# Patient Record
Sex: MALE | Race: WHITE | Employment: UNEMPLOYED | URBAN - METROPOLITAN AREA
[De-identification: names, ages, dates, MRNs, and addresses within clinical notes are randomized per-mention and may not be internally consistent; named-entity substitution may affect disease eponyms.]

---

## 2017-11-01 PROBLEM — E10.65 UNCONTROLLED TYPE 1 DIABETES MELLITUS WITH HYPERGLYCEMIA (HCC): Status: ACTIVE | Noted: 2017-11-01

## 2018-03-26 RX ORDER — FLUTICASONE PROPIONATE 220 UG/1
1 AEROSOL, METERED RESPIRATORY (INHALATION) 2 TIMES DAILY
Qty: 1 INHALER | Refills: 3 | Status: SHIPPED | OUTPATIENT
Start: 2018-03-26 | End: 2018-04-25 | Stop reason: SDUPTHER

## 2018-03-26 RX ORDER — ALBUTEROL SULFATE 90 UG/1
2 AEROSOL, METERED RESPIRATORY (INHALATION) EVERY 6 HOURS PRN
Qty: 1 INHALER | Refills: 3 | Status: SHIPPED | OUTPATIENT
Start: 2018-03-26 | End: 2018-07-30 | Stop reason: SDUPTHER

## 2018-04-25 ENCOUNTER — OFFICE VISIT (OUTPATIENT)
Dept: FAMILY MEDICINE CLINIC | Age: 28
End: 2018-04-25
Payer: COMMERCIAL

## 2018-04-25 VITALS
OXYGEN SATURATION: 98 % | SYSTOLIC BLOOD PRESSURE: 136 MMHG | HEART RATE: 120 BPM | RESPIRATION RATE: 18 BRPM | HEIGHT: 63 IN | WEIGHT: 104 LBS | DIASTOLIC BLOOD PRESSURE: 104 MMHG | BODY MASS INDEX: 18.43 KG/M2

## 2018-04-25 DIAGNOSIS — R80.9 MICROALBUMINURIA: ICD-10-CM

## 2018-04-25 DIAGNOSIS — E10.42 DIABETIC POLYNEUROPATHY ASSOCIATED WITH TYPE 1 DIABETES MELLITUS (HCC): ICD-10-CM

## 2018-04-25 DIAGNOSIS — E78.01 FAMILIAL HYPERCHOLESTEROLEMIA: ICD-10-CM

## 2018-04-25 DIAGNOSIS — Z76.0 MEDICATION REFILL: ICD-10-CM

## 2018-04-25 DIAGNOSIS — R11.2 INTRACTABLE VOMITING WITH NAUSEA, UNSPECIFIED VOMITING TYPE: ICD-10-CM

## 2018-04-25 DIAGNOSIS — Z00.00 HEALTH CARE MAINTENANCE: ICD-10-CM

## 2018-04-25 DIAGNOSIS — I10 ESSENTIAL HYPERTENSION: ICD-10-CM

## 2018-04-25 LAB — HBA1C MFR BLD: 10.5 %

## 2018-04-25 PROCEDURE — 83036 HEMOGLOBIN GLYCOSYLATED A1C: CPT | Performed by: STUDENT IN AN ORGANIZED HEALTH CARE EDUCATION/TRAINING PROGRAM

## 2018-04-25 PROCEDURE — 4004F PT TOBACCO SCREEN RCVD TLK: CPT | Performed by: STUDENT IN AN ORGANIZED HEALTH CARE EDUCATION/TRAINING PROGRAM

## 2018-04-25 PROCEDURE — G8419 CALC BMI OUT NRM PARAM NOF/U: HCPCS | Performed by: STUDENT IN AN ORGANIZED HEALTH CARE EDUCATION/TRAINING PROGRAM

## 2018-04-25 PROCEDURE — 99213 OFFICE O/P EST LOW 20 MIN: CPT | Performed by: STUDENT IN AN ORGANIZED HEALTH CARE EDUCATION/TRAINING PROGRAM

## 2018-04-25 PROCEDURE — 2022F DILAT RTA XM EVC RTNOPTHY: CPT | Performed by: STUDENT IN AN ORGANIZED HEALTH CARE EDUCATION/TRAINING PROGRAM

## 2018-04-25 PROCEDURE — 3046F HEMOGLOBIN A1C LEVEL >9.0%: CPT | Performed by: STUDENT IN AN ORGANIZED HEALTH CARE EDUCATION/TRAINING PROGRAM

## 2018-04-25 PROCEDURE — G8427 DOCREV CUR MEDS BY ELIG CLIN: HCPCS | Performed by: STUDENT IN AN ORGANIZED HEALTH CARE EDUCATION/TRAINING PROGRAM

## 2018-04-25 RX ORDER — FLUTICASONE PROPIONATE 220 UG/1
1 AEROSOL, METERED RESPIRATORY (INHALATION) 2 TIMES DAILY
Qty: 1 INHALER | Refills: 3 | Status: SHIPPED | OUTPATIENT
Start: 2018-04-25 | End: 2018-08-23 | Stop reason: SDUPTHER

## 2018-04-25 RX ORDER — LISINOPRIL 5 MG/1
5 TABLET ORAL DAILY
Qty: 30 TABLET | Refills: 1 | Status: SHIPPED | OUTPATIENT
Start: 2018-04-25 | End: 2018-06-22 | Stop reason: SDUPTHER

## 2018-04-25 RX ORDER — GABAPENTIN 100 MG/1
100 CAPSULE ORAL 3 TIMES DAILY
Qty: 90 CAPSULE | Refills: 0 | Status: SHIPPED | OUTPATIENT
Start: 2018-04-25 | End: 2018-05-25 | Stop reason: SDUPTHER

## 2018-04-26 RX ORDER — QUETIAPINE FUMARATE 25 MG/1
25 TABLET, FILM COATED ORAL EVERY EVENING
Qty: 30 TABLET | Refills: 1 | Status: SHIPPED | OUTPATIENT
Start: 2018-04-26 | End: 2018-06-22 | Stop reason: SDUPTHER

## 2018-05-01 ENCOUNTER — HOSPITAL ENCOUNTER (OUTPATIENT)
Dept: GENERAL RADIOLOGY | Age: 28
Discharge: HOME | End: 2018-05-03
Payer: COMMERCIAL

## 2018-05-01 ENCOUNTER — HOSPITAL ENCOUNTER (OUTPATIENT)
Age: 28
Discharge: HOME | End: 2018-05-03
Payer: COMMERCIAL

## 2018-05-01 ENCOUNTER — HOSPITAL ENCOUNTER (OUTPATIENT)
Age: 28
Discharge: HOME | End: 2018-05-01
Payer: COMMERCIAL

## 2018-05-01 ENCOUNTER — TELEPHONE (OUTPATIENT)
Dept: FAMILY MEDICINE CLINIC | Age: 28
End: 2018-05-01

## 2018-05-01 DIAGNOSIS — Z00.00 HEALTH CARE MAINTENANCE: ICD-10-CM

## 2018-05-01 DIAGNOSIS — R11.2 INTRACTABLE VOMITING WITH NAUSEA, UNSPECIFIED VOMITING TYPE: ICD-10-CM

## 2018-05-01 LAB
ALBUMIN SERPL-MCNC: 3.9 G/DL (ref 3.5–5.2)
ALP BLD-CCNC: 103 U/L (ref 40–129)
ALT SERPL-CCNC: 22 U/L (ref 0–40)
ANION GAP SERPL CALCULATED.3IONS-SCNC: 19 MMOL/L (ref 7–16)
AST SERPL-CCNC: 28 U/L (ref 0–39)
BASOPHILS ABSOLUTE: 0.13 E9/L (ref 0–0.2)
BASOPHILS RELATIVE PERCENT: 0.8 % (ref 0–2)
BILIRUB SERPL-MCNC: <0.2 MG/DL (ref 0–1.2)
BUN BLDV-MCNC: 19 MG/DL (ref 6–20)
CALCIUM SERPL-MCNC: 9.5 MG/DL (ref 8.6–10.2)
CHLORIDE BLD-SCNC: 96 MMOL/L (ref 98–107)
CHOLESTEROL, TOTAL: 262 MG/DL (ref 0–199)
CO2: 19 MMOL/L (ref 22–29)
CREAT SERPL-MCNC: 0.8 MG/DL (ref 0.7–1.2)
EOSINOPHILS ABSOLUTE: 0.35 E9/L (ref 0.05–0.5)
EOSINOPHILS RELATIVE PERCENT: 2.1 % (ref 0–6)
GFR AFRICAN AMERICAN: >60
GFR NON-AFRICAN AMERICAN: >60 ML/MIN/1.73
GLUCOSE BLD-MCNC: 518 MG/DL (ref 74–109)
HCT VFR BLD CALC: 39.2 % (ref 37–54)
HDLC SERPL-MCNC: 39 MG/DL
HEMOGLOBIN: 13 G/DL (ref 12.5–16.5)
IMMATURE GRANULOCYTES #: 0.08 E9/L
IMMATURE GRANULOCYTES %: 0.5 % (ref 0–5)
LDL CHOLESTEROL CALCULATED: 165 MG/DL (ref 0–99)
LYMPHOCYTES ABSOLUTE: 3.01 E9/L (ref 1.5–4)
LYMPHOCYTES RELATIVE PERCENT: 17.7 % (ref 20–42)
MCH RBC QN AUTO: 30.2 PG (ref 26–35)
MCHC RBC AUTO-ENTMCNC: 33.2 % (ref 32–34.5)
MCV RBC AUTO: 91 FL (ref 80–99.9)
MONOCYTES ABSOLUTE: 0.53 E9/L (ref 0.1–0.95)
MONOCYTES RELATIVE PERCENT: 3.1 % (ref 2–12)
NEUTROPHILS ABSOLUTE: 12.89 E9/L (ref 1.8–7.3)
NEUTROPHILS RELATIVE PERCENT: 75.8 % (ref 43–80)
PDW BLD-RTO: 13 FL (ref 11.5–15)
PLATELET # BLD: 318 E9/L (ref 130–450)
PMV BLD AUTO: 10 FL (ref 7–12)
POTASSIUM SERPL-SCNC: 4 MMOL/L (ref 3.5–5)
RBC # BLD: 4.31 E12/L (ref 3.8–5.8)
SODIUM BLD-SCNC: 134 MMOL/L (ref 132–146)
TOTAL PROTEIN: 7.1 G/DL (ref 6.4–8.3)
TRIGL SERPL-MCNC: 288 MG/DL (ref 0–149)
VLDLC SERPL CALC-MCNC: 58 MG/DL
WBC # BLD: 17 E9/L (ref 4.5–11.5)

## 2018-05-01 PROCEDURE — 74019 RADEX ABDOMEN 2 VIEWS: CPT

## 2018-05-01 PROCEDURE — 80053 COMPREHEN METABOLIC PANEL: CPT

## 2018-05-01 PROCEDURE — 36415 COLL VENOUS BLD VENIPUNCTURE: CPT

## 2018-05-01 PROCEDURE — 85025 COMPLETE CBC W/AUTO DIFF WBC: CPT

## 2018-05-01 PROCEDURE — 80061 LIPID PANEL: CPT

## 2018-05-02 ENCOUNTER — OFFICE VISIT (OUTPATIENT)
Dept: FAMILY MEDICINE CLINIC | Age: 28
End: 2018-05-02
Payer: COMMERCIAL

## 2018-05-02 VITALS
WEIGHT: 107 LBS | RESPIRATION RATE: 18 BRPM | SYSTOLIC BLOOD PRESSURE: 142 MMHG | HEART RATE: 123 BPM | OXYGEN SATURATION: 98 % | BODY MASS INDEX: 18.96 KG/M2 | DIASTOLIC BLOOD PRESSURE: 100 MMHG | HEIGHT: 63 IN

## 2018-05-02 DIAGNOSIS — R00.0 TACHYCARDIA: ICD-10-CM

## 2018-05-02 LAB — GLUCOSE BLD-MCNC: 269 MG/DL

## 2018-05-02 PROCEDURE — 4004F PT TOBACCO SCREEN RCVD TLK: CPT | Performed by: STUDENT IN AN ORGANIZED HEALTH CARE EDUCATION/TRAINING PROGRAM

## 2018-05-02 PROCEDURE — G8427 DOCREV CUR MEDS BY ELIG CLIN: HCPCS | Performed by: STUDENT IN AN ORGANIZED HEALTH CARE EDUCATION/TRAINING PROGRAM

## 2018-05-02 PROCEDURE — 82962 GLUCOSE BLOOD TEST: CPT | Performed by: STUDENT IN AN ORGANIZED HEALTH CARE EDUCATION/TRAINING PROGRAM

## 2018-05-02 PROCEDURE — 3046F HEMOGLOBIN A1C LEVEL >9.0%: CPT | Performed by: STUDENT IN AN ORGANIZED HEALTH CARE EDUCATION/TRAINING PROGRAM

## 2018-05-02 PROCEDURE — G8420 CALC BMI NORM PARAMETERS: HCPCS | Performed by: STUDENT IN AN ORGANIZED HEALTH CARE EDUCATION/TRAINING PROGRAM

## 2018-05-02 PROCEDURE — 99213 OFFICE O/P EST LOW 20 MIN: CPT | Performed by: STUDENT IN AN ORGANIZED HEALTH CARE EDUCATION/TRAINING PROGRAM

## 2018-05-02 PROCEDURE — 2022F DILAT RTA XM EVC RTNOPTHY: CPT | Performed by: STUDENT IN AN ORGANIZED HEALTH CARE EDUCATION/TRAINING PROGRAM

## 2018-05-02 RX ORDER — METOPROLOL SUCCINATE 100 MG/1
50 TABLET, EXTENDED RELEASE ORAL DAILY
Qty: 30 TABLET | Refills: 3 | Status: SHIPPED | OUTPATIENT
Start: 2018-05-02 | End: 2018-05-09 | Stop reason: SDUPTHER

## 2018-05-02 RX ORDER — INSULIN GLARGINE 100 [IU]/ML
40 INJECTION, SOLUTION SUBCUTANEOUS NIGHTLY
Qty: 1 VIAL | Refills: 3 | Status: SHIPPED | OUTPATIENT
Start: 2018-05-02 | End: 2018-05-30 | Stop reason: SDUPTHER

## 2018-05-08 ASSESSMENT — ENCOUNTER SYMPTOMS
HEARTBURN: 1
SORE THROAT: 0
WHEEZING: 0
CONSTIPATION: 0
BLURRED VISION: 0
VOMITING: 1
COUGH: 1
ABDOMINAL PAIN: 0
SHORTNESS OF BREATH: 1
DIARRHEA: 0
BACK PAIN: 0
NAUSEA: 1
ORTHOPNEA: 0

## 2018-05-09 RX ORDER — METOPROLOL SUCCINATE 100 MG/1
100 TABLET, EXTENDED RELEASE ORAL DAILY
Qty: 30 TABLET | Refills: 3
Start: 2018-05-09 | End: 2018-09-11 | Stop reason: SDUPTHER

## 2018-05-09 ASSESSMENT — ENCOUNTER SYMPTOMS
DIARRHEA: 0
NAUSEA: 0
ABDOMINAL PAIN: 0
SORE THROAT: 0
BLURRED VISION: 0
SHORTNESS OF BREATH: 1
ORTHOPNEA: 0
CONSTIPATION: 0
BACK PAIN: 0
COUGH: 1
VOMITING: 0
HEARTBURN: 0
WHEEZING: 0

## 2018-05-10 ENCOUNTER — TELEPHONE (OUTPATIENT)
Dept: FAMILY MEDICINE CLINIC | Age: 28
End: 2018-05-10

## 2018-05-25 ENCOUNTER — OFFICE VISIT (OUTPATIENT)
Dept: FAMILY MEDICINE CLINIC | Age: 28
End: 2018-05-25
Payer: COMMERCIAL

## 2018-05-25 VITALS
BODY MASS INDEX: 18.96 KG/M2 | WEIGHT: 107 LBS | HEIGHT: 63 IN | DIASTOLIC BLOOD PRESSURE: 84 MMHG | RESPIRATION RATE: 16 BRPM | SYSTOLIC BLOOD PRESSURE: 126 MMHG | HEART RATE: 116 BPM

## 2018-05-25 DIAGNOSIS — E10.42 DIABETIC POLYNEUROPATHY ASSOCIATED WITH TYPE 1 DIABETES MELLITUS (HCC): ICD-10-CM

## 2018-05-25 DIAGNOSIS — Z00.00 HEALTH CARE MAINTENANCE: ICD-10-CM

## 2018-05-25 LAB — GLUCOSE BLD-MCNC: 151 MG/DL

## 2018-05-25 PROCEDURE — 82962 GLUCOSE BLOOD TEST: CPT | Performed by: STUDENT IN AN ORGANIZED HEALTH CARE EDUCATION/TRAINING PROGRAM

## 2018-05-25 PROCEDURE — G8427 DOCREV CUR MEDS BY ELIG CLIN: HCPCS | Performed by: STUDENT IN AN ORGANIZED HEALTH CARE EDUCATION/TRAINING PROGRAM

## 2018-05-25 PROCEDURE — 3046F HEMOGLOBIN A1C LEVEL >9.0%: CPT | Performed by: STUDENT IN AN ORGANIZED HEALTH CARE EDUCATION/TRAINING PROGRAM

## 2018-05-25 PROCEDURE — 4004F PT TOBACCO SCREEN RCVD TLK: CPT | Performed by: STUDENT IN AN ORGANIZED HEALTH CARE EDUCATION/TRAINING PROGRAM

## 2018-05-25 PROCEDURE — G8420 CALC BMI NORM PARAMETERS: HCPCS | Performed by: STUDENT IN AN ORGANIZED HEALTH CARE EDUCATION/TRAINING PROGRAM

## 2018-05-25 PROCEDURE — 99213 OFFICE O/P EST LOW 20 MIN: CPT | Performed by: STUDENT IN AN ORGANIZED HEALTH CARE EDUCATION/TRAINING PROGRAM

## 2018-05-25 PROCEDURE — 2022F DILAT RTA XM EVC RTNOPTHY: CPT | Performed by: STUDENT IN AN ORGANIZED HEALTH CARE EDUCATION/TRAINING PROGRAM

## 2018-05-25 RX ORDER — ATORVASTATIN CALCIUM 40 MG/1
40 TABLET, FILM COATED ORAL DAILY
Qty: 30 TABLET | Refills: 3 | Status: SHIPPED | OUTPATIENT
Start: 2018-05-25 | End: 2018-10-11 | Stop reason: SDUPTHER

## 2018-05-25 RX ORDER — GABAPENTIN 100 MG/1
100 CAPSULE ORAL 3 TIMES DAILY
Qty: 90 CAPSULE | Refills: 0 | Status: SHIPPED | OUTPATIENT
Start: 2018-05-25 | End: 2018-06-22 | Stop reason: SDUPTHER

## 2018-05-25 RX ORDER — NICOTINE 21 MG/24HR
1 PATCH, TRANSDERMAL 24 HOURS TRANSDERMAL EVERY 24 HOURS
Qty: 30 PATCH | Refills: 3 | Status: SHIPPED | OUTPATIENT
Start: 2018-05-25 | End: 2018-06-27 | Stop reason: SDDI

## 2018-05-30 RX ORDER — INSULIN GLARGINE 100 [IU]/ML
40 INJECTION, SOLUTION SUBCUTANEOUS NIGHTLY
Qty: 1 VIAL | Refills: 3
Start: 2018-05-30 | End: 2018-07-30

## 2018-05-30 ASSESSMENT — ENCOUNTER SYMPTOMS
WHEEZING: 0
HEARTBURN: 0
VOMITING: 0
DIARRHEA: 0
CONSTIPATION: 0
COUGH: 0
BACK PAIN: 0
BLURRED VISION: 1
SORE THROAT: 0
NAUSEA: 0
ABDOMINAL PAIN: 0
SHORTNESS OF BREATH: 0
ORTHOPNEA: 0

## 2018-06-22 ENCOUNTER — OFFICE VISIT (OUTPATIENT)
Dept: FAMILY MEDICINE CLINIC | Age: 28
End: 2018-06-22
Payer: COMMERCIAL

## 2018-06-22 VITALS
SYSTOLIC BLOOD PRESSURE: 120 MMHG | HEART RATE: 107 BPM | BODY MASS INDEX: 18.25 KG/M2 | HEIGHT: 63 IN | OXYGEN SATURATION: 97 % | RESPIRATION RATE: 18 BRPM | WEIGHT: 103 LBS | DIASTOLIC BLOOD PRESSURE: 88 MMHG

## 2018-06-22 DIAGNOSIS — E10.42 DIABETIC POLYNEUROPATHY ASSOCIATED WITH TYPE 1 DIABETES MELLITUS (HCC): ICD-10-CM

## 2018-06-22 DIAGNOSIS — R80.9 MICROALBUMINURIA: ICD-10-CM

## 2018-06-22 DIAGNOSIS — E10.42 TYPE 1 DIABETES MELLITUS WITH DIABETIC POLYNEUROPATHY (HCC): Primary | ICD-10-CM

## 2018-06-22 PROCEDURE — G8427 DOCREV CUR MEDS BY ELIG CLIN: HCPCS | Performed by: STUDENT IN AN ORGANIZED HEALTH CARE EDUCATION/TRAINING PROGRAM

## 2018-06-22 PROCEDURE — 4004F PT TOBACCO SCREEN RCVD TLK: CPT | Performed by: STUDENT IN AN ORGANIZED HEALTH CARE EDUCATION/TRAINING PROGRAM

## 2018-06-22 PROCEDURE — 2022F DILAT RTA XM EVC RTNOPTHY: CPT | Performed by: STUDENT IN AN ORGANIZED HEALTH CARE EDUCATION/TRAINING PROGRAM

## 2018-06-22 PROCEDURE — 3046F HEMOGLOBIN A1C LEVEL >9.0%: CPT | Performed by: STUDENT IN AN ORGANIZED HEALTH CARE EDUCATION/TRAINING PROGRAM

## 2018-06-22 PROCEDURE — G8419 CALC BMI OUT NRM PARAM NOF/U: HCPCS | Performed by: STUDENT IN AN ORGANIZED HEALTH CARE EDUCATION/TRAINING PROGRAM

## 2018-06-22 PROCEDURE — 99213 OFFICE O/P EST LOW 20 MIN: CPT | Performed by: STUDENT IN AN ORGANIZED HEALTH CARE EDUCATION/TRAINING PROGRAM

## 2018-06-22 RX ORDER — QUETIAPINE FUMARATE 25 MG/1
25 TABLET, FILM COATED ORAL EVERY EVENING
Qty: 30 TABLET | Refills: 1 | Status: SHIPPED | OUTPATIENT
Start: 2018-06-22 | End: 2018-08-23 | Stop reason: SDUPTHER

## 2018-06-22 RX ORDER — LISINOPRIL 5 MG/1
5 TABLET ORAL DAILY
Qty: 30 TABLET | Refills: 1 | Status: SHIPPED | OUTPATIENT
Start: 2018-06-22 | End: 2018-08-23 | Stop reason: SDUPTHER

## 2018-06-22 RX ORDER — GABAPENTIN 100 MG/1
100 CAPSULE ORAL 3 TIMES DAILY
Qty: 90 CAPSULE | Refills: 0 | Status: SHIPPED | OUTPATIENT
Start: 2018-06-22 | End: 2018-07-30 | Stop reason: SDUPTHER

## 2018-06-25 ENCOUNTER — HOSPITAL ENCOUNTER (OUTPATIENT)
Age: 28
Discharge: HOME | End: 2018-06-25
Payer: COMMERCIAL

## 2018-06-25 DIAGNOSIS — Z00.00 HEALTH CARE MAINTENANCE: ICD-10-CM

## 2018-06-25 LAB
BASOPHILS ABSOLUTE: 0.09 E9/L (ref 0–0.2)
BASOPHILS RELATIVE PERCENT: 0.8 % (ref 0–2)
EOSINOPHILS ABSOLUTE: 0.38 E9/L (ref 0.05–0.5)
EOSINOPHILS RELATIVE PERCENT: 3.4 % (ref 0–6)
HCT VFR BLD CALC: 38.8 % (ref 37–54)
HEMOGLOBIN: 12.9 G/DL (ref 12.5–16.5)
IMMATURE GRANULOCYTES #: 0.04 E9/L
IMMATURE GRANULOCYTES %: 0.4 % (ref 0–5)
LYMPHOCYTES ABSOLUTE: 3.79 E9/L (ref 1.5–4)
LYMPHOCYTES RELATIVE PERCENT: 34 % (ref 20–42)
MCH RBC QN AUTO: 29.7 PG (ref 26–35)
MCHC RBC AUTO-ENTMCNC: 33.2 % (ref 32–34.5)
MCV RBC AUTO: 89.2 FL (ref 80–99.9)
MONOCYTES ABSOLUTE: 0.52 E9/L (ref 0.1–0.95)
MONOCYTES RELATIVE PERCENT: 4.7 % (ref 2–12)
NEUTROPHILS ABSOLUTE: 6.34 E9/L (ref 1.8–7.3)
NEUTROPHILS RELATIVE PERCENT: 56.7 % (ref 43–80)
PDW BLD-RTO: 12.6 FL (ref 11.5–15)
PLATELET # BLD: 291 E9/L (ref 130–450)
PMV BLD AUTO: 9.6 FL (ref 7–12)
RBC # BLD: 4.35 E12/L (ref 3.8–5.8)
WBC # BLD: 11.2 E9/L (ref 4.5–11.5)

## 2018-06-25 PROCEDURE — 36415 COLL VENOUS BLD VENIPUNCTURE: CPT

## 2018-06-25 PROCEDURE — 85025 COMPLETE CBC W/AUTO DIFF WBC: CPT

## 2018-06-25 ASSESSMENT — ENCOUNTER SYMPTOMS
WHEEZING: 0
ABDOMINAL PAIN: 0
NAUSEA: 0
HEARTBURN: 0
SHORTNESS OF BREATH: 1
SORE THROAT: 0
CONSTIPATION: 0
ORTHOPNEA: 0
VOMITING: 0
DIARRHEA: 0
BACK PAIN: 0
BLURRED VISION: 1
COUGH: 1

## 2018-07-30 ENCOUNTER — OFFICE VISIT (OUTPATIENT)
Dept: FAMILY MEDICINE CLINIC | Age: 28
End: 2018-07-30
Payer: COMMERCIAL

## 2018-07-30 VITALS
HEIGHT: 63 IN | RESPIRATION RATE: 14 BRPM | DIASTOLIC BLOOD PRESSURE: 106 MMHG | OXYGEN SATURATION: 99 % | BODY MASS INDEX: 18.43 KG/M2 | SYSTOLIC BLOOD PRESSURE: 143 MMHG | HEART RATE: 110 BPM | WEIGHT: 104 LBS

## 2018-07-30 DIAGNOSIS — E10.42 DIABETIC POLYNEUROPATHY ASSOCIATED WITH TYPE 1 DIABETES MELLITUS (HCC): ICD-10-CM

## 2018-07-30 DIAGNOSIS — I10 ESSENTIAL HYPERTENSION: ICD-10-CM

## 2018-07-30 LAB — HBA1C MFR BLD: 11.1 %

## 2018-07-30 PROCEDURE — 4004F PT TOBACCO SCREEN RCVD TLK: CPT | Performed by: STUDENT IN AN ORGANIZED HEALTH CARE EDUCATION/TRAINING PROGRAM

## 2018-07-30 PROCEDURE — G8419 CALC BMI OUT NRM PARAM NOF/U: HCPCS | Performed by: STUDENT IN AN ORGANIZED HEALTH CARE EDUCATION/TRAINING PROGRAM

## 2018-07-30 PROCEDURE — 3046F HEMOGLOBIN A1C LEVEL >9.0%: CPT | Performed by: STUDENT IN AN ORGANIZED HEALTH CARE EDUCATION/TRAINING PROGRAM

## 2018-07-30 PROCEDURE — G8427 DOCREV CUR MEDS BY ELIG CLIN: HCPCS | Performed by: STUDENT IN AN ORGANIZED HEALTH CARE EDUCATION/TRAINING PROGRAM

## 2018-07-30 PROCEDURE — 83036 HEMOGLOBIN GLYCOSYLATED A1C: CPT | Performed by: STUDENT IN AN ORGANIZED HEALTH CARE EDUCATION/TRAINING PROGRAM

## 2018-07-30 PROCEDURE — 99213 OFFICE O/P EST LOW 20 MIN: CPT | Performed by: STUDENT IN AN ORGANIZED HEALTH CARE EDUCATION/TRAINING PROGRAM

## 2018-07-30 PROCEDURE — 2022F DILAT RTA XM EVC RTNOPTHY: CPT | Performed by: STUDENT IN AN ORGANIZED HEALTH CARE EDUCATION/TRAINING PROGRAM

## 2018-07-30 RX ORDER — GABAPENTIN 100 MG/1
100 CAPSULE ORAL 3 TIMES DAILY
Qty: 90 CAPSULE | Refills: 0 | Status: SHIPPED | OUTPATIENT
Start: 2018-07-30 | End: 2018-08-23 | Stop reason: SDUPTHER

## 2018-07-30 RX ORDER — ALBUTEROL SULFATE 90 UG/1
2 AEROSOL, METERED RESPIRATORY (INHALATION) EVERY 6 HOURS PRN
Qty: 1 INHALER | Refills: 3 | Status: SHIPPED | OUTPATIENT
Start: 2018-07-30 | End: 2019-01-21 | Stop reason: SDUPTHER

## 2018-07-30 RX ORDER — CLONIDINE HYDROCHLORIDE 0.1 MG/1
0.1 TABLET, EXTENDED RELEASE ORAL 2 TIMES DAILY PRN
Qty: 60 TABLET | Refills: 0 | Status: SHIPPED | OUTPATIENT
Start: 2018-07-30 | End: 2018-09-11

## 2018-07-30 NOTE — PROGRESS NOTES
insulin aspart (NOVOLOG) 100 UNIT/ML injection vial; Inject 6-10 Units into the skin 4 times daily  -     POCT glycosylated hemoglobin (Hb A1C)  -      DIABETES FOOT EXAM    Essential hypertension:  HTN due to weaning off suboxone. Patient does have history of labile blood pressure. Will add Clonidine today and reassess. Patient asked to report any dizziness, light headedness. Also asked not to stop suddenly. -     cloNIDine (KAPVAY) 0.1 MG TB12 extended release tablet; Take 1 tablet by mouth 2 times daily as needed (htn)    Diabetic polyneuropathy associated with type 1 diabetes mellitus (HCC)  -     gabapentin (NEURONTIN) 100 MG capsule; Take 1 capsule by mouth 3 times daily for 30 days. .    Shortness of Breath:  Pt continues to smoke. Counseled on smoking cessation. Will start on Ventolin as needed. -     albuterol sulfate HFA (VENTOLIN HFA) 108 (90 Base) MCG/ACT inhaler; Inhale 2 puffs into the lungs every 6 hours as needed for Wheezing    Return in about 2 weeks (around 8/13/2018).

## 2018-08-07 ASSESSMENT — ENCOUNTER SYMPTOMS
VOMITING: 0
CONSTIPATION: 0
DIARRHEA: 0
BLURRED VISION: 1
NAUSEA: 0
SORE THROAT: 0
SHORTNESS OF BREATH: 1
COUGH: 1
HEARTBURN: 0
ABDOMINAL PAIN: 0
ORTHOPNEA: 0
WHEEZING: 0
BACK PAIN: 0

## 2018-08-23 ENCOUNTER — OFFICE VISIT (OUTPATIENT)
Dept: FAMILY MEDICINE CLINIC | Age: 28
End: 2018-08-23
Payer: COMMERCIAL

## 2018-08-23 VITALS
SYSTOLIC BLOOD PRESSURE: 150 MMHG | HEART RATE: 112 BPM | DIASTOLIC BLOOD PRESSURE: 100 MMHG | TEMPERATURE: 98.7 F | RESPIRATION RATE: 16 BRPM | WEIGHT: 107 LBS | OXYGEN SATURATION: 98 % | HEIGHT: 63 IN | BODY MASS INDEX: 18.96 KG/M2

## 2018-08-23 DIAGNOSIS — K04.7 DENTAL ABSCESS: Primary | ICD-10-CM

## 2018-08-23 DIAGNOSIS — E10.65 UNCONTROLLED TYPE 1 DIABETES MELLITUS WITH HYPERGLYCEMIA (HCC): ICD-10-CM

## 2018-08-23 DIAGNOSIS — R80.9 MICROALBUMINURIA: ICD-10-CM

## 2018-08-23 DIAGNOSIS — E10.42 DIABETIC POLYNEUROPATHY ASSOCIATED WITH TYPE 1 DIABETES MELLITUS (HCC): ICD-10-CM

## 2018-08-23 DIAGNOSIS — Z76.0 MEDICATION REFILL: ICD-10-CM

## 2018-08-23 PROCEDURE — 4004F PT TOBACCO SCREEN RCVD TLK: CPT | Performed by: STUDENT IN AN ORGANIZED HEALTH CARE EDUCATION/TRAINING PROGRAM

## 2018-08-23 PROCEDURE — 3046F HEMOGLOBIN A1C LEVEL >9.0%: CPT | Performed by: STUDENT IN AN ORGANIZED HEALTH CARE EDUCATION/TRAINING PROGRAM

## 2018-08-23 PROCEDURE — G8427 DOCREV CUR MEDS BY ELIG CLIN: HCPCS | Performed by: STUDENT IN AN ORGANIZED HEALTH CARE EDUCATION/TRAINING PROGRAM

## 2018-08-23 PROCEDURE — 2022F DILAT RTA XM EVC RTNOPTHY: CPT | Performed by: STUDENT IN AN ORGANIZED HEALTH CARE EDUCATION/TRAINING PROGRAM

## 2018-08-23 PROCEDURE — 99213 OFFICE O/P EST LOW 20 MIN: CPT | Performed by: STUDENT IN AN ORGANIZED HEALTH CARE EDUCATION/TRAINING PROGRAM

## 2018-08-23 PROCEDURE — G8420 CALC BMI NORM PARAMETERS: HCPCS | Performed by: STUDENT IN AN ORGANIZED HEALTH CARE EDUCATION/TRAINING PROGRAM

## 2018-08-23 RX ORDER — GABAPENTIN 100 MG/1
100 CAPSULE ORAL 3 TIMES DAILY
Qty: 90 CAPSULE | Refills: 0 | Status: SHIPPED | OUTPATIENT
Start: 2018-08-23 | End: 2018-09-11 | Stop reason: SDUPTHER

## 2018-08-23 RX ORDER — LISINOPRIL 10 MG/1
10 TABLET ORAL DAILY
Qty: 30 TABLET | Refills: 1 | Status: SHIPPED | OUTPATIENT
Start: 2018-08-23 | End: 2019-03-06 | Stop reason: SDUPTHER

## 2018-08-23 RX ORDER — AMOXICILLIN AND CLAVULANATE POTASSIUM 875; 125 MG/1; MG/1
1 TABLET, FILM COATED ORAL EVERY 12 HOURS
Qty: 20 TABLET | Refills: 0 | Status: SHIPPED | OUTPATIENT
Start: 2018-08-23 | End: 2018-09-02

## 2018-08-23 RX ORDER — FLUTICASONE PROPIONATE 220 UG/1
1 AEROSOL, METERED RESPIRATORY (INHALATION) 2 TIMES DAILY
Qty: 1 INHALER | Refills: 3 | Status: SHIPPED | OUTPATIENT
Start: 2018-08-23 | End: 2019-01-21 | Stop reason: SDUPTHER

## 2018-08-23 RX ORDER — QUETIAPINE FUMARATE 25 MG/1
25 TABLET, FILM COATED ORAL EVERY EVENING
Qty: 30 TABLET | Refills: 1 | Status: SHIPPED | OUTPATIENT
Start: 2018-08-23 | End: 2018-09-11

## 2018-08-23 NOTE — PROGRESS NOTES
9/24/2018    Jose Bobo is a 29 y.o. male here for   Chief Complaint   Patient presents with    Diabetes     check up     DM2:    Lantus at 10:30-11pm at night. FBS: 300s now. Post-Prandial   Left eye blurry -has not been able to see any opth. Neuropathy under check with gabapentin. Pre-meal: 150-160s. Right jaw pain and also under his ear. Night time fevers. Started a week ago, worse for the past 3 days. Dentist unwilling to extract because of Diabetes. 9-10 units 4 times a day. Eating more to gain weight. Lisinopril 20 mg    No Known Allergies    Medications  Current Outpatient Prescriptions   Medication Sig Dispense Refill    blood glucose test strips (TRUE METRIX BLOOD GLUCOSE TEST) strip 1 each by In Vitro route 5 times daily As needed. 150 each 3    lisinopril (PRINIVIL;ZESTRIL) 10 MG tablet Take 1 tablet by mouth daily (Patient taking differently: Take 7.5 mg by mouth daily ) 30 tablet 1    fluticasone (FLOVENT HFA) 220 MCG/ACT inhaler Inhale 1 puff into the lungs 2 times daily 1 Inhaler 3    insulin aspart (NOVOLOG) 100 UNIT/ML injection vial Inject 6-10 Units into the skin 4 times daily 1 vial 3    albuterol sulfate HFA (VENTOLIN HFA) 108 (90 Base) MCG/ACT inhaler Inhale 2 puffs into the lungs every 6 hours as needed for Wheezing 1 Inhaler 3    insulin glargine (BASAGLAR KWIKPEN) 100 UNIT/ML injection pen Patient to take 15 units in the am and 20 units in the pm. (Patient taking differently: Inject 40 Units into the skin nightly ) 4 pen 3    B-D INS SYRINGE 0.5CC/31GX5/16 31G X 5/16\" 0.5 ML MISC use four times a day 200 each 5    atorvastatin (LIPITOR) 40 MG tablet Take 1 tablet by mouth daily 30 tablet 3    SUBOXONE 8-2 MG FILM SL film       gabapentin (NEURONTIN) 300 MG capsule Take 1 capsule by mouth nightly for 60 days. . 30 capsule 1    melatonin 1 MG tablet Take 1 tablet by mouth nightly as needed for Sleep 30 tablet 0    metoprolol succinate (TOPROL XL) 50 MG extended
Statement    I have reviewed the chart, including any radiology or labs. I have discussed the case, including pertinent history and exam findings with the resident. I agree with the assessment, plan and orders as documented by the resident. Please refer to the resident note for additional information.       Electronically signed by Lizette Gee DO on 9/24/2018 at 8:22 AM

## 2018-08-23 NOTE — PATIENT INSTRUCTIONS
Uncontrolled Diabetes Barrier Assessment  [facility] 4908-8219 Quality Improvement  Project  [address] [facility] PRIMARY CARE Dept: [phone] 8/22/18    Hemoglobin A1C:  Hemoglobin A1C is a reflection of average blood sugar over the past 3 months. Many organizations recommend that a diabetic's Hemoglobin A1C be below 7%, however this often varies on an individual basis. It is recommended that you discuss your Hemoglobin A1C goal with your doctor and why this goal is chosen for you. My next Hemoglobin A1C goal is less than 10. My last Hemoglobin A1C was 11.1  Lab Results   Component Value Date    LABA1C 11.1 07/30/2018        Follow Up Plan: It is very important that you have appropriate follow up for your health conditions. We may call you in about 2 weeks to review your progress unless you are scheduled to meet with your doctor before that time. Please be sure your contact numbers are accurate. At this visit your doctor recommended follow up No Follow-up on file. Please check your glucose 4 times per day and record your results for review at your follow up visit. Uncontrolled Diabetes Barrier Assessment:  Based on the barrier assessment completed today (scanned into media), the highest risk area appears to be: 3 - Beliefs about Treatments. Based on the survey you completed today, we have agreed that the next best step is: 2 - to streamline to help you take your medicines as recommended. Every person's circumstances are unique, we would like to offer as much support as possible as we work together to improve your health. If there is something specific you feel would help please bring it up to your health care staff and doctor. Thank you for allowing us to take care of you.

## 2018-08-24 ENCOUNTER — TELEPHONE (OUTPATIENT)
Dept: ADMINISTRATIVE | Age: 28
End: 2018-08-24

## 2018-09-11 ENCOUNTER — OFFICE VISIT (OUTPATIENT)
Dept: FAMILY MEDICINE CLINIC | Age: 28
End: 2018-09-11
Payer: COMMERCIAL

## 2018-09-11 VITALS
WEIGHT: 107 LBS | HEIGHT: 63 IN | SYSTOLIC BLOOD PRESSURE: 114 MMHG | DIASTOLIC BLOOD PRESSURE: 74 MMHG | RESPIRATION RATE: 18 BRPM | OXYGEN SATURATION: 94 % | BODY MASS INDEX: 18.96 KG/M2 | HEART RATE: 103 BPM

## 2018-09-11 DIAGNOSIS — R00.0 TACHYCARDIA: ICD-10-CM

## 2018-09-11 DIAGNOSIS — E10.42 DIABETIC POLYNEUROPATHY ASSOCIATED WITH TYPE 1 DIABETES MELLITUS (HCC): ICD-10-CM

## 2018-09-11 PROCEDURE — 99213 OFFICE O/P EST LOW 20 MIN: CPT | Performed by: STUDENT IN AN ORGANIZED HEALTH CARE EDUCATION/TRAINING PROGRAM

## 2018-09-11 PROCEDURE — 3046F HEMOGLOBIN A1C LEVEL >9.0%: CPT | Performed by: STUDENT IN AN ORGANIZED HEALTH CARE EDUCATION/TRAINING PROGRAM

## 2018-09-11 PROCEDURE — G8420 CALC BMI NORM PARAMETERS: HCPCS | Performed by: STUDENT IN AN ORGANIZED HEALTH CARE EDUCATION/TRAINING PROGRAM

## 2018-09-11 PROCEDURE — 4004F PT TOBACCO SCREEN RCVD TLK: CPT | Performed by: STUDENT IN AN ORGANIZED HEALTH CARE EDUCATION/TRAINING PROGRAM

## 2018-09-11 PROCEDURE — 2022F DILAT RTA XM EVC RTNOPTHY: CPT | Performed by: STUDENT IN AN ORGANIZED HEALTH CARE EDUCATION/TRAINING PROGRAM

## 2018-09-11 PROCEDURE — G8427 DOCREV CUR MEDS BY ELIG CLIN: HCPCS | Performed by: STUDENT IN AN ORGANIZED HEALTH CARE EDUCATION/TRAINING PROGRAM

## 2018-09-11 RX ORDER — UREA 10 %
1 LOTION (ML) TOPICAL NIGHTLY PRN
Qty: 30 TABLET | Refills: 0 | Status: SHIPPED | OUTPATIENT
Start: 2018-09-11 | End: 2019-02-14

## 2018-09-11 RX ORDER — GABAPENTIN 300 MG/1
300 CAPSULE ORAL NIGHTLY
Qty: 30 CAPSULE | Refills: 1 | Status: SHIPPED | OUTPATIENT
Start: 2018-09-11 | End: 2018-11-13 | Stop reason: SDUPTHER

## 2018-09-11 RX ORDER — METOPROLOL SUCCINATE 50 MG/1
50 TABLET, EXTENDED RELEASE ORAL DAILY
Qty: 30 TABLET | Refills: 1 | Status: SHIPPED | OUTPATIENT
Start: 2018-09-11 | End: 2018-11-13 | Stop reason: SDUPTHER

## 2018-09-11 NOTE — PROGRESS NOTES
Uncontrolled Diabetes Barrier Assessment  [facility] 0550-0579 Quality Improvement  Project  [address] [facility] PRIMARY CARE Dept: [phone] 9/11/18    Hemoglobin A1C:  Hemoglobin A1C is a reflection of average blood sugar over the past 3 months. Many organizations recommend that a diabetic's Hemoglobin A1C be below 7%, however this often varies on an individual basis. It is recommended that you discuss your Hemoglobin A1C goal with your doctor and why this goal is chosen for you. My next Hemoglobin A1C goal is less than {Blank single:90981::\"6.5\",\"7\",\"7.5\",\"8\",\"8.5\",\"9\",\"***\"}. My last Hemoglobin A1C was   Lab Results   Component Value Date    LABA1C 11.1 07/30/2018        Follow Up Plan: It is very important that you have appropriate follow up for your health conditions. We may call you in about 2 weeks to review your progress unless you are scheduled to meet with your doctor before that time. Please be sure your contact numbers are accurate. At this visit your doctor recommended follow up No Follow-up on file. Please check your glucose {Blanksingle:32880::\"1\",\"2\",\"3\",\"4\"} times per day and record your results for review at your follow up visit. Uncontrolled Diabetes Barrier Assessment:  Based on the barrier assessment completed today (scanned into media), the highest risk area appears to be: {Blank single:92774::\"1 - Social/Economic Concerns\",\"2 - Medication Effects\",\"3 - Beliefs about Treatments\",\"4 - Mood\",\"5 - Strength of Habit (reverse coded)\",\"***\"}. Based on the survey you completed today, we have agreed that the next best step is: {Blank single:04357::\"1 - a referral to social work. Please expect a call within the next 2 weeks. \",\"2 - to *** to help you take your medicines as recommended. \",\"3 - to *** to help you understand your illness and the treatments available. \",\"4 - treat your mood by *** so that you are able to improve your diabetes illness. \",\"5 - to remind myself daily why it is important to me to control my illnesses and the benefit of making my medications part of my daily routine\",\"***\"}    Every person's circumstances are unique, we would like to offer as much support as possible as we work together to improve your health. If there is something specific you feel would help please bring it up to your health care staff and doctor. Thank you for allowing us to take care of you.

## 2018-09-11 NOTE — PROGRESS NOTES
refer to the resident note for additional information.       Electronically signed by Marni Muñiz MD on 9/11/2018 at 12:10 PM

## 2018-09-11 NOTE — PROGRESS NOTES
9/18/2018    Adam Ortega is a 29 y.o. male here for   Chief Complaint   Patient presents with    Diabetes         Other     due for eye exam, declined Tdap, pneumo, flu     Dental Pain:  augmentin 10 days. Tooth pain has resolved. DM1:  40 units at night. premeal is 160s. Post prandial are 240-260.  30 day avg has been 204. FBS: 187,usually in the 180s-200s. Blood pressure has been better because taking 7.5 mg lisinopril. In the 100s/70-80s.    5 units   No Known Allergies    Medications  Current Outpatient Prescriptions   Medication Sig Dispense Refill    gabapentin (NEURONTIN) 300 MG capsule Take 1 capsule by mouth nightly for 60 days. . 30 capsule 1    melatonin 1 MG tablet Take 1 tablet by mouth nightly as needed for Sleep 30 tablet 0    metoprolol succinate (TOPROL XL) 50 MG extended release tablet Take 1 tablet by mouth daily 30 tablet 1    blood glucose test strips (TRUE METRIX BLOOD GLUCOSE TEST) strip 1 each by In Vitro route 5 times daily As needed.  150 each 3    lisinopril (PRINIVIL;ZESTRIL) 10 MG tablet Take 1 tablet by mouth daily (Patient taking differently: Take 7.5 mg by mouth daily ) 30 tablet 1    fluticasone (FLOVENT HFA) 220 MCG/ACT inhaler Inhale 1 puff into the lungs 2 times daily 1 Inhaler 3    insulin aspart (NOVOLOG) 100 UNIT/ML injection vial Inject 6-10 Units into the skin 4 times daily 1 vial 3    albuterol sulfate HFA (VENTOLIN HFA) 108 (90 Base) MCG/ACT inhaler Inhale 2 puffs into the lungs every 6 hours as needed for Wheezing 1 Inhaler 3    insulin glargine (BASAGLAR KWIKPEN) 100 UNIT/ML injection pen Patient to take 15 units in the am and 20 units in the pm. (Patient taking differently: Inject 40 Units into the skin nightly ) 4 pen 3    B-D INS SYRINGE 0.5CC/31GX5/16 31G X 5/16\" 0.5 ML MISC use four times a day 200 each 5    atorvastatin (LIPITOR) 40 MG tablet Take 1 tablet by mouth daily 30 tablet 3    SUBOXONE 8-2 MG FILM SL film        No current

## 2018-09-18 ASSESSMENT — ENCOUNTER SYMPTOMS
DIARRHEA: 0
ORTHOPNEA: 0
VOMITING: 0
WHEEZING: 0
SHORTNESS OF BREATH: 0
NAUSEA: 0
HEARTBURN: 0
BACK PAIN: 0
ABDOMINAL PAIN: 0
BLURRED VISION: 0
SORE THROAT: 0
CONSTIPATION: 0
COUGH: 0

## 2018-09-21 ASSESSMENT — ENCOUNTER SYMPTOMS
SHORTNESS OF BREATH: 0
COUGH: 0
NAUSEA: 0
WHEEZING: 0
CONSTIPATION: 0
HEARTBURN: 0
BACK PAIN: 0
BLURRED VISION: 0
VOMITING: 0
ORTHOPNEA: 0
ABDOMINAL PAIN: 0
DIARRHEA: 0
SORE THROAT: 0

## 2018-09-27 ENCOUNTER — TELEPHONE (OUTPATIENT)
Dept: FAMILY MEDICINE CLINIC | Age: 28
End: 2018-09-27

## 2018-09-27 NOTE — TELEPHONE ENCOUNTER
----- Message from Yumiko Vann RN sent at 9/11/2018 11:18 AM EDT -----  Staff - This patient has been identified for additional help due to uncontrolled diabetes. Please use smart phrase [id number] when the follow up phone call is placed.

## 2018-10-11 ENCOUNTER — OFFICE VISIT (OUTPATIENT)
Dept: FAMILY MEDICINE CLINIC | Age: 28
End: 2018-10-11
Payer: COMMERCIAL

## 2018-10-11 VITALS
HEART RATE: 59 BPM | WEIGHT: 108 LBS | OXYGEN SATURATION: 95 % | RESPIRATION RATE: 18 BRPM | BODY MASS INDEX: 19.14 KG/M2 | HEIGHT: 63 IN | SYSTOLIC BLOOD PRESSURE: 126 MMHG | DIASTOLIC BLOOD PRESSURE: 88 MMHG

## 2018-10-11 DIAGNOSIS — F33.42 RECURRENT MAJOR DEPRESSIVE DISORDER, IN FULL REMISSION (HCC): ICD-10-CM

## 2018-10-11 DIAGNOSIS — E10.65 UNCONTROLLED TYPE 1 DIABETES MELLITUS WITH HYPERGLYCEMIA (HCC): ICD-10-CM

## 2018-10-11 DIAGNOSIS — H53.8 BLURRY VISION: Primary | ICD-10-CM

## 2018-10-11 DIAGNOSIS — R80.9 MICROALBUMINURIA: ICD-10-CM

## 2018-10-11 PROCEDURE — 4004F PT TOBACCO SCREEN RCVD TLK: CPT | Performed by: STUDENT IN AN ORGANIZED HEALTH CARE EDUCATION/TRAINING PROGRAM

## 2018-10-11 PROCEDURE — 99213 OFFICE O/P EST LOW 20 MIN: CPT | Performed by: STUDENT IN AN ORGANIZED HEALTH CARE EDUCATION/TRAINING PROGRAM

## 2018-10-11 PROCEDURE — 3046F HEMOGLOBIN A1C LEVEL >9.0%: CPT | Performed by: STUDENT IN AN ORGANIZED HEALTH CARE EDUCATION/TRAINING PROGRAM

## 2018-10-11 PROCEDURE — G8420 CALC BMI NORM PARAMETERS: HCPCS | Performed by: STUDENT IN AN ORGANIZED HEALTH CARE EDUCATION/TRAINING PROGRAM

## 2018-10-11 PROCEDURE — G8427 DOCREV CUR MEDS BY ELIG CLIN: HCPCS | Performed by: STUDENT IN AN ORGANIZED HEALTH CARE EDUCATION/TRAINING PROGRAM

## 2018-10-11 PROCEDURE — G8484 FLU IMMUNIZE NO ADMIN: HCPCS | Performed by: STUDENT IN AN ORGANIZED HEALTH CARE EDUCATION/TRAINING PROGRAM

## 2018-10-11 PROCEDURE — 2022F DILAT RTA XM EVC RTNOPTHY: CPT | Performed by: STUDENT IN AN ORGANIZED HEALTH CARE EDUCATION/TRAINING PROGRAM

## 2018-10-11 RX ORDER — QUETIAPINE FUMARATE 25 MG/1
25 TABLET, FILM COATED ORAL NIGHTLY
COMMUNITY
End: 2018-10-11 | Stop reason: SDUPTHER

## 2018-10-11 RX ORDER — LISINOPRIL 10 MG/1
7.5 TABLET ORAL DAILY
Qty: 30 TABLET | Refills: 1 | Status: CANCELLED | OUTPATIENT
Start: 2018-10-11

## 2018-10-11 RX ORDER — QUETIAPINE FUMARATE 25 MG/1
25 TABLET, FILM COATED ORAL NIGHTLY
Qty: 60 TABLET | Refills: 1 | Status: SHIPPED | OUTPATIENT
Start: 2018-10-11 | End: 2019-03-06 | Stop reason: SDUPTHER

## 2018-10-11 RX ORDER — LISINOPRIL 5 MG/1
5 TABLET ORAL 2 TIMES DAILY
Qty: 60 TABLET | Refills: 3 | Status: SHIPPED | OUTPATIENT
Start: 2018-10-11 | End: 2018-11-13 | Stop reason: ALTCHOICE

## 2018-10-11 RX ORDER — ATORVASTATIN CALCIUM 40 MG/1
40 TABLET, FILM COATED ORAL DAILY
Qty: 30 TABLET | Refills: 3 | Status: SHIPPED | OUTPATIENT
Start: 2018-10-11 | End: 2019-03-07 | Stop reason: SDUPTHER

## 2018-10-11 NOTE — PROGRESS NOTES
Subjective:    [facility] comes in today for a follow up on his DM. His fasting sugars are higher than the last time but his 2 hours are a little lower than the last time. ROS: Otherwise negative    Patient Active Problem List   Diagnosis    Hyperlipidemia    Diabetes mellitus type 1, uncontrolled ([facility] [address]    Elevated liver enzymes    HTN (hypertension)    Anxiety    Vitamin D insufficiency    Acquired hypothyroidism    DKA (diabetic ketoacidoses) ([facility] [address]    Uncontrolled type 1 diabetes mellitus with hyperglycemia ([facility] [address]       Past medical, surgical, family and social history were reviewed, non-contributory, and unchanged unless otherwise stated. Objective:    /88   Pulse 59   Resp 18   Ht 5' 3\" (1.6 m)   Wt 108 lb (49 kg)   SpO2 95%   BMI 19.13 kg/m²     Exam is as noted by resident with the following changes, additions or corrections:      Assessment/Plan:        [facility] was seen today for diabetes and other. Diagnoses and all orders for this visit:    [id number] Clari Echevarria MD ([facility])    Microalbuminuria    Uncontrolled type 1 diabetes mellitus with hyperglycemia (HCC)  -     lisinopril (PRINIVIL;ZESTRIL) 5 MG tablet; Take 1 tablet by mouth 2 times daily  -     [facility] [place] [address] [place], MD ([facility])    Uncontrolled type 1 diabetes mellitus with complication (HCC)  -     atorvastatin (LIPITOR) 40 MG tablet; Take 1 tablet by mouth daily    Recurrent major depressive disorder, in full remission (HCC)  -     QUEtiapine (SEROQUEL) 25 MG tablet; Take 1 tablet by mouth nightly    Other orders  -     Cancel: lisinopril (PRINIVIL;ZESTRIL) 10 MG tablet; Take 1 tablet by mouth daily           Attending Physician Statement    I have reviewed the chart, including any radiology or labs. I have discussed the case, including pertinent history and exam findings with the resident.   I agree with the assessment, plan and orders as documented by the
fluticasone (FLOVENT HFA) 220 MCG/ACT inhaler Inhale 1 puff into the lungs 2 times daily 1 Inhaler 3    insulin aspart (NOVOLOG) 100 UNIT/ML injection vial Inject 6-10 Units into the skin 4 times daily 1 vial 3    albuterol sulfate HFA (VENTOLIN HFA) 108 (90 Base) MCG/ACT inhaler Inhale 2 puffs into the lungs every 6 hours as needed for Wheezing 1 Inhaler 3    insulin glargine (BASAGLAR KWIKPEN) 100 UNIT/ML injection pen Patient to take 15 units in the am and 20 units in the pm. (Patient taking differently: Inject 40 Units into the skin nightly ) 4 pen 3    B-D INS SYRINGE 0.5CC/31GX5/16 31G X 5/16\" 0.5 ML MISC use four times a day 200 each 5    SUBOXONE 8-2 MG FILM SL film        No current facility-administered medications for this visit. PastMedical/Surgical Hx;  Reviewed with patient      Diagnosis Date    Acute kidney injury ([facility] [address] 10/24/2014    Acute renal failure (ARF) ([address] 7/26/2015    Diabetes mellitus ([address]     Diabetes mellitus type I ([address]     DKA (diabetic ketoacidoses) ([address]     DKA (diabetic ketoacidoses) ([address] 10/25/2014    DKA (diabetic ketoacidosis) ([facility] [address] 4/12/2014    Hypercholesteremia     Pancreatitis, acute     Renal failure, acute (HCC)      No past surgical history on file. Past Family Hx:  Reviewed with patient  No family history on file. Social Hx:  Reviewed with patient  Social History   Substance Use Topics    Smoking status: Current Every Day Smoker     Packs/day: 0.50     Years: 12.00     Types: Cigarettes    Smokeless tobacco: Never Used    Alcohol use No      Comment: denies caffeine         There is no immunization history on file for this patient. Review of Systems  Review of Systems    PE:  VS:  /88   Pulse 59   Resp 18   Ht 5' 3\" (1.6 m)   Wt 108 lb (49 kg)   SpO2 95%   BMI 19.13 kg/m²   Physical Exam    Assessment/Plan:  Candida Sanchez was seen today for diabetes and other.     Diagnoses and all orders for this visit:    Suyapa Ingram

## 2018-11-13 ENCOUNTER — OFFICE VISIT (OUTPATIENT)
Dept: FAMILY MEDICINE CLINIC | Age: 28
End: 2018-11-13
Payer: COMMERCIAL

## 2018-11-13 VITALS
HEIGHT: 63 IN | WEIGHT: 113 LBS | BODY MASS INDEX: 20.02 KG/M2 | RESPIRATION RATE: 18 BRPM | SYSTOLIC BLOOD PRESSURE: 134 MMHG | HEART RATE: 118 BPM | OXYGEN SATURATION: 98 % | DIASTOLIC BLOOD PRESSURE: 94 MMHG

## 2018-11-13 DIAGNOSIS — E10.42 DIABETIC POLYNEUROPATHY ASSOCIATED WITH TYPE 1 DIABETES MELLITUS (HCC): Primary | ICD-10-CM

## 2018-11-13 DIAGNOSIS — R00.0 TACHYCARDIA: ICD-10-CM

## 2018-11-13 LAB — HBA1C MFR BLD: 10 %

## 2018-11-13 PROCEDURE — G8420 CALC BMI NORM PARAMETERS: HCPCS | Performed by: STUDENT IN AN ORGANIZED HEALTH CARE EDUCATION/TRAINING PROGRAM

## 2018-11-13 PROCEDURE — G8484 FLU IMMUNIZE NO ADMIN: HCPCS | Performed by: STUDENT IN AN ORGANIZED HEALTH CARE EDUCATION/TRAINING PROGRAM

## 2018-11-13 PROCEDURE — 4004F PT TOBACCO SCREEN RCVD TLK: CPT | Performed by: STUDENT IN AN ORGANIZED HEALTH CARE EDUCATION/TRAINING PROGRAM

## 2018-11-13 PROCEDURE — 99213 OFFICE O/P EST LOW 20 MIN: CPT | Performed by: STUDENT IN AN ORGANIZED HEALTH CARE EDUCATION/TRAINING PROGRAM

## 2018-11-13 PROCEDURE — G8427 DOCREV CUR MEDS BY ELIG CLIN: HCPCS | Performed by: STUDENT IN AN ORGANIZED HEALTH CARE EDUCATION/TRAINING PROGRAM

## 2018-11-13 PROCEDURE — 83036 HEMOGLOBIN GLYCOSYLATED A1C: CPT | Performed by: STUDENT IN AN ORGANIZED HEALTH CARE EDUCATION/TRAINING PROGRAM

## 2018-11-13 PROCEDURE — 2022F DILAT RTA XM EVC RTNOPTHY: CPT | Performed by: STUDENT IN AN ORGANIZED HEALTH CARE EDUCATION/TRAINING PROGRAM

## 2018-11-13 PROCEDURE — 3046F HEMOGLOBIN A1C LEVEL >9.0%: CPT | Performed by: STUDENT IN AN ORGANIZED HEALTH CARE EDUCATION/TRAINING PROGRAM

## 2018-11-13 RX ORDER — METOPROLOL SUCCINATE 50 MG/1
50 TABLET, EXTENDED RELEASE ORAL DAILY
Qty: 30 TABLET | Refills: 1 | Status: SHIPPED | OUTPATIENT
Start: 2018-11-13 | End: 2019-01-21 | Stop reason: SDUPTHER

## 2018-11-13 RX ORDER — GABAPENTIN 300 MG/1
300 CAPSULE ORAL NIGHTLY
Qty: 30 CAPSULE | Refills: 1 | Status: SHIPPED | OUTPATIENT
Start: 2018-11-13 | End: 2019-01-21 | Stop reason: SDUPTHER

## 2018-11-13 ASSESSMENT — ENCOUNTER SYMPTOMS
SHORTNESS OF BREATH: 0
VOMITING: 0
CONSTIPATION: 0
ABDOMINAL PAIN: 0
COUGH: 0
SORE THROAT: 0
WHEEZING: 0
NAUSEA: 0
DIARRHEA: 0
BACK PAIN: 0

## 2018-11-13 NOTE — PROGRESS NOTES
place, and time. He appears well-developed and well-nourished. No distress. HENT:   Head: Normocephalic and atraumatic. Eyes: Pupils are equal, round, and reactive to light. EOM are normal.   Neck: Normal range of motion. Neck supple. No thyromegaly present. Cardiovascular: Regular rhythm and normal heart sounds. Exam reveals no gallop and no friction rub. No murmur heard. Pulmonary/Chest: Effort normal and breath sounds normal. No respiratory distress. He has no wheezes. He exhibits no tenderness. Abdominal: Soft. Bowel sounds are normal. He exhibits no distension. There is no tenderness. There is no rebound. Musculoskeletal: Normal range of motion. He exhibits no edema, tenderness or deformity. Lymphadenopathy:     He has no cervical adenopathy. Neurological: He is alert and oriented to person, place, and time. No cranial nerve deficit. Skin: Skin is warm and dry. No rash noted. He is not diaphoretic. No erythema. Assessment/Plan:  Jocelyn Gautam was seen today for diabetes and other. Diagnoses and all orders for this visit:    Diabetic polyneuropathy associated with type 1 diabetes mellitus [facility]):  Patient reports that his neuropathy is well controlled with gabapentin. We will continue current management.  -     gabapentin (NEURONTIN) 300 MG capsule; Take 1 capsule by mouth nightly for 60 days. .    Tachycardia: This has been a chronic issue for patient. Thought to be related to his smoking. Patient calls old on smoking cessation.  -     metoprolol succinate (TOPROL XL) 50 MG extended release tablet; Take 1 tablet by mouth daily    Uncontrolled type 1 diabetes mellitus with complication, with long-term current use of insulin [facility]):  Patient will be seeing endocrinology for the 1st time this week. We will defer further diabetes care to endocrinology.   -     insulin aspart (NOVOLOG) 100 UNIT/ML injection vial; Inject 6-10 Units into the skin 4 times daily  -     POCT glycosylated hemoglobin

## 2019-01-21 DIAGNOSIS — E10.42 DIABETIC POLYNEUROPATHY ASSOCIATED WITH TYPE 1 DIABETES MELLITUS (HCC): ICD-10-CM

## 2019-01-21 DIAGNOSIS — E10.65 UNCONTROLLED TYPE 1 DIABETES MELLITUS WITH HYPERGLYCEMIA (HCC): ICD-10-CM

## 2019-01-21 DIAGNOSIS — Z76.0 MEDICATION REFILL: ICD-10-CM

## 2019-01-21 DIAGNOSIS — R00.0 TACHYCARDIA: ICD-10-CM

## 2019-01-21 RX ORDER — FLUTICASONE PROPIONATE 220 UG/1
1 AEROSOL, METERED RESPIRATORY (INHALATION) 2 TIMES DAILY
Qty: 1 INHALER | Refills: 3 | Status: SHIPPED | OUTPATIENT
Start: 2019-01-21 | End: 2019-05-30 | Stop reason: SDUPTHER

## 2019-01-21 RX ORDER — ALBUTEROL SULFATE 90 UG/1
2 AEROSOL, METERED RESPIRATORY (INHALATION) EVERY 6 HOURS PRN
Qty: 1 INHALER | Refills: 3 | Status: SHIPPED | OUTPATIENT
Start: 2019-01-21 | End: 2019-05-30 | Stop reason: SDUPTHER

## 2019-01-21 RX ORDER — METOPROLOL SUCCINATE 50 MG/1
50 TABLET, EXTENDED RELEASE ORAL DAILY
Qty: 30 TABLET | Refills: 3 | Status: SHIPPED | OUTPATIENT
Start: 2019-01-21 | End: 2019-05-29 | Stop reason: SDUPTHER

## 2019-01-21 RX ORDER — GABAPENTIN 300 MG/1
300 CAPSULE ORAL 2 TIMES DAILY
Qty: 30 CAPSULE | Refills: 0 | Status: SHIPPED | OUTPATIENT
Start: 2019-01-21 | End: 2019-03-07 | Stop reason: SDUPTHER

## 2019-02-14 ENCOUNTER — OFFICE VISIT (OUTPATIENT)
Dept: ENDOCRINOLOGY | Age: 29
End: 2019-02-14
Payer: COMMERCIAL

## 2019-02-14 VITALS
BODY MASS INDEX: 20.45 KG/M2 | DIASTOLIC BLOOD PRESSURE: 88 MMHG | OXYGEN SATURATION: 99 % | WEIGHT: 115.4 LBS | HEIGHT: 63 IN | HEART RATE: 108 BPM | SYSTOLIC BLOOD PRESSURE: 138 MMHG | TEMPERATURE: 98.1 F

## 2019-02-14 DIAGNOSIS — E55.9 VITAMIN D DEFICIENCY: ICD-10-CM

## 2019-02-14 DIAGNOSIS — E10.42 TYPE 1 DIABETES MELLITUS WITH DIABETIC POLYNEUROPATHY (HCC): Primary | ICD-10-CM

## 2019-02-14 LAB — HBA1C MFR BLD: 10.8 %

## 2019-02-14 PROCEDURE — 4004F PT TOBACCO SCREEN RCVD TLK: CPT | Performed by: INTERNAL MEDICINE

## 2019-02-14 PROCEDURE — G8484 FLU IMMUNIZE NO ADMIN: HCPCS | Performed by: INTERNAL MEDICINE

## 2019-02-14 PROCEDURE — 3046F HEMOGLOBIN A1C LEVEL >9.0%: CPT | Performed by: INTERNAL MEDICINE

## 2019-02-14 PROCEDURE — 2022F DILAT RTA XM EVC RTNOPTHY: CPT | Performed by: INTERNAL MEDICINE

## 2019-02-14 PROCEDURE — G8427 DOCREV CUR MEDS BY ELIG CLIN: HCPCS | Performed by: INTERNAL MEDICINE

## 2019-02-14 PROCEDURE — 99205 OFFICE O/P NEW HI 60 MIN: CPT | Performed by: INTERNAL MEDICINE

## 2019-02-14 PROCEDURE — 83036 HEMOGLOBIN GLYCOSYLATED A1C: CPT | Performed by: INTERNAL MEDICINE

## 2019-02-14 PROCEDURE — G8420 CALC BMI NORM PARAMETERS: HCPCS | Performed by: INTERNAL MEDICINE

## 2019-02-18 ENCOUNTER — TELEPHONE (OUTPATIENT)
Dept: ENDOCRINOLOGY | Age: 29
End: 2019-02-18

## 2019-03-06 DIAGNOSIS — E10.65 UNCONTROLLED TYPE 1 DIABETES MELLITUS WITH HYPERGLYCEMIA (HCC): ICD-10-CM

## 2019-03-06 DIAGNOSIS — E10.42 DIABETIC POLYNEUROPATHY ASSOCIATED WITH TYPE 1 DIABETES MELLITUS (HCC): ICD-10-CM

## 2019-03-06 DIAGNOSIS — R80.9 MICROALBUMINURIA: ICD-10-CM

## 2019-03-06 DIAGNOSIS — F33.42 RECURRENT MAJOR DEPRESSIVE DISORDER, IN FULL REMISSION (HCC): ICD-10-CM

## 2019-03-07 ENCOUNTER — TELEPHONE (OUTPATIENT)
Dept: FAMILY MEDICINE CLINIC | Age: 29
End: 2019-03-07

## 2019-03-07 RX ORDER — GABAPENTIN 300 MG/1
300 CAPSULE ORAL 2 TIMES DAILY
Qty: 30 CAPSULE | Refills: 0 | Status: SHIPPED | OUTPATIENT
Start: 2019-03-07 | End: 2019-03-08 | Stop reason: SDUPTHER

## 2019-03-07 RX ORDER — ATORVASTATIN CALCIUM 40 MG/1
40 TABLET, FILM COATED ORAL DAILY
Qty: 30 TABLET | Refills: 3 | Status: SHIPPED | OUTPATIENT
Start: 2019-03-07 | End: 2019-08-23 | Stop reason: SDUPTHER

## 2019-03-07 RX ORDER — QUETIAPINE FUMARATE 25 MG/1
25 TABLET, FILM COATED ORAL NIGHTLY
Qty: 30 TABLET | Refills: 1 | Status: SHIPPED | OUTPATIENT
Start: 2019-03-07 | End: 2019-05-29 | Stop reason: SDUPTHER

## 2019-03-07 RX ORDER — LISINOPRIL 10 MG/1
10 TABLET ORAL DAILY
Qty: 30 TABLET | Refills: 1 | Status: SHIPPED | OUTPATIENT
Start: 2019-03-07 | End: 2019-04-08

## 2019-03-08 DIAGNOSIS — E10.42 DIABETIC POLYNEUROPATHY ASSOCIATED WITH TYPE 1 DIABETES MELLITUS (HCC): ICD-10-CM

## 2019-03-08 RX ORDER — GABAPENTIN 300 MG/1
300 CAPSULE ORAL 2 TIMES DAILY
Qty: 60 CAPSULE | Refills: 0 | Status: SHIPPED | OUTPATIENT
Start: 2019-03-08 | End: 2019-04-22 | Stop reason: SDUPTHER

## 2019-04-08 ENCOUNTER — OFFICE VISIT (OUTPATIENT)
Dept: FAMILY MEDICINE CLINIC | Age: 29
End: 2019-04-08
Payer: COMMERCIAL

## 2019-04-08 ENCOUNTER — HOSPITAL ENCOUNTER (OUTPATIENT)
Age: 29
Setting detail: OBSERVATION
Discharge: HOME | End: 2019-04-09
Attending: FAMILY MEDICINE | Admitting: FAMILY MEDICINE
Payer: COMMERCIAL

## 2019-04-08 VITALS
SYSTOLIC BLOOD PRESSURE: 142 MMHG | RESPIRATION RATE: 14 BRPM | DIASTOLIC BLOOD PRESSURE: 101 MMHG | HEIGHT: 63 IN | HEART RATE: 122 BPM | WEIGHT: 115 LBS | BODY MASS INDEX: 20.38 KG/M2 | OXYGEN SATURATION: 98 %

## 2019-04-08 DIAGNOSIS — R07.2 SUBSTERNAL CHEST PAIN: Primary | ICD-10-CM

## 2019-04-08 DIAGNOSIS — I10 ESSENTIAL HYPERTENSION: ICD-10-CM

## 2019-04-08 PROBLEM — Z72.0 TOBACCO ABUSE: Status: ACTIVE | Noted: 2019-04-08

## 2019-04-08 PROBLEM — F31.9 BIPOLAR DISORDER (HCC): Status: ACTIVE | Noted: 2019-04-08

## 2019-04-08 PROBLEM — F31.9 BIPOLAR AFFECTIVE (HCC): Status: ACTIVE | Noted: 2019-04-08

## 2019-04-08 PROBLEM — F11.20 OPIOID DEPENDENCE (HCC): Status: ACTIVE | Noted: 2019-04-08

## 2019-04-08 PROBLEM — R07.9 CHEST PAIN: Status: ACTIVE | Noted: 2019-04-08

## 2019-04-08 LAB
ALBUMIN SERPL-MCNC: 4.1 G/DL (ref 3.5–5.2)
ALP BLD-CCNC: 153 U/L (ref 40–129)
ALT SERPL-CCNC: 19 U/L (ref 0–40)
ANION GAP SERPL CALCULATED.3IONS-SCNC: 11 MMOL/L (ref 7–16)
AST SERPL-CCNC: 21 U/L (ref 0–39)
BASOPHILS ABSOLUTE: 0.09 E9/L (ref 0–0.2)
BASOPHILS RELATIVE PERCENT: 0.8 % (ref 0–2)
BILIRUB SERPL-MCNC: <0.2 MG/DL (ref 0–1.2)
BUN BLDV-MCNC: 17 MG/DL (ref 6–20)
CALCIUM SERPL-MCNC: 9.8 MG/DL (ref 8.6–10.2)
CHLORIDE BLD-SCNC: 101 MMOL/L (ref 98–107)
CO2: 27 MMOL/L (ref 22–29)
CREAT SERPL-MCNC: 0.9 MG/DL (ref 0.7–1.2)
EOSINOPHILS ABSOLUTE: 0.29 E9/L (ref 0.05–0.5)
EOSINOPHILS RELATIVE PERCENT: 2.7 % (ref 0–6)
GFR AFRICAN AMERICAN: >60
GFR NON-AFRICAN AMERICAN: >60 ML/MIN/1.73
GLUCOSE BLD-MCNC: 106 MG/DL (ref 74–99)
HCT VFR BLD CALC: 37.3 % (ref 37–54)
HEMOGLOBIN: 12.6 G/DL (ref 12.5–16.5)
IMMATURE GRANULOCYTES #: 0.03 E9/L
IMMATURE GRANULOCYTES %: 0.3 % (ref 0–5)
LYMPHOCYTES ABSOLUTE: 3.39 E9/L (ref 1.5–4)
LYMPHOCYTES RELATIVE PERCENT: 31.8 % (ref 20–42)
MAGNESIUM: 2.3 MG/DL (ref 1.6–2.6)
MCH RBC QN AUTO: 30.4 PG (ref 26–35)
MCHC RBC AUTO-ENTMCNC: 33.8 % (ref 32–34.5)
MCV RBC AUTO: 89.9 FL (ref 80–99.9)
METER GLUCOSE: 153 MG/DL (ref 74–99)
MONOCYTES ABSOLUTE: 0.57 E9/L (ref 0.1–0.95)
MONOCYTES RELATIVE PERCENT: 5.3 % (ref 2–12)
NEUTROPHILS ABSOLUTE: 6.29 E9/L (ref 1.8–7.3)
NEUTROPHILS RELATIVE PERCENT: 59.1 % (ref 43–80)
PDW BLD-RTO: 13.1 FL (ref 11.5–15)
PLATELET # BLD: 383 E9/L (ref 130–450)
PMV BLD AUTO: 9.4 FL (ref 7–12)
POTASSIUM SERPL-SCNC: 5.1 MMOL/L (ref 3.5–5)
RBC # BLD: 4.15 E12/L (ref 3.8–5.8)
SODIUM BLD-SCNC: 139 MMOL/L (ref 132–146)
TOTAL PROTEIN: 7.4 G/DL (ref 6.4–8.3)
TROPONIN: 0.02 NG/ML (ref 0–0.03)
WBC # BLD: 10.7 E9/L (ref 4.5–11.5)

## 2019-04-08 PROCEDURE — G0378 HOSPITAL OBSERVATION PER HR: HCPCS

## 2019-04-08 PROCEDURE — 93000 ELECTROCARDIOGRAM COMPLETE: CPT | Performed by: STUDENT IN AN ORGANIZED HEALTH CARE EDUCATION/TRAINING PROGRAM

## 2019-04-08 PROCEDURE — 83735 ASSAY OF MAGNESIUM: CPT

## 2019-04-08 PROCEDURE — 85025 COMPLETE CBC W/AUTO DIFF WBC: CPT

## 2019-04-08 PROCEDURE — 94640 AIRWAY INHALATION TREATMENT: CPT

## 2019-04-08 PROCEDURE — 36415 COLL VENOUS BLD VENIPUNCTURE: CPT

## 2019-04-08 PROCEDURE — 99214 OFFICE O/P EST MOD 30 MIN: CPT | Performed by: STUDENT IN AN ORGANIZED HEALTH CARE EDUCATION/TRAINING PROGRAM

## 2019-04-08 PROCEDURE — G8427 DOCREV CUR MEDS BY ELIG CLIN: HCPCS | Performed by: STUDENT IN AN ORGANIZED HEALTH CARE EDUCATION/TRAINING PROGRAM

## 2019-04-08 PROCEDURE — 6370000000 HC RX 637 (ALT 250 FOR IP): Performed by: FAMILY MEDICINE

## 2019-04-08 PROCEDURE — 4004F PT TOBACCO SCREEN RCVD TLK: CPT | Performed by: STUDENT IN AN ORGANIZED HEALTH CARE EDUCATION/TRAINING PROGRAM

## 2019-04-08 PROCEDURE — G8420 CALC BMI NORM PARAMETERS: HCPCS | Performed by: STUDENT IN AN ORGANIZED HEALTH CARE EDUCATION/TRAINING PROGRAM

## 2019-04-08 PROCEDURE — 82962 GLUCOSE BLOOD TEST: CPT

## 2019-04-08 PROCEDURE — 84484 ASSAY OF TROPONIN QUANT: CPT

## 2019-04-08 PROCEDURE — 2022F DILAT RTA XM EVC RTNOPTHY: CPT | Performed by: STUDENT IN AN ORGANIZED HEALTH CARE EDUCATION/TRAINING PROGRAM

## 2019-04-08 PROCEDURE — 3046F HEMOGLOBIN A1C LEVEL >9.0%: CPT | Performed by: STUDENT IN AN ORGANIZED HEALTH CARE EDUCATION/TRAINING PROGRAM

## 2019-04-08 PROCEDURE — 80053 COMPREHEN METABOLIC PANEL: CPT

## 2019-04-08 RX ORDER — ASPIRIN 81 MG/1
81 TABLET, CHEWABLE ORAL DAILY
Status: DISCONTINUED | OUTPATIENT
Start: 2019-04-08 | End: 2019-04-09 | Stop reason: HOSPADM

## 2019-04-08 RX ORDER — LISINOPRIL 5 MG/1
7.5 TABLET ORAL
Refills: 0 | COMMUNITY
Start: 2019-01-12 | End: 2019-04-15 | Stop reason: SDUPTHER

## 2019-04-08 RX ORDER — LISINOPRIL 20 MG/1
20 TABLET ORAL DAILY
Status: DISCONTINUED | OUTPATIENT
Start: 2019-04-08 | End: 2019-04-09 | Stop reason: HOSPADM

## 2019-04-08 RX ORDER — QUETIAPINE FUMARATE 25 MG/1
25 TABLET, FILM COATED ORAL NIGHTLY
Status: DISCONTINUED | OUTPATIENT
Start: 2019-04-08 | End: 2019-04-09 | Stop reason: HOSPADM

## 2019-04-08 RX ORDER — NICOTINE POLACRILEX 4 MG
15 LOZENGE BUCCAL PRN
Status: DISCONTINUED | OUTPATIENT
Start: 2019-04-08 | End: 2019-04-09 | Stop reason: HOSPADM

## 2019-04-08 RX ORDER — FLUTICASONE PROPIONATE 110 UG/1
2 AEROSOL, METERED RESPIRATORY (INHALATION) 2 TIMES DAILY
Status: DISCONTINUED | OUTPATIENT
Start: 2019-04-08 | End: 2019-04-09 | Stop reason: HOSPADM

## 2019-04-08 RX ORDER — NITROGLYCERIN 0.3 MG/1
TABLET SUBLINGUAL
Qty: 30 TABLET | Refills: 0 | Status: SHIPPED | OUTPATIENT
Start: 2019-04-08 | End: 2019-08-23

## 2019-04-08 RX ORDER — ALBUTEROL SULFATE 90 UG/1
2 AEROSOL, METERED RESPIRATORY (INHALATION) EVERY 6 HOURS PRN
Status: DISCONTINUED | OUTPATIENT
Start: 2019-04-08 | End: 2019-04-09 | Stop reason: HOSPADM

## 2019-04-08 RX ORDER — NICOTINE 21 MG/24HR
1 PATCH, TRANSDERMAL 24 HOURS TRANSDERMAL DAILY
Status: DISCONTINUED | OUTPATIENT
Start: 2019-04-08 | End: 2019-04-08

## 2019-04-08 RX ORDER — DEXTROSE MONOHYDRATE 50 MG/ML
100 INJECTION, SOLUTION INTRAVENOUS PRN
Status: DISCONTINUED | OUTPATIENT
Start: 2019-04-08 | End: 2019-04-09 | Stop reason: HOSPADM

## 2019-04-08 RX ORDER — DEXTROSE MONOHYDRATE 25 G/50ML
12.5 INJECTION, SOLUTION INTRAVENOUS PRN
Status: DISCONTINUED | OUTPATIENT
Start: 2019-04-08 | End: 2019-04-09 | Stop reason: HOSPADM

## 2019-04-08 RX ORDER — DEXTROSE AND SODIUM CHLORIDE 5; .45 G/100ML; G/100ML
INJECTION, SOLUTION INTRAVENOUS CONTINUOUS
Status: DISCONTINUED | OUTPATIENT
Start: 2019-04-09 | End: 2019-04-09 | Stop reason: HOSPADM

## 2019-04-08 RX ORDER — BUPRENORPHINE AND NALOXONE 8; 2 MG/1; MG/1
1 FILM, SOLUBLE BUCCAL; SUBLINGUAL DAILY
Status: DISCONTINUED | OUTPATIENT
Start: 2019-04-08 | End: 2019-04-09 | Stop reason: HOSPADM

## 2019-04-08 RX ORDER — HYDRALAZINE HYDROCHLORIDE 20 MG/ML
5 INJECTION INTRAMUSCULAR; INTRAVENOUS EVERY 6 HOURS PRN
Status: DISCONTINUED | OUTPATIENT
Start: 2019-04-08 | End: 2019-04-08

## 2019-04-08 RX ORDER — LISINOPRIL 5 MG/1
7.5 TABLET ORAL DAILY
Status: DISCONTINUED | OUTPATIENT
Start: 2019-04-09 | End: 2019-04-08

## 2019-04-08 RX ADMIN — INSULIN HUMAN 20 UNITS: 100 INJECTION, SUSPENSION SUBCUTANEOUS at 21:12

## 2019-04-08 RX ADMIN — FLUTICASONE PROPIONATE 2 PUFF: 110 AEROSOL, METERED RESPIRATORY (INHALATION) at 20:06

## 2019-04-08 RX ADMIN — INSULIN LISPRO 1 UNITS: 100 INJECTION, SOLUTION INTRAVENOUS; SUBCUTANEOUS at 21:14

## 2019-04-08 RX ADMIN — ASPIRIN 81 MG 81 MG: 81 TABLET ORAL at 22:40

## 2019-04-08 ASSESSMENT — PATIENT HEALTH QUESTIONNAIRE - PHQ9
1. LITTLE INTEREST OR PLEASURE IN DOING THINGS: 0
SUM OF ALL RESPONSES TO PHQ QUESTIONS 1-9: 0
SUM OF ALL RESPONSES TO PHQ9 QUESTIONS 1 & 2: 0
2. FEELING DOWN, DEPRESSED OR HOPELESS: 0
SUM OF ALL RESPONSES TO PHQ QUESTIONS 1-9: 0

## 2019-04-08 ASSESSMENT — PAIN SCALES - GENERAL: PAINLEVEL_OUTOF10: 0

## 2019-04-08 NOTE — H&P
[address]  History and Physical      CHIEF COMPLAINT:  Chest pain     History of Present Illness:   Mr. Ana Frederick is a 29year-old male patient of Dr. Génesis Ram with a PMHx of bipolar disorder, HTN, HLD, T1DM with neuropathy, proteinuria, opioid dependence (on Suboxone), and tobacco use (0.5 ppd for 12 years) who was directly admitted to [facility] for chest pain. Patient reports that chest pain has been occurring for months, but recently, in the last week, it has been  occurring daily. The chest pain occurs with exertional activities, such as walking the dog or sweeping the kitchen floor. The chest pain is left-sided, and he describes it as a pressure sensation. It radiates to the left upper extremity and causes bilateral tingling of the fingertips. It is accompanied by SOB and palpitations. When it occurs, it lasts for 10-15 minutes. It only improves with rest. Besides exertion, nothing makes pain worse. Patient rates pain a 4/10 on pain scale. Patient is a current smoker and smokes 0.50 ppd for 12 years. He denies EtOH and illicit drug use. Patient's FamHx includes T2DM in grandmother. Mother is healthy and father was adopted. He denies a FamHx of sudden cardiac death in young male relatives. Past Medical History:   Diagnosis Date    Bipolar affective [facility] [address]     Diabetes mellitus type I ([facility] [address]     Diabetic neuropathy ([facility] [address]     HTN (hypertension)     Hypercholesteremia     Opioid dependence ([facility] [address]     Proteinuria        No past surgical history on file. Medications Prior to Admission:    Medications Prior to Admission: lisinopril (PRINIVIL;ZESTRIL) 5 MG tablet, 7.5 mg   insulin aspart (NOVOLOG) 100 UNIT/ML injection vial, Inject 6-10 Units into the skin 4 times daily  gabapentin (NEURONTIN) 300 MG capsule, Take 1 capsule by mouth 2 times daily for 30 days.   atorvastatin (LIPITOR) 40 MG tablet, Take 1 tablet by mouth daily  QUEtiapine (SEROQUEL) 25 MG tablet, Take 1 tablet by mouth nightly  insulin NPH (HUMULIN N) 100 UNIT/ML injection vial, Take 20 units twice daily  fluticasone (FLOVENT HFA) 220 MCG/ACT inhaler, Inhale 1 puff into the lungs 2 times daily  metoprolol succinate (TOPROL XL) 50 MG extended release tablet, Take 1 tablet by mouth daily  blood glucose test strips (TRUE METRIX BLOOD GLUCOSE TEST) strip, 1 each by In Vitro route 5 times daily As needed. B-D INS SYRINGE 0.5CC/31GX5/16 31G X 5/16\" 0.5 ML MISC, use four times a day  SUBOXONE 8-2 MG FILM SL film,   nitroGLYCERIN (NITROSTAT) 0.3 MG SL tablet, up to max of 3 total doses. If no relief after 1 dose, call 911.  albuterol sulfate HFA (VENTOLIN HFA) 108 (90 Base) MCG/ACT inhaler, Inhale 2 puffs into the lungs every 6 hours as needed for Wheezing    Allergies:    Patient has no known allergies. Social History:    reports that he has been smoking cigarettes. He has a 6.00 pack-year smoking history. He has never used smokeless tobacco. He reports that he does not drink alcohol or use drugs. Family History:   family history includes Diabetes type 2  in his maternal grandmother; Drug Abuse in his sister; No Known Problems in his brother, daughter, father, mother, and son. REVIEW OF SYSTEMS:  General: Denies diaphoresis. Head: Denies headache, lightheadedness, and dizziness. Eyes: Admits to blurry and double vision when blood sugar is elevated. Ears: Admits to tinnitus. Denies hearing loss and pain. Nose: Denies congestion and rhinorrhea. Throat: Denies sore throat. Cardiovascular: Admits to chest pain, palpitations, and leg swelling. Respiratory: Admits to SOB and smoker's cough. Denies wheezing. GI: Denies abdominal pain, nausea, vomiting, diarrhea, and constipation. : Denies urinary frequency, urinary urgency, dysuria, and hematuria  Neurologic: Admits to tingling of bilateral hands.      PHYSICAL EXAM:    Vitals:  BP (!) 170/102   Pulse 98   Temp 98.2 °F (36.8 °C) (Oral) nightly     Diet: NPO after midnight   Code: Full   DVT prophylaxis: Patient ambulatory. Will hold off on Lovenox and PCDs.    Disposition: Tomorrow if stress test is normal. If patient stays longer, home medications will need to be reviewed again and continued as appropriate, as some of these medications were held in anticipation of tomorrow's stress test.     Please note that over 50 minutes was spent in evaluating the patient, review of records and results, discussion with staff/family, etc.    Sigifredo Salazar DO   7:35 PM  4/8/2019

## 2019-04-08 NOTE — PROGRESS NOTES
Subjective:    Farzana Casillas is here for a diabetic visit. He did see endocrinology and they did switch him to Humulin. His A1c started to rise and he is scheduled for a pump. He is having typical anginal symptoms and is experiencing not only exertional chest pain but radiation into the right arm and SOB. This is occurring at least every other day. ROS: Otherwise negative    Patient Active Problem List   Diagnosis    Hyperlipidemia    Diabetes mellitus type 1, uncontrolled ([address]    Elevated liver enzymes    HTN (hypertension)    Anxiety    Vitamin D insufficiency    Acquired hypothyroidism    DKA (diabetic ketoacidoses) ([address]    Uncontrolled type 1 diabetes mellitus with hyperglycemia ([address]    Diabetes mellitus type I ([address]    Chest pain    Tobacco abuse    Opioid dependence ([address]    Bipolar affective ([address]       Past medical, surgical, family and social history were reviewed, non-contributory, and unchanged unless otherwise stated. Objective:    BP (!) 142/101   Pulse 122   Resp 14   Ht 5' 3\" (1.6 m)   Wt 115 lb (52.2 kg)   SpO2 98%   BMI 20.37 kg/m²     Exam is as noted by resident with the following changes, additions or corrections:      Assessment/Plan:        Farzana Casillas was seen today for diabetes. Diagnoses and all orders for this visit:    Substernal chest pain  -     EKG 12 Lead  -     nitroGLYCERIN (NITROSTAT) 0.3 MG SL tablet; up to max of 3 total doses. If no relief after 1 dose, call 911. Uncontrolled type 1 diabetes mellitus with complication, with long-term current use of insulin ([address]  -     [facility]    Essential hypertension           Attending Physician Statement    I have reviewed the chart, including any radiology or labs. I have discussed the case, including pertinent history and exam findings with the resident. I agree with the assessment, plan and orders as documented by the resident.   Please refer to the resident note for additional information.       Electronically signed by Cassandra Nathan DO on 4/10/2019 at 1:57 PM

## 2019-04-08 NOTE — PROGRESS NOTES
4/10/2019    Kayla Victor is a 29 y.o. male here for   Chief Complaint   Patient presents with    Diabetes   Patient is here for Chest pain and DM2. Seeing Endocrinology and was put on humilin. Was told he needs to get an insulin pump. Patient reporting higher BGLs. Fasting blood sugars are usually above 200s. During the day, it is usually 160-180. It spikes up after, during the night. Neuropathy is under control with the gabapentin. Blurry vision. No Retinopathy. Smoking 1/2 ppd. BP high. Smoked when he came. Also had caffienated coffee. No CP, No SOB. Foot swelling still comes and goes. Patient is also here for Chest pain. Painful tingling of the heart. Happening and worsening with activity,substernal pressure. While this happens he feels a tingle in his arm. it will last 10 minutes. also SOB at this time, tingle in his right arm. Has to rest and it goes away. Its a pressure pain. It is occurring every other day. This started two months ago. worse and more frequent.  4-5/10 pain. pressure. Smoking-12 yrs, 0.5-1ppd. No Known Allergies    Medications  Current Outpatient Medications   Medication Sig Dispense Refill    lisinopril (PRINIVIL;ZESTRIL) 5 MG tablet 7.5 mg   0    nitroGLYCERIN (NITROSTAT) 0.3 MG SL tablet up to max of 3 total doses. If no relief after 1 dose, call 911. 30 tablet 0    insulin aspart (NOVOLOG) 100 UNIT/ML injection vial Inject 6-10 Units into the skin 4 times daily 1 vial 3    gabapentin (NEURONTIN) 300 MG capsule Take 1 capsule by mouth 2 times daily for 30 days.  60 capsule 0    atorvastatin (LIPITOR) 40 MG tablet Take 1 tablet by mouth daily 30 tablet 3    QUEtiapine (SEROQUEL) 25 MG tablet Take 1 tablet by mouth nightly 30 tablet 1    insulin NPH (HUMULIN N) 100 UNIT/ML injection vial Take 20 units twice daily 3 vial 3    fluticasone (FLOVENT HFA) 220 MCG/ACT inhaler Inhale 1 puff into the lungs 2 times daily 1 Inhaler 3    metoprolol succinate (TOPROL XL) 50 MG extended release tablet Take 1 tablet by mouth daily 30 tablet 3    albuterol sulfate HFA (VENTOLIN HFA) 108 (90 Base) MCG/ACT inhaler Inhale 2 puffs into the lungs every 6 hours as needed for Wheezing 1 Inhaler 3    blood glucose test strips (TRUE METRIX BLOOD GLUCOSE TEST) strip 1 each by In Vitro route 5 times daily As needed. 150 each 11    B-D INS SYRINGE 0.5CC/31GX5/16 31G X 5/16\" 0.5 ML MISC use four times a day 200 each 5    SUBOXONE 8-2 MG FILM SL film       aspirin 81 MG chewable tablet Take 1 tablet by mouth daily 30 tablet 3     No current facility-administered medications for this visit. PastMedical/Surgical Hx;  Reviewed with patient      Diagnosis Date    B[facility] affective ([facility]     Diabetes mellitus type I ([facility]     Diabetic neuropathy ([facility]     HTN (hypertension)     Hypercholesteremia     Opioid dependence ([facility]     Proteinuria      History reviewed. No pertinent surgical history. Past Family Hx:  Reviewed with patient      Problem Relation Age of Onset    No Known Problems Mother     No Known Problems Father     No Known Problems Brother         Homicide     Drug Abuse Sister         Overdose    No Known Problems Daughter     No Known Problems Son     Diabetes type 2  Maternal Grandmother        Social Hx:  Reviewed with patient  Social History     Tobacco Use    Smoking status: Current Every Day Smoker     Packs/day: 0.50     Years: 12.00     Pack years: 6.00     Types: Cigarettes    Smokeless tobacco: Never Used   Substance Use Topics    Alcohol use: No     Comment: denies caffeine         There is no immunization history on file for this patient. Review of Systems  Review of Systems   Constitutional: Negative for chills and fever. HENT: Negative for congestion and sore throat. Respiratory: Positive for chest tightness and shortness of breath. Negative for cough and wheezing.     Cardiovascular: Negative for chest pain, palpitations and leg swelling. Gastrointestinal: Negative for abdominal pain, constipation, diarrhea, nausea and vomiting. Genitourinary: Negative for dysuria and hematuria. Musculoskeletal: Negative for back pain and myalgias. Skin: Negative for rash. Neurological: Negative for dizziness and headaches. Psychiatric/Behavioral: Negative. PE:  VS:  BP (!) 142/101   Pulse 122   Resp 14   Ht 5' 3\" (1.6 m)   Wt 115 lb (52.2 kg)   SpO2 98%   BMI 20.37 kg/m²   Physical Exam   Constitutional: He is oriented to person, place, and time. He appears well-developed and well-nourished. No distress. HENT:   Head: Normocephalic and atraumatic. Eyes: Pupils are equal, round, and reactive to light. EOM are normal.   Neck: Normal range of motion. Neck supple. No thyromegaly present. Cardiovascular: Regular rhythm and normal heart sounds. Exam reveals no gallop and no friction rub. No murmur heard. Patient denies any TTP of the chest wall. Pulmonary/Chest: Effort normal and breath sounds normal. No respiratory distress. He has no wheezes. He exhibits no tenderness. Abdominal: Soft. Bowel sounds are normal. He exhibits no distension. There is no tenderness. There is no rebound. Musculoskeletal: Normal range of motion. He exhibits no edema, tenderness or deformity. Lymphadenopathy:     He has no cervical adenopathy. Neurological: He is alert and oriented to person, place, and time. No cranial nerve deficit. Skin: Skin is warm and dry. No rash noted. He is not diaphoretic. No erythema. Assessment/Plan:  Casandra Murrieta was seen today for diabetes. Diagnoses and all orders for this visit:    Substernal chest pain:  Patient has typical chest pain. Given his history of DM2,Smoking, tachycardia, hypertension, there is concern for ischemic cause for this pain. Patient is asked to get direct admission for chest pain rule out with cardiology. Patient will return in 5 hours because has to  children from school. Given Nitrostat to use with chest pain. -     EKG 12 Lead  -     nitroGLYCERIN (NITROSTAT) 0.3 MG SL tablet; up to max of 3 total doses. If no relief after 1 dose, call 911. Uncontrolled type 1 diabetes mellitus with complication, with long-term current use of insulin ([facility]  -     [facility], [place]    Essential hypertension  Patient reports he does not have his medication,will renew his medication. Return in about 3 months (around 7/8/2019).

## 2019-04-09 VITALS
HEART RATE: 107 BPM | RESPIRATION RATE: 16 BRPM | WEIGHT: 113.8 LBS | BODY MASS INDEX: 20.16 KG/M2 | DIASTOLIC BLOOD PRESSURE: 104 MMHG | TEMPERATURE: 98.5 F | SYSTOLIC BLOOD PRESSURE: 147 MMHG | OXYGEN SATURATION: 96 %

## 2019-04-09 LAB
ALBUMIN SERPL-MCNC: 3.7 G/DL (ref 3.5–5.2)
ALP BLD-CCNC: 136 U/L (ref 40–129)
ALT SERPL-CCNC: 17 U/L (ref 0–40)
ANION GAP SERPL CALCULATED.3IONS-SCNC: 12 MMOL/L (ref 7–16)
AST SERPL-CCNC: 17 U/L (ref 0–39)
BASOPHILS ABSOLUTE: 0.07 E9/L (ref 0–0.2)
BASOPHILS RELATIVE PERCENT: 0.8 % (ref 0–2)
BILIRUB SERPL-MCNC: <0.2 MG/DL (ref 0–1.2)
BUN BLDV-MCNC: 19 MG/DL (ref 6–20)
CALCIUM SERPL-MCNC: 9.5 MG/DL (ref 8.6–10.2)
CHLORIDE BLD-SCNC: 101 MMOL/L (ref 98–107)
CO2: 27 MMOL/L (ref 22–29)
CREAT SERPL-MCNC: 1 MG/DL (ref 0.7–1.2)
EOSINOPHILS ABSOLUTE: 0.3 E9/L (ref 0.05–0.5)
EOSINOPHILS RELATIVE PERCENT: 3.4 % (ref 0–6)
GFR AFRICAN AMERICAN: >60
GFR NON-AFRICAN AMERICAN: >60 ML/MIN/1.73
GLUCOSE BLD-MCNC: 103 MG/DL (ref 74–99)
HCT VFR BLD CALC: 36.2 % (ref 37–54)
HEMOGLOBIN: 12 G/DL (ref 12.5–16.5)
IMMATURE GRANULOCYTES #: 0.02 E9/L
IMMATURE GRANULOCYTES %: 0.2 % (ref 0–5)
LYMPHOCYTES ABSOLUTE: 3.31 E9/L (ref 1.5–4)
LYMPHOCYTES RELATIVE PERCENT: 37.2 % (ref 20–42)
MAGNESIUM: 2.3 MG/DL (ref 1.6–2.6)
MCH RBC QN AUTO: 30 PG (ref 26–35)
MCHC RBC AUTO-ENTMCNC: 33.1 % (ref 32–34.5)
MCV RBC AUTO: 90.5 FL (ref 80–99.9)
METER GLUCOSE: 101 MG/DL (ref 74–99)
METER GLUCOSE: 164 MG/DL (ref 74–99)
MONOCYTES ABSOLUTE: 0.35 E9/L (ref 0.1–0.95)
MONOCYTES RELATIVE PERCENT: 3.9 % (ref 2–12)
NEUTROPHILS ABSOLUTE: 4.85 E9/L (ref 1.8–7.3)
NEUTROPHILS RELATIVE PERCENT: 54.5 % (ref 43–80)
PDW BLD-RTO: 13.2 FL (ref 11.5–15)
PLATELET # BLD: 342 E9/L (ref 130–450)
PMV BLD AUTO: 9.5 FL (ref 7–12)
POTASSIUM SERPL-SCNC: 4.4 MMOL/L (ref 3.5–5)
RBC # BLD: 4 E12/L (ref 3.8–5.8)
SODIUM BLD-SCNC: 140 MMOL/L (ref 132–146)
TOTAL PROTEIN: 6.8 G/DL (ref 6.4–8.3)
TROPONIN: <0.01 NG/ML (ref 0–0.03)
TROPONIN: <0.01 NG/ML (ref 0–0.03)
WBC # BLD: 8.9 E9/L (ref 4.5–11.5)

## 2019-04-09 PROCEDURE — 82962 GLUCOSE BLOOD TEST: CPT

## 2019-04-09 PROCEDURE — 93010 ELECTROCARDIOGRAM REPORT: CPT | Performed by: INTERNAL MEDICINE

## 2019-04-09 PROCEDURE — 83735 ASSAY OF MAGNESIUM: CPT

## 2019-04-09 PROCEDURE — G0378 HOSPITAL OBSERVATION PER HR: HCPCS

## 2019-04-09 PROCEDURE — 99234 HOSP IP/OBS SM DT SF/LOW 45: CPT | Performed by: FAMILY MEDICINE

## 2019-04-09 PROCEDURE — 94640 AIRWAY INHALATION TREATMENT: CPT

## 2019-04-09 PROCEDURE — 84484 ASSAY OF TROPONIN QUANT: CPT

## 2019-04-09 PROCEDURE — 93017 CV STRESS TEST TRACING ONLY: CPT

## 2019-04-09 PROCEDURE — 93018 CV STRESS TEST I&R ONLY: CPT | Performed by: INTERNAL MEDICINE

## 2019-04-09 PROCEDURE — 36415 COLL VENOUS BLD VENIPUNCTURE: CPT

## 2019-04-09 PROCEDURE — 85025 COMPLETE CBC W/AUTO DIFF WBC: CPT

## 2019-04-09 PROCEDURE — 93005 ELECTROCARDIOGRAM TRACING: CPT

## 2019-04-09 PROCEDURE — 99222 1ST HOSP IP/OBS MODERATE 55: CPT | Performed by: INTERNAL MEDICINE

## 2019-04-09 PROCEDURE — 93016 CV STRESS TEST SUPVJ ONLY: CPT | Performed by: INTERNAL MEDICINE

## 2019-04-09 PROCEDURE — 80053 COMPREHEN METABOLIC PANEL: CPT

## 2019-04-09 RX ORDER — ASPIRIN 81 MG/1
81 TABLET, CHEWABLE ORAL DAILY
Qty: 30 TABLET | Refills: 3 | Status: SHIPPED | OUTPATIENT
Start: 2019-04-09 | End: 2019-08-23 | Stop reason: SDUPTHER

## 2019-04-09 RX ADMIN — FLUTICASONE PROPIONATE 2 PUFF: 110 AEROSOL, METERED RESPIRATORY (INHALATION) at 08:29

## 2019-04-09 ASSESSMENT — PAIN SCALES - GENERAL
PAINLEVEL_OUTOF10: 0
PAINLEVEL_OUTOF10: 0

## 2019-04-09 NOTE — PROGRESS NOTES
[address]  Progress Note    Subjective:  Mr. Dereck Gregorio denies chest pain, palpitations, and SOB. He denies urinary complications. His last BM was yesterday. Past medical, surgical, family and social history were reviewed, non-contributory, and unchanged unless otherwise stated. Objective:    BP (!) 137/94   Pulse 116   Temp 98.5 °F (36.9 °C) (Oral)   Resp 14   Wt 113 lb 12.8 oz (51.6 kg)   SpO2 97%   BMI 20.16 kg/m²     General: In no acute distress. Walking around. Heart:  Tachycardia. Regular rhythm. No murmurs, gallops, or rubs. Lungs:  CTAB. No wheeze, rales, or rhonchi. Abd: Bowel sounds normal. Nontender. Nondistended. No masses. Extrem:  No clubbing, cyanosis, or edema.     Labs:  Recent Results (from the past 12 hour(s))   CBC WITH AUTO DIFFERENTIAL    Collection Time: 04/08/19  8:25 PM   Result Value Ref Range    WBC 10.7 4.5 - 11.5 E9/L    RBC 4.15 3.80 - 5.80 E12/L    Hemoglobin 12.6 12.5 - 16.5 g/dL    Hematocrit 37.3 37.0 - 54.0 %    MCV 89.9 80.0 - 99.9 fL    MCH 30.4 26.0 - 35.0 pg    MCHC 33.8 32.0 - 34.5 %    RDW 13.1 11.5 - 15.0 fL    Platelets 545 077 - 721 E9/L    MPV 9.4 7.0 - 12.0 fL    Neutrophils % 59.1 43.0 - 80.0 %    Immature Granulocytes % 0.3 0.0 - 5.0 %    Lymphocytes % 31.8 20.0 - 42.0 %    Monocytes % 5.3 2.0 - 12.0 %    Eosinophils % 2.7 0.0 - 6.0 %    Basophils % 0.8 0.0 - 2.0 %    Neutrophils # 6.29 1.80 - 7.30 E9/L    Immature Granulocytes # 0.03 E9/L    Lymphocytes # 3.39 1.50 - 4.00 E9/L    Monocytes # 0.57 0.10 - 0.95 E9/L    Eosinophils # 0.29 0.05 - 0.50 E9/L    Basophils # 0.09 0.00 - 0.20 E9/L   Comprehensive metabolic panel    Collection Time: 04/08/19  8:25 PM   Result Value Ref Range    Sodium 139 132 - 146 mmol/L    Potassium 5.1 (H) 3.5 - 5.0 mmol/L    Chloride 101 98 - 107 mmol/L    CO2 27 22 - 29 mmol/L    Anion Gap 11 7 - 16 mmol/L    Glucose 106 (H) 74 - 99 mg/dL    BUN 17 6 - 20 mg/dL    CREATININE 0.9 0.7 - 1.2 mg/dL    GFR Non-African American >60 >=60 mL/min/1.73    GFR African American >60     Calcium 9.8 8.6 - 10.2 mg/dL    Total Protein 7.4 6.4 - 8.3 g/dL    Alb 4.1 3.5 - 5.2 g/dL    Total Bilirubin <0.2 0.0 - 1.2 mg/dL    Alkaline Phosphatase 153 (H) 40 - 129 U/L    ALT 19 0 - 40 U/L    AST 21 0 - 39 U/L   Troponin    Collection Time: 04/08/19  8:25 PM   Result Value Ref Range    Troponin 0.02 0.00 - 0.03 ng/mL   Magnesium    Collection Time: 04/08/19  8:25 PM   Result Value Ref Range    Magnesium 2.3 1.6 - 2.6 mg/dL   POCT Glucose    Collection Time: 04/08/19  9:10 PM   Result Value Ref Range    Meter Glucose 153 (H) 74 - 99 mg/dL   CBC WITH AUTO DIFFERENTIAL    Collection Time: 04/09/19  2:45 AM   Result Value Ref Range    WBC 8.9 4.5 - 11.5 E9/L    RBC 4.00 3.80 - 5.80 E12/L    Hemoglobin 12.0 (L) 12.5 - 16.5 g/dL    Hematocrit 36.2 (L) 37.0 - 54.0 %    MCV 90.5 80.0 - 99.9 fL    MCH 30.0 26.0 - 35.0 pg    MCHC 33.1 32.0 - 34.5 %    RDW 13.2 11.5 - 15.0 fL    Platelets 430 055 - 381 E9/L    MPV 9.5 7.0 - 12.0 fL    Neutrophils % 54.5 43.0 - 80.0 %    Immature Granulocytes % 0.2 0.0 - 5.0 %    Lymphocytes % 37.2 20.0 - 42.0 %    Monocytes % 3.9 2.0 - 12.0 %    Eosinophils % 3.4 0.0 - 6.0 %    Basophils % 0.8 0.0 - 2.0 %    Neutrophils # 4.85 1.80 - 7.30 E9/L    Immature Granulocytes # 0.02 E9/L    Lymphocytes # 3.31 1.50 - 4.00 E9/L    Monocytes # 0.35 0.10 - 0.95 E9/L    Eosinophils # 0.30 0.05 - 0.50 E9/L    Basophils # 0.07 0.00 - 0.20 E9/L   Comprehensive metabolic panel    Collection Time: 04/09/19  2:45 AM   Result Value Ref Range    Sodium 140 132 - 146 mmol/L    Potassium 4.4 3.5 - 5.0 mmol/L    Chloride 101 98 - 107 mmol/L    CO2 27 22 - 29 mmol/L    Anion Gap 12 7 - 16 mmol/L    Glucose 103 (H) 74 - 99 mg/dL    BUN 19 6 - 20 mg/dL    CREATININE 1.0 0.7 - 1.2 mg/dL    GFR Non-African American >60 >=60 mL/min/1.73    GFR African American >60     Calcium 9.5 8.6 - 10.2 mg/dL    Total Protein 6.8 6.4 - 8.3 g/dL    Alb 3.7 3.5 - 5.2 g/dL    Total Bilirubin <0.2 0.0 - 1.2 mg/dL    Alkaline Phosphatase 136 (H) 40 - 129 U/L    ALT 17 0 - 40 U/L    AST 17 0 - 39 U/L   Magnesium    Collection Time: 04/09/19  2:45 AM   Result Value Ref Range    Magnesium 2.3 1.6 - 2.6 mg/dL   Troponin    Collection Time: 04/09/19  2:45 AM   Result Value Ref Range    Troponin <0.01 0.00 - 0.03 ng/mL   EKG 12 Lead    Collection Time: 04/09/19  6:10 AM   Result Value Ref Range    Ventricular Rate 94 BPM    Atrial Rate 94 BPM    P-R Interval 138 ms    QRS Duration 78 ms    Q-T Interval 360 ms    QTc Calculation (Bazett) 450 ms    P Axis 44 degrees    R Axis 52 degrees    T Axis 67 degrees       Assessment:    Active Hospital Problems    Diagnosis Date Noted    Chest pain [R07.9] 04/08/2019    Tobacco abuse [Z72.0] 04/08/2019    Opioid dependence ([facility] [F11.20] 04/08/2019    Bipolar affective ([facility] [F31.9] 04/08/2019    HTN (hypertension) [I10] 08/13/2015    Diabetes mellitus type 1, uncontrolled ([facility] [E10.65] 04/12/2014    Hyperlipidemia [E78.5] 04/21/2012       Plan:  1. Chest pain  - Telemetry   - Consult to Cardiology. Patient follows with Dr. Ltio Lamar. Echo from 09/22/17 showed normal diastolic function, EF= 28-86%, and normal MV and AV. Await Cardiology's recommendations. - Troponin 1 is 0.02. Troponin 2 is <0.01. Troponin 3 pending.   - EKG showed NSR.   - Stress test this morning.     2. HTN  - Patient's BP is 137/94 mmHg currently. Home BP medications include lisinopril 7.5 mg daily and Toprol XL 50 mg daily. Toprol XL was held in anticipation of stress test this morning since it is a beta-blocker. Lisinopril increased to 20 mg daily to help reduce patient's BP. However, patient refused to take this dose last night.       3. T1DM  - Continue home Humulin. Patient's home dose is 20 units BID.  Gave 20 units last night and 10 units this morning since patient will be going for stress test this morning.   - LDSSI ordered  - Hypoglycemia protocols  - POCT glucose QID      4. HLD  - Held home atorvastatin 40 mg daily because patient took his daily dose and expected DC is in <24hours      5. Tobacco abuse   - Will hold off on nicotine patches due to patient's chest pain     6. Opioid dependence   - Continue home Suboxone daily      7. Bipolar affective  - Continue home Seroquel 25 mg nightly      Diet: NPO after midnight   Code: Full   DVT prophylaxis: Patient ambulatory. Will hold off on Lovenox and PCDs.    Disposition: Today, if stress test is normal. If patient stays longer, home medications will need to be reviewed again and continued as appropriate, as some of these medications were held in anticipation of today's stress test.       Electronically signed by Lv Lopez DO on 4/9/2019 at 7:43 AM

## 2019-04-09 NOTE — PROGRESS NOTES
Notified Dr. Bean Billing of patient refusing to take 20 mg dose of lisinopril stating 20 mg made him dizzy when increased in the past. Will discuss with patient in the morning.

## 2019-04-09 NOTE — CONSULTS
[facility] Cardiology Consultation    Nicole Mata is a 29 y.o. male  patient of Dr. Kasie Ribera with no history of cardiac disease except for a small pericardial effusion and sinus tachycardia. He was admitted to an acute care hospital via the emergency room 04/08/19 with a chief complaint of chest pain. .  The symptoms have been present for months. PMHx of bipolar disorder, HTN, HLD, T1DM with neuropathy, proteinuria, opioid dependence (on Suboxone), and tobacco abuse        Past Medical History:   Diagnosis Date    Bipolar affective ([address]     Diabetes mellitus type I ([address]     Diabetic neuropathy ([address]     HTN (hypertension)     Hypercholesteremia     Opioid dependence ([address]     Proteinuria        No past surgical history on file. Family History   Problem Relation Age of Onset    No Known Problems Mother     No Known Problems Father     No Known Problems Brother         Homicide     Drug Abuse Sister         Overdose    No Known Problems Daughter     No Known Problems Son     Diabetes type 2  Maternal Grandmother        Prior to Admission medications    Medication Sig Start Date End Date Taking? Authorizing Provider   lisinopril (PRINIVIL;ZESTRIL) 5 MG tablet 7.5 mg  1/12/19  Yes Historical Provider, MD   insulin aspart (NOVOLOG) 100 UNIT/ML injection vial Inject 6-10 Units into the skin 4 times daily 3/21/19  Yes Leatha Weiss MD   gabapentin (NEURONTIN) 300 MG capsule Take 1 capsule by mouth 2 times daily for 30 days.  3/8/19 4/8/19 Yes Leatha Weiss MD   atorvastatin (LIPITOR) 40 MG tablet Take 1 tablet by mouth daily 3/7/19  Yes Leatha Weiss MD   QUEtiapine (SEROQUEL) 25 MG tablet Take 1 tablet by mouth nightly 3/7/19  Yes Leatha Weiss MD   insulin NPH (HUMULIN N) 100 UNIT/ML injection vial Take 20 units twice daily 2/14/19  Yes Raul Bruce MD   fluticasone (FLOVENT HFA) 220 MCG/ACT inhaler Inhale 1 puff into the lungs 2 times daily 1/21/19 1/21/20 Yes Leatha Weiss MD metoprolol succinate (TOPROL XL) 50 MG extended release tablet Take 1 tablet by mouth daily 1/21/19  Yes Esther Christy MD   blood glucose test strips (TRUE METRIX BLOOD GLUCOSE TEST) strip 1 each by In Vitro route 5 times daily As needed. 1/21/19  Yes Esther Christy MD   B-D INS SYRINGE 0.5CC/31GX5/16 31G X 5/16\" 0.5 ML MISC use four times a day 6/25/18  Yes Esther Christy MD   SUBOXONE 8-2 MG FILM SL film  9/14/17  Yes Historical Provider, MD   nitroGLYCERIN (NITROSTAT) 0.3 MG SL tablet up to max of 3 total doses. If no relief after 1 dose, call 911. 4/8/19   Esther Christy MD   albuterol sulfate HFA (VENTOLIN HFA) 108 (90 Base) MCG/ACT inhaler Inhale 2 puffs into the lungs every 6 hours as needed for Wheezing 1/21/19   Esther Christy MD        Allergies: Patient has no known allergies.     Social History     Tobacco Use    Smoking status: Current Every Day Smoker     Packs/day: 0.50     Years: 12.00     Pack years: 6.00     Types: Cigarettes    Smokeless tobacco: Never Used   Substance Use Topics    Alcohol use: No     Comment: denies caffeine          Review of Systems:  HEENT: negative for acute visual and auditory problems  Constitutional: negative for fever and chills  Respiratory: negative for cough and hemoptysis  Cardiovascular: negative for chest pain and dyspnea  Gastrointestinal: negative for abdominal pain, diarrhea, melena, hematemesis, nausea and vomiting  Genitourinary: negative for dysuria and hematuria  Derm: negative for rash and skin lesion(s)  Neurological: negative for seizures and tremors  Endocrine: negative for diabetic symptoms including polydipsia and polyuria  Musculoskeletal: negative for CTD  Psychiatric: negative for anxiety and depression      Inpatient Meds:    fluticasone  2 puff Inhalation BID    QUEtiapine  25 mg Oral Nightly    buprenorphine-naloxone  1 Film Sublingual Daily    insulin NPH  10 Units Subcutaneous Once    insulin lispro  0-6 Units Subcutaneous TID     insulin lispro  0-3 Units Subcutaneous Nightly    lisinopril  20 mg Oral Daily    aspirin  81 mg Oral Daily         Physical Exam:  Vitals:    04/08/19 2126   BP: (!) 137/94   Pulse: 116   Resp: 14   Temp: 98.5 °F (36.9 °C)   SpO2: 97%        Skin: Warm and dry. No rash or bruises  HEENT: Normocephalic, atraumatic. No scleral icterus. EOMI. No abnormalities of mouth or nose identified    Neck: No JVD, carotid upstrokes normal, no carotid bruit  Chest: Normal expansion  Back: Normal  Cardiac: RRR, S4  Lungs: CTA  Abdomen: Normal bowel sounds, no HSM, non-tender, no masses  Extremities: No clubbing, edema or cyanosis  Neurological: Moves all extremities. Alert and oriented to person, place, and time.       Labs:    CBC:   Lab Results   Component Value Date    WBC 8.9 04/09/2019    RBC 4.00 04/09/2019    HGB 12.0 04/09/2019    HCT 36.2 04/09/2019    MCV 90.5 04/09/2019    MCH 30.0 04/09/2019    MCHC 33.1 04/09/2019    RDW 13.2 04/09/2019     04/09/2019    MPV 9.5 04/09/2019     CMP:    Lab Results   Component Value Date     04/09/2019    K 4.4 04/09/2019     04/09/2019    CO2 27 04/09/2019    BUN 19 04/09/2019    CREATININE 1.0 04/09/2019    GFRAA >60 04/09/2019    LABGLOM >60 04/09/2019    GLUCOSE 103 04/09/2019    GLUCOSE 282 04/22/2012    PROT 6.8 04/09/2019    LABALBU 3.7 04/09/2019    LABALBU 4.9 09/10/2011    CALCIUM 9.5 04/09/2019    BILITOT <0.2 04/09/2019    ALKPHOS 136 04/09/2019    AST 17 04/09/2019    ALT 17 04/09/2019     BMP:    Lab Results   Component Value Date     04/09/2019    K 4.4 04/09/2019     04/09/2019    CO2 27 04/09/2019    BUN 19 04/09/2019    LABALBU 3.7 04/09/2019    LABALBU 4.9 09/10/2011    CREATININE 1.0 04/09/2019    CALCIUM 9.5 04/09/2019    GFRAA >60 04/09/2019    LABGLOM >60 04/09/2019    GLUCOSE 103 04/09/2019    GLUCOSE 282 04/22/2012     Last 3 Troponin:    Lab Results   Component Value Date    TROPONINI <0.01 04/09/2019    TROPONINI 0.02

## 2019-04-09 NOTE — PROGRESS NOTES
Stress Lab:  A treadmill protocol was performed with achievement of >85% of the maximal predicted heart rate at a workload of 11.9  METS. There was no angina, arrhythmia or significant ST segment shift. The test is considered electrocardiographically negative.

## 2019-04-09 NOTE — DISCHARGE SUMMARY
Physician Discharge Summary         Patient ID:  Wenceslao Callaway  [id number]  54 y.o.  1990    Admit date: 4/8/2019    Discharge date and time: 4/9/19, morning     Admission Diagnoses:   Patient Active Problem List   Diagnosis    Hyperlipidemia    [facility]    Elevated liver enzymes    HTN (hypertension)    Anxiety    Vitamin D insufficiency    Acquired hypothyroidism    DKA (diabetic ketoacidoses) ([facility]    Uncontrolled type 1 diabetes mellitus with hyperglycemia ([facility]    Diabetes mellitus type I ([facility]    Chest pain    Tobacco abuse    Opioid dependence ([facility]    Bipolar affective ([facility]       Discharge Diagnoses: chest pain, hyperlipidemia, T1DM, HTN, tobacco abuse      Consults: Cardiology    Procedures: Stress test     Hospital Course:   Mr. Ruben Rodney is a 29year-old male patient of Dr. Bella Sainz with a PMHx of bipolar disorder, HTN, HLD, T1DM with neuropathy, proteinuria, opioid dependence (on Suboxone), and tobacco use (0.5 ppd for 12 years) who was directly admitted to [facility] for chest pain.     Patient reports that chest pain has been occurring for months, but recently, in the last week, it has been occurring daily. The chest pain occurs with exertional activities, such as walking the dog or sweeping the kitchen floor. The chest pain is left-sided, and he describes it as a pressure sensation. It radiates to the left upper extremity and causes bilateral tingling of the fingertips. It is accompanied by SOB and palpitations. When it occurs, it lasts for 10-15 minutes. It only improves with rest. Besides exertion, nothing makes pain worse. Patient rates pain a 4/10 on pain scale.      Patient is a current smoker and smokes 0.50 ppd for 12 years. He denies EtOH and illicit drug use.      Patient's FamHx includes T2DM in grandmother. Mother is healthy and father was adopted. He denies a FamHx of sudden cardiac death in young male relatives.      During his admission, a stress test was performed, which showed no angina, arrhythmia, or significant ST segment shift. Patient was cleared for discharge on 4/9/19 in good condition, as cardiac work-up was negative. He is to follow-up with Dr. Fermin Richard next week. For further evaluation of his breathing, we are recommending repeat PFTs and possible adjustments in his inhalers for better control of his obstructive lung disease. Discharge Exam:  General: In no acute distress. Walking around. Heart:  Tachycardia. Regular rhythm. No murmurs, gallops, or rubs. Lungs:  CTAB. No wheeze, rales, or rhonchi. Abd: Bowel sounds normal. Nontender. Nondistended. No masses. Extrem:  No clubbing, cyanosis, or edema.       Disposition: Home    Patient Instructions:   Current Discharge Medication List      START taking these medications    Details   aspirin 81 MG chewable tablet Take 1 tablet by mouth daily  Qty: 30 tablet, Refills: 3         CONTINUE these medications which have NOT CHANGED    Details   lisinopril (PRINIVIL;ZESTRIL) 5 MG tablet 7.5 mg   Refills: 0      insulin aspart (NOVOLOG) 100 UNIT/ML injection vial Inject 6-10 Units into the skin 4 times daily  Qty: 1 vial, Refills: 3    Associated Diagnoses: Uncontrolled type 1 diabetes mellitus with complication, with long-term current use of insulin ([facility])      gabapentin (NEURONTIN) 300 MG capsule Take 1 capsule by mouth 2 times daily for 30 days.   Qty: 60 capsule, Refills: 0    Associated Diagnoses: Diabetic polyneuropathy associated with type 1 diabetes mellitus ([facility])      atorvastatin (LIPITOR) 40 MG tablet Take 1 tablet by mouth daily  Qty: 30 tablet, Refills: 3    Associated Diagnoses: Uncontrolled type 1 diabetes mellitus with complication ([facility])      QUEtiapine (SEROQUEL) 25 MG tablet Take 1 tablet by mouth nightly  Qty: 30 tablet, Refills: 1    Associated Diagnoses: Recurrent major depressive disorder, in full remission ([facility])      insulin NPH (HUMULIN N) 100 UNIT/ML

## 2019-04-10 ENCOUNTER — TELEPHONE (OUTPATIENT)
Dept: FAMILY MEDICINE CLINIC | Age: 29
End: 2019-04-10

## 2019-04-10 ASSESSMENT — ENCOUNTER SYMPTOMS
SORE THROAT: 0
DIARRHEA: 0
WHEEZING: 0
VOMITING: 0
COUGH: 0
CHEST TIGHTNESS: 1
SHORTNESS OF BREATH: 1
NAUSEA: 0
ABDOMINAL PAIN: 0
CONSTIPATION: 0
BACK PAIN: 0

## 2019-04-11 LAB
EKG ATRIAL RATE: 94 BPM
EKG P AXIS: 44 DEGREES
EKG P-R INTERVAL: 138 MS
EKG Q-T INTERVAL: 360 MS
EKG QRS DURATION: 78 MS
EKG QTC CALCULATION (BAZETT): 450 MS
EKG R AXIS: 52 DEGREES
EKG T AXIS: 67 DEGREES
EKG VENTRICULAR RATE: 94 BPM

## 2019-04-12 ENCOUNTER — TELEPHONE (OUTPATIENT)
Dept: FAMILY MEDICINE CLINIC | Age: 29
End: 2019-04-12

## 2019-04-15 ENCOUNTER — OFFICE VISIT (OUTPATIENT)
Dept: FAMILY MEDICINE CLINIC | Age: 29
End: 2019-04-15
Payer: COMMERCIAL

## 2019-04-15 VITALS
BODY MASS INDEX: 20.38 KG/M2 | HEIGHT: 63 IN | RESPIRATION RATE: 16 BRPM | WEIGHT: 115 LBS | DIASTOLIC BLOOD PRESSURE: 85 MMHG | SYSTOLIC BLOOD PRESSURE: 121 MMHG | OXYGEN SATURATION: 98 % | HEART RATE: 107 BPM

## 2019-04-15 DIAGNOSIS — I10 ESSENTIAL HYPERTENSION: ICD-10-CM

## 2019-04-15 PROCEDURE — G8420 CALC BMI NORM PARAMETERS: HCPCS | Performed by: STUDENT IN AN ORGANIZED HEALTH CARE EDUCATION/TRAINING PROGRAM

## 2019-04-15 PROCEDURE — G8427 DOCREV CUR MEDS BY ELIG CLIN: HCPCS | Performed by: STUDENT IN AN ORGANIZED HEALTH CARE EDUCATION/TRAINING PROGRAM

## 2019-04-15 PROCEDURE — 99213 OFFICE O/P EST LOW 20 MIN: CPT | Performed by: STUDENT IN AN ORGANIZED HEALTH CARE EDUCATION/TRAINING PROGRAM

## 2019-04-15 PROCEDURE — 1111F DSCHRG MED/CURRENT MED MERGE: CPT | Performed by: STUDENT IN AN ORGANIZED HEALTH CARE EDUCATION/TRAINING PROGRAM

## 2019-04-15 PROCEDURE — 4004F PT TOBACCO SCREEN RCVD TLK: CPT | Performed by: STUDENT IN AN ORGANIZED HEALTH CARE EDUCATION/TRAINING PROGRAM

## 2019-04-15 RX ORDER — LISINOPRIL 5 MG/1
10 TABLET ORAL DAILY
Qty: 30 TABLET | Refills: 0
Start: 2019-04-15 | End: 2019-05-29 | Stop reason: SDUPTHER

## 2019-04-15 NOTE — PROGRESS NOTES
discharge: Yes    Chief Complaint   Patient presents with   [address] from [facility] course: Discharge summary reviewed- see chart. Interval history/Current status: Patient reports that his chest pain has resolved. His stress test was negative. Patient is trying to cut down on his smoking. Patient denies any other symptoms. Believes it could have been due to his smoking. Patient denies any other symptoms at this time. Here for TCM:  Was admitted for chest pain. Stress test was negative. Lisinopril now up to 10 mg. No chest pain since discharge. No shortness of breath. Smokin cigarettes a day. 1pack per 3 days. Sees Dr. Maria Del Rosario Souza on 19. Review of Systems   Constitutional: Negative for activity change, appetite change and fatigue. Respiratory: Positive for shortness of breath (improving). Negative for cough, chest tightness and wheezing. Cardiovascular: Negative for chest pain, palpitations and leg swelling. Gastrointestinal: Negative for abdominal distention and vomiting. Endocrine: Positive for polyuria. Negative for polydipsia and polyphagia. Neurological: Negative for dizziness, light-headedness and headaches. Vitals:    04/15/19 0919   BP: 121/85   Pulse: 107   Resp: 16   SpO2: 98%   Weight: 115 lb (52.2 kg)   Height: 5' 3\" (1.6 m)     Body mass index is 20.37 kg/m². Wt Readings from Last 3 Encounters:   04/15/19 115 lb (52.2 kg)   19 113 lb 12.8 oz (51.6 kg)   19 115 lb (52.2 kg)     BP Readings from Last 3 Encounters:   04/15/19 121/85   19 (!) 147/104   19 (!) 142/101       Physical Exam   Constitutional: He is oriented to person, place, and time. He appears well-developed. No distress. Underweight     HENT:   Head: Normocephalic and atraumatic. Neck: No thyromegaly present. Cardiovascular: Normal heart sounds. Exam reveals no gallop and no friction rub. No murmur heard.   Tachycardic

## 2019-04-15 NOTE — PROGRESS NOTES
Subjective:    Bandar Lagunas is back after his hospital evaluation for chest pain which was worked up and found to be negative for stress induced ischemia. ROS: Otherwise negative    Patient Active Problem List   Diagnosis    Hyperlipidemia    [facility] uncontrolled [facility]    Elevated liver enzymes    HTN (hypertension)    Anxiety    Vitamin D insufficiency    Acquired hypothyroidism    DKA (diabetic ketoacidoses) ([facility]    Uncontrolled type 1 diabetes mellitus with hyperglycemia ([facility]    Diabetes mellitus type I ([facility]    Chest pain    Tobacco abuse    [facility]    Bipolar affective ([facility]       Past medical, surgical, family and social history were reviewed, non-contributory, and unchanged unless otherwise stated. Objective:    /85   Pulse 107   Resp 16   Ht 5' 3\" (1.6 m)   Wt 115 lb (52.2 kg)   SpO2 98%   BMI 20.37 kg/m²     Exam is as noted by resident with the following changes, additions or corrections:      Assessment/Plan:        Bandar Lagunas was seen today for follow-up from hospital.    Diagnoses and all orders for this visit:    Essential hypertension  -     lisinopril (PRINIVIL;ZESTRIL) 5 MG tablet; Take 2 tablets by mouth daily  -     [place] DISCHARGE MEDS RECONCILED W/ CURRENT OUTPATIENT MED LIST           Attending Physician Statement    I have reviewed the chart, including any radiology or labs. I have discussed the case, including pertinent history and exam findings with the resident. I agree with the assessment, plan and orders as documented by the resident. Please refer to the resident note for additional information.       Electronically signed by Lani Guadalupe DO on 4/22/2019 at 10:53 AM

## 2019-04-15 NOTE — PROGRESS NOTES
4/15/2019    Jose Bobo is a 29 y.o. male here for   Chief Complaint   Patient presents with    Follow-Up from [facility] -      Here for TCM:  Was admitted for chest pain. Stress test was negative. Lisinopril now up to 10 mg. No chest pain since discharge. No shortness of breath. Smokin cigarettes a day. 1pack per 3 days. Sees Dr. Everette Davis on 19. No Known Allergies    Medications  Current Outpatient Medications   Medication Sig Dispense Refill    lisinopril (PRINIVIL;ZESTRIL) 5 MG tablet 7.5 mg   0    insulin aspart (NOVOLOG) 100 UNIT/ML injection vial Inject 6-10 Units into the skin 4 times daily 1 vial 3    atorvastatin (LIPITOR) 40 MG tablet Take 1 tablet by mouth daily 30 tablet 3    QUEtiapine (SEROQUEL) 25 MG tablet Take 1 tablet by mouth nightly 30 tablet 1    insulin NPH (HUMULIN N) 100 UNIT/ML injection vial Take 20 units twice daily 3 vial 3    fluticasone (FLOVENT HFA) 220 MCG/ACT inhaler Inhale 1 puff into the lungs 2 times daily 1 Inhaler 3    metoprolol succinate (TOPROL XL) 50 MG extended release tablet Take 1 tablet by mouth daily 30 tablet 3    albuterol sulfate HFA (VENTOLIN HFA) 108 (90 Base) MCG/ACT inhaler Inhale 2 puffs into the lungs every 6 hours as needed for Wheezing 1 Inhaler 3    blood glucose test strips (TRUE METRIX BLOOD GLUCOSE TEST) strip 1 each by In Vitro route 5 times daily As needed. 150 each 11    SUBOXONE 8-2 MG FILM SL film       aspirin 81 MG chewable tablet Take 1 tablet by mouth daily 30 tablet 3    nitroGLYCERIN (NITROSTAT) 0.3 MG SL tablet up to max of 3 total doses. If no relief after 1 dose, call 911. 30 tablet 0    gabapentin (NEURONTIN) 300 MG capsule Take 1 capsule by mouth 2 times daily for 30 days. 60 capsule 0    B-D INS SYRINGE 0.5CC/31GX5/16 31G X 5/16\" 0.5 ML MISC use four times a day 200 each 5     No current facility-administered medications for this visit.          PastMedical/Surgical Hx;  Reviewed with patient      Diagnosis Date    B[facility] affective [facility]     Diabetes mellitus type I ([facility]     Diabetic neuropathy ([facility]     HTN (hypertension)     Hypercholesteremia     Opioid dependence ([email or web address]     Proteinuria      No past surgical history on file. Past Family Hx:  Reviewed with patient      Problem Relation Age of Onset    No Known Problems Mother     No Known Problems Father     No Known Problems Brother         Homicide     Drug Abuse Sister         Overdose    No Known Problems Daughter     No Known Problems Son     Diabetes type 2  Maternal Grandmother        Social Hx:  Reviewed with patient  Social History     Tobacco Use    Smoking status: Current Every Day Smoker     Packs/day: 0.50     Years: 12.00     Pack years: 6.00     Types: Cigarettes    Smokeless tobacco: Never Used   Substance Use Topics    Alcohol use: No     Comment: denies caffeine         There is no immunization history on file for this patient. Review of Systems  Review of Systems    PE:  VS:  /85   Pulse 107   Resp 16   Ht 5' 3\" (1.6 m)   Wt 115 lb (52.2 kg)   SpO2 98%   BMI 20.37 kg/m²   Physical Exam  ***    Assessment/Plan:  There are no diagnoses linked to this encounter. No follow-ups on file.

## 2019-04-22 DIAGNOSIS — E10.42 DIABETIC POLYNEUROPATHY ASSOCIATED WITH TYPE 1 DIABETES MELLITUS (HCC): ICD-10-CM

## 2019-04-22 RX ORDER — GABAPENTIN 300 MG/1
300 CAPSULE ORAL 2 TIMES DAILY
Qty: 60 CAPSULE | Refills: 0 | Status: SHIPPED | OUTPATIENT
Start: 2019-04-22 | End: 2019-05-30 | Stop reason: SDUPTHER

## 2019-04-22 ASSESSMENT — ENCOUNTER SYMPTOMS
ABDOMINAL DISTENTION: 0
CHEST TIGHTNESS: 0
SHORTNESS OF BREATH: 1
VOMITING: 0
COUGH: 0
WHEEZING: 0

## 2019-05-29 DIAGNOSIS — Z76.0 MEDICATION REFILL: ICD-10-CM

## 2019-05-29 DIAGNOSIS — F33.42 RECURRENT MAJOR DEPRESSIVE DISORDER, IN FULL REMISSION (HCC): ICD-10-CM

## 2019-05-29 DIAGNOSIS — R00.0 TACHYCARDIA: ICD-10-CM

## 2019-05-29 DIAGNOSIS — E10.42 DIABETIC POLYNEUROPATHY ASSOCIATED WITH TYPE 1 DIABETES MELLITUS (HCC): ICD-10-CM

## 2019-05-29 DIAGNOSIS — I10 ESSENTIAL HYPERTENSION: ICD-10-CM

## 2019-05-30 RX ORDER — LISINOPRIL 5 MG/1
10 TABLET ORAL DAILY
Qty: 60 TABLET | Refills: 0 | Status: SHIPPED | OUTPATIENT
Start: 2019-05-30 | End: 2019-07-03 | Stop reason: SDUPTHER

## 2019-05-30 RX ORDER — QUETIAPINE FUMARATE 25 MG/1
25 TABLET, FILM COATED ORAL NIGHTLY
Qty: 30 TABLET | Refills: 0 | Status: SHIPPED | OUTPATIENT
Start: 2019-05-30 | End: 2019-07-03 | Stop reason: SDUPTHER

## 2019-05-30 RX ORDER — FLUTICASONE PROPIONATE 220 UG/1
1 AEROSOL, METERED RESPIRATORY (INHALATION) 2 TIMES DAILY
Qty: 1 INHALER | Refills: 3 | Status: SHIPPED | OUTPATIENT
Start: 2019-05-30 | End: 2019-08-23 | Stop reason: SDUPTHER

## 2019-05-30 RX ORDER — METOPROLOL SUCCINATE 50 MG/1
50 TABLET, EXTENDED RELEASE ORAL DAILY
Qty: 30 TABLET | Refills: 0 | Status: SHIPPED | OUTPATIENT
Start: 2019-05-30 | End: 2019-07-03 | Stop reason: SDUPTHER

## 2019-05-30 RX ORDER — ALBUTEROL SULFATE 90 UG/1
2 AEROSOL, METERED RESPIRATORY (INHALATION) EVERY 6 HOURS PRN
Qty: 1 INHALER | Refills: 3 | Status: SHIPPED | OUTPATIENT
Start: 2019-05-30 | End: 2019-08-23 | Stop reason: SDUPTHER

## 2019-05-30 RX ORDER — GABAPENTIN 300 MG/1
300 CAPSULE ORAL 2 TIMES DAILY
Qty: 60 CAPSULE | Refills: 0 | Status: SHIPPED | OUTPATIENT
Start: 2019-05-30 | End: 2019-07-03 | Stop reason: SDUPTHER

## 2019-06-25 ENCOUNTER — TELEPHONE (OUTPATIENT)
Dept: CARDIOLOGY CLINIC | Age: 29
End: 2019-06-25

## 2019-06-25 NOTE — TELEPHONE ENCOUNTER
Call received from Rabia Sheehan for reports for Social Security disability     [phone]  Fax  [phone]  Phone

## 2019-07-03 DIAGNOSIS — E10.42 DIABETIC POLYNEUROPATHY ASSOCIATED WITH TYPE 1 DIABETES MELLITUS (HCC): ICD-10-CM

## 2019-07-03 DIAGNOSIS — R00.0 TACHYCARDIA: ICD-10-CM

## 2019-07-03 DIAGNOSIS — F33.42 RECURRENT MAJOR DEPRESSIVE DISORDER, IN FULL REMISSION (HCC): ICD-10-CM

## 2019-07-03 DIAGNOSIS — I10 ESSENTIAL HYPERTENSION: ICD-10-CM

## 2019-07-03 RX ORDER — BLOOD SUGAR DIAGNOSTIC
STRIP MISCELLANEOUS
Qty: 200 EACH | Refills: 5 | Status: SHIPPED | OUTPATIENT
Start: 2019-07-03 | End: 2019-10-02 | Stop reason: SDUPTHER

## 2019-07-03 RX ORDER — GABAPENTIN 300 MG/1
300 CAPSULE ORAL 2 TIMES DAILY
Qty: 60 CAPSULE | Refills: 0 | Status: SHIPPED | OUTPATIENT
Start: 2019-07-03 | End: 2019-08-23 | Stop reason: SDUPTHER

## 2019-07-03 RX ORDER — QUETIAPINE FUMARATE 25 MG/1
25 TABLET, FILM COATED ORAL NIGHTLY
Qty: 30 TABLET | Refills: 0 | Status: SHIPPED | OUTPATIENT
Start: 2019-07-03 | End: 2019-08-23 | Stop reason: SDUPTHER

## 2019-07-03 RX ORDER — METOPROLOL SUCCINATE 50 MG/1
50 TABLET, EXTENDED RELEASE ORAL DAILY
Qty: 30 TABLET | Refills: 0 | Status: SHIPPED | OUTPATIENT
Start: 2019-07-03 | End: 2019-08-23 | Stop reason: SDUPTHER

## 2019-07-03 RX ORDER — LISINOPRIL 5 MG/1
10 TABLET ORAL DAILY
Qty: 60 TABLET | Refills: 0 | Status: SHIPPED | OUTPATIENT
Start: 2019-07-03 | End: 2019-08-23 | Stop reason: SDUPTHER

## 2019-08-23 ENCOUNTER — OFFICE VISIT (OUTPATIENT)
Dept: FAMILY MEDICINE CLINIC | Age: 29
End: 2019-08-23
Payer: COMMERCIAL

## 2019-08-23 ENCOUNTER — HOSPITAL ENCOUNTER (OUTPATIENT)
Age: 29
Discharge: HOME | End: 2019-08-25
Payer: COMMERCIAL

## 2019-08-23 VITALS
DIASTOLIC BLOOD PRESSURE: 119 MMHG | WEIGHT: 106.8 LBS | SYSTOLIC BLOOD PRESSURE: 177 MMHG | HEART RATE: 105 BPM | BODY MASS INDEX: 18.92 KG/M2 | HEIGHT: 63 IN

## 2019-08-23 DIAGNOSIS — I10 ESSENTIAL HYPERTENSION: ICD-10-CM

## 2019-08-23 DIAGNOSIS — R00.0 TACHYCARDIA: ICD-10-CM

## 2019-08-23 DIAGNOSIS — F33.42 RECURRENT MAJOR DEPRESSIVE DISORDER, IN FULL REMISSION (HCC): ICD-10-CM

## 2019-08-23 DIAGNOSIS — I16.0 HYPERTENSIVE URGENCY: ICD-10-CM

## 2019-08-23 DIAGNOSIS — Z76.0 MEDICATION REFILL: ICD-10-CM

## 2019-08-23 LAB
BACTERIA: ABNORMAL /HPF
BILIRUBIN URINE: NEGATIVE
BLOOD, URINE: ABNORMAL
CLARITY: CLEAR
COLOR: YELLOW
CREATININE URINE: 62 MG/DL (ref 40–278)
GLUCOSE URINE: >=1000 MG/DL
HBA1C MFR BLD: 10.6 %
KETONES, URINE: NEGATIVE MG/DL
LEUKOCYTE ESTERASE, URINE: NEGATIVE
MICROALBUMIN UR-MCNC: 766.9 MG/L
MICROALBUMIN/CREAT UR-RTO: 1236.9 (ref 0–30)
NITRITE, URINE: NEGATIVE
PH UA: 6 (ref 5–9)
PROTEIN UA: 100 MG/DL
RBC UA: ABNORMAL /HPF (ref 0–2)
SPECIFIC GRAVITY UA: 1.01 (ref 1–1.03)
UROBILINOGEN, URINE: 0.2 E.U./DL
WBC UA: ABNORMAL /HPF (ref 0–5)

## 2019-08-23 PROCEDURE — 82570 ASSAY OF URINE CREATININE: CPT

## 2019-08-23 PROCEDURE — 93000 ELECTROCARDIOGRAM COMPLETE: CPT | Performed by: FAMILY MEDICINE

## 2019-08-23 PROCEDURE — 81003 URINALYSIS AUTO W/O SCOPE: CPT | Performed by: FAMILY MEDICINE

## 2019-08-23 PROCEDURE — 3046F HEMOGLOBIN A1C LEVEL >9.0%: CPT | Performed by: FAMILY MEDICINE

## 2019-08-23 PROCEDURE — 83036 HEMOGLOBIN GLYCOSYLATED A1C: CPT | Performed by: FAMILY MEDICINE

## 2019-08-23 PROCEDURE — 4004F PT TOBACCO SCREEN RCVD TLK: CPT | Performed by: FAMILY MEDICINE

## 2019-08-23 PROCEDURE — 99214 OFFICE O/P EST MOD 30 MIN: CPT | Performed by: FAMILY MEDICINE

## 2019-08-23 PROCEDURE — 82044 UR ALBUMIN SEMIQUANTITATIVE: CPT

## 2019-08-23 PROCEDURE — G8427 DOCREV CUR MEDS BY ELIG CLIN: HCPCS | Performed by: FAMILY MEDICINE

## 2019-08-23 PROCEDURE — G8420 CALC BMI NORM PARAMETERS: HCPCS | Performed by: FAMILY MEDICINE

## 2019-08-23 PROCEDURE — 2022F DILAT RTA XM EVC RTNOPTHY: CPT | Performed by: FAMILY MEDICINE

## 2019-08-23 PROCEDURE — 81001 URINALYSIS AUTO W/SCOPE: CPT

## 2019-08-23 RX ORDER — ATORVASTATIN CALCIUM 40 MG/1
40 TABLET, FILM COATED ORAL DAILY
Qty: 30 TABLET | Refills: 3 | Status: SHIPPED | OUTPATIENT
Start: 2019-08-23 | End: 2019-08-29 | Stop reason: DRUGHIGH

## 2019-08-23 RX ORDER — GABAPENTIN 300 MG/1
300 CAPSULE ORAL 2 TIMES DAILY
Qty: 60 CAPSULE | Refills: 0 | Status: SHIPPED | OUTPATIENT
Start: 2019-08-23 | End: 2019-10-02 | Stop reason: SDUPTHER

## 2019-08-23 RX ORDER — METOPROLOL SUCCINATE 50 MG/1
50 TABLET, EXTENDED RELEASE ORAL DAILY
Qty: 30 TABLET | Refills: 0 | Status: SHIPPED | OUTPATIENT
Start: 2019-08-23 | End: 2019-10-25 | Stop reason: SDUPTHER

## 2019-08-23 RX ORDER — ALBUTEROL SULFATE 90 UG/1
2 AEROSOL, METERED RESPIRATORY (INHALATION) EVERY 6 HOURS PRN
Qty: 1 INHALER | Refills: 3 | Status: SHIPPED
Start: 2019-08-23 | End: 2020-08-27 | Stop reason: ALTCHOICE

## 2019-08-23 RX ORDER — ASPIRIN 81 MG/1
81 TABLET, CHEWABLE ORAL DAILY
Qty: 30 TABLET | Refills: 3 | Status: SHIPPED | OUTPATIENT
Start: 2019-08-23 | End: 2019-12-30 | Stop reason: SDUPTHER

## 2019-08-23 RX ORDER — LISINOPRIL 5 MG/1
10 TABLET ORAL DAILY
Qty: 60 TABLET | Refills: 0 | Status: SHIPPED | OUTPATIENT
Start: 2019-08-23 | End: 2019-09-03 | Stop reason: DRUGHIGH

## 2019-08-23 RX ORDER — FLUTICASONE PROPIONATE 220 UG/1
1 AEROSOL, METERED RESPIRATORY (INHALATION) 2 TIMES DAILY
Qty: 1 INHALER | Refills: 3 | Status: SHIPPED | OUTPATIENT
Start: 2019-08-23 | End: 2019-12-30 | Stop reason: SDUPTHER

## 2019-08-23 RX ORDER — QUETIAPINE FUMARATE 25 MG/1
25 TABLET, FILM COATED ORAL NIGHTLY
Qty: 30 TABLET | Refills: 0 | Status: SHIPPED | OUTPATIENT
Start: 2019-08-23 | End: 2019-10-02 | Stop reason: SDUPTHER

## 2019-08-23 ASSESSMENT — ENCOUNTER SYMPTOMS
COLOR CHANGE: 0
VOMITING: 0
DIARRHEA: 0
CONSTIPATION: 0
ABDOMINAL PAIN: 0
COUGH: 0
NAUSEA: 0
SHORTNESS OF BREATH: 1
WHEEZING: 0

## 2019-08-23 NOTE — PROGRESS NOTES
[facility]  Precepting Note    Subjective:  33 yo M with multiple comorbidities  Diabetes  - fasting sugars in 200's  novolog 6-10 units meals  Humulin 15 bid  Recent hypoglycemic episode  Lab Results   Component Value Date    LABA1C 10.6 08/23/2019    LABA1C 10.8 02/14/2019    LABA1C 10.0 11/13/2018       HTN - off home medcations     Exertional chest pain - unchanged from baseline  No headaches    ROS otherwise negative    Past medical, surgical, family and social history were reviewed, non-contributory, and unchanged unless otherwise stated. Objective:    BP (!) 187/132   Pulse 112   Ht 5' 3\" (1.6 m)   Wt 106 lb 12.8 oz (48.4 kg)   BMI 18.92 kg/m²     Exam is as noted by resident with the following changes, additions or corrections:    General:  NAD; alert & oriented x 3   Heart:  RRR, no murmurs, gallops, or rubs. Lungs:  CTA bilaterally, no wheeze, rales or rhonchi  Abd: bowel sounds present, nontender, nondistended, no masses  Extrem:  No clubbing, cyanosis, or edema    Assessment/Plan:    Hypertensive urgency- refill his metoprolol and lisinopril, check ekg, ua, repeat cmp  Diabetes - uncontrolled - type 1 - increase nph in the morning, cont same pm dose  Cont meal time insulin - keep same dose for now  Tachycardia - EKG today, r/o arrhythmia     Attending Physician Statement  I have reviewed the chart, including any radiology or labs, and have seen the patient with the resident(s). I personally reviewed and performed key elements of the history and exam. I have seen and examined patient I agree with the assessment, plan and orders as documented by the resident. Please refer to the resident note for additional information.       Electronically signed by Zarina Mike MD on 8/23/2019 at 3:23 PM
palpitations (\"the usual\") and leg swelling. Gastrointestinal: Negative for abdominal pain, constipation, diarrhea, nausea and vomiting. Genitourinary: Positive for frequency (\"when my sugar is high\"). Negative for dysuria, hematuria and urgency. Skin: Negative for color change and rash. Neurological: Negative for dizziness, syncope, light-headedness and numbness. Past Medical History:      Diagnosis Date    Bipolar affective ([facility]     Diabetes mellitus type I ([facility]     Diabetic neuropathy ([facility]     HTN (hypertension)     Hypercholesteremia     Opioid dependence ([facility]     Proteinuria        Past Surgical History:    History reviewed. No pertinent surgical history. Allergies:    Patient has no known allergies.     Social History:   Social History     Socioeconomic History    Marital status:      Spouse name: Not on file    Number of children: 2    Years of education: Not on file    Highest education level: Not on file   Occupational History    Occupation: Unemployed     Comment: Last job [employer]   Social Needs    Financial resource strain: Not on file    Food insecurity:     Worry: Not on file     Inability: Not on file   [facility] needs:     Medical: Not on file     Non-medical: Not on file   Tobacco Use    Smoking status: Current Every Day Smoker     Packs/day: 0.50     Years: 12.00     Pack years: 6.00     Types: Cigarettes    Smokeless tobacco: Never Used   Substance and Sexual Activity    Alcohol use: No     Comment: denies caffeine    Drug use: No     Types: Opiates      Comment: Quit 10/2017, now takes Suboxone    Sexual activity: Not on file   Lifestyle    Physical activity:     Days per week: Not on file     Minutes per session: Not on file    Stress: Not on file   Relationships    Social connections:     Talks on phone: Not on file     Gets together: Not on file     Attends [employer] service: Not on file     Active member of club or organization: Not on file

## 2019-08-26 ENCOUNTER — CARE COORDINATION (OUTPATIENT)
Dept: CARE COORDINATION | Age: 29
End: 2019-08-26

## 2019-08-26 ENCOUNTER — TELEPHONE (OUTPATIENT)
Dept: FAMILY MEDICINE CLINIC | Age: 29
End: 2019-08-26

## 2019-08-26 NOTE — TELEPHONE ENCOUNTER
Please call patient to let him know that I ordered additional lab work after he left the office on Friday -- renin, aldosterone, and urine drug screen. Patient can come to the office to  these lab orders. Thanks!

## 2019-08-28 ENCOUNTER — HOSPITAL ENCOUNTER (OUTPATIENT)
Age: 29
Discharge: HOME | End: 2019-08-28
Payer: COMMERCIAL

## 2019-08-28 DIAGNOSIS — I16.0 HYPERTENSIVE URGENCY: ICD-10-CM

## 2019-08-28 LAB
ALBUMIN SERPL-MCNC: 4.1 G/DL (ref 3.5–5.2)
ALP BLD-CCNC: 141 U/L (ref 40–129)
ALT SERPL-CCNC: 29 U/L (ref 0–40)
ANION GAP SERPL CALCULATED.3IONS-SCNC: 14 MMOL/L (ref 7–16)
AST SERPL-CCNC: 33 U/L (ref 0–39)
BACTERIA: ABNORMAL /HPF
BILIRUB SERPL-MCNC: 0.3 MG/DL (ref 0–1.2)
BILIRUBIN URINE: NEGATIVE
BLOOD, URINE: ABNORMAL
BUN BLDV-MCNC: 13 MG/DL (ref 6–20)
CALCIUM SERPL-MCNC: 10 MG/DL (ref 8.6–10.2)
CHLORIDE BLD-SCNC: 98 MMOL/L (ref 98–107)
CHOLESTEROL, TOTAL: 265 MG/DL (ref 0–199)
CLARITY: CLEAR
CO2: 26 MMOL/L (ref 22–29)
COLOR: YELLOW
CREAT SERPL-MCNC: 0.9 MG/DL (ref 0.7–1.2)
CREATININE URINE: 39 MG/DL (ref 40–278)
EPITHELIAL CELLS, UA: ABNORMAL /HPF
GFR AFRICAN AMERICAN: >60
GFR NON-AFRICAN AMERICAN: >60 ML/MIN/1.73
GLUCOSE BLD-MCNC: 270 MG/DL (ref 74–99)
GLUCOSE URINE: >=1000 MG/DL
HDLC SERPL-MCNC: 40 MG/DL
KETONES, URINE: NEGATIVE MG/DL
LDL CHOLESTEROL CALCULATED: 166 MG/DL (ref 0–99)
LEUKOCYTE ESTERASE, URINE: NEGATIVE
MICROALBUMIN UR-MCNC: 482.2 MG/L
MICROALBUMIN/CREAT UR-RTO: 1236.4 (ref 0–30)
NITRITE, URINE: NEGATIVE
PH UA: 6 (ref 5–9)
POTASSIUM SERPL-SCNC: 4.5 MMOL/L (ref 3.5–5)
PROTEIN UA: 30 MG/DL
RBC UA: ABNORMAL /HPF (ref 0–2)
SODIUM BLD-SCNC: 138 MMOL/L (ref 132–146)
SPECIFIC GRAVITY UA: 1.01 (ref 1–1.03)
TOTAL PROTEIN: 7.6 G/DL (ref 6.4–8.3)
TRIGL SERPL-MCNC: 296 MG/DL (ref 0–149)
TSH SERPL DL<=0.05 MIU/L-ACNC: 1.75 UIU/ML (ref 0.27–4.2)
UROBILINOGEN, URINE: 0.2 E.U./DL
VLDLC SERPL CALC-MCNC: 59 MG/DL
WBC UA: ABNORMAL /HPF (ref 0–5)

## 2019-08-28 PROCEDURE — 80053 COMPREHEN METABOLIC PANEL: CPT

## 2019-08-28 PROCEDURE — 36415 COLL VENOUS BLD VENIPUNCTURE: CPT

## 2019-08-28 PROCEDURE — 84443 ASSAY THYROID STIM HORMONE: CPT

## 2019-08-28 PROCEDURE — 80061 LIPID PANEL: CPT

## 2019-08-28 PROCEDURE — 82570 ASSAY OF URINE CREATININE: CPT

## 2019-08-28 PROCEDURE — 82044 UR ALBUMIN SEMIQUANTITATIVE: CPT

## 2019-08-28 PROCEDURE — 81001 URINALYSIS AUTO W/SCOPE: CPT

## 2019-08-29 DIAGNOSIS — E78.2 MIXED HYPERLIPIDEMIA: ICD-10-CM

## 2019-08-29 DIAGNOSIS — R74.8 ELEVATED ALKALINE PHOSPHATASE LEVEL: Primary | ICD-10-CM

## 2019-08-29 RX ORDER — ATORVASTATIN CALCIUM 80 MG/1
80 TABLET, FILM COATED ORAL NIGHTLY
Qty: 30 TABLET | Refills: 5 | Status: SHIPPED | OUTPATIENT
Start: 2019-08-29 | End: 2019-09-03 | Stop reason: CLARIF

## 2019-08-30 ENCOUNTER — HOSPITAL ENCOUNTER (OUTPATIENT)
Age: 29
Discharge: HOME | End: 2019-08-30
Payer: COMMERCIAL

## 2019-08-30 ENCOUNTER — HOSPITAL ENCOUNTER (EMERGENCY)
Age: 29
Discharge: LEFT BEFORE DISCHARGE | End: 2019-08-30
Attending: EMERGENCY MEDICINE
Payer: COMMERCIAL

## 2019-08-30 VITALS
HEIGHT: 63 IN | TEMPERATURE: 97.6 F | HEART RATE: 104 BPM | WEIGHT: 112 LBS | DIASTOLIC BLOOD PRESSURE: 90 MMHG | RESPIRATION RATE: 20 BRPM | OXYGEN SATURATION: 98 % | SYSTOLIC BLOOD PRESSURE: 138 MMHG | BODY MASS INDEX: 19.84 KG/M2

## 2019-08-30 DIAGNOSIS — I16.0 HYPERTENSIVE URGENCY: ICD-10-CM

## 2019-08-30 DIAGNOSIS — R51.9 NONINTRACTABLE HEADACHE, UNSPECIFIED CHRONICITY PATTERN, UNSPECIFIED HEADACHE TYPE: ICD-10-CM

## 2019-08-30 DIAGNOSIS — R74.8 ELEVATED ALKALINE PHOSPHATASE LEVEL: ICD-10-CM

## 2019-08-30 DIAGNOSIS — H53.9 VISUAL DISTURBANCE: Primary | ICD-10-CM

## 2019-08-30 LAB
ALBUMIN SERPL-MCNC: 4.3 G/DL (ref 3.5–5.2)
ALP BLD-CCNC: 148 U/L (ref 40–129)
ALT SERPL-CCNC: 27 U/L (ref 0–40)
AMPHETAMINE SCREEN, URINE: NOT DETECTED
ANION GAP SERPL CALCULATED.3IONS-SCNC: 16 MMOL/L (ref 7–16)
AST SERPL-CCNC: 27 U/L (ref 0–39)
BARBITURATE SCREEN URINE: NOT DETECTED
BENZODIAZEPINE SCREEN, URINE: NOT DETECTED
BETA-HYDROXYBUTYRATE: 0.37 MMOL/L (ref 0.02–0.27)
BILIRUB SERPL-MCNC: 0.3 MG/DL (ref 0–1.2)
BUN BLDV-MCNC: 14 MG/DL (ref 6–20)
CALCIUM SERPL-MCNC: 10.1 MG/DL (ref 8.6–10.2)
CANNABINOID SCREEN URINE: NOT DETECTED
CHLORIDE BLD-SCNC: 94 MMOL/L (ref 98–107)
CO2: 26 MMOL/L (ref 22–29)
COCAINE METABOLITE SCREEN URINE: NOT DETECTED
CREAT SERPL-MCNC: 1 MG/DL (ref 0.7–1.2)
GAMMA GLUTAMYL TRANSFERASE: 38 U/L (ref 10–71)
GFR AFRICAN AMERICAN: >60
GFR NON-AFRICAN AMERICAN: >60 ML/MIN/1.73
GLUCOSE BLD-MCNC: 354 MG/DL (ref 74–99)
HCT VFR BLD CALC: 40.7 % (ref 37–54)
HEMOGLOBIN: 13.4 G/DL (ref 12.5–16.5)
LACTIC ACID, SEPSIS: 5.7 MMOL/L (ref 0.5–1.9)
Lab: NORMAL
MCH RBC QN AUTO: 29 PG (ref 26–35)
MCHC RBC AUTO-ENTMCNC: 32.9 % (ref 32–34.5)
MCV RBC AUTO: 88.1 FL (ref 80–99.9)
METHADONE SCREEN, URINE: NOT DETECTED
OPIATE SCREEN URINE: NOT DETECTED
PDW BLD-RTO: 13.2 FL (ref 11.5–15)
PHENCYCLIDINE SCREEN URINE: NOT DETECTED
PLATELET # BLD: 425 E9/L (ref 130–450)
PMV BLD AUTO: 9.9 FL (ref 7–12)
POTASSIUM SERPL-SCNC: 4.2 MMOL/L (ref 3.5–5)
PROPOXYPHENE SCREEN: NOT DETECTED
RBC # BLD: 4.62 E12/L (ref 3.8–5.8)
SODIUM BLD-SCNC: 136 MMOL/L (ref 132–146)
TOTAL PROTEIN: 8.1 G/DL (ref 6.4–8.3)
WBC # BLD: 14.2 E9/L (ref 4.5–11.5)

## 2019-08-30 PROCEDURE — 36415 COLL VENOUS BLD VENIPUNCTURE: CPT

## 2019-08-30 PROCEDURE — 99284 EMERGENCY DEPT VISIT MOD MDM: CPT

## 2019-08-30 PROCEDURE — 83605 ASSAY OF LACTIC ACID: CPT

## 2019-08-30 PROCEDURE — 80307 DRUG TEST PRSMV CHEM ANLYZR: CPT

## 2019-08-30 PROCEDURE — 84244 ASSAY OF RENIN: CPT

## 2019-08-30 PROCEDURE — 82010 KETONE BODYS QUAN: CPT

## 2019-08-30 PROCEDURE — 82977 ASSAY OF GGT: CPT

## 2019-08-30 PROCEDURE — 80053 COMPREHEN METABOLIC PANEL: CPT

## 2019-08-30 PROCEDURE — 85027 COMPLETE CBC AUTOMATED: CPT

## 2019-08-30 PROCEDURE — 82088 ASSAY OF ALDOSTERONE: CPT

## 2019-08-30 RX ORDER — 0.9 % SODIUM CHLORIDE 0.9 %
2000 INTRAVENOUS SOLUTION INTRAVENOUS ONCE
Status: DISCONTINUED | OUTPATIENT
Start: 2019-08-30 | End: 2019-08-30 | Stop reason: HOSPADM

## 2019-08-30 RX ORDER — TETRACAINE HYDROCHLORIDE 5 MG/ML
1 SOLUTION OPHTHALMIC ONCE
Status: DISCONTINUED | OUTPATIENT
Start: 2019-08-30 | End: 2019-08-30 | Stop reason: HOSPADM

## 2019-08-30 ASSESSMENT — ENCOUNTER SYMPTOMS
ALLERGIC/IMMUNOLOGIC NEGATIVE: 1
EYE PAIN: 0
EYE DISCHARGE: 0
NAUSEA: 0
DIARRHEA: 0
COUGH: 0
CONSTIPATION: 0
SHORTNESS OF BREATH: 0
PHOTOPHOBIA: 0
EYE ITCHING: 0
EYE REDNESS: 0
CHEST TIGHTNESS: 0
ABDOMINAL PAIN: 0
VOMITING: 0

## 2019-08-30 ASSESSMENT — PAIN DESCRIPTION - DESCRIPTORS: DESCRIPTORS: PRESSURE

## 2019-08-30 ASSESSMENT — VISUAL ACUITY
OD: 20/40
OS: 20/25
OU: 20/15

## 2019-08-30 ASSESSMENT — PAIN SCALES - GENERAL: PAINLEVEL_OUTOF10: 5

## 2019-08-30 ASSESSMENT — PAIN DESCRIPTION - ORIENTATION: ORIENTATION: ANTERIOR

## 2019-08-30 ASSESSMENT — PAIN DESCRIPTION - LOCATION: LOCATION: HEAD

## 2019-08-30 ASSESSMENT — PAIN DESCRIPTION - PAIN TYPE: TYPE: ACUTE PAIN

## 2019-08-30 NOTE — ED TRIAGE NOTES
FIRST PROVIDER CONTACT ASSESSMENT NOTE      Department of Emergency Medicine   8/30/19  5:48 PM    Chief Complaint: No chief complaint on file. History of Present Illness:    Sunday Abner is a 34 y.o. male who presents to the ED by private car for headaches  Focused Screening Exam:  Constitutional:  Alert, appears stated age and is in no distress. *ALLERGIES*     Patient has no known allergies.      ED Triage Vitals [08/30/19 1737]   BP Temp Temp Source Pulse Resp SpO2 Height Weight   -- 97.6 °F (36.4 °C) Tympanic 118 -- 97 % -- --        Initial Plan of Care:  Initiate Treatment-Testing, Proceed toTreatment Area When Bed Available for ED Attending/MLP to Continue Care    -----------------END OF FIRST PROVIDER CONTACT ASSESSMENT NOTE--------------  Electronically signed by DARLYN Chanel CNP   DD: 8/30/19

## 2019-09-02 LAB
ALDOSTERONE: 4.5 NG/DL
RENIN ACTIVITY: 1.2 NG/ML/HR

## 2019-09-03 ENCOUNTER — OFFICE VISIT (OUTPATIENT)
Dept: FAMILY MEDICINE CLINIC | Age: 29
End: 2019-09-03
Payer: COMMERCIAL

## 2019-09-03 ENCOUNTER — CARE COORDINATION (OUTPATIENT)
Dept: CARE COORDINATION | Age: 29
End: 2019-09-03

## 2019-09-03 VITALS
HEART RATE: 99 BPM | RESPIRATION RATE: 16 BRPM | BODY MASS INDEX: 19.67 KG/M2 | OXYGEN SATURATION: 98 % | SYSTOLIC BLOOD PRESSURE: 153 MMHG | DIASTOLIC BLOOD PRESSURE: 104 MMHG | WEIGHT: 111 LBS | HEIGHT: 63 IN

## 2019-09-03 DIAGNOSIS — R31.21 ASYMPTOMATIC MICROSCOPIC HEMATURIA: ICD-10-CM

## 2019-09-03 DIAGNOSIS — I10 ESSENTIAL HYPERTENSION: Primary | ICD-10-CM

## 2019-09-03 DIAGNOSIS — R74.8 ELEVATED ALKALINE PHOSPHATASE LEVEL: ICD-10-CM

## 2019-09-03 DIAGNOSIS — E10.65 TYPE 1 DIABETES MELLITUS WITH HYPERGLYCEMIA (HCC): ICD-10-CM

## 2019-09-03 PROCEDURE — G8427 DOCREV CUR MEDS BY ELIG CLIN: HCPCS | Performed by: FAMILY MEDICINE

## 2019-09-03 PROCEDURE — 3046F HEMOGLOBIN A1C LEVEL >9.0%: CPT | Performed by: FAMILY MEDICINE

## 2019-09-03 PROCEDURE — 4004F PT TOBACCO SCREEN RCVD TLK: CPT | Performed by: FAMILY MEDICINE

## 2019-09-03 PROCEDURE — G8420 CALC BMI NORM PARAMETERS: HCPCS | Performed by: FAMILY MEDICINE

## 2019-09-03 PROCEDURE — 99213 OFFICE O/P EST LOW 20 MIN: CPT | Performed by: FAMILY MEDICINE

## 2019-09-03 PROCEDURE — 2022F DILAT RTA XM EVC RTNOPTHY: CPT | Performed by: FAMILY MEDICINE

## 2019-09-03 RX ORDER — LISINOPRIL 5 MG/1
TABLET ORAL
Qty: 30 TABLET | Refills: 0 | Status: SHIPPED | OUTPATIENT
Start: 2019-09-03 | End: 2019-10-02 | Stop reason: SDUPTHER

## 2019-09-03 RX ORDER — LISINOPRIL 20 MG/1
20 TABLET ORAL DAILY
Qty: 30 TABLET | Refills: 1 | Status: SHIPPED | OUTPATIENT
Start: 2019-09-03 | End: 2019-09-03 | Stop reason: CLARIF

## 2019-09-03 RX ORDER — ATORVASTATIN CALCIUM 40 MG/1
40 TABLET, FILM COATED ORAL DAILY
COMMUNITY
End: 2019-12-30 | Stop reason: SDUPTHER

## 2019-09-03 ASSESSMENT — ENCOUNTER SYMPTOMS
ABDOMINAL PAIN: 0
CONSTIPATION: 0
WHEEZING: 0
EYE PAIN: 0
NAUSEA: 0
SHORTNESS OF BREATH: 0
VOMITING: 0
DIARRHEA: 0
COUGH: 0

## 2019-09-03 NOTE — PROGRESS NOTES
[facility]  Precepting Note    Subjective:  Hypertension  Follow-up  Blood pressure is not controlled today. BP Readings from Last 3 Encounters:   09/03/19 (!) 153/104   08/30/19 (!) 138/90   08/23/19 (!) 177/119     Denies CP, sob, abd pain, headaches, vision changes, dizziness, hypotensive symptoms. Considering secondary causes. Renin and mane levels wnl. UDS neg. Last appt was out of all meds. Since then has been taking regularly. Diabetes mellitus  Follow-up -  in the morning, which is normal for him. Hasnt logged glucose as requested last week. Lab Results   Component Value Date    LABA1C 10.6 08/23/2019    LABA1C 10.8 02/14/2019    LABA1C 10.0 11/13/2018     Lab Results   Component Value Date    LABMICR 482.2 (H) 08/28/2019    LDLCALC 166 (H) 08/28/2019    CREATININE 1.0 08/30/2019     Wt Readings from Last 3 Encounters:   09/03/19 111 lb (50.3 kg)   08/30/19 112 lb (50.8 kg)   08/23/19 106 lb 12.8 oz (48.4 kg)     Patient continues diabetic medication regimen. Compliance is good. No side effects of medications noted. Diabetes is not adequately controlled. ROS otherwise negative     Past medical, surgical, family and social history were reviewed, non-contributory, and unchanged unless otherwise stated. Objective:    BP (!) 153/104   Pulse 99   Resp 16   Ht 5' 3\" (1.6 m)   Wt 111 lb (50.3 kg)   SpO2 98%   BMI 19.66 kg/m²     Exam is as noted by resident with the following changes, additions or corrections:    General:  NAD; alert & oriented x 3   Heart:  RRR, no murmurs, gallops, or rubs. Lungs:  CTA bilaterally, no wheeze, rales or rhonchi  Abd: bowel sounds present, nontender, nondistended, no masses  Extrem:  No clubbing, cyanosis, or edema    Assessment/Plan:  HTN  Inc lisinopril  Renin and Mane levels normal.  Consider other causes of 2nd HTN. Get Renal US especially given blood in urine. DM  Poor control.  Not willing to do some things. Refer to alternate Endo at patient request.  He does not want to do insulin pump and that may have been a barrier to relationship with previous Endo. Would likely be good candidate for Freestyle susie type monitor. No change to insulin regimen right now. Again encouraged to check glucose and record so we can help him adjust meds. Persistent microscopic hematuria  Renal US and Urology ref. Attending Physician Statement  I have reviewed the chart, including any radiology or labs. I have discussed the case, including pertinent history and exam findings with the resident. I agree with the assessment, plan and orders as documented by the resident. Please refer to the resident note for additional information.       Electronically signed by Hedy Fang MD on 9/3/2019 at 11:10 AM

## 2019-09-03 NOTE — PROGRESS NOTES
Cardiovascular: Positive for leg swelling (baseline ). Negative for chest pain and palpitations. Gastrointestinal: Negative for abdominal pain, constipation, diarrhea, nausea and vomiting. Genitourinary: Negative for dysuria and hematuria. Neurological: Negative for dizziness and light-headedness. Past Medical History:      Diagnosis Date    Bipolar affective ([facility]     Diabetes mellitus type I ([facility]     Diabetic neuropathy ([facility]     HTN (hypertension)     Hypercholesteremia     Opioid dependence ([facility]     Proteinuria        Past Surgical History:    No past surgical history on file. Allergies:    Patient has no known allergies.     Social History:   Social History     Socioeconomic History    Marital status:      Spouse name: Not on file    Number of children: 2    Years of education: Not on file    Highest education level: Not on file   Occupational History    Occupation: Unemployed     Comment: Last job [employer]   Social Needs    Financial resource strain: Not on file    Food insecurity:     Worry: Not on file     Inability: Not on file   [employer] needs:     Medical: Not on file     Non-medical: Not on file   Tobacco Use    Smoking status: Current Every Day Smoker     Packs/day: 0.50     Years: 12.00     Pack years: 6.00     Types: Cigarettes    Smokeless tobacco: Never Used   Substance and Sexual Activity    Alcohol use: No     Comment: denies caffeine    Drug use: No     Types: Opiates      Comment: Quit 10/2017, now takes Suboxone    Sexual activity: Not on file   Lifestyle    Physical activity:     Days per week: Not on file     Minutes per session: Not on file    Stress: Not on file   Relationships    Social connections:     Talks on phone: Not on file     Gets together: Not on file     Attends Mosque service: Not on file     Active member of club or organization: Not on file     Attends meetings of clubs or organizations: Not on file     Relationship

## 2019-09-19 ENCOUNTER — HOSPITAL ENCOUNTER (OUTPATIENT)
Dept: ULTRASOUND IMAGING | Age: 29
Discharge: HOME | End: 2019-09-21
Payer: COMMERCIAL

## 2019-09-19 DIAGNOSIS — I10 ESSENTIAL HYPERTENSION: ICD-10-CM

## 2019-09-19 DIAGNOSIS — I10 HYPERTENSION, ESSENTIAL: ICD-10-CM

## 2019-09-19 PROCEDURE — 93975 VASCULAR STUDY: CPT

## 2019-09-19 PROCEDURE — 76770 US EXAM ABDO BACK WALL COMP: CPT

## 2019-09-20 ENCOUNTER — TELEPHONE (OUTPATIENT)
Dept: FAMILY MEDICINE CLINIC | Age: 29
End: 2019-09-20

## 2019-09-20 DIAGNOSIS — I70.1 LEFT RENAL ARTERY STENOSIS (HCC): Primary | ICD-10-CM

## 2019-09-24 ENCOUNTER — HOSPITAL ENCOUNTER (OUTPATIENT)
Age: 29
Discharge: HOME | End: 2019-09-26
Payer: COMMERCIAL

## 2019-09-24 PROCEDURE — 87088 URINE BACTERIA CULTURE: CPT

## 2019-09-24 PROCEDURE — 88112 CYTOPATH CELL ENHANCE TECH: CPT

## 2019-09-27 LAB — URINE CULTURE, ROUTINE: NORMAL

## 2019-10-02 DIAGNOSIS — I10 ESSENTIAL HYPERTENSION: ICD-10-CM

## 2019-10-02 DIAGNOSIS — F33.42 RECURRENT MAJOR DEPRESSIVE DISORDER, IN FULL REMISSION (HCC): ICD-10-CM

## 2019-10-02 RX ORDER — GABAPENTIN 300 MG/1
300 CAPSULE ORAL 2 TIMES DAILY
Qty: 60 CAPSULE | Refills: 3 | Status: SHIPPED
Start: 2019-10-02 | End: 2020-02-18 | Stop reason: SDUPTHER

## 2019-10-02 RX ORDER — LISINOPRIL 5 MG/1
TABLET ORAL
Qty: 180 TABLET | Refills: 2 | Status: SHIPPED
Start: 2019-10-02 | End: 2020-04-25 | Stop reason: SDUPTHER

## 2019-10-02 RX ORDER — BLOOD SUGAR DIAGNOSTIC
STRIP MISCELLANEOUS
Qty: 200 EACH | Refills: 2 | Status: SHIPPED
Start: 2019-10-02 | End: 2020-07-02 | Stop reason: SDUPTHER

## 2019-10-02 RX ORDER — QUETIAPINE FUMARATE 25 MG/1
25 TABLET, FILM COATED ORAL NIGHTLY
Qty: 30 TABLET | Refills: 2 | Status: SHIPPED
Start: 2019-10-02 | End: 2020-07-02

## 2019-10-07 ENCOUNTER — OFFICE VISIT (OUTPATIENT)
Dept: FAMILY MEDICINE CLINIC | Age: 29
End: 2019-10-07
Payer: COMMERCIAL

## 2019-10-07 ENCOUNTER — HOSPITAL ENCOUNTER (OUTPATIENT)
Dept: MRI IMAGING | Age: 29
Discharge: HOME | End: 2019-10-09
Payer: COMMERCIAL

## 2019-10-07 VITALS
RESPIRATION RATE: 18 BRPM | HEIGHT: 63 IN | BODY MASS INDEX: 18.96 KG/M2 | HEART RATE: 100 BPM | SYSTOLIC BLOOD PRESSURE: 132 MMHG | OXYGEN SATURATION: 98 % | DIASTOLIC BLOOD PRESSURE: 83 MMHG | WEIGHT: 107 LBS

## 2019-10-07 DIAGNOSIS — I70.1 LEFT RENAL ARTERY STENOSIS (HCC): ICD-10-CM

## 2019-10-07 DIAGNOSIS — S61.233A PUNCTURE WOUND OF LEFT MIDDLE FINGER: ICD-10-CM

## 2019-10-07 DIAGNOSIS — I15.0 RENOVASCULAR HYPERTENSION: Primary | ICD-10-CM

## 2019-10-07 DIAGNOSIS — R74.8 ELEVATED ALKALINE PHOSPHATASE LEVEL: ICD-10-CM

## 2019-10-07 PROBLEM — R31.29 OTHER MICROSCOPIC HEMATURIA: Status: ACTIVE | Noted: 2019-10-07

## 2019-10-07 PROCEDURE — 90471 IMMUNIZATION ADMIN: CPT | Performed by: FAMILY MEDICINE

## 2019-10-07 PROCEDURE — G8427 DOCREV CUR MEDS BY ELIG CLIN: HCPCS | Performed by: FAMILY MEDICINE

## 2019-10-07 PROCEDURE — G8484 FLU IMMUNIZE NO ADMIN: HCPCS | Performed by: FAMILY MEDICINE

## 2019-10-07 PROCEDURE — 90715 TDAP VACCINE 7 YRS/> IM: CPT | Performed by: FAMILY MEDICINE

## 2019-10-07 PROCEDURE — 6360000004 HC RX CONTRAST MEDICATION: Performed by: RADIOLOGY

## 2019-10-07 PROCEDURE — C8902 MRA W/O FOL W/CONT, ABD: HCPCS

## 2019-10-07 PROCEDURE — G8598 ASA/ANTIPLAT THER USED: HCPCS | Performed by: FAMILY MEDICINE

## 2019-10-07 PROCEDURE — A9576 INJ PROHANCE MULTIPACK: HCPCS | Performed by: RADIOLOGY

## 2019-10-07 PROCEDURE — G8420 CALC BMI NORM PARAMETERS: HCPCS | Performed by: FAMILY MEDICINE

## 2019-10-07 PROCEDURE — 4004F PT TOBACCO SCREEN RCVD TLK: CPT | Performed by: FAMILY MEDICINE

## 2019-10-07 PROCEDURE — 99213 OFFICE O/P EST LOW 20 MIN: CPT | Performed by: FAMILY MEDICINE

## 2019-10-07 RX ORDER — DOXYCYCLINE HYCLATE 100 MG
100 TABLET ORAL 2 TIMES DAILY
Qty: 14 TABLET | Refills: 0 | Status: SHIPPED | OUTPATIENT
Start: 2019-10-07 | End: 2019-10-14

## 2019-10-07 RX ADMIN — GADOTERIDOL 30 ML: 279.3 INJECTION, SOLUTION INTRAVENOUS at 18:59

## 2019-10-07 ASSESSMENT — ENCOUNTER SYMPTOMS
NAUSEA: 0
COUGH: 1
CONSTIPATION: 0
DIARRHEA: 0
ABDOMINAL PAIN: 0
SHORTNESS OF BREATH: 0
WHEEZING: 0
VOMITING: 0
BLOOD IN STOOL: 0

## 2019-10-10 ENCOUNTER — TELEPHONE (OUTPATIENT)
Dept: FAMILY MEDICINE CLINIC | Age: 29
End: 2019-10-10

## 2019-10-14 ENCOUNTER — HOSPITAL ENCOUNTER (OUTPATIENT)
Dept: NUCLEAR MEDICINE | Age: 29
Discharge: HOME | End: 2019-10-16
Payer: COMMERCIAL

## 2019-10-14 DIAGNOSIS — R74.8 ELEVATED ALKALINE PHOSPHATASE LEVEL: ICD-10-CM

## 2019-10-14 PROCEDURE — 78306 BONE IMAGING WHOLE BODY: CPT

## 2019-10-14 PROCEDURE — A9503 TC99M MEDRONATE: HCPCS | Performed by: RADIOLOGY

## 2019-10-14 PROCEDURE — 3430000000 HC RX DIAGNOSTIC RADIOPHARMACEUTICAL: Performed by: RADIOLOGY

## 2019-10-14 RX ORDER — TC 99M MEDRONATE 20 MG/10ML
25 INJECTION, POWDER, LYOPHILIZED, FOR SOLUTION INTRAVENOUS
Status: COMPLETED | OUTPATIENT
Start: 2019-10-14 | End: 2019-10-14

## 2019-10-14 RX ADMIN — TC 99M MEDRONATE 25 MILLICURIE: 20 INJECTION, POWDER, LYOPHILIZED, FOR SOLUTION INTRAVENOUS at 14:11

## 2019-10-25 DIAGNOSIS — I10 ESSENTIAL HYPERTENSION: ICD-10-CM

## 2019-10-25 DIAGNOSIS — I16.0 HYPERTENSIVE URGENCY: ICD-10-CM

## 2019-10-25 DIAGNOSIS — R00.0 TACHYCARDIA: ICD-10-CM

## 2019-10-25 RX ORDER — METOPROLOL SUCCINATE 50 MG/1
50 TABLET, EXTENDED RELEASE ORAL DAILY
Qty: 30 TABLET | Refills: 5 | Status: SHIPPED
Start: 2019-10-25 | End: 2020-05-18 | Stop reason: SDUPTHER

## 2019-11-19 ENCOUNTER — HOSPITAL ENCOUNTER (OUTPATIENT)
Age: 29
Discharge: HOME | End: 2019-11-19
Payer: COMMERCIAL

## 2019-11-19 ENCOUNTER — APPOINTMENT (OUTPATIENT)
Dept: GENERAL RADIOLOGY | Age: 29
End: 2019-11-19
Payer: COMMERCIAL

## 2019-11-19 ENCOUNTER — HOSPITAL ENCOUNTER (OUTPATIENT)
Age: 29
Setting detail: OBSERVATION
Discharge: LEFT BEFORE DISCHARGE | End: 2019-11-20
Attending: EMERGENCY MEDICINE | Admitting: INTERNAL MEDICINE
Payer: COMMERCIAL

## 2019-11-19 DIAGNOSIS — R77.8 ELEVATED TROPONIN: ICD-10-CM

## 2019-11-19 DIAGNOSIS — R73.9 HYPERGLYCEMIA: Primary | ICD-10-CM

## 2019-11-19 DIAGNOSIS — R94.31 ABNORMAL EKG: ICD-10-CM

## 2019-11-19 DIAGNOSIS — E87.20 LACTIC ACIDOSIS: ICD-10-CM

## 2019-11-19 LAB
ALBUMIN SERPL-MCNC: 4.5 G/DL (ref 3.5–5.2)
ALP BLD-CCNC: 169 U/L (ref 40–129)
ALT SERPL-CCNC: 11 U/L (ref 0–40)
ANION GAP SERPL CALCULATED.3IONS-SCNC: 19 MMOL/L (ref 7–16)
ANION GAP SERPL CALCULATED.3IONS-SCNC: 26 MMOL/L (ref 7–16)
AST SERPL-CCNC: 14 U/L (ref 0–39)
BASOPHILS ABSOLUTE: 0.1 E9/L (ref 0–0.2)
BASOPHILS RELATIVE PERCENT: 0.8 % (ref 0–2)
BETA-HYDROXYBUTYRATE: 0.26 MMOL/L (ref 0.02–0.27)
BILIRUB SERPL-MCNC: 0.3 MG/DL (ref 0–1.2)
BUN BLDV-MCNC: 22 MG/DL (ref 6–20)
BUN BLDV-MCNC: 26 MG/DL (ref 6–20)
CALCIUM SERPL-MCNC: 10.5 MG/DL (ref 8.6–10.2)
CALCIUM SERPL-MCNC: 9.7 MG/DL (ref 8.6–10.2)
CHLORIDE BLD-SCNC: 88 MMOL/L (ref 98–107)
CHLORIDE BLD-SCNC: 91 MMOL/L (ref 98–107)
CHP ED QC CHECK: YES
CO2: 17 MMOL/L (ref 22–29)
CO2: 26 MMOL/L (ref 22–29)
CREAT SERPL-MCNC: 1.1 MG/DL (ref 0.7–1.2)
CREAT SERPL-MCNC: 1.1 MG/DL (ref 0.7–1.2)
EOSINOPHILS ABSOLUTE: 0.21 E9/L (ref 0.05–0.5)
EOSINOPHILS RELATIVE PERCENT: 1.8 % (ref 0–6)
GFR AFRICAN AMERICAN: >60
GFR AFRICAN AMERICAN: >60
GFR NON-AFRICAN AMERICAN: >60 ML/MIN/1.73
GFR NON-AFRICAN AMERICAN: >60 ML/MIN/1.73
GLUCOSE BLD-MCNC: 186 MG/DL
GLUCOSE BLD-MCNC: 186 MG/DL (ref 74–99)
GLUCOSE BLD-MCNC: 716 MG/DL (ref 74–99)
HCT VFR BLD CALC: 39.1 % (ref 37–54)
HEMOGLOBIN: 13.3 G/DL (ref 12.5–16.5)
IMMATURE GRANULOCYTES #: 0.03 E9/L
IMMATURE GRANULOCYTES %: 0.3 % (ref 0–5)
LACTIC ACID: 5.2 MMOL/L (ref 0.5–2.2)
LYMPHOCYTES ABSOLUTE: 2.24 E9/L (ref 1.5–4)
LYMPHOCYTES RELATIVE PERCENT: 18.7 % (ref 20–42)
MAGNESIUM: 2.2 MG/DL (ref 1.6–2.6)
MCH RBC QN AUTO: 28.9 PG (ref 26–35)
MCHC RBC AUTO-ENTMCNC: 34 % (ref 32–34.5)
MCV RBC AUTO: 84.8 FL (ref 80–99.9)
METER GLUCOSE: 178 MG/DL (ref 74–99)
MONOCYTES ABSOLUTE: 0.65 E9/L (ref 0.1–0.95)
MONOCYTES RELATIVE PERCENT: 5.4 % (ref 2–12)
NEUTROPHILS ABSOLUTE: 8.76 E9/L (ref 1.8–7.3)
NEUTROPHILS RELATIVE PERCENT: 73 % (ref 43–80)
PDW BLD-RTO: 12.1 FL (ref 11.5–15)
PH VENOUS: 7.35 (ref 7.35–7.45)
PLATELET # BLD: 372 E9/L (ref 130–450)
PMV BLD AUTO: 10.2 FL (ref 7–12)
POTASSIUM SERPL-SCNC: 3.6 MMOL/L (ref 3.5–5)
POTASSIUM SERPL-SCNC: 4.3 MMOL/L (ref 3.5–5)
RBC # BLD: 4.61 E12/L (ref 3.8–5.8)
SODIUM BLD-SCNC: 131 MMOL/L (ref 132–146)
SODIUM BLD-SCNC: 136 MMOL/L (ref 132–146)
TOTAL PROTEIN: 8.3 G/DL (ref 6.4–8.3)
TROPONIN: 0.05 NG/ML (ref 0–0.03)
WBC # BLD: 12 E9/L (ref 4.5–11.5)

## 2019-11-19 PROCEDURE — 83036 HEMOGLOBIN GLYCOSYLATED A1C: CPT

## 2019-11-19 PROCEDURE — 99285 EMERGENCY DEPT VISIT HI MDM: CPT

## 2019-11-19 PROCEDURE — 80053 COMPREHEN METABOLIC PANEL: CPT

## 2019-11-19 PROCEDURE — 85025 COMPLETE CBC W/AUTO DIFF WBC: CPT

## 2019-11-19 PROCEDURE — 93005 ELECTROCARDIOGRAM TRACING: CPT | Performed by: NURSE PRACTITIONER

## 2019-11-19 PROCEDURE — G0378 HOSPITAL OBSERVATION PER HR: HCPCS

## 2019-11-19 PROCEDURE — 36415 COLL VENOUS BLD VENIPUNCTURE: CPT

## 2019-11-19 PROCEDURE — 71045 X-RAY EXAM CHEST 1 VIEW: CPT

## 2019-11-19 PROCEDURE — 80048 BASIC METABOLIC PNL TOTAL CA: CPT

## 2019-11-19 PROCEDURE — 82962 GLUCOSE BLOOD TEST: CPT

## 2019-11-19 PROCEDURE — 82800 BLOOD PH: CPT

## 2019-11-19 PROCEDURE — 2580000003 HC RX 258: Performed by: EMERGENCY MEDICINE

## 2019-11-19 PROCEDURE — 84484 ASSAY OF TROPONIN QUANT: CPT

## 2019-11-19 PROCEDURE — 83735 ASSAY OF MAGNESIUM: CPT

## 2019-11-19 PROCEDURE — 83605 ASSAY OF LACTIC ACID: CPT

## 2019-11-19 PROCEDURE — 82010 KETONE BODYS QUAN: CPT

## 2019-11-19 RX ORDER — POTASSIUM CHLORIDE 20 MEQ/1
40 TABLET, EXTENDED RELEASE ORAL PRN
Status: DISCONTINUED | OUTPATIENT
Start: 2019-11-19 | End: 2019-11-20 | Stop reason: HOSPADM

## 2019-11-19 RX ORDER — DEXTROSE AND SODIUM CHLORIDE 5; .45 G/100ML; G/100ML
INJECTION, SOLUTION INTRAVENOUS CONTINUOUS
Status: DISCONTINUED | OUTPATIENT
Start: 2019-11-19 | End: 2019-11-20 | Stop reason: HOSPADM

## 2019-11-19 RX ORDER — ATORVASTATIN CALCIUM 40 MG/1
40 TABLET, FILM COATED ORAL NIGHTLY
Status: DISCONTINUED | OUTPATIENT
Start: 2019-11-19 | End: 2019-11-20 | Stop reason: HOSPADM

## 2019-11-19 RX ORDER — GABAPENTIN 300 MG/1
300 CAPSULE ORAL 2 TIMES DAILY
Status: DISCONTINUED | OUTPATIENT
Start: 2019-11-19 | End: 2019-11-20 | Stop reason: HOSPADM

## 2019-11-19 RX ORDER — SODIUM CHLORIDE 0.9 % (FLUSH) 0.9 %
10 SYRINGE (ML) INJECTION PRN
Status: DISCONTINUED | OUTPATIENT
Start: 2019-11-19 | End: 2019-11-20 | Stop reason: HOSPADM

## 2019-11-19 RX ORDER — 0.9 % SODIUM CHLORIDE 0.9 %
1000 INTRAVENOUS SOLUTION INTRAVENOUS ONCE
Status: COMPLETED | OUTPATIENT
Start: 2019-11-19 | End: 2019-11-19

## 2019-11-19 RX ORDER — DEXTROSE MONOHYDRATE 50 MG/ML
100 INJECTION, SOLUTION INTRAVENOUS PRN
Status: DISCONTINUED | OUTPATIENT
Start: 2019-11-19 | End: 2019-11-20 | Stop reason: HOSPADM

## 2019-11-19 RX ORDER — PANTOPRAZOLE SODIUM 40 MG/1
40 TABLET, DELAYED RELEASE ORAL
Status: DISCONTINUED | OUTPATIENT
Start: 2019-11-20 | End: 2019-11-20 | Stop reason: HOSPADM

## 2019-11-19 RX ORDER — NICOTINE POLACRILEX 4 MG
15 LOZENGE BUCCAL PRN
Status: DISCONTINUED | OUTPATIENT
Start: 2019-11-19 | End: 2019-11-20 | Stop reason: HOSPADM

## 2019-11-19 RX ORDER — ASPIRIN 81 MG/1
81 TABLET, CHEWABLE ORAL DAILY
Status: DISCONTINUED | OUTPATIENT
Start: 2019-11-20 | End: 2019-11-20 | Stop reason: HOSPADM

## 2019-11-19 RX ORDER — DEXTROSE MONOHYDRATE 25 G/50ML
12.5 INJECTION, SOLUTION INTRAVENOUS PRN
Status: DISCONTINUED | OUTPATIENT
Start: 2019-11-19 | End: 2019-11-20 | Stop reason: HOSPADM

## 2019-11-19 RX ORDER — SODIUM CHLORIDE 9 MG/ML
25 INJECTION, SOLUTION INTRAVENOUS PRN
Status: DISCONTINUED | OUTPATIENT
Start: 2019-11-19 | End: 2019-11-20 | Stop reason: HOSPADM

## 2019-11-19 RX ORDER — BUPRENORPHINE HYDROCHLORIDE AND NALOXONE HYDROCHLORIDE DIHYDRATE 8; 2 MG/1; MG/1
1 TABLET SUBLINGUAL 2 TIMES DAILY
Status: DISCONTINUED | OUTPATIENT
Start: 2019-11-20 | End: 2019-11-20 | Stop reason: HOSPADM

## 2019-11-19 RX ORDER — METOPROLOL SUCCINATE 50 MG/1
50 TABLET, EXTENDED RELEASE ORAL DAILY
Status: DISCONTINUED | OUTPATIENT
Start: 2019-11-20 | End: 2019-11-20 | Stop reason: HOSPADM

## 2019-11-19 RX ORDER — QUETIAPINE FUMARATE 25 MG/1
25 TABLET, FILM COATED ORAL NIGHTLY
Status: DISCONTINUED | OUTPATIENT
Start: 2019-11-19 | End: 2019-11-20 | Stop reason: HOSPADM

## 2019-11-19 RX ORDER — LISINOPRIL 5 MG/1
5 TABLET ORAL DAILY
Status: DISCONTINUED | OUTPATIENT
Start: 2019-11-20 | End: 2019-11-20

## 2019-11-19 RX ORDER — HEPARIN SODIUM 10000 [USP'U]/ML
5000 INJECTION, SOLUTION INTRAVENOUS; SUBCUTANEOUS 3 TIMES DAILY
Status: DISCONTINUED | OUTPATIENT
Start: 2019-11-20 | End: 2019-11-20 | Stop reason: HOSPADM

## 2019-11-19 RX ORDER — SODIUM CHLORIDE 0.9 % (FLUSH) 0.9 %
10 SYRINGE (ML) INJECTION EVERY 12 HOURS SCHEDULED
Status: DISCONTINUED | OUTPATIENT
Start: 2019-11-19 | End: 2019-11-20 | Stop reason: HOSPADM

## 2019-11-19 RX ORDER — POTASSIUM CHLORIDE 7.45 MG/ML
10 INJECTION INTRAVENOUS PRN
Status: DISCONTINUED | OUTPATIENT
Start: 2019-11-19 | End: 2019-11-20 | Stop reason: HOSPADM

## 2019-11-19 RX ORDER — 0.9 % SODIUM CHLORIDE 0.9 %
1000 INTRAVENOUS SOLUTION INTRAVENOUS ONCE
Status: DISCONTINUED | OUTPATIENT
Start: 2019-11-19 | End: 2019-11-19

## 2019-11-19 RX ADMIN — SODIUM CHLORIDE 1000 ML: 9 INJECTION, SOLUTION INTRAVENOUS at 22:44

## 2019-11-19 ASSESSMENT — PAIN SCALES - GENERAL: PAINLEVEL_OUTOF10: 0

## 2019-11-20 VITALS
TEMPERATURE: 97.5 F | OXYGEN SATURATION: 100 % | BODY MASS INDEX: 17.86 KG/M2 | DIASTOLIC BLOOD PRESSURE: 87 MMHG | RESPIRATION RATE: 16 BRPM | HEART RATE: 87 BPM | HEIGHT: 63 IN | SYSTOLIC BLOOD PRESSURE: 117 MMHG | WEIGHT: 100.8 LBS

## 2019-11-20 LAB
ALBUMIN SERPL-MCNC: 3.5 G/DL (ref 3.5–5.2)
ALBUMIN SERPL-MCNC: 4.2 G/DL (ref 3.5–5.2)
ALP BLD-CCNC: 131 U/L (ref 40–129)
ALP BLD-CCNC: 152 U/L (ref 40–129)
ALT SERPL-CCNC: 10 U/L (ref 0–40)
ALT SERPL-CCNC: 9 U/L (ref 0–40)
ANION GAP SERPL CALCULATED.3IONS-SCNC: 14 MMOL/L (ref 7–16)
ANION GAP SERPL CALCULATED.3IONS-SCNC: 17 MMOL/L (ref 7–16)
AST SERPL-CCNC: 13 U/L (ref 0–39)
AST SERPL-CCNC: 13 U/L (ref 0–39)
BASOPHILS ABSOLUTE: 0.09 E9/L (ref 0–0.2)
BASOPHILS RELATIVE PERCENT: 0.7 % (ref 0–2)
BILIRUB SERPL-MCNC: 0.2 MG/DL (ref 0–1.2)
BILIRUB SERPL-MCNC: <0.2 MG/DL (ref 0–1.2)
BUN BLDV-MCNC: 17 MG/DL (ref 6–20)
BUN BLDV-MCNC: 20 MG/DL (ref 6–20)
CALCIUM SERPL-MCNC: 9 MG/DL (ref 8.6–10.2)
CALCIUM SERPL-MCNC: 9.6 MG/DL (ref 8.6–10.2)
CHLORIDE BLD-SCNC: 96 MMOL/L (ref 98–107)
CHLORIDE BLD-SCNC: 96 MMOL/L (ref 98–107)
CO2: 24 MMOL/L (ref 22–29)
CO2: 28 MMOL/L (ref 22–29)
CREAT SERPL-MCNC: 0.9 MG/DL (ref 0.7–1.2)
CREAT SERPL-MCNC: 1 MG/DL (ref 0.7–1.2)
EKG ATRIAL RATE: 102 BPM
EKG P AXIS: 58 DEGREES
EKG P-R INTERVAL: 142 MS
EKG Q-T INTERVAL: 348 MS
EKG QRS DURATION: 86 MS
EKG QTC CALCULATION (BAZETT): 453 MS
EKG R AXIS: 70 DEGREES
EKG T AXIS: 81 DEGREES
EKG VENTRICULAR RATE: 102 BPM
EOSINOPHILS ABSOLUTE: 0.28 E9/L (ref 0.05–0.5)
EOSINOPHILS RELATIVE PERCENT: 2.3 % (ref 0–6)
GFR AFRICAN AMERICAN: >60
GFR AFRICAN AMERICAN: >60
GFR NON-AFRICAN AMERICAN: >60 ML/MIN/1.73
GFR NON-AFRICAN AMERICAN: >60 ML/MIN/1.73
GLUCOSE BLD-MCNC: 137 MG/DL (ref 74–99)
GLUCOSE BLD-MCNC: 325 MG/DL (ref 74–99)
HBA1C MFR BLD: 11.1 % (ref 4–5.6)
HCT VFR BLD CALC: 36.1 % (ref 37–54)
HEMOGLOBIN: 12 G/DL (ref 12.5–16.5)
IMMATURE GRANULOCYTES #: 0.03 E9/L
IMMATURE GRANULOCYTES %: 0.2 % (ref 0–5)
LACTIC ACID: 1 MMOL/L (ref 0.5–2.2)
LYMPHOCYTES ABSOLUTE: 2.96 E9/L (ref 1.5–4)
LYMPHOCYTES RELATIVE PERCENT: 24.4 % (ref 20–42)
MAGNESIUM: 2.2 MG/DL (ref 1.6–2.6)
MCH RBC QN AUTO: 28.9 PG (ref 26–35)
MCHC RBC AUTO-ENTMCNC: 33.2 % (ref 32–34.5)
MCV RBC AUTO: 87 FL (ref 80–99.9)
METER GLUCOSE: 199 MG/DL (ref 74–99)
METER GLUCOSE: 314 MG/DL (ref 74–99)
MONOCYTES ABSOLUTE: 0.73 E9/L (ref 0.1–0.95)
MONOCYTES RELATIVE PERCENT: 6 % (ref 2–12)
NEUTROPHILS ABSOLUTE: 8.03 E9/L (ref 1.8–7.3)
NEUTROPHILS RELATIVE PERCENT: 66.4 % (ref 43–80)
PDW BLD-RTO: 12.2 FL (ref 11.5–15)
PLATELET # BLD: 300 E9/L (ref 130–450)
PMV BLD AUTO: 10.6 FL (ref 7–12)
POTASSIUM SERPL-SCNC: 3.9 MMOL/L (ref 3.5–5)
POTASSIUM SERPL-SCNC: 3.9 MMOL/L (ref 3.5–5)
RBC # BLD: 4.15 E12/L (ref 3.8–5.8)
SODIUM BLD-SCNC: 137 MMOL/L (ref 132–146)
SODIUM BLD-SCNC: 138 MMOL/L (ref 132–146)
TOTAL PROTEIN: 6.5 G/DL (ref 6.4–8.3)
TOTAL PROTEIN: 7.6 G/DL (ref 6.4–8.3)
TROPONIN: 0.03 NG/ML (ref 0–0.03)
TROPONIN: 0.04 NG/ML (ref 0–0.03)
WBC # BLD: 12.1 E9/L (ref 4.5–11.5)

## 2019-11-20 PROCEDURE — 2580000003 HC RX 258: Performed by: INTERNAL MEDICINE

## 2019-11-20 PROCEDURE — 6370000000 HC RX 637 (ALT 250 FOR IP): Performed by: INTERNAL MEDICINE

## 2019-11-20 PROCEDURE — 83605 ASSAY OF LACTIC ACID: CPT

## 2019-11-20 PROCEDURE — 82962 GLUCOSE BLOOD TEST: CPT

## 2019-11-20 PROCEDURE — 80053 COMPREHEN METABOLIC PANEL: CPT

## 2019-11-20 PROCEDURE — 84484 ASSAY OF TROPONIN QUANT: CPT

## 2019-11-20 PROCEDURE — G0378 HOSPITAL OBSERVATION PER HR: HCPCS

## 2019-11-20 PROCEDURE — 6370000000 HC RX 637 (ALT 250 FOR IP): Performed by: FAMILY MEDICINE

## 2019-11-20 PROCEDURE — 83735 ASSAY OF MAGNESIUM: CPT

## 2019-11-20 PROCEDURE — 85025 COMPLETE CBC W/AUTO DIFF WBC: CPT

## 2019-11-20 PROCEDURE — 93010 ELECTROCARDIOGRAM REPORT: CPT | Performed by: INTERNAL MEDICINE

## 2019-11-20 PROCEDURE — 36415 COLL VENOUS BLD VENIPUNCTURE: CPT

## 2019-11-20 RX ORDER — LISINOPRIL 20 MG/1
20 TABLET ORAL DAILY
Status: DISCONTINUED | OUTPATIENT
Start: 2019-11-21 | End: 2019-11-20 | Stop reason: HOSPADM

## 2019-11-20 RX ADMIN — INSULIN LISPRO 1 UNITS: 100 INJECTION, SOLUTION INTRAVENOUS; SUBCUTANEOUS at 01:23

## 2019-11-20 RX ADMIN — DEXTROSE AND SODIUM CHLORIDE: 5; 450 INJECTION, SOLUTION INTRAVENOUS at 00:04

## 2019-11-20 RX ADMIN — INSULIN HUMAN 15 UNITS: 100 INJECTION, SUSPENSION SUBCUTANEOUS at 06:59

## 2019-11-20 RX ADMIN — SODIUM CHLORIDE, PRESERVATIVE FREE 10 ML: 5 INJECTION INTRAVENOUS at 00:16

## 2019-11-20 RX ADMIN — LIDOCAINE HYDROCHLORIDE: 20 SOLUTION ORAL; TOPICAL at 00:18

## 2019-11-20 RX ADMIN — PANTOPRAZOLE SODIUM 40 MG: 40 TABLET, DELAYED RELEASE ORAL at 06:13

## 2019-11-20 ASSESSMENT — PAIN SCALES - GENERAL: PAINLEVEL_OUTOF10: 0

## 2019-11-25 PROBLEM — E10.3513 PROLIFERATIVE DIABETIC RETINOPATHY OF BOTH EYES WITH MACULAR EDEMA ASSOCIATED WITH TYPE 1 DIABETES MELLITUS (HCC): Status: ACTIVE | Noted: 2019-11-25

## 2019-11-25 RX ORDER — AMLODIPINE BESYLATE 10 MG/1
TABLET ORAL
Refills: 0 | COMMUNITY
Start: 2019-11-06 | End: 2020-03-17

## 2019-11-25 RX ORDER — CHLORTHALIDONE 25 MG/1
TABLET ORAL
Refills: 0 | COMMUNITY
Start: 2019-11-06 | End: 2019-11-26

## 2019-11-26 ENCOUNTER — OFFICE VISIT (OUTPATIENT)
Dept: FAMILY MEDICINE CLINIC | Age: 29
End: 2019-11-26
Payer: COMMERCIAL

## 2019-11-26 VITALS
HEIGHT: 63 IN | BODY MASS INDEX: 18.75 KG/M2 | WEIGHT: 105.8 LBS | HEART RATE: 107 BPM | SYSTOLIC BLOOD PRESSURE: 135 MMHG | DIASTOLIC BLOOD PRESSURE: 84 MMHG

## 2019-11-26 DIAGNOSIS — E10.3513 PROLIFERATIVE DIABETIC RETINOPATHY OF BOTH EYES WITH MACULAR EDEMA ASSOCIATED WITH TYPE 1 DIABETES MELLITUS (HCC): ICD-10-CM

## 2019-11-26 DIAGNOSIS — I70.1 LEFT RENAL ARTERY STENOSIS (HCC): ICD-10-CM

## 2019-11-26 DIAGNOSIS — E10.65 TYPE 1 DIABETES MELLITUS WITH HYPERGLYCEMIA (HCC): Primary | ICD-10-CM

## 2019-11-26 DIAGNOSIS — F31.70 BIPOLAR AFFECTIVE DISORDER IN REMISSION (HCC): ICD-10-CM

## 2019-11-26 DIAGNOSIS — F11.20 OPIOID DEPENDENCE ON MAINTENANCE AGONIST THERAPY, NO SYMPTOMS (HCC): ICD-10-CM

## 2019-11-26 PROCEDURE — 99213 OFFICE O/P EST LOW 20 MIN: CPT | Performed by: FAMILY MEDICINE

## 2019-11-26 PROCEDURE — G8427 DOCREV CUR MEDS BY ELIG CLIN: HCPCS | Performed by: FAMILY MEDICINE

## 2019-11-26 PROCEDURE — 2022F DILAT RTA XM EVC RTNOPTHY: CPT | Performed by: FAMILY MEDICINE

## 2019-11-26 PROCEDURE — G8420 CALC BMI NORM PARAMETERS: HCPCS | Performed by: FAMILY MEDICINE

## 2019-11-26 PROCEDURE — 3046F HEMOGLOBIN A1C LEVEL >9.0%: CPT | Performed by: FAMILY MEDICINE

## 2019-11-26 PROCEDURE — G8484 FLU IMMUNIZE NO ADMIN: HCPCS | Performed by: FAMILY MEDICINE

## 2019-11-26 PROCEDURE — 4004F PT TOBACCO SCREEN RCVD TLK: CPT | Performed by: FAMILY MEDICINE

## 2019-11-26 PROCEDURE — G8598 ASA/ANTIPLAT THER USED: HCPCS | Performed by: FAMILY MEDICINE

## 2019-11-26 ASSESSMENT — ENCOUNTER SYMPTOMS
BLOOD IN STOOL: 0
VOMITING: 0
ABDOMINAL PAIN: 0
COUGH: 0
SHORTNESS OF BREATH: 0
CONSTIPATION: 0
SINUS PAIN: 0
DIARRHEA: 0
NAUSEA: 0
SINUS PRESSURE: 0
SORE THROAT: 0
WHEEZING: 0
COLOR CHANGE: 0

## 2019-12-30 DIAGNOSIS — F33.42 RECURRENT MAJOR DEPRESSIVE DISORDER, IN FULL REMISSION (HCC): ICD-10-CM

## 2019-12-30 DIAGNOSIS — E10.65 TYPE 1 DIABETES MELLITUS WITH HYPERGLYCEMIA (HCC): ICD-10-CM

## 2019-12-30 DIAGNOSIS — Z76.0 MEDICATION REFILL: ICD-10-CM

## 2019-12-30 RX ORDER — ASPIRIN 81 MG/1
81 TABLET, CHEWABLE ORAL DAILY
Qty: 30 TABLET | Refills: 3 | Status: SHIPPED
Start: 2019-12-30 | End: 2020-05-19 | Stop reason: SDUPTHER

## 2019-12-30 RX ORDER — ATORVASTATIN CALCIUM 40 MG/1
40 TABLET, FILM COATED ORAL DAILY
Qty: 30 TABLET | Refills: 3 | Status: SHIPPED
Start: 2019-12-30 | End: 2020-05-18 | Stop reason: SDUPTHER

## 2019-12-30 RX ORDER — FLUTICASONE PROPIONATE 220 UG/1
1 AEROSOL, METERED RESPIRATORY (INHALATION) 2 TIMES DAILY
Qty: 1 INHALER | Refills: 3 | Status: SHIPPED
Start: 2019-12-30 | End: 2020-07-02 | Stop reason: SDUPTHER

## 2019-12-30 RX ORDER — QUETIAPINE FUMARATE 25 MG/1
25 TABLET, FILM COATED ORAL NIGHTLY
Qty: 30 TABLET | Refills: 2 | OUTPATIENT
Start: 2019-12-30

## 2020-02-18 RX ORDER — GABAPENTIN 300 MG/1
300 CAPSULE ORAL 2 TIMES DAILY
Qty: 60 CAPSULE | Refills: 3 | Status: SHIPPED
Start: 2020-02-18 | End: 2020-06-16 | Stop reason: SDUPTHER

## 2020-03-17 ENCOUNTER — OFFICE VISIT (OUTPATIENT)
Dept: FAMILY MEDICINE CLINIC | Age: 30
End: 2020-03-17
Payer: COMMERCIAL

## 2020-03-17 ENCOUNTER — HOSPITAL ENCOUNTER (OUTPATIENT)
Dept: ULTRASOUND IMAGING | Age: 30
Discharge: HOME | End: 2020-03-19
Payer: COMMERCIAL

## 2020-03-17 VITALS
WEIGHT: 109 LBS | DIASTOLIC BLOOD PRESSURE: 87 MMHG | RESPIRATION RATE: 16 BRPM | SYSTOLIC BLOOD PRESSURE: 129 MMHG | HEART RATE: 108 BPM | OXYGEN SATURATION: 98 % | HEIGHT: 63 IN | BODY MASS INDEX: 19.31 KG/M2 | TEMPERATURE: 98.4 F

## 2020-03-17 PROBLEM — J45.40 MODERATE PERSISTENT ASTHMA WITHOUT COMPLICATION: Status: ACTIVE | Noted: 2020-03-17

## 2020-03-17 PROBLEM — I70.1 LEFT RENAL ARTERY STENOSIS (HCC): Status: ACTIVE | Noted: 2020-03-17

## 2020-03-17 LAB — HBA1C MFR BLD: 11.5 %

## 2020-03-17 PROCEDURE — G8427 DOCREV CUR MEDS BY ELIG CLIN: HCPCS | Performed by: FAMILY MEDICINE

## 2020-03-17 PROCEDURE — 99213 OFFICE O/P EST LOW 20 MIN: CPT | Performed by: FAMILY MEDICINE

## 2020-03-17 PROCEDURE — 4004F PT TOBACCO SCREEN RCVD TLK: CPT | Performed by: FAMILY MEDICINE

## 2020-03-17 PROCEDURE — G8484 FLU IMMUNIZE NO ADMIN: HCPCS | Performed by: FAMILY MEDICINE

## 2020-03-17 PROCEDURE — G8420 CALC BMI NORM PARAMETERS: HCPCS | Performed by: FAMILY MEDICINE

## 2020-03-17 PROCEDURE — 76770 US EXAM ABDO BACK WALL COMP: CPT

## 2020-03-17 PROCEDURE — 83036 HEMOGLOBIN GLYCOSYLATED A1C: CPT | Performed by: FAMILY MEDICINE

## 2020-03-17 PROCEDURE — 2022F DILAT RTA XM EVC RTNOPTHY: CPT | Performed by: FAMILY MEDICINE

## 2020-03-17 PROCEDURE — 3046F HEMOGLOBIN A1C LEVEL >9.0%: CPT | Performed by: FAMILY MEDICINE

## 2020-03-17 RX ORDER — AMLODIPINE BESYLATE 10 MG/1
10 TABLET ORAL DAILY
Qty: 30 TABLET | Refills: 2 | Status: SHIPPED
Start: 2020-03-17 | End: 2020-07-02 | Stop reason: SDUPTHER

## 2020-03-17 ASSESSMENT — ENCOUNTER SYMPTOMS
COLOR CHANGE: 0
VOMITING: 0
WHEEZING: 0
DIARRHEA: 0
NAUSEA: 0
ABDOMINAL PAIN: 0
EYE PAIN: 0
CONSTIPATION: 0
SHORTNESS OF BREATH: 0
COUGH: 0

## 2020-03-17 NOTE — PROGRESS NOTES
3/17/2020    Carley Mayorga is a 34 y.o. male here for the following:    Chief Complaint   Patient presents with    Diabetes    Hypertension        HPI:  1. T1DM  - Checks BG daily. Fasting BG readings have been running 200-400, averaging 260. Pre-prandial BG readings are 180.   - Hasn't had Humulin in 3 weeks because of prior authorization problems. When he takes Humulin, he takes 15 U in the AM and 20 U at PM. Using Novolog at least 6 U with the addition of 1 U per 12 g carbs with any snack or meal.  - No hypoglycemic episodes   - Has DM retinopathy. Sees Dr. Zo Luis. Is receiving Avastin injections in both eyes. Patient reports minimal improvement with injections. - Has DM nephropathy and left renal artery stenosis. Sees Dr. Jose Aguilera. Patient to get renal US today after his appointment with me. Per Nephrologist, may need stenting of left renal artery stenosis. May also need biopsy. Sees Nephrology notes in chart.      - Has DM neuropathy. On gabapentin 300 mg BID. Patient reports that this does help with his neuropathy.   - Does not see a Podiatrist currently. Did see Dr. Darian Powell in the past. \"I can't remember the last time I've seen him. \"  - Denies chest pain, palpitations, syncope, lightheadedness, dizziness, N/V/D/C, and abdominal pain. - Admits to double vision, blurry vision, numbness and tingling in feet. - Has not followed-up with Endocrinology as discussed at last appointment with me. Unable to drive because of vision. 2. HTN   - BP at home has been running 160/100 mmHg. Checks BP every day in the morning.   - Reports morning headaches due to elevated BP.   - Only Toprol 50 mg QD and lisinopril 15 mg QD. Not taking Norvasc 10 mg QD. Could not tolerate chlorthalidone in the past.   - Denies chest pain, palpitations, syncope, lightheadedness, dizziness, N/V/D/C, and abdominal pain. 3. HLD  - On atorvastatin 40 mg QD.  No side effects.   - Denies chest pain, palpitations, syncope, diarrhea, nausea and vomiting. Skin: Negative for color change and rash. Neurological: Positive for numbness (bilateral feet) and headaches. Negative for dizziness, syncope and light-headedness. Psychiatric/Behavioral: Positive for dysphoric mood. Negative for self-injury and suicidal ideas. The patient is nervous/anxious. BP Readings from Last 3 Encounters:   03/17/20 129/87   11/26/19 135/84   11/20/19 117/87       VS:  /87   Pulse 108   Temp 98.4 °F (36.9 °C) (Oral)   Resp 16   Ht 5' 3\" (1.6 m)   Wt 109 lb (49.4 kg)   SpO2 98%   BMI 19.31 kg/m²     Physical Exam  Constitutional:       General: He is awake. He is not in acute distress. Cardiovascular:      Rate and Rhythm: Normal rate and regular rhythm. Heart sounds: S1 normal and S2 normal. No murmur. Pulmonary:      Breath sounds: No decreased breath sounds, wheezing, rhonchi or rales. Abdominal:      General: Bowel sounds are normal.      Palpations: Abdomen is soft. Tenderness: There is no abdominal tenderness. There is no guarding or rebound. Musculoskeletal:      Right lower leg: Edema (trace) present. Left lower leg: Edema (trace) present. Skin:     General: Skin is warm and dry. Findings: No rash. Neurological:      Mental Status: He is alert. Psychiatric:         Mood and Affect: Mood normal.         Speech: Speech normal.         Behavior: Behavior is cooperative. Thought Content: Thought content normal.         Assessment/Plan:  1. Type 1 diabetes mellitus with hyperglycemia (HCC)  HgbA1c in office today was 11.5%. Will change Humulin 15 U in the AM and 20 U in the PM to insulin degludec 30 units nightly. Continue Novolog sliding scale with snacks and meals. Referral to Podiatry (Dr. Ella Mcfarlane) made. Referral made to Endocrinology (Dr. Julius Vargas) again.  Stressed the importance of following up with Endocrinology for better glucose control.    - POCT glycosylated hemoglobin (Hb A1C)  - Insulin Degludec 100 UNIT/ML SOPN; Inject 30 Units into the skin nightly  Dispense: 3 pen; Refill: 2  - External Referral To Derek Dillard MD, Endocrinology, [place]    2. Proliferative diabetic retinopathy of both eyes with macular edema associated with type 1 diabetes mellitus ([facility]  Uncontrolled. Continue to follow-up with Dr. Adan Medina.     3. Left renal artery stenosis ([facility]  Uncontrolled. Continue to follow-up with Dr. Bee Gonzales. 4. Essential hypertension  Uncontrolled. Continue Toprol 50 mg QD and lisinopril 15 mg QD. Add Norvasc 10 mg QD, as patient is symptomatic from his HTN and reports elevated BP readings at home. Labs ordered. - amLODIPine (NORVASC) 10 MG tablet; Take 1 tablet by mouth daily  Dispense: 30 tablet; Refill: 2  - CBC WITH AUTO DIFFERENTIAL; Future  - COMPREHENSIVE METABOLIC PANEL; Future    5. Mixed hyperlipidemia  Continue atorvastatin 40 mg QD. Recheck lipids.   - LIPID PANEL; Future    6. Moderate persistent asthma without complication  Stable. Continue Flovent 1 puff BID.     7. Opioid dependence on maintenance agonist therapy, no symptoms (HCC)  Stable. Follow-up with Dr. Yumiko Snowden. 8. Bipolar affective disorder in remission ([facility]  Stable. Follow-up with Dr. Yumiko Snowden. Return in about 6 weeks (around 4/28/2020) for DM, HTN.     Chidi Reynaga DO, PGY-2

## 2020-03-31 ENCOUNTER — TELEPHONE (OUTPATIENT)
Dept: FAMILY MEDICINE CLINIC | Age: 30
End: 2020-03-31

## 2020-04-02 NOTE — TELEPHONE ENCOUNTER
Dayan Garcia approved through 4/2/21. Contacted pharmacy & verified prescription was able to be filled.

## 2020-04-27 RX ORDER — LISINOPRIL 5 MG/1
TABLET ORAL
Qty: 180 TABLET | Refills: 2 | Status: SHIPPED
Start: 2020-04-27 | End: 2020-08-27

## 2020-04-27 NOTE — TELEPHONE ENCOUNTER
Medication approved and sent to pharmacy. Patient needs an in-office appointment for T1DM and HTN follow-up. Thanks!

## 2020-05-19 RX ORDER — METOPROLOL SUCCINATE 50 MG/1
50 TABLET, EXTENDED RELEASE ORAL DAILY
Qty: 30 TABLET | Refills: 5 | Status: SHIPPED
Start: 2020-05-19 | End: 2020-11-10 | Stop reason: SDUPTHER

## 2020-05-19 RX ORDER — ATORVASTATIN CALCIUM 40 MG/1
40 TABLET, FILM COATED ORAL DAILY
Qty: 30 TABLET | Refills: 3 | Status: SHIPPED
Start: 2020-05-19 | End: 2020-09-08 | Stop reason: SDUPTHER

## 2020-05-19 RX ORDER — ASPIRIN 81 MG/1
81 TABLET, CHEWABLE ORAL DAILY
Qty: 30 TABLET | Refills: 3 | Status: SHIPPED
Start: 2020-05-19 | End: 2020-10-05 | Stop reason: SDUPTHER

## 2020-06-16 RX ORDER — CALCIUM CITRATE/VITAMIN D3 200MG-6.25
1 TABLET ORAL
Qty: 150 EACH | Refills: 11 | Status: SHIPPED
Start: 2020-06-16 | End: 2020-11-02 | Stop reason: SDUPTHER

## 2020-06-16 RX ORDER — GABAPENTIN 300 MG/1
300 CAPSULE ORAL 2 TIMES DAILY
Qty: 60 CAPSULE | Refills: 3 | Status: SHIPPED
Start: 2020-06-16 | End: 2020-10-05 | Stop reason: SDUPTHER

## 2020-07-02 ENCOUNTER — HOSPITAL ENCOUNTER (OUTPATIENT)
Age: 30
Discharge: HOME | End: 2020-07-04
Payer: COMMERCIAL

## 2020-07-02 ENCOUNTER — OFFICE VISIT (OUTPATIENT)
Dept: FAMILY MEDICINE CLINIC | Age: 30
End: 2020-07-02
Payer: COMMERCIAL

## 2020-07-02 VITALS
SYSTOLIC BLOOD PRESSURE: 172 MMHG | OXYGEN SATURATION: 98 % | WEIGHT: 105 LBS | TEMPERATURE: 97.2 F | HEART RATE: 103 BPM | RESPIRATION RATE: 16 BRPM | BODY MASS INDEX: 18.61 KG/M2 | DIASTOLIC BLOOD PRESSURE: 116 MMHG | HEIGHT: 63 IN

## 2020-07-02 LAB
ALBUMIN SERPL-MCNC: 4.5 G/DL (ref 3.5–5.2)
ALP BLD-CCNC: 123 U/L (ref 40–129)
ALT SERPL-CCNC: 7 U/L (ref 0–40)
ANION GAP SERPL CALCULATED.3IONS-SCNC: 15 MMOL/L (ref 7–16)
AST SERPL-CCNC: 13 U/L (ref 0–39)
BASOPHILS ABSOLUTE: 0.09 E9/L (ref 0–0.2)
BASOPHILS RELATIVE PERCENT: 0.8 % (ref 0–2)
BILIRUB SERPL-MCNC: 0.2 MG/DL (ref 0–1.2)
BUN BLDV-MCNC: 23 MG/DL (ref 6–20)
CALCIUM SERPL-MCNC: 9.8 MG/DL (ref 8.6–10.2)
CHLORIDE BLD-SCNC: 95 MMOL/L (ref 98–107)
CHOLESTEROL, TOTAL: 246 MG/DL (ref 0–199)
CO2: 25 MMOL/L (ref 22–29)
CREAT SERPL-MCNC: 1.2 MG/DL (ref 0.7–1.2)
CREATININE URINE: 70 MG/DL (ref 40–278)
EOSINOPHILS ABSOLUTE: 0.29 E9/L (ref 0.05–0.5)
EOSINOPHILS RELATIVE PERCENT: 2.6 % (ref 0–6)
GFR AFRICAN AMERICAN: >60
GFR NON-AFRICAN AMERICAN: >60 ML/MIN/1.73
GLUCOSE BLD-MCNC: 348 MG/DL (ref 74–99)
HBA1C MFR BLD: 10.7 %
HCT VFR BLD CALC: 38.5 % (ref 37–54)
HDLC SERPL-MCNC: 44 MG/DL
HEMOGLOBIN: 12.3 G/DL (ref 12.5–16.5)
IMMATURE GRANULOCYTES #: 0.03 E9/L
IMMATURE GRANULOCYTES %: 0.3 % (ref 0–5)
LDL CHOLESTEROL CALCULATED: 161 MG/DL (ref 0–99)
LYMPHOCYTES ABSOLUTE: 2.7 E9/L (ref 1.5–4)
LYMPHOCYTES RELATIVE PERCENT: 23.7 % (ref 20–42)
MCH RBC QN AUTO: 29.1 PG (ref 26–35)
MCHC RBC AUTO-ENTMCNC: 31.9 % (ref 32–34.5)
MCV RBC AUTO: 91 FL (ref 80–99.9)
MICROALBUMIN UR-MCNC: 725.5 MG/L
MICROALBUMIN/CREAT UR-RTO: 1036.4 (ref 0–30)
MONOCYTES ABSOLUTE: 0.48 E9/L (ref 0.1–0.95)
MONOCYTES RELATIVE PERCENT: 4.2 % (ref 2–12)
NEUTROPHILS ABSOLUTE: 7.78 E9/L (ref 1.8–7.3)
NEUTROPHILS RELATIVE PERCENT: 68.4 % (ref 43–80)
PDW BLD-RTO: 12.9 FL (ref 11.5–15)
PLATELET # BLD: 405 E9/L (ref 130–450)
PMV BLD AUTO: 10.1 FL (ref 7–12)
POTASSIUM SERPL-SCNC: 5.2 MMOL/L (ref 3.5–5)
RBC # BLD: 4.23 E12/L (ref 3.8–5.8)
SODIUM BLD-SCNC: 135 MMOL/L (ref 132–146)
TOTAL PROTEIN: 7.6 G/DL (ref 6.4–8.3)
TRIGL SERPL-MCNC: 206 MG/DL (ref 0–149)
VLDLC SERPL CALC-MCNC: 41 MG/DL
WBC # BLD: 11.4 E9/L (ref 4.5–11.5)

## 2020-07-02 PROCEDURE — 4004F PT TOBACCO SCREEN RCVD TLK: CPT | Performed by: FAMILY MEDICINE

## 2020-07-02 PROCEDURE — 82044 UR ALBUMIN SEMIQUANTITATIVE: CPT

## 2020-07-02 PROCEDURE — G8427 DOCREV CUR MEDS BY ELIG CLIN: HCPCS | Performed by: FAMILY MEDICINE

## 2020-07-02 PROCEDURE — 82570 ASSAY OF URINE CREATININE: CPT

## 2020-07-02 PROCEDURE — 80053 COMPREHEN METABOLIC PANEL: CPT

## 2020-07-02 PROCEDURE — 99213 OFFICE O/P EST LOW 20 MIN: CPT | Performed by: FAMILY MEDICINE

## 2020-07-02 PROCEDURE — 80061 LIPID PANEL: CPT

## 2020-07-02 PROCEDURE — 2022F DILAT RTA XM EVC RTNOPTHY: CPT | Performed by: FAMILY MEDICINE

## 2020-07-02 PROCEDURE — 85025 COMPLETE CBC W/AUTO DIFF WBC: CPT

## 2020-07-02 PROCEDURE — G8420 CALC BMI NORM PARAMETERS: HCPCS | Performed by: FAMILY MEDICINE

## 2020-07-02 PROCEDURE — 83036 HEMOGLOBIN GLYCOSYLATED A1C: CPT | Performed by: FAMILY MEDICINE

## 2020-07-02 PROCEDURE — 3046F HEMOGLOBIN A1C LEVEL >9.0%: CPT | Performed by: FAMILY MEDICINE

## 2020-07-02 RX ORDER — AMLODIPINE BESYLATE 10 MG/1
10 TABLET ORAL DAILY
Qty: 30 TABLET | Refills: 2 | Status: SHIPPED
Start: 2020-07-02 | End: 2020-10-05 | Stop reason: SDUPTHER

## 2020-07-02 RX ORDER — BLOOD SUGAR DIAGNOSTIC
STRIP MISCELLANEOUS
Qty: 200 EACH | Refills: 2 | Status: SHIPPED
Start: 2020-07-02 | End: 2021-01-04 | Stop reason: SDUPTHER

## 2020-07-02 RX ORDER — SPIRONOLACTONE 25 MG/1
12.5 TABLET ORAL DAILY
Qty: 15 TABLET | Refills: 2 | Status: SHIPPED
Start: 2020-07-02 | End: 2020-07-03

## 2020-07-02 RX ORDER — FLUTICASONE PROPIONATE 220 UG/1
1 AEROSOL, METERED RESPIRATORY (INHALATION) 2 TIMES DAILY
Qty: 1 INHALER | Refills: 3 | Status: SHIPPED
Start: 2020-07-02 | End: 2020-11-02 | Stop reason: SDUPTHER

## 2020-07-02 ASSESSMENT — ENCOUNTER SYMPTOMS
WHEEZING: 0
DIARRHEA: 0
COUGH: 0
CONSTIPATION: 0
VOMITING: 0
BLOOD IN STOOL: 0
EYE PAIN: 0
NAUSEA: 0
COLOR CHANGE: 0
ABDOMINAL PAIN: 0

## 2020-07-02 NOTE — PROGRESS NOTES
Never Used   Substance Use Topics    Alcohol use: No     Comment: denies caffeine       Immunization History   Administered Date(s) Administered    Hepatitis B Ped/Adol (Engerix-B, Recombivax HB) 01/08/2002, 02/12/2002, 08/19/2002    Influenza Vaccine, unspecified formulation 10/09/2002    MMR 08/19/2002    Tdap (Boostrix, Adacel) 10/07/2019       Review of Systems   Constitutional: Negative for chills and fever. Eyes: Positive for visual disturbance. Negative for pain. Respiratory: Negative for cough, shortness of breath and wheezing. Cardiovascular: Positive for leg swelling. Negative for chest pain and palpitations. Gastrointestinal: Negative for abdominal pain, blood in stool, constipation, diarrhea, nausea and vomiting. Genitourinary: Negative for dysuria and hematuria. Skin: Negative for color change and rash. Neurological: Negative for dizziness, syncope and light-headedness. BP Readings from Last 3 Encounters:   07/02/20 (!) 172/116   03/17/20 129/87   11/26/19 135/84       VS:  BP (!) 172/116   Pulse 103   Temp 97.2 °F (36.2 °C) (Temporal)   Resp 16   Ht 5' 3\" (1.6 m)   Wt 105 lb (47.6 kg)   SpO2 98%   BMI 18.60 kg/m²     Physical Exam  Vitals signs reviewed. Constitutional:       General: He is awake. He is not in acute distress. Appearance: He is normal weight. He is not ill-appearing, toxic-appearing or diaphoretic. Cardiovascular:      Rate and Rhythm: Tachycardia present. Heart sounds: S1 normal and S2 normal. Gallop present. S3 sounds present. Pulmonary:      Effort: No respiratory distress. Breath sounds: No decreased breath sounds, wheezing, rhonchi or rales. Abdominal:      General: Abdomen is flat. Bowel sounds are normal.      Palpations: Abdomen is soft. Tenderness: There is no abdominal tenderness. There is no guarding or rebound. Musculoskeletal:      Right lower leg: He exhibits no tenderness. No edema.       Left lower leg: He exhibits no tenderness. No edema. Skin:     General: Skin is warm and dry. Findings: No rash. Neurological:      Mental Status: He is alert. Psychiatric:         Attention and Perception: Attention normal.         Mood and Affect: Mood normal.         Speech: Speech normal.         Behavior: Behavior is cooperative. Thought Content: Thought content normal.         Cognition and Memory: Cognition normal.         Judgment: Judgment normal.       Assessment/Plan:  1. Uncontrolled type 1 diabetes mellitus with complication, with long-term current use of insulin (HCC)  Uncontrolled. HgbA1c in office today is 10.7%. Has an appointment with Endocrinology, Dr. Jennifer Caceres, in November. Will increase Tresiba to 35 units nightly. Will continue Novolog sliding scale with meals. Encouraged follow-up with Podiatry and Retinal Specialist for his neuropathy and DM retinopathy. Check microalbumin/Cr ratio today. - Insulin Syringe-Needle U-100 (B-D INS SYRINGE 0.5CC/31GX5/16) 31G X 5/16\" 0.5 ML MISC; use four times a day  Dispense: 200 each; Refill: 2  - POCT glycosylated hemoglobin (Hb A1C)  - Microalbumin / Creatinine Urine Ratio; Future  - Insulin Degludec 100 UNIT/ML SOPN; Inject 35 Units into the skin nightly  Dispense: 3 pen; Refill: 2    2. Essential hypertension  Uncontrolled in context of left renal artery stenosis. Advised follow-up with Nephrology. Will continue Toprol 50 mg QD, lisinopril 15 mg QD, and Norvasc 10 mg QD. Norvasc refilled today. Will also start spironolactone 12.5 mg QD for better BP control. Will check labs. - amLODIPine (NORVASC) 10 MG tablet; Take 1 tablet by mouth daily  Dispense: 30 tablet; Refill: 2  - COMPREHENSIVE METABOLIC PANEL; Future  - CBC WITH AUTO DIFFERENTIAL; Future  - LIPID PANEL; Future  - spironolactone (ALDACTONE) 25 MG tablet; Take 0.5 tablets by mouth daily  Dispense: 15 tablet; Refill: 2    3.  Medication refill  - fluticasone (FLOVENT HFA) 220 MCG/ACT inhaler; Inhale 1 puff into the lungs 2 times daily  Dispense: 1 Inhaler; Refill: 3      Return in about 1 month (around 8/2/2020) for HTN, DM.     Corby Hauser DO, PGY-2

## 2020-07-02 NOTE — PROGRESS NOTES
S: 34 y.o. male with   Chief Complaint   Patient presents with    Hypertension    Diabetes       Poorly controlled DM - unable to see endo until Nov.  Has been out amlodipine but has had the other htn meds. He has known L renal art stenosis. BP Readings from Last 3 Encounters:   07/02/20 (!) 172/116   03/17/20 129/87   11/26/19 135/84       O: VS:  height is 5' 3\" (1.6 m) and weight is 105 lb (47.6 kg). His temporal temperature is 97.2 °F (36.2 °C). His blood pressure is 172/116 (abnormal) and his pulse is 103. His respiration is 16 and oxygen saturation is 98%. AAO/NAD, appropriate affect for mood  CV:  RRR, no murmur  Resp: CTAB      Impression/Plan:   1) HTN - uncontrolled - add meds - follow labs  2) DM - uncontrolled increase meds slightly. Health Maintenance Due   Topic Date Due    Varicella vaccine (1 of 2 - 2-dose childhood series) 07/21/1991    Pneumococcal 0-64 years Vaccine (1 of 1 - PPSV23) 07/21/1996    Diabetic foot exam  07/30/2019    A1C test (Diabetic or Prediabetic)  06/17/2020         Attending Physician Statement  I have discussed the case, including pertinent history and exam findings with the resident. I agree with the documented assessment and plan.       Laureen Lozano MD

## 2020-07-03 RX ORDER — HYDROCHLOROTHIAZIDE 12.5 MG/1
12.5 CAPSULE, GELATIN COATED ORAL EVERY MORNING
Qty: 30 CAPSULE | Refills: 1 | Status: SHIPPED
Start: 2020-07-03 | End: 2020-08-27 | Stop reason: ALTCHOICE

## 2020-07-03 ASSESSMENT — ENCOUNTER SYMPTOMS: SHORTNESS OF BREATH: 0

## 2020-07-07 ENCOUNTER — TELEPHONE (OUTPATIENT)
Dept: FAMILY MEDICINE CLINIC | Age: 30
End: 2020-07-07

## 2020-07-07 NOTE — TELEPHONE ENCOUNTER
Patient called and states that he has a rash and since starting the HCTZ on July 2nd.  Left message for patient to call back for more details

## 2020-07-07 NOTE — TELEPHONE ENCOUNTER
Patient needs in-office appointment to evaluate rash, as this may or may not be medication related. Thanks!

## 2020-07-10 NOTE — TELEPHONE ENCOUNTER
Please see other encounter. Patient is going to call us back as soon as he is given time from mom as to when he can get a ride.

## 2020-08-27 ENCOUNTER — OFFICE VISIT (OUTPATIENT)
Dept: FAMILY MEDICINE CLINIC | Age: 30
End: 2020-08-27
Payer: COMMERCIAL

## 2020-08-27 VITALS
RESPIRATION RATE: 16 BRPM | SYSTOLIC BLOOD PRESSURE: 153 MMHG | DIASTOLIC BLOOD PRESSURE: 103 MMHG | BODY MASS INDEX: 17.54 KG/M2 | TEMPERATURE: 97 F | OXYGEN SATURATION: 97 % | WEIGHT: 99 LBS | HEART RATE: 101 BPM | HEIGHT: 63 IN

## 2020-08-27 PROCEDURE — 4004F PT TOBACCO SCREEN RCVD TLK: CPT | Performed by: FAMILY MEDICINE

## 2020-08-27 PROCEDURE — 3046F HEMOGLOBIN A1C LEVEL >9.0%: CPT | Performed by: FAMILY MEDICINE

## 2020-08-27 PROCEDURE — G8419 CALC BMI OUT NRM PARAM NOF/U: HCPCS | Performed by: FAMILY MEDICINE

## 2020-08-27 PROCEDURE — 2022F DILAT RTA XM EVC RTNOPTHY: CPT | Performed by: FAMILY MEDICINE

## 2020-08-27 PROCEDURE — G8427 DOCREV CUR MEDS BY ELIG CLIN: HCPCS | Performed by: FAMILY MEDICINE

## 2020-08-27 PROCEDURE — 99213 OFFICE O/P EST LOW 20 MIN: CPT | Performed by: FAMILY MEDICINE

## 2020-08-27 RX ORDER — LISINOPRIL 40 MG/1
40 TABLET ORAL DAILY
Qty: 90 TABLET | Refills: 1 | Status: SHIPPED
Start: 2020-08-27 | End: 2020-12-15 | Stop reason: SDUPTHER

## 2020-08-27 RX ORDER — INSULIN DEGLUDEC INJECTION 100 U/ML
40 INJECTION, SOLUTION SUBCUTANEOUS NIGHTLY
Qty: 5 PEN | Refills: 2 | Status: SHIPPED
Start: 2020-08-27 | End: 2021-01-04 | Stop reason: SDUPTHER

## 2020-08-27 ASSESSMENT — ENCOUNTER SYMPTOMS
VOMITING: 0
EYE PAIN: 0
ABDOMINAL PAIN: 0
CONSTIPATION: 0
DIARRHEA: 0
SHORTNESS OF BREATH: 0
WHEEZING: 0
NAUSEA: 0
COUGH: 0

## 2020-08-27 NOTE — PROGRESS NOTES
S: 27 y.o. male with   Chief Complaint   Patient presents with    Diabetes    Hypertension     patient states that he stopped HCTZ due to reation        DM2, HTN - has appt with endocrinology in Nov.  Has been missing optho appts. Cannot tolerate diuretics for BP control. BP Readings from Last 3 Encounters:   08/27/20 (!) 153/103   07/02/20 (!) 172/116   03/17/20 129/87       O: VS:  height is 5' 3\" (1.6 m) and weight is 99 lb (44.9 kg). His temporal temperature is 97 °F (36.1 °C). His blood pressure is 153/103 (abnormal) and his pulse is 101. His respiration is 16 and oxygen saturation is 97%. AAO/NAD, appropriate affect for mood  CV:  RRR, no murmur  Resp: CTAB      Impression/Plan:   1) DM2 - appt upcoming with endo  2) HTN - following with   3) attempt to engage [address] Maintenance Due   Topic Date Due    Varicella vaccine (1 of 2 - 2-dose childhood series) 07/21/1991    Pneumococcal 0-64 years Vaccine (1 of 1 - PPSV23) 07/21/1996    Diabetic foot exam  07/30/2019    TSH testing  08/28/2020         Attending Physician Statement  I have discussed the case, including pertinent history and exam findings with the resident. I agree with the documented assessment and plan.       Gomez Rosales MD

## 2020-08-27 NOTE — PROGRESS NOTES
8/27/2020    Sandra Dudley is a 27 y.o. male here for the following:    Chief Complaint   Patient presents with    Diabetes    Hypertension     patient states that he stopped HCTZ due to reation         HPI:  T1DM  - Has an appointment with Endocrinology, Dr. Duane Card, on 11/16/2020  - Hasn't seen Retinal Specialist, Dr. Kayla Medrano, in months. Was getting bilateral injections (Avastin injections) in eyes for DM retinopathy and macular edema.   - Fasting BG readings= 300s; Meal time BG readings= 300s  - Admits to polyuria, weight loss, and peripheral neuropathy   - Denies polydipsia, chest pain, palpitations, abdominal pain, and N/V/D/C  - On Tresiba 35 units nightly. On Novolog sliding scale with meals. He takes 1 unit for every 12 g of carbohydrates consumed. HTN   - Hx of left renal artery stenosis diagnosed in 10/2019  - Has an appointment with Nephrology on 09/10/2020   - BP has been 140s/90s at home   - On Toprol 50 XL mg QD, lisinopril 15 mg QD, and Norvasc 10 mg QD.   - Denies polydipsia, chest pain, palpitations, abdominal pain, and N/V/D/C    Current Outpatient Medications   Medication Sig Dispense Refill    lisinopril (PRINIVIL;ZESTRIL) 40 MG tablet Take 1 tablet by mouth daily 90 tablet 1    Insulin Degludec (TRESIBA FLEXTOUCH) 100 UNIT/ML SOPN Inject 40 Units into the skin nightly 5 pen 2    amLODIPine (NORVASC) 10 MG tablet Take 1 tablet by mouth daily 30 tablet 2    fluticasone (FLOVENT HFA) 220 MCG/ACT inhaler Inhale 1 puff into the lungs 2 times daily 1 Inhaler 3    Insulin Syringe-Needle U-100 (B-D INS SYRINGE 0.5CC/31GX5/16) 31G X 5/16\" 0.5 ML MISC use four times a day 200 each 2    blood glucose test strips (TRUE METRIX BLOOD GLUCOSE TEST) strip 1 each by In Vitro route 5 times daily As needed. 150 each 11    gabapentin (NEURONTIN) 300 MG capsule Take 1 capsule by mouth 2 times daily for 120 days.  60 capsule 3    metoprolol succinate (TOPROL XL) 50 MG extended release tablet Take 1 tablet by mouth daily 30 tablet 5    atorvastatin (LIPITOR) 40 MG tablet Take 1 tablet by mouth daily 30 tablet 3    aspirin 81 MG chewable tablet Take 1 tablet by mouth daily 30 tablet 3    insulin aspart (NOVOLOG) 100 UNIT/ML injection vial Inject 6-10 Units into the skin 4 times daily 1 vial 2    SUBOXONE 8-2 MG FILM SL film        No current facility-administered medications for this visit. Allergies   Allergen Reactions    Chlorthalidone      Vomiting     Hctz [Hydrochlorothiazide] Nausea Only       Past Medical & Surgical History:      Diagnosis Date    Bipolar affective [facility]     [facility]     [facility]abetic neuropathy [facility]     HTN (hypertension)     Hypercholesteremia     Left renal artery stenosis ([facility] 09/19/2019    Opioid dependence (HCC)     Proteinuria      No past surgical history on file. Family History:      Problem Relation Age of Onset    No Known Problems Mother     No Known Problems Father     No Known Problems Brother         Homicide     No Known Problems Daughter     No Known Problems Son     Diabetes type 2  Maternal Grandmother        Social History:  Social History     Tobacco Use    Smoking status: Current Every Day Smoker     Packs/day: 0.50     Years: 12.00     Pack years: 6.00     Types: Cigarettes    Smokeless tobacco: Never Used   Substance Use Topics    Alcohol use: No     Comment: denies caffeine       Immunization History   Administered Date(s) Administered    Hepatitis B Ped/Adol (Engerix-B, Recombivax HB) 01/08/2002, 02/12/2002, 08/19/2002    Influenza Vaccine, unspecified formulation 10/09/2002    MMR 08/19/2002    Tdap (Boostrix, Adacel) 10/07/2019       Review of Systems   Constitutional: Positive for unexpected weight change. Negative for chills and fever. Eyes: Positive for visual disturbance. Negative for pain. Respiratory: Negative for cough, shortness of breath and wheezing.     Cardiovascular: Positive for leg swelling (baseline). Negative for chest pain and palpitations. Gastrointestinal: Negative for abdominal pain, constipation, diarrhea, nausea and vomiting. Endocrine: Positive for polyuria. Negative for polydipsia. Genitourinary: Positive for frequency. Negative for dysuria, hematuria and urgency. Neurological: Positive for numbness. Negative for dizziness, syncope and light-headedness. BP Readings from Last 3 Encounters:   08/27/20 (!) 153/103   07/02/20 (!) 172/116   03/17/20 129/87       VS:  BP (!) 153/103   Pulse 101   Temp 97 °F (36.1 °C) (Temporal)   Resp 16   Ht 5' 3\" (1.6 m)   Wt 99 lb (44.9 kg)   SpO2 97%   BMI 17.54 kg/m²     Physical Exam  Vitals signs reviewed. Constitutional:       General: He is awake. He is not in acute distress. Appearance: He is well-developed, well-groomed and underweight. He is not ill-appearing, toxic-appearing or diaphoretic. Cardiovascular:      Rate and Rhythm: Normal rate and regular rhythm. Heart sounds: S1 normal and S2 normal. No murmur. Pulmonary:      Breath sounds: No decreased breath sounds, wheezing, rhonchi or rales. Abdominal:      General: Abdomen is flat. Bowel sounds are normal.      Palpations: Abdomen is soft. Tenderness: There is no abdominal tenderness. There is no guarding or rebound. Musculoskeletal:      Right lower leg: He exhibits no deformity. No edema. Left lower leg: He exhibits no deformity. No edema. Skin:     General: Skin is warm and dry. Findings: No rash. Neurological:      General: No focal deficit present. Mental Status: He is alert. Psychiatric:         Attention and Perception: Attention normal.         Mood and Affect: Mood normal.         Speech: Speech normal.         Behavior: Behavior is cooperative. Thought Content: Thought content normal.         Cognition and Memory: Cognition normal.         Judgment: Judgment normal.         Assessment/Plan:  1.  Type 1 diabetes mellitus with microalbuminuria ([facility]  Last HgbA1c= 10.7% from 07/02/2020. Microalbumin:Cr ratio was 1036.4 from 07/02/2020. I discussed the importance of patient recording his BG readings in the morning (fasting), before each meal, and in the evening so that his insulin regimen can be titrated accordingly. As he is not recording his BG readings currently, it is difficult to adjust his insulin. For now, will increase Tresiba from 35 units nightly to 40 units nightly. Continue Novolog sliding scale. Patient was instructed to bring a log of his recorded BG readings to his next appointment. He was encouraged to keep his Endocrinology appointment and to schedule an appointment with Ophthalmology as well. - Insulin Degludec (TRESIBA FLEXTOUCH) 100 UNIT/ML SOPN; Inject 40 Units into the skin nightly  Dispense: 5 pen; Refill: 2    2. 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 Patient has not been able to tolerate any diuretics in the past. Will increase his lisinopril from 15 mg QD to 40 mg QD. Will continue Toprol XL 50 mg QD and Norvasc 10 mg QD. Patient was encouraged to keep his appointment with Nephrology. Nephrology has mentioned the possibility of stent placement for his left renal artery stenosis. Will await Nephrology's recommendations. - lisinopril (PRINIVIL;ZESTRIL) 40 MG tablet; Take 1 tablet by mouth daily  Dispense: 90 tablet; Refill: 1      Return in about 3 weeks (around 9/17/2020) for DM, HTN.     Corby Hauser DO, PGY-3

## 2020-08-28 ENCOUNTER — CARE COORDINATION (OUTPATIENT)
Dept: CARE COORDINATION | Age: 30
End: 2020-08-28

## 2020-08-28 NOTE — CARE COORDINATION
-Attempted to reach pt to offer enrollment into care coordination program, however no answer.  -Detailed VM left introducing self, reason for call, and brief explanation of program.  -Left ACM's contact information, requesting call back. -[email or web address] message sent to pt regarding program description, ACM's role, and contact information.

## 2020-09-02 ENCOUNTER — CARE COORDINATION (OUTPATIENT)
Dept: CARE COORDINATION | Age: 30
End: 2020-09-02

## 2020-09-02 NOTE — CARE COORDINATION
-2nd attempt to reach pt to offer enrollment into care coordination program, however no answer.  -Detailed VM left introducing self, reason for call, and brief explanation of program.  -Left ACM's contact information, requesting call back. -[employer] message was sent on 8/28/20 introducing self, explaining program, and providing ACM's contact information.

## 2020-09-04 NOTE — CARE COORDINATION
-3rd attempt to reach pt to offer enrollment for care coordination program, however, no answer.  -Noted read receipt from [address] message regarding introduction of ACM and role, and the care coordination program on 9/3/2020  -No further attempts will be made at this time to offer enrollment  -Pt was encouraged to call this ACM back if wanting to enroll. Contact information was given. -Will place pt on temporary exclusion from CC and update pcp that pt has not returned any calls/messages regarding enrollment.

## 2020-09-08 ENCOUNTER — HOSPITAL ENCOUNTER (OUTPATIENT)
Age: 30
Discharge: HOME | End: 2020-09-08
Payer: COMMERCIAL

## 2020-09-08 LAB
ANION GAP SERPL CALCULATED.3IONS-SCNC: 14 MMOL/L (ref 7–16)
BUN BLDV-MCNC: 18 MG/DL (ref 6–20)
CALCIUM SERPL-MCNC: 9.8 MG/DL (ref 8.6–10.2)
CHLORIDE BLD-SCNC: 99 MMOL/L (ref 98–107)
CO2: 23 MMOL/L (ref 22–29)
CREAT SERPL-MCNC: 1 MG/DL (ref 0.7–1.2)
FERRITIN: 154 NG/ML
GFR AFRICAN AMERICAN: >60
GFR NON-AFRICAN AMERICAN: >60 ML/MIN/1.73
GLUCOSE BLD-MCNC: 290 MG/DL (ref 74–99)
IRON SATURATION: 23 % (ref 20–55)
IRON: 79 MCG/DL (ref 59–158)
PARATHYROID HORMONE INTACT: 46 PG/ML (ref 15–65)
PHOSPHORUS: 3.3 MG/DL (ref 2.5–4.5)
POTASSIUM SERPL-SCNC: 4.9 MMOL/L (ref 3.5–5)
SODIUM BLD-SCNC: 136 MMOL/L (ref 132–146)
TOTAL IRON BINDING CAPACITY: 338 MCG/DL (ref 250–450)
VITAMIN D 25-HYDROXY: 15 NG/ML (ref 30–100)

## 2020-09-08 PROCEDURE — 80048 BASIC METABOLIC PNL TOTAL CA: CPT

## 2020-09-08 PROCEDURE — 82728 ASSAY OF FERRITIN: CPT

## 2020-09-08 PROCEDURE — 83550 IRON BINDING TEST: CPT

## 2020-09-08 PROCEDURE — 84100 ASSAY OF PHOSPHORUS: CPT

## 2020-09-08 PROCEDURE — 83540 ASSAY OF IRON: CPT

## 2020-09-08 PROCEDURE — 83970 ASSAY OF PARATHORMONE: CPT

## 2020-09-08 PROCEDURE — 82306 VITAMIN D 25 HYDROXY: CPT

## 2020-09-08 PROCEDURE — 36415 COLL VENOUS BLD VENIPUNCTURE: CPT

## 2020-09-08 RX ORDER — ATORVASTATIN CALCIUM 40 MG/1
40 TABLET, FILM COATED ORAL DAILY
Qty: 30 TABLET | Refills: 3 | Status: SHIPPED
Start: 2020-09-08 | End: 2020-11-15

## 2020-10-05 RX ORDER — AMLODIPINE BESYLATE 10 MG/1
10 TABLET ORAL DAILY
Qty: 30 TABLET | Refills: 2 | Status: SHIPPED
Start: 2020-10-05 | End: 2021-01-04 | Stop reason: SDUPTHER

## 2020-10-05 RX ORDER — GABAPENTIN 300 MG/1
300 CAPSULE ORAL 2 TIMES DAILY
Qty: 60 CAPSULE | Refills: 3 | Status: SHIPPED
Start: 2020-10-05 | End: 2021-02-08 | Stop reason: SDUPTHER

## 2020-10-05 RX ORDER — ASPIRIN 81 MG/1
81 TABLET, CHEWABLE ORAL DAILY
Qty: 30 TABLET | Refills: 3 | Status: SHIPPED
Start: 2020-10-05 | End: 2021-02-08 | Stop reason: SDUPTHER

## 2020-10-05 NOTE — TELEPHONE ENCOUNTER
Last Appointment   8/27/2020  Next Appointment      Return in about 3 weeks (around 9/17/2020) for DM, HTN

## 2020-10-05 NOTE — TELEPHONE ENCOUNTER
Last Appointment   8/27/2020  Next Appointment  Visit date not found      Return in about 3 weeks (around 9/17/2020) for DM, HTN

## 2020-11-02 RX ORDER — CALCIUM CITRATE/VITAMIN D3 200MG-6.25
1 TABLET ORAL
Qty: 150 EACH | Refills: 11 | Status: SHIPPED
Start: 2020-11-02 | End: 2021-11-02 | Stop reason: SDUPTHER

## 2020-11-02 RX ORDER — FLUTICASONE PROPIONATE 220 UG/1
1 AEROSOL, METERED RESPIRATORY (INHALATION) 2 TIMES DAILY
Qty: 1 INHALER | Refills: 3 | Status: SHIPPED
Start: 2020-11-02 | End: 2021-04-16 | Stop reason: ALTCHOICE

## 2020-11-10 RX ORDER — METOPROLOL SUCCINATE 50 MG/1
50 TABLET, EXTENDED RELEASE ORAL DAILY
Qty: 30 TABLET | Refills: 2 | Status: SHIPPED
Start: 2020-11-10 | End: 2020-11-16

## 2020-11-10 NOTE — TELEPHONE ENCOUNTER
Medication approved and sent to pharmacy. Needs appointment in December after he sees Endocrinology on 11/16/2020.

## 2020-11-11 LAB — DIABETIC RETINOPATHY: NEGATIVE

## 2020-11-13 RX ORDER — HYDRALAZINE HYDROCHLORIDE 25 MG/1
25 TABLET, FILM COATED ORAL 2 TIMES DAILY
COMMUNITY
End: 2020-11-16

## 2020-11-15 RX ORDER — ATORVASTATIN CALCIUM 80 MG/1
TABLET, FILM COATED ORAL
COMMUNITY
Start: 2020-09-10 | End: 2021-11-02 | Stop reason: SDUPTHER

## 2020-11-16 ENCOUNTER — OFFICE VISIT (OUTPATIENT)
Dept: FAMILY MEDICINE CLINIC | Age: 30
End: 2020-11-16
Payer: COMMERCIAL

## 2020-11-16 ENCOUNTER — TELEPHONE (OUTPATIENT)
Dept: FAMILY MEDICINE CLINIC | Age: 30
End: 2020-11-16

## 2020-11-16 ENCOUNTER — HOSPITAL ENCOUNTER (OUTPATIENT)
Age: 30
Discharge: HOME | End: 2020-11-18
Payer: COMMERCIAL

## 2020-11-16 VITALS
OXYGEN SATURATION: 97 % | TEMPERATURE: 97.8 F | HEART RATE: 109 BPM | BODY MASS INDEX: 20.01 KG/M2 | SYSTOLIC BLOOD PRESSURE: 189 MMHG | RESPIRATION RATE: 16 BRPM | HEIGHT: 61 IN | DIASTOLIC BLOOD PRESSURE: 124 MMHG | WEIGHT: 106 LBS

## 2020-11-16 LAB — HBA1C MFR BLD: 10.8 %

## 2020-11-16 PROCEDURE — G8484 FLU IMMUNIZE NO ADMIN: HCPCS | Performed by: FAMILY MEDICINE

## 2020-11-16 PROCEDURE — 99214 OFFICE O/P EST MOD 30 MIN: CPT | Performed by: FAMILY MEDICINE

## 2020-11-16 PROCEDURE — 93000 ELECTROCARDIOGRAM COMPLETE: CPT | Performed by: FAMILY MEDICINE

## 2020-11-16 PROCEDURE — 4004F PT TOBACCO SCREEN RCVD TLK: CPT | Performed by: FAMILY MEDICINE

## 2020-11-16 PROCEDURE — U0003 INFECTIOUS AGENT DETECTION BY NUCLEIC ACID (DNA OR RNA); SEVERE ACUTE RESPIRATORY SYNDROME CORONAVIRUS 2 (SARS-COV-2) (CORONAVIRUS DISEASE [COVID-19]), AMPLIFIED PROBE TECHNIQUE, MAKING USE OF HIGH THROUGHPUT TECHNOLOGIES AS DESCRIBED BY CMS-2020-01-R: HCPCS

## 2020-11-16 PROCEDURE — 83036 HEMOGLOBIN GLYCOSYLATED A1C: CPT | Performed by: FAMILY MEDICINE

## 2020-11-16 PROCEDURE — G8420 CALC BMI NORM PARAMETERS: HCPCS | Performed by: FAMILY MEDICINE

## 2020-11-16 PROCEDURE — 3046F HEMOGLOBIN A1C LEVEL >9.0%: CPT | Performed by: FAMILY MEDICINE

## 2020-11-16 PROCEDURE — 2022F DILAT RTA XM EVC RTNOPTHY: CPT | Performed by: FAMILY MEDICINE

## 2020-11-16 PROCEDURE — G8427 DOCREV CUR MEDS BY ELIG CLIN: HCPCS | Performed by: FAMILY MEDICINE

## 2020-11-16 RX ORDER — OFLOXACIN 3 MG/ML
SOLUTION/ DROPS OPHTHALMIC
COMMUNITY
Start: 2020-11-11 | End: 2021-04-15

## 2020-11-16 RX ORDER — PREDNISOLONE ACETATE 10 MG/ML
SUSPENSION/ DROPS OPHTHALMIC
COMMUNITY
Start: 2020-11-11 | End: 2021-04-15

## 2020-11-16 RX ORDER — METOPROLOL SUCCINATE 50 MG/1
50 TABLET, EXTENDED RELEASE ORAL 2 TIMES DAILY
Qty: 60 TABLET | Refills: 1 | Status: SHIPPED
Start: 2020-11-16 | End: 2021-01-04 | Stop reason: SDUPTHER

## 2020-11-16 RX ORDER — HYDRALAZINE HYDROCHLORIDE 50 MG/1
50 TABLET, FILM COATED ORAL 2 TIMES DAILY
Qty: 60 TABLET | Refills: 1 | Status: SHIPPED
Start: 2020-11-16 | End: 2020-12-10

## 2020-11-16 ASSESSMENT — ENCOUNTER SYMPTOMS
SHORTNESS OF BREATH: 0
NAUSEA: 0
EYE PAIN: 0
DIARRHEA: 0
CONSTIPATION: 0
BLOOD IN STOOL: 0
ABDOMINAL PAIN: 0
WHEEZING: 0
COUGH: 0
VOMITING: 0
COLOR CHANGE: 0

## 2020-11-16 NOTE — PROGRESS NOTES
S: 27 y.o. male with   Chief Complaint   Patient presents with    Pre-op Exam     right eye surgery        Pre-Op exam for R eye surgery retinal detachment with macular edema. Uncontrolled DM and Htn. NO CV sx, no exertional sx. Cont to smoke. BP Readings from Last 3 Encounters:   11/16/20 (!) 189/124   08/27/20 (!) 153/103   07/02/20 (!) 172/116       O: VS:  height is 5' 1\" (1.549 m) and weight is 106 lb (48.1 kg). His temporal temperature is 97.8 °F (36.6 °C). His blood pressure is 189/124 (abnormal) and his pulse is 109. His respiration is 16 and oxygen saturation is 97%. AAO/NAD, appropriate affect for mood  CV:  RRR, no murmur  Resp: CTAB      Impression/Plan:   1) DM - poorly contolled  2) HTN - very poorly controlled  3) Moderate risk for vision conserving surgery  Discussed compliance with tx plan. There does seem to be significant variation in BP which fits with missing meds. I did phone the pharmacy and he is picking up meds. Health Maintenance Due   Topic Date Due    Varicella vaccine (1 of 2 - 2-dose childhood series) 07/21/1991    Pneumococcal 0-64 years Vaccine (1 of 1 - PPSV23) 07/21/1996    Diabetic foot exam  07/30/2019    TSH testing  08/28/2020    Flu vaccine (1) 09/01/2020    A1C test (Diabetic or Prediabetic)  10/02/2020         Attending Physician Statement  I have discussed the case, including pertinent history and exam findings with the resident. I also have seen the patient and performed key portions of the examination. I agree with the documented assessment and plan.       Latricia Black MD

## 2020-11-16 NOTE — PROGRESS NOTES
11/16/2020    Kaiser Gupta is a 27 y.o. male here for the following:    Chief Complaint   Patient presents with    Pre-op Exam     right eye surgery         HPI:  Pre-op exam  - Scheduled for right eye surgery with Dr. Gina Godoy on 11/20/2020 for macular edema, diabetic retinopathy, and retinal detachment. - Hx of uncontrolled HTN w/ left renal artery stenosis. On hydralazine 25 mg BID, metoprolol 50 mg QD, amlodipine 10 mg QD, and lisinopril 40 mg QD. Has had poor follow-up with Nephrology. Patient reports compliance with medications. - Hx of uncontrolled T1DM with neuropathy. HgbA1c= 10.8% today. On Tresiba 40 units nightly and Novolog sliding scale. On gabapentin 300 mg BID for neuropathy. Has not seen Endocrinology yet for his T1DM. - Denies shortness of breath. Reports that he is able to go up 2 flights of stairs.   - Denies chest pain, abdominal pain, N/V/D/C, dizziness, and lightheadedness.   - No illicit drug use currently. On Suboxone for opioid addiction to prescribed narcotics. - No hx of MARCOS.   - No alcohol use. - Current smoker. Smokes < 0.5 ppd. Has smoked since age 16-14.   - Has never had major surgery requiring anesthesia. No FamHx of relatives having difficulty waking up from anesthesia. Current Outpatient Medications   Medication Sig Dispense Refill    metoprolol succinate (TOPROL XL) 50 MG extended release tablet Take 1 tablet by mouth 2 times daily 60 tablet 1    hydrALAZINE (APRESOLINE) 50 MG tablet Take 1 tablet by mouth 2 times daily 60 tablet 1    atorvastatin (LIPITOR) 80 MG tablet take 1 tablet by mouth at bedtime      insulin aspart (NOVOLOG) 100 UNIT/ML injection vial Inject 6-10 Units into the skin 4 times daily (Patient taking differently: Inject 6-10 Units into the skin 4 times daily SLIDING SCALE) 1 vial 2    blood glucose test strips (TRUE METRIX BLOOD GLUCOSE TEST) strip 1 each by In Vitro route 5 times daily As needed.  150 each 11    fluticasone Every Day Smoker     Packs/day: 0.50     Years: 15.00     Pack years: 7.50     Types: Cigarettes    Smokeless tobacco: Never Used   Substance Use Topics    Alcohol use: No       Immunization History   Administered Date(s) Administered    Hepatitis B Ped/Adol (Engerix-B, Recombivax HB) 01/08/2002, 02/12/2002, 08/19/2002    Influenza Vaccine, unspecified formulation 10/09/2002    MMR 08/19/2002    Tdap (Boostrix, Adacel) 10/07/2019       Review of Systems   Constitutional: Negative for chills and fever. HENT: Negative for hearing loss and tinnitus. Eyes: Positive for visual disturbance. Negative for pain. Respiratory: Negative for cough, shortness of breath and wheezing. Cardiovascular: Negative for chest pain and palpitations. Gastrointestinal: Negative for abdominal pain, blood in stool, constipation, diarrhea, nausea and vomiting. Endocrine: Negative for polydipsia and polyuria. Skin: Negative for color change and rash. Neurological: Positive for numbness (neuropathy of bilateral feet). Negative for dizziness, syncope and light-headedness. BP Readings from Last 3 Encounters:   11/16/20 (!) 189/124   08/27/20 (!) 153/103   07/02/20 (!) 172/116       VS:  BP (!) 189/124   Pulse 109   Temp 97.8 °F (36.6 °C) (Temporal)   Resp 16   Ht 5' 1\" (1.549 m)   Wt 106 lb (48.1 kg)   SpO2 97%   BMI 20.03 kg/m²     Physical Exam  Constitutional:       General: He is awake. He is not in acute distress. Appearance: Normal appearance. He is not ill-appearing, toxic-appearing or diaphoretic. Cardiovascular:      Rate and Rhythm: Normal rate and regular rhythm. Heart sounds: S1 normal and S2 normal. No murmur. Pulmonary:      Breath sounds: No decreased breath sounds, wheezing, rhonchi or rales. Abdominal:      General: Abdomen is flat. Bowel sounds are normal.      Palpations: Abdomen is soft. Tenderness: There is no abdominal tenderness. There is no guarding or rebound.    Feet: patient's insulin regimen in the past and have encouraged patient to bring in his blood glucose readings. He has not provided me with any home blood glucose readings since he has been my patient. Therefore, I will continue Tresiba 40 units nightly and Novolog sliding scale for the time being until he establishes with Endocrinology. He may benefit from an insulin pump. - CBC; Future  - COMPREHENSIVE METABOLIC PANEL; Future  - Microalbumin / Creatinine Urine Ratio; Future  - TSH; Future  - LIPID PANEL; Future    3. Renovascular hypertension  Uncontrolled. He has had inconsistent follow-up with Nephrology for his secondary HTN and left renal artery stenosis. His BP is incredibly elevated today. He reports compliance with taking his BP medications. I will make the following changes to his BP medications today: increase hydralazine from 25 mg BID to 50 mg BID, increase metoprolol 50 mg QD to 50 mg BID, continue amlodipine 10 mg QD, and continue lisinopril 40 mg QD. Patient is to return to the office on Wednesday for a BP check and labs. - metoprolol succinate (TOPROL XL) 50 MG extended release tablet; Take 1 tablet by mouth 2 times daily  Dispense: 60 tablet; Refill: 1  - hydrALAZINE (APRESOLINE) 50 MG tablet; Take 1 tablet by mouth 2 times daily  Dispense: 60 tablet; Refill: 1      Return in about 3 months (around 2/16/2021) for HTN, DM.     Bekah Lara DO, PGY-3

## 2020-11-16 NOTE — Clinical Note
Make sure this note sounds okay in regards to him having surgery, as I need to send it to the surgery center by Friday.

## 2020-11-17 ENCOUNTER — PREP FOR PROCEDURE (OUTPATIENT)
Dept: OPHTHALMOLOGY | Age: 30
End: 2020-11-17

## 2020-11-17 NOTE — TELEPHONE ENCOUNTER
Please call patient and inform him to stop taking his aspirin today in anticipation of his eye surgery this Friday. He can resume his aspirin on Saturday. Thank you!

## 2020-11-18 DIAGNOSIS — E10.311 TYPE 1 DIABETES MELLITUS WITH RETINOPATHY OF BOTH EYES AND MACULAR EDEMA, UNSPECIFIED RETINOPATHY SEVERITY (HCC): ICD-10-CM

## 2020-11-18 LAB
CREATININE URINE: 90 MG/DL (ref 40–278)
MICROALBUMIN UR-MCNC: 688 MG/L
MICROALBUMIN/CREAT UR-RTO: 764.4 (ref 0–30)
SARS-COV-2: NOT DETECTED
SOURCE: NORMAL

## 2020-11-19 ENCOUNTER — PREP FOR PROCEDURE (OUTPATIENT)
Dept: OPHTHALMOLOGY | Age: 30
End: 2020-11-19

## 2020-11-19 ENCOUNTER — ANESTHESIA EVENT (OUTPATIENT)
Dept: OPERATING ROOM | Age: 30
End: 2020-11-19
Payer: COMMERCIAL

## 2020-11-19 LAB
ALBUMIN SERPL-MCNC: 4.2 G/DL (ref 3.5–5.2)
ALP BLD-CCNC: 115 U/L (ref 40–129)
ALT SERPL-CCNC: 12 U/L (ref 0–40)
ANION GAP SERPL CALCULATED.3IONS-SCNC: 20 MMOL/L (ref 7–16)
AST SERPL-CCNC: 19 U/L (ref 0–39)
BILIRUB SERPL-MCNC: 0.2 MG/DL (ref 0–1.2)
BUN BLDV-MCNC: 17 MG/DL (ref 6–20)
CALCIUM SERPL-MCNC: 10 MG/DL (ref 8.6–10.2)
CHLORIDE BLD-SCNC: 99 MMOL/L (ref 98–107)
CHOLESTEROL, TOTAL: 222 MG/DL (ref 0–199)
CO2: 19 MMOL/L (ref 22–29)
CREAT SERPL-MCNC: 1.2 MG/DL (ref 0.7–1.2)
GFR AFRICAN AMERICAN: >60
GFR NON-AFRICAN AMERICAN: >60 ML/MIN/1.73
GLUCOSE BLD-MCNC: 366 MG/DL (ref 74–99)
HCT VFR BLD CALC: 39.9 % (ref 37–54)
HDLC SERPL-MCNC: 44 MG/DL
HEMOGLOBIN: 12.6 G/DL (ref 12.5–16.5)
LDL CHOLESTEROL CALCULATED: 138 MG/DL (ref 0–99)
MCH RBC QN AUTO: 29.3 PG (ref 26–35)
MCHC RBC AUTO-ENTMCNC: 31.6 % (ref 32–34.5)
MCV RBC AUTO: 92.8 FL (ref 80–99.9)
PDW BLD-RTO: 12.6 FL (ref 11.5–15)
PLATELET # BLD: 447 E9/L (ref 130–450)
PMV BLD AUTO: 10.4 FL (ref 7–12)
POTASSIUM SERPL-SCNC: 5.1 MMOL/L (ref 3.5–5)
RBC # BLD: 4.3 E12/L (ref 3.8–5.8)
SODIUM BLD-SCNC: 138 MMOL/L (ref 132–146)
TOTAL PROTEIN: 7.5 G/DL (ref 6.4–8.3)
TRIGL SERPL-MCNC: 198 MG/DL (ref 0–149)
TSH SERPL DL<=0.05 MIU/L-ACNC: 2.47 UIU/ML (ref 0.27–4.2)
VLDLC SERPL CALC-MCNC: 40 MG/DL
WBC # BLD: 9.2 E9/L (ref 4.5–11.5)

## 2020-11-19 RX ORDER — SODIUM CHLORIDE 9 MG/ML
INJECTION, SOLUTION INTRAVENOUS CONTINUOUS
Status: CANCELLED | OUTPATIENT
Start: 2020-11-19

## 2020-11-19 RX ORDER — PROPARACAINE HYDROCHLORIDE 5 MG/ML
1 SOLUTION/ DROPS OPHTHALMIC SEE ADMIN INSTRUCTIONS
Status: CANCELLED | OUTPATIENT
Start: 2020-11-20

## 2020-11-19 RX ORDER — TROPICAMIDE 10 MG/ML
1 SOLUTION/ DROPS OPHTHALMIC SEE ADMIN INSTRUCTIONS
Status: CANCELLED | OUTPATIENT
Start: 2020-11-20

## 2020-11-19 RX ORDER — PHENYLEPHRINE HCL 2.5 %
1 DROPS OPHTHALMIC (EYE) SEE ADMIN INSTRUCTIONS
Status: CANCELLED | OUTPATIENT
Start: 2020-11-20

## 2020-11-19 RX ORDER — SODIUM CHLORIDE 0.9 % (FLUSH) 0.9 %
10 SYRINGE (ML) INJECTION PRN
Status: CANCELLED | OUTPATIENT
Start: 2020-11-19

## 2020-11-19 RX ORDER — TOBRAMYCIN 3 MG/ML
1 SOLUTION/ DROPS OPHTHALMIC SEE ADMIN INSTRUCTIONS
Status: CANCELLED | OUTPATIENT
Start: 2020-11-20

## 2020-11-19 RX ORDER — SODIUM CHLORIDE 0.9 % (FLUSH) 0.9 %
10 SYRINGE (ML) INJECTION EVERY 12 HOURS SCHEDULED
Status: CANCELLED | OUTPATIENT
Start: 2020-11-19

## 2020-11-19 ASSESSMENT — LIFESTYLE VARIABLES: SMOKING_STATUS: 1

## 2020-11-19 NOTE — H&P
I have examined the patient and reviewed the history and physical from 11/16/2020 and find no relevant changes. I have reviewed with the patient and/or family the risks, benefits, and alternatives to the procedure and they have agreed to proceed.     Tristan Dodd

## 2020-11-19 NOTE — ANESTHESIA PRE PROCEDURE
Department of Anesthesiology  Preprocedure Note       Name:  Elizabeth Vázquez   Age:  27 y.o.  :  1990                                          MRN:  [id number]         Date:  2020      Surgeon: Justina Medina):  Abad Jimenez MD    Procedure: Procedure(s):  PARS PLANA VITRECTOMY, MEMBRANE PEEL, ENDOLASER, GAS FLUID EXCHANGE, 25G, MAC, RIGHT EYE    Medications prior to admission:   Prior to Admission medications    Medication Sig Start Date End Date Taking? Authorizing Provider   insulin aspart (NOVOLOG) 100 UNIT/ML injection vial Inject 6-10 Units into the skin 4 times daily  Patient taking differently: Inject 6-10 Units into the skin 4 times daily SLIDING SCALE 20  Yes Sergio Corbin DO   fluticasone (FLOVENT HFA) 220 MCG/ACT inhaler Inhale 1 puff into the lungs 2 times daily 20 Yes Sergio Corbin DO   aspirin 81 MG chewable tablet Take 1 tablet by mouth daily  Patient taking differently: Take 81 mg by mouth daily Pt states per Dr Nely Lopes he is to\" check with his PCP regarding holding ASA\" 10/5/20  Yes Jessy Sanchez,    amLODIPine (NORVASC) 10 MG tablet Take 1 tablet by mouth daily 10/5/20  Yes Sergio Corbin DO   gabapentin (NEURONTIN) 300 MG capsule Take 1 capsule by mouth 2 times daily for 120 days.  10/5/20 2/2/21 Yes Corazon Corbin DO   lisinopril (PRINIVIL;ZESTRIL) 40 MG tablet Take 1 tablet by mouth daily 20  Yes Sergio Corbin DO   Insulin Degludec (TRESIBA FLEXTOUCH) 100 UNIT/ML SOPN Inject 40 Units into the skin nightly 20  Yes Sergio Corbin DO   prednisoLONE acetate (PRED FORTE) 1 % ophthalmic suspension  20   Historical Provider, MD   ofloxacin (OCUFLOX) 0.3 % solution  20   Historical Provider, MD   metoprolol succinate (TOPROL XL) 50 MG extended release tablet Take 1 tablet by mouth 2 times daily 20   Sergio Corbin DO   hydrALAZINE (APRESOLINE) 50 MG tablet Take 1 tablet by mouth 2 times daily 11/16/20   [facility] DO Shaquille   atorvastatin (LIPITOR) 80 MG tablet take 1 tablet by mouth at bedtime 9/10/20   Historical Provider, MD   blood glucose test strips (TRUE METRIX BLOOD GLUCOSE TEST) strip 1 each by In Vitro route 5 times daily As needed. 11/2/20   [facility] DO Shaquille   Insulin Syringe-Needle U-100 (B-D INS SYRINGE 0.5CC/31GX5/16) 31G X 5/16\" 0.5 ML MISC use four times a day 7/2/20   [facility] DO Shaquille   SUBOXONE 8-2 MG FILM SL film Place 1 Film under the tongue daily. 9/14/17   Historical Provider, MD       Current medications:    No current facility-administered medications for this encounter. Current Outpatient Medications   Medication Sig Dispense Refill    insulin aspart (NOVOLOG) 100 UNIT/ML injection vial Inject 6-10 Units into the skin 4 times daily (Patient taking differently: Inject 6-10 Units into the skin 4 times daily SLIDING SCALE) 1 vial 2    fluticasone (FLOVENT HFA) 220 MCG/ACT inhaler Inhale 1 puff into the lungs 2 times daily 1 Inhaler 3    aspirin 81 MG chewable tablet Take 1 tablet by mouth daily (Patient taking differently: Take 81 mg by mouth daily Pt states per Dr Hope Comment he is to\" check with his PCP regarding holding ASA\") 30 tablet 3    amLODIPine (NORVASC) 10 MG tablet Take 1 tablet by mouth daily 30 tablet 2    gabapentin (NEURONTIN) 300 MG capsule Take 1 capsule by mouth 2 times daily for 120 days.  60 capsule 3    lisinopril (PRINIVIL;ZESTRIL) 40 MG tablet Take 1 tablet by mouth daily 90 tablet 1    Insulin Degludec (TRESIBA FLEXTOUCH) 100 UNIT/ML SOPN Inject 40 Units into the skin nightly 5 pen 2    prednisoLONE acetate (PRED FORTE) 1 % ophthalmic suspension       ofloxacin (OCUFLOX) 0.3 % solution       metoprolol succinate (TOPROL XL) 50 MG extended release tablet Take 1 tablet by mouth 2 times daily 60 tablet 1    hydrALAZINE (APRESOLINE) 50 MG tablet Take 1 tablet by mouth 2 times daily 60 tablet 1    atorvastatin (LIPITOR) 80 MG tablet take 1 tablet by mouth at bedtime      blood glucose test strips (TRUE METRIX BLOOD GLUCOSE TEST) strip 1 each by In Vitro route 5 times daily As needed. 150 each 11    Insulin Syringe-Needle U-100 (B-D INS SYRINGE 0.5CC/31GX5/16) 31G X 5/16\" 0.5 ML MISC use four times a day 200 each 2    SUBOXONE 8-2 MG FILM SL film Place 1 Film under the tongue daily. Allergies: Allergies   Allergen Reactions    Chlorthalidone      Vomiting     Hctz [Hydrochlorothiazide] Nausea Only       Problem List:    Patient Active Problem List   Diagnosis Code    Hyperlipidemia E78.5    Elevated liver enzymes R74.8    HTN (hypertension) I10    Anxiety F41.9    Vitamin D insufficiency E55.9    Acquired hypothyroidism E03.9    Uncontrolled type 1 diabetes mellitus with hyperglycemia (HCC) E10.65    Chest pain R07.9    Tobacco abuse Z72.0    Opioid dependence ([address] F11.20    Bipolar affective ([facility] F31.9    Other microscopic hematuria R31.29    Elevated alkaline phosphatase level R74.8    Lactic acidosis E87.2    Proliferative diabetic retinopathy of both eyes with macular edema associated with type 1 diabetes mellitus (HCC) R25.5066    Moderate persistent asthma without complication A33.13    Left renal artery stenosis (HCC) I70.1       Past Medical History:        Diagnosis Date    Asthma     Bipolar affective ([address]     COPD (chronic obstructive pulmonary disease) (HCC)     Diabetes mellitus type I ([address]     Diabetic neuropathy ([address]     HTN (hypertension)     D/T RENAL ARTERY STENOSIS    Hypercholesteremia     Left renal artery stenosis (HCC) 09/19/2019    Opioid dependence ([facility])     Proteinuria        Past Surgical History:  History reviewed. No pertinent surgical history.     Social History:    Social History     Tobacco Use    Smoking status: Current Every Day Smoker     Packs/day: 0.50     Years: 15.00     Pack years: 7.50     Types: Cigarettes    Smokeless tobacco: Never Used Substance Use Topics    Alcohol use: No                                Ready to quit: Not Answered  Counseling given: Not Answered      Vital Signs (Current):   Vitals:    11/13/20 1122   Weight: 105 lb (47.6 kg)   Height: 5' 1\" (1.549 m)                                              BP Readings from Last 3 Encounters:   11/16/20 (!) 189/124   08/27/20 (!) 153/103   07/02/20 (!) 172/116       NPO Status:                                                                                 BMI:   Wt Readings from Last 3 Encounters:   11/16/20 106 lb (48.1 kg)   08/27/20 99 lb (44.9 kg)   07/02/20 105 lb (47.6 kg)     Body mass index is 19.84 kg/m². CBC:   Lab Results   Component Value Date    WBC 9.2 11/18/2020    RBC 4.30 11/18/2020    HGB 12.6 11/18/2020    HCT 39.9 11/18/2020    MCV 92.8 11/18/2020    RDW 12.6 11/18/2020     11/18/2020       CMP:   Lab Results   Component Value Date     11/18/2020    K 5.1 11/18/2020    CL 99 11/18/2020    CO2 19 11/18/2020    BUN 17 11/18/2020    CREATININE 1.2 11/18/2020    GFRAA >60 11/18/2020    LABGLOM >60 11/18/2020    GLUCOSE 366 11/18/2020    GLUCOSE 282 04/22/2012    PROT 7.5 11/18/2020    CALCIUM 10.0 11/18/2020    BILITOT 0.2 11/18/2020    ALKPHOS 115 11/18/2020    AST 19 11/18/2020    ALT 12 11/18/2020       POC Tests: No results for input(s): POCGLU, POCNA, POCK, POCCL, POCBUN, POCHEMO, POCHCT in the last 72 hours.     Coags:   Lab Results   Component Value Date    PROTIME 10.9 09/14/2015    INR 1.0 09/14/2015       HCG (If Applicable): No results found for: PREGTESTUR, PREGSERUM, HCG, HCGQUANT     ABGs: No results found for: PHART, PO2ART, XQS5LJI, BNT0EXT, BEART, Y8VOSWIE     Type & Screen (If Applicable):  No results found for: LABABO, LABRH    Drug/Infectious Status (If Applicable):  No results found for: HIV, HEPCAB    COVID-19 Screening (If Applicable):   Lab Results   Component Value Date    COVID19 Not Detected 11/16/2020         Anesthesia

## 2020-11-19 NOTE — OP NOTE
tears or detachments and none were found. A gas fluid exchange was performed with 15%c3f8 gas. The cannulas were removed from the eye and all sclerotomies were found to be water tight. The intraocular pressure was appropriate and the eyelid speculum was removed. The eye was patched and shielded and the patient was taken into the recovery area in stable condition having tolerated the procedure well. They will be seen tomorrow for examination.  The patient will maintain face down sleeping on either side for 7 days

## 2020-11-20 ENCOUNTER — ANESTHESIA (OUTPATIENT)
Dept: OPERATING ROOM | Age: 30
End: 2020-11-20
Payer: COMMERCIAL

## 2020-11-20 ENCOUNTER — HOSPITAL ENCOUNTER (OUTPATIENT)
Age: 30
Setting detail: OUTPATIENT SURGERY
Discharge: HOME | End: 2020-11-20
Attending: OPHTHALMOLOGY | Admitting: OPHTHALMOLOGY
Payer: COMMERCIAL

## 2020-11-20 VITALS
HEIGHT: 61 IN | WEIGHT: 107 LBS | SYSTOLIC BLOOD PRESSURE: 132 MMHG | OXYGEN SATURATION: 98 % | DIASTOLIC BLOOD PRESSURE: 87 MMHG | TEMPERATURE: 97.4 F | HEART RATE: 88 BPM | BODY MASS INDEX: 20.2 KG/M2 | RESPIRATION RATE: 16 BRPM

## 2020-11-20 VITALS
RESPIRATION RATE: 18 BRPM | SYSTOLIC BLOOD PRESSURE: 141 MMHG | DIASTOLIC BLOOD PRESSURE: 94 MMHG | OXYGEN SATURATION: 100 %

## 2020-11-20 LAB
METER GLUCOSE: 281 MG/DL (ref 74–99)
METER GLUCOSE: 315 MG/DL (ref 74–99)

## 2020-11-20 PROCEDURE — 6370000000 HC RX 637 (ALT 250 FOR IP): Performed by: OPHTHALMOLOGY

## 2020-11-20 PROCEDURE — 3700000000 HC ANESTHESIA ATTENDED CARE: Performed by: OPHTHALMOLOGY

## 2020-11-20 PROCEDURE — 6370000000 HC RX 637 (ALT 250 FOR IP): Performed by: PHYSICIAN ASSISTANT

## 2020-11-20 PROCEDURE — 2580000003 HC RX 258: Performed by: ANESTHESIOLOGY

## 2020-11-20 PROCEDURE — 7100000011 HC PHASE II RECOVERY - ADDTL 15 MIN: Performed by: OPHTHALMOLOGY

## 2020-11-20 PROCEDURE — 2500000003 HC RX 250 WO HCPCS: Performed by: OPHTHALMOLOGY

## 2020-11-20 PROCEDURE — 3600000003 HC SURGERY LEVEL 3 BASE: Performed by: OPHTHALMOLOGY

## 2020-11-20 PROCEDURE — 3600000013 HC SURGERY LEVEL 3 ADDTL 15MIN: Performed by: OPHTHALMOLOGY

## 2020-11-20 PROCEDURE — 2709999900 HC NON-CHARGEABLE SUPPLY: Performed by: OPHTHALMOLOGY

## 2020-11-20 PROCEDURE — 3700000001 HC ADD 15 MINUTES (ANESTHESIA): Performed by: OPHTHALMOLOGY

## 2020-11-20 PROCEDURE — 2720000010 HC SURG SUPPLY STERILE: Performed by: OPHTHALMOLOGY

## 2020-11-20 PROCEDURE — 82962 GLUCOSE BLOOD TEST: CPT

## 2020-11-20 PROCEDURE — 6360000002 HC RX W HCPCS: Performed by: NURSE ANESTHETIST, CERTIFIED REGISTERED

## 2020-11-20 PROCEDURE — 7100000010 HC PHASE II RECOVERY - FIRST 15 MIN: Performed by: OPHTHALMOLOGY

## 2020-11-20 RX ORDER — SODIUM CHLORIDE 0.9 % (FLUSH) 0.9 %
10 SYRINGE (ML) INJECTION PRN
Status: DISCONTINUED | OUTPATIENT
Start: 2020-11-20 | End: 2020-11-20 | Stop reason: HOSPADM

## 2020-11-20 RX ORDER — HYDROCODONE BITARTRATE AND ACETAMINOPHEN 5; 325 MG/1; MG/1
1 TABLET ORAL PRN
Status: DISCONTINUED | OUTPATIENT
Start: 2020-11-20 | End: 2020-11-20 | Stop reason: HOSPADM

## 2020-11-20 RX ORDER — TROPICAMIDE 10 MG/ML
1 SOLUTION/ DROPS OPHTHALMIC SEE ADMIN INSTRUCTIONS
Status: DISCONTINUED | OUTPATIENT
Start: 2020-11-20 | End: 2020-11-20 | Stop reason: HOSPADM

## 2020-11-20 RX ORDER — FENTANYL CITRATE 50 UG/ML
50 INJECTION, SOLUTION INTRAMUSCULAR; INTRAVENOUS EVERY 5 MIN PRN
Status: DISCONTINUED | OUTPATIENT
Start: 2020-11-20 | End: 2020-11-20 | Stop reason: HOSPADM

## 2020-11-20 RX ORDER — FENTANYL CITRATE 50 UG/ML
INJECTION, SOLUTION INTRAMUSCULAR; INTRAVENOUS PRN
Status: DISCONTINUED | OUTPATIENT
Start: 2020-11-20 | End: 2020-11-20 | Stop reason: SDUPTHER

## 2020-11-20 RX ORDER — PHENYLEPHRINE HCL 2.5 %
1 DROPS OPHTHALMIC (EYE) SEE ADMIN INSTRUCTIONS
Status: DISCONTINUED | OUTPATIENT
Start: 2020-11-20 | End: 2020-11-20 | Stop reason: HOSPADM

## 2020-11-20 RX ORDER — TOBRAMYCIN 3 MG/ML
1 SOLUTION/ DROPS OPHTHALMIC SEE ADMIN INSTRUCTIONS
Status: DISCONTINUED | OUTPATIENT
Start: 2020-11-20 | End: 2020-11-20 | Stop reason: HOSPADM

## 2020-11-20 RX ORDER — TETRACAINE HYDROCHLORIDE 5 MG/ML
SOLUTION OPHTHALMIC PRN
Status: DISCONTINUED | OUTPATIENT
Start: 2020-11-20 | End: 2020-11-20 | Stop reason: ALTCHOICE

## 2020-11-20 RX ORDER — HYDROCODONE BITARTRATE AND ACETAMINOPHEN 5; 325 MG/1; MG/1
2 TABLET ORAL PRN
Status: DISCONTINUED | OUTPATIENT
Start: 2020-11-20 | End: 2020-11-20 | Stop reason: HOSPADM

## 2020-11-20 RX ORDER — SODIUM CHLORIDE, SODIUM LACTATE, POTASSIUM CHLORIDE, CALCIUM CHLORIDE 600; 310; 30; 20 MG/100ML; MG/100ML; MG/100ML; MG/100ML
INJECTION, SOLUTION INTRAVENOUS CONTINUOUS
Status: DISCONTINUED | OUTPATIENT
Start: 2020-11-20 | End: 2020-11-20 | Stop reason: HOSPADM

## 2020-11-20 RX ORDER — MEPERIDINE HYDROCHLORIDE 25 MG/ML
12.5 INJECTION INTRAMUSCULAR; INTRAVENOUS; SUBCUTANEOUS EVERY 5 MIN PRN
Status: DISCONTINUED | OUTPATIENT
Start: 2020-11-20 | End: 2020-11-20 | Stop reason: HOSPADM

## 2020-11-20 RX ORDER — PROPARACAINE HYDROCHLORIDE 5 MG/ML
1 SOLUTION/ DROPS OPHTHALMIC SEE ADMIN INSTRUCTIONS
Status: DISCONTINUED | OUTPATIENT
Start: 2020-11-20 | End: 2020-11-20 | Stop reason: HOSPADM

## 2020-11-20 RX ORDER — SODIUM CHLORIDE 9 MG/ML
INJECTION, SOLUTION INTRAVENOUS CONTINUOUS
Status: DISCONTINUED | OUTPATIENT
Start: 2020-11-20 | End: 2020-11-20 | Stop reason: HOSPADM

## 2020-11-20 RX ORDER — BALANCED SALT SOLUTION 6.4; .75; .48; .3; 3.9; 1.7 MG/ML; MG/ML; MG/ML; MG/ML; MG/ML; MG/ML
SOLUTION OPHTHALMIC PRN
Status: DISCONTINUED | OUTPATIENT
Start: 2020-11-20 | End: 2020-11-20 | Stop reason: ALTCHOICE

## 2020-11-20 RX ORDER — MIDAZOLAM HYDROCHLORIDE 1 MG/ML
INJECTION INTRAMUSCULAR; INTRAVENOUS PRN
Status: DISCONTINUED | OUTPATIENT
Start: 2020-11-20 | End: 2020-11-20 | Stop reason: SDUPTHER

## 2020-11-20 RX ORDER — FENTANYL CITRATE 50 UG/ML
25 INJECTION, SOLUTION INTRAMUSCULAR; INTRAVENOUS EVERY 5 MIN PRN
Status: DISCONTINUED | OUTPATIENT
Start: 2020-11-20 | End: 2020-11-20 | Stop reason: HOSPADM

## 2020-11-20 RX ORDER — NEOMYCIN SULFATE, POLYMYXIN B SULFATE, AND DEXAMETHASONE 3.5; 10000; 1 MG/G; [USP'U]/G; MG/G
OINTMENT OPHTHALMIC PRN
Status: DISCONTINUED | OUTPATIENT
Start: 2020-11-20 | End: 2020-11-20 | Stop reason: ALTCHOICE

## 2020-11-20 RX ORDER — SODIUM CHLORIDE 0.9 % (FLUSH) 0.9 %
10 SYRINGE (ML) INJECTION EVERY 12 HOURS SCHEDULED
Status: DISCONTINUED | OUTPATIENT
Start: 2020-11-20 | End: 2020-11-20 | Stop reason: HOSPADM

## 2020-11-20 RX ADMIN — SODIUM CHLORIDE, POTASSIUM CHLORIDE, SODIUM LACTATE AND CALCIUM CHLORIDE: 600; 310; 30; 20 INJECTION, SOLUTION INTRAVENOUS at 08:01

## 2020-11-20 RX ADMIN — PHENYLEPHRINE HYDROCHLORIDE 1 DROP: 25 SOLUTION/ DROPS OPHTHALMIC at 07:50

## 2020-11-20 RX ADMIN — TROPICAMIDE 1 DROP: 10 SOLUTION/ DROPS OPHTHALMIC at 07:50

## 2020-11-20 RX ADMIN — MIDAZOLAM 2 MG: 1 INJECTION INTRAMUSCULAR; INTRAVENOUS at 08:58

## 2020-11-20 RX ADMIN — TOBRAMYCIN 1 DROP: 3 SOLUTION/ DROPS OPHTHALMIC at 07:50

## 2020-11-20 RX ADMIN — FENTANYL CITRATE 50 MCG: 50 INJECTION, SOLUTION INTRAMUSCULAR; INTRAVENOUS at 08:58

## 2020-11-20 RX ADMIN — SODIUM CHLORIDE, POTASSIUM CHLORIDE, SODIUM LACTATE AND CALCIUM CHLORIDE: 600; 310; 30; 20 INJECTION, SOLUTION INTRAVENOUS at 08:56

## 2020-11-20 RX ADMIN — FENTANYL CITRATE 50 MCG: 50 INJECTION, SOLUTION INTRAMUSCULAR; INTRAVENOUS at 09:03

## 2020-11-20 ASSESSMENT — PULMONARY FUNCTION TESTS
PIF_VALUE: 0
PIF_VALUE: 1
PIF_VALUE: 0

## 2020-11-20 ASSESSMENT — PAIN SCALES - GENERAL
PAINLEVEL_OUTOF10: 0

## 2020-11-20 ASSESSMENT — PAIN - FUNCTIONAL ASSESSMENT: PAIN_FUNCTIONAL_ASSESSMENT: 0-10

## 2020-11-20 NOTE — ANESTHESIA POSTPROCEDURE EVALUATION
Department of Anesthesiology  Postprocedure Note    Patient: Alissa Arndt  MRN: [id number]  YOB: 1990  Date of evaluation: 11/20/2020  Time:  10:45 AM     Procedure Summary     Date:  11/20/20 Room / Location:  [address] [place] [address] 04 / [address] [facility]    Anesthesia Start:  2931 Anesthesia Stop:  2106    Procedure:  PARS PLANA VITRECTOMY, MEMBRANE PEEL, ENDOLASER, GAS FLUID EXCHANGE, 25G, MAC, RIGHT EYE (Right Eye) Diagnosis:  (TRACTIONAL DETACHMENT RIGHT EYE)    Surgeon:  Ventura Christine MD Responsible Provider:  Jose Alejandro Esparza MD    Anesthesia Type:  MAC ASA Status:  3          Anesthesia Type: MAC    Naye Phase I: Naye Score: 10    Naye Phase II: Naye Score: 10    Last vitals: Reviewed and per EMR flowsheets.        Anesthesia Post Evaluation    Patient location during evaluation: PACU  Patient participation: complete - patient participated  Level of consciousness: awake  Airway patency: patent  Nausea & Vomiting: no nausea and no vomiting  Complications: no  Cardiovascular status: hemodynamically stable  Respiratory status: acceptable  Hydration status: euvolemic

## 2020-11-20 NOTE — PROGRESS NOTES
Discharge instructions given, communicates understanding. VSS. Gauze patch to right eye dry. Shield intact. Discharged home with family.

## 2020-12-10 ENCOUNTER — OFFICE VISIT (OUTPATIENT)
Dept: FAMILY MEDICINE CLINIC | Age: 30
End: 2020-12-10
Payer: COMMERCIAL

## 2020-12-10 VITALS
SYSTOLIC BLOOD PRESSURE: 153 MMHG | TEMPERATURE: 97.6 F | RESPIRATION RATE: 16 BRPM | WEIGHT: 109 LBS | DIASTOLIC BLOOD PRESSURE: 98 MMHG | HEIGHT: 61 IN | BODY MASS INDEX: 20.58 KG/M2 | OXYGEN SATURATION: 97 % | HEART RATE: 74 BPM

## 2020-12-10 PROCEDURE — 99213 OFFICE O/P EST LOW 20 MIN: CPT | Performed by: FAMILY MEDICINE

## 2020-12-10 PROCEDURE — G8484 FLU IMMUNIZE NO ADMIN: HCPCS | Performed by: FAMILY MEDICINE

## 2020-12-10 PROCEDURE — G8420 CALC BMI NORM PARAMETERS: HCPCS | Performed by: FAMILY MEDICINE

## 2020-12-10 PROCEDURE — 4004F PT TOBACCO SCREEN RCVD TLK: CPT | Performed by: FAMILY MEDICINE

## 2020-12-10 PROCEDURE — G8427 DOCREV CUR MEDS BY ELIG CLIN: HCPCS | Performed by: FAMILY MEDICINE

## 2020-12-10 RX ORDER — CLONIDINE HYDROCHLORIDE 0.1 MG/1
0.1 TABLET ORAL 2 TIMES DAILY
Qty: 60 TABLET | Refills: 3 | Status: SHIPPED
Start: 2020-12-10 | End: 2021-04-15

## 2020-12-10 ASSESSMENT — ENCOUNTER SYMPTOMS
NAUSEA: 0
COUGH: 0
WHEEZING: 0
VOMITING: 0
BLOOD IN STOOL: 0
EYE PAIN: 0
SHORTNESS OF BREATH: 0
ABDOMINAL PAIN: 0
DIARRHEA: 0
COLOR CHANGE: 0
CONSTIPATION: 0

## 2020-12-10 NOTE — PROGRESS NOTES
12/10/2020    Matthias Ramírez is a 27 y.o. male here for the following:    Chief Complaint   Patient presents with    Hypertension        HPI:  HTN   - States that he is on amlodipine 10 mg QD, lisinopril 20 mg QD (though his prescription is for lisinopril 40 mg QD), and Toprol 50 mg BID. Not missing any doses of his HTN medications. - Stopped hydralazine on 11/18/2020 due to side effects of nausea, headache, etc. Patient felt that he was going into DKA. - Wrist BP cuff at home has shown BP readings between 130-140s/80-90s. Patient brought in BP logs with him today. - Does report feeling anxious when he comes to the doctor's office.       - S/p eye surgery for macular edema, retinal detachment, and DM retinopathy on 11/20/2020 with Dr. Jose J Cruz. Hitesh Her is still distorted post-surgery. Dr. Jose J Cruz states that this will take 8 weeks to improve. Current Outpatient Medications   Medication Sig Dispense Refill    cloNIDine (CATAPRES) 0.1 MG tablet Take 1 tablet by mouth 2 times daily 60 tablet 3    prednisoLONE acetate (PRED FORTE) 1 % ophthalmic suspension       ofloxacin (OCUFLOX) 0.3 % solution       metoprolol succinate (TOPROL XL) 50 MG extended release tablet Take 1 tablet by mouth 2 times daily 60 tablet 1    atorvastatin (LIPITOR) 80 MG tablet take 1 tablet by mouth at bedtime      insulin aspart (NOVOLOG) 100 UNIT/ML injection vial Inject 6-10 Units into the skin 4 times daily (Patient taking differently: Inject 6-10 Units into the skin 4 times daily SLIDING SCALE) 1 vial 2    blood glucose test strips (TRUE METRIX BLOOD GLUCOSE TEST) strip 1 each by In Vitro route 5 times daily As needed.  150 each 11    fluticasone (FLOVENT HFA) 220 MCG/ACT inhaler Inhale 1 puff into the lungs 2 times daily 1 Inhaler 3    aspirin 81 MG chewable tablet Take 1 tablet by mouth daily (Patient taking differently: Take 81 mg by mouth daily Pt states per Dr Darrel Granados he is to\" check with his PCP regarding holding ASA\") 30 tablet 3    amLODIPine (NORVASC) 10 MG tablet Take 1 tablet by mouth daily 30 tablet 2    gabapentin (NEURONTIN) 300 MG capsule Take 1 capsule by mouth 2 times daily for 120 days. 60 capsule 3    lisinopril (PRINIVIL;ZESTRIL) 40 MG tablet Take 1 tablet by mouth daily 90 tablet 1    Insulin Degludec (TRESIBA FLEXTOUCH) 100 UNIT/ML SOPN Inject 40 Units into the skin nightly 5 pen 2    Insulin Syringe-Needle U-100 (B-D INS SYRINGE 0.5CC/31GX5/16) 31G X 5/16\" 0.5 ML MISC use four times a day 200 each 2    SUBOXONE 8-2 MG FILM SL film Place 1 Film under the tongue daily. No current facility-administered medications for this visit.        Allergies   Allergen Reactions    Chlorthalidone      Vomiting     Hctz [Hydrochlorothiazide] Nausea Only    Hydralazine      Nausea, headache       Past Medical & Surgical History:      Diagnosis Date    Asthma     Bipolar affective ([address]     [facility]) ([facility])     Diabetes mellitus type I ([address]     Diabetic neuropathy ([facility])     Diabetic, retinopathy, proliferative ([address]     HTN (hypertension)     due to left renal artery stenosis    Hypercholesteremia     Left renal artery stenosis ([address] 09/19/2019    Macular edema due to secondary diabetes ([address]     Opioid dependence ([facility])     Proteinuria     Retinal detachment     right eye    Vitreous hemorrhage of right eye due to diabetes mellitus ([address]      Past Surgical History:   Procedure Laterality Date    EYE SURGERY Right 11/20/2020    pars plana vitrectomy, membrane peel, endolaser, gas fluid exchange w/ Dr. Shanna Edmondson Right 11/20/2020    PARS PLANA VITRECTOMY, MEMBRANE PEEL, ENDOLASER, GAS FLUID EXCHANGE, 25G, MAC, RIGHT EYE performed by Denisse Nicolas MD at [address] [place] [address]       Family History:      Problem Relation Age of Onset    No Known Problems Mother     No Known Problems Father     No Known Problems Brother Homicide     No Known Problems Daughter     No Known Problems Son     Diabetes type 2  Maternal Grandmother        Social History:  Social History     Tobacco Use    Smoking status: Current Every Day Smoker     Packs/day: 0.50     Years: 15.00     Pack years: 7.50     Types: Cigarettes    Smokeless tobacco: Never Used   Substance Use Topics    Alcohol use: No       Immunization History   Administered Date(s) Administered    Hepatitis B Ped/Adol (Engerix-B, Recombivax HB) 01/08/2002, 02/12/2002, 08/19/2002    Influenza Vaccine, unspecified formulation 10/09/2002    MMR 08/19/2002    Tdap (Boostrix, Adacel) 10/07/2019       Review of Systems   Constitutional: Negative for chills and fever. Eyes: Positive for visual disturbance. Negative for pain. Respiratory: Negative for cough, shortness of breath and wheezing. Cardiovascular: Negative for chest pain and palpitations. Gastrointestinal: Negative for abdominal pain, blood in stool, constipation, diarrhea, nausea and vomiting. Genitourinary: Negative for dysuria and hematuria. Skin: Negative for color change and rash. Neurological: Negative for dizziness, syncope and light-headedness. BP Readings from Last 3 Encounters:   12/10/20 (!) 153/98   11/20/20 (!) 141/94   11/20/20 132/87       VS:  BP (!) 153/98   Pulse 74   Temp 97.6 °F (36.4 °C) (Temporal)   Resp 16   Ht 5' 1\" (1.549 m)   Wt 109 lb (49.4 kg)   SpO2 97%   BMI 20.60 kg/m²     Physical Exam  Vitals signs reviewed. Constitutional:       General: He is awake. He is not in acute distress. Appearance: He is well-developed, well-groomed and normal weight. He is not ill-appearing, toxic-appearing or diaphoretic. Cardiovascular:      Rate and Rhythm: Normal rate and regular rhythm. Heart sounds: S1 normal and S2 normal. No murmur. Pulmonary:      Breath sounds: No decreased breath sounds, wheezing, rhonchi or rales.    Abdominal:      General: Bowel sounds are normal.      Palpations: Abdomen is soft. Tenderness: There is no abdominal tenderness. There is no guarding or rebound. Musculoskeletal:      Right lower leg: He exhibits no deformity. No edema. Left lower leg: He exhibits no deformity. No edema. Skin:     General: Skin is warm and dry. Findings: No rash. Neurological:      General: No focal deficit present. Mental Status: He is alert. Psychiatric:         Attention and Perception: Attention normal.         Mood and Affect: Mood normal.         Speech: Speech normal.         Behavior: Behavior is cooperative. Thought Content: Thought content normal.         Cognition and Memory: Cognition normal.         Judgment: Judgment normal.         Assessment/Plan:  1. Secondary hypertension  Though his BP is still uncontrolled, his home BP readings suggest that patient is taking his HTN medications. Besides his left renal artery stenosis contributing to his high BP, his high BP readings in the office may also be secondary to white coat HTN. Patient states that he is going to [employer] after his appointment with me today to purchase a BP cuff for the arm. I advised him to continue to monitor his BP diligently at home and bring in his BP logs again to his next appointment with me in 2 weeks. Will continue amlodipine 10 mg QD, lisinopril 40 mg QD, and Toprol 50 mg BID. Will start clonidine 0.1 mg BID today. Repeat BMP ordered today. Follow-up in 2 weeks. - BASIC METABOLIC PANEL; Future  - cloNIDine (CATAPRES) 0.1 MG tablet; Take 1 tablet by mouth 2 times daily  Dispense: 60 tablet; Refill: 3      Return in about 2 weeks (around 12/24/2020) for HTN.     Davide Eli DO, PGY-3

## 2020-12-10 NOTE — PROGRESS NOTES
S: 27 y.o. male with   Chief Complaint   Patient presents with    Hypertension     patient has log and states that when stopped hydralazine bp dropped to normal range        Pt brought in his log and he is 130-140/80-90. Pt could not tolerate hydralazine. O: VS:  height is 5' 1\" (1.549 m) and weight is 109 lb (49.4 kg). His temporal temperature is 97.6 °F (36.4 °C). His blood pressure is 180/90 (abnormal) and his pulse is 74. His respiration is 16 and oxygen saturation is 97%. BP Readings from Last 3 Encounters:   12/10/20 (!) 180/90   11/20/20 (!) 141/94   11/20/20 132/87     See resident note      Impression/Plan:   1) HTN - start clonidine BID low dose. Pt with renal a stenoisi and seeing nephrology. Health Maintenance Due   Topic Date Due    Varicella vaccine (1 of 2 - 2-dose childhood series) 07/21/1991    Pneumococcal 0-64 years Vaccine (1 of 1 - PPSV23) 07/21/1996    Diabetic foot exam  07/30/2019    Flu vaccine (1) 09/01/2020         Attending Physician Statement  I have discussed the case, including pertinent history and exam findings with the resident. I agree with the documented assessment and plan.       Aurelia Quintana MD

## 2020-12-15 RX ORDER — LISINOPRIL 40 MG/1
40 TABLET ORAL DAILY
Qty: 90 TABLET | Refills: 1 | Status: SHIPPED
Start: 2020-12-15 | End: 2021-01-29

## 2020-12-29 ENCOUNTER — OFFICE VISIT (OUTPATIENT)
Dept: FAMILY MEDICINE CLINIC | Age: 30
End: 2020-12-29
Payer: COMMERCIAL

## 2020-12-29 VITALS
HEART RATE: 112 BPM | RESPIRATION RATE: 16 BRPM | DIASTOLIC BLOOD PRESSURE: 100 MMHG | WEIGHT: 117 LBS | OXYGEN SATURATION: 97 % | HEIGHT: 61 IN | TEMPERATURE: 97 F | BODY MASS INDEX: 22.09 KG/M2 | SYSTOLIC BLOOD PRESSURE: 144 MMHG

## 2020-12-29 DIAGNOSIS — I15.9 SECONDARY HYPERTENSION: Primary | ICD-10-CM

## 2020-12-29 DIAGNOSIS — Z01.818 PRE-OP TESTING: ICD-10-CM

## 2020-12-29 DIAGNOSIS — I15.9 SECONDARY HYPERTENSION: ICD-10-CM

## 2020-12-29 LAB
ANION GAP SERPL CALCULATED.3IONS-SCNC: 12 MMOL/L (ref 7–16)
BUN BLDV-MCNC: 23 MG/DL (ref 6–20)
CALCIUM SERPL-MCNC: 9.8 MG/DL (ref 8.6–10.2)
CHLORIDE BLD-SCNC: 107 MMOL/L (ref 98–107)
CO2: 24 MMOL/L (ref 22–29)
CREAT SERPL-MCNC: 1.1 MG/DL (ref 0.7–1.2)
GFR AFRICAN AMERICAN: >60
GFR NON-AFRICAN AMERICAN: >60 ML/MIN/1.73
GLUCOSE BLD-MCNC: 57 MG/DL (ref 74–99)
POTASSIUM SERPL-SCNC: 4.9 MMOL/L (ref 3.5–5)
SODIUM BLD-SCNC: 143 MMOL/L (ref 132–146)

## 2020-12-29 PROCEDURE — 4004F PT TOBACCO SCREEN RCVD TLK: CPT | Performed by: FAMILY MEDICINE

## 2020-12-29 PROCEDURE — G8484 FLU IMMUNIZE NO ADMIN: HCPCS | Performed by: FAMILY MEDICINE

## 2020-12-29 PROCEDURE — G8427 DOCREV CUR MEDS BY ELIG CLIN: HCPCS | Performed by: FAMILY MEDICINE

## 2020-12-29 PROCEDURE — G8420 CALC BMI NORM PARAMETERS: HCPCS | Performed by: FAMILY MEDICINE

## 2020-12-29 PROCEDURE — 99213 OFFICE O/P EST LOW 20 MIN: CPT | Performed by: FAMILY MEDICINE

## 2020-12-29 ASSESSMENT — ENCOUNTER SYMPTOMS
CONSTIPATION: 0
NAUSEA: 0
SHORTNESS OF BREATH: 0
COLOR CHANGE: 0
DIARRHEA: 0
BLOOD IN STOOL: 0
VOMITING: 0
ABDOMINAL PAIN: 0
EYE PAIN: 0
COUGH: 0
WHEEZING: 0

## 2020-12-29 NOTE — PROGRESS NOTES
is to\" check with his PCP regarding holding ASA\") 30 tablet 3    amLODIPine (NORVASC) 10 MG tablet Take 1 tablet by mouth daily 30 tablet 2    gabapentin (NEURONTIN) 300 MG capsule Take 1 capsule by mouth 2 times daily for 120 days. 60 capsule 3    Insulin Degludec (TRESIBA FLEXTOUCH) 100 UNIT/ML SOPN Inject 40 Units into the skin nightly 5 pen 2    Insulin Syringe-Needle U-100 (B-D INS SYRINGE 0.5CC/31GX5/16) 31G X 5/16\" 0.5 ML MISC use four times a day 200 each 2    SUBOXONE 8-2 MG FILM SL film Place 1 Film under the tongue daily. No current facility-administered medications for this visit.        Allergies   Allergen Reactions    Chlorthalidone      Vomiting     Hctz [Hydrochlorothiazide] Nausea Only    Hydralazine      Nausea, headache       Past Medical & Surgical History:      Diagnosis Date    Asthma     Bipolar affective ([address]     COPD [facility]ch[facility] disease) ([facility]     Diabetes mellitus type I ([address]     Diabetic neuropathy ([facility])     Diabetic, retinopathy, proliferative ([address]     HTN (hypertension)     due to left renal artery stenosis    Hypercholesteremia     Left renal artery stenosis ([address] 09/19/2019    Macular edema due to secondary diabetes ([address]     Opioid dependence ([facility])     Proteinuria     Retinal detachment     right eye    Vitreous hemorrhage of right eye due to diabetes mellitus ([address]      Past Surgical History:   Procedure Laterality Date    EYE SURGERY Right 11/20/2020    pars plana vitrectomy, membrane peel, endolaser, gas fluid exchange w/ Dr. Millre Shoulders Right 11/20/2020    PARS PLANA VITRECTOMY, MEMBRANE PEEL, ENDOLASER, GAS FLUID EXCHANGE, 25G, MAC, RIGHT EYE performed by Byron Venegas MD at [address] [place] [address]       Family History:      Problem Relation Age of Onset    No Known Problems Mother     No Known Problems Father     No Known Problems Brother         Homicide     No Known Problems Daughter     No Known Problems Son  Diabetes type 2  Maternal Grandmother        Social History:  Social History     Tobacco Use    Smoking status: Current Every Day Smoker     Packs/day: 0.50     Years: 15.00     Pack years: 7.50     Types: Cigarettes    Smokeless tobacco: Never Used   Substance Use Topics    Alcohol use: No       Immunization History   Administered Date(s) Administered    Hepatitis B Ped/Adol (Engerix-B, Recombivax HB) 01/08/2002, 02/12/2002, 08/19/2002    Influenza Vaccine, unspecified formulation 10/09/2002    MMR 08/19/2002    Tdap (Boostrix, Adacel) 10/07/2019       Review of Systems   Constitutional: Negative for chills and fever. Eyes: Positive for visual disturbance. Negative for pain. Respiratory: Negative for cough, shortness of breath and wheezing. Cardiovascular: Negative for chest pain and palpitations. Gastrointestinal: Negative for abdominal pain, blood in stool, constipation, diarrhea, nausea and vomiting. Genitourinary: Negative for dysuria and hematuria. Musculoskeletal: Negative for arthralgias and joint swelling. Skin: Negative for color change and rash. Neurological: Negative for dizziness, syncope and light-headedness. BP Readings from Last 3 Encounters:   12/29/20 (!) 144/100   12/10/20 (!) 153/98   11/20/20 (!) 141/94       VS:  BP (!) 144/100   Pulse 112   Temp 97 °F (36.1 °C) (Temporal)   Resp 16   Ht 5' 1\" (1.549 m)   Wt 117 lb (53.1 kg)   SpO2 97%   BMI 22.11 kg/m²     Physical Exam  Vitals signs reviewed. Constitutional:       General: He is awake. He is not in acute distress. Appearance: He is well-developed and well-groomed. He is not ill-appearing, toxic-appearing or diaphoretic. Cardiovascular:      Rate and Rhythm: Normal rate and regular rhythm. Heart sounds: S1 normal and S2 normal. No murmur. Pulmonary:      Breath sounds: No decreased breath sounds, wheezing, rhonchi or rales.    Abdominal:      General: Bowel sounds are normal. Palpations: Abdomen is soft. Tenderness: There is no abdominal tenderness. There is no guarding or rebound. Musculoskeletal:      Right lower leg: He exhibits no deformity. No edema. Left lower leg: He exhibits no deformity. No edema. Skin:     General: Skin is warm and dry. Neurological:      General: No focal deficit present. Mental Status: He is alert. Psychiatric:         Attention and Perception: Attention normal.         Mood and Affect: Mood normal.         Speech: Speech normal.         Behavior: Behavior is cooperative. Thought Content: Thought content normal.         Cognition and Memory: Cognition normal.         Judgment: Judgment normal.         Assessment/Plan:  1. Secondary hypertension  BP improved since prior visits. Advised patient to continue checking BP readings at home daily. Instructed him to bring BP log with him to next appointment with me. Will increase lisinopril from 20 mg QD to 40 mg QD, as patient has been taking only 20 mg QD though he should be taking 40 mg QD. Will continue amlodipine 10 mg QD, Toprol 50 mg BID, and clonidine 0.1 mg BID. Instructed to call me if BP consistently is equal to or greater than 150/110 mmHg at home. With improved BP, he is in better condition for his repeat right eye surgery than he was a few months ago for his initial eye surgery. He can proceed with eye surgery on 01/08/2020. Will check BMP today. - BASIC METABOLIC PANEL; Future    2. Pre-op testing  Screening COVID-test ordered for upcoming eye surgery   - COVID-19 Ambulatory; Future      Return in about 4 weeks (around 1/26/2021) for HTN follow-up .     Rajat Odonnell DO, PGY-3

## 2020-12-29 NOTE — PROGRESS NOTES
S: 27 y.o. male with   Chief Complaint   Patient presents with    Hypertension     patient states BP is normally 130/110. Patient states that he just took BP med 20 min ago        HTN - BP improved with improved med compliance. No CV sx.     O: VS:  height is 5' 1\" (1.549 m) and weight is 117 lb (53.1 kg). His temporal temperature is 97 °F (36.1 °C). His blood pressure is 144/100 (abnormal) and his pulse is 112. His respiration is 16 and oxygen saturation is 97%. BP Readings from Last 3 Encounters:   12/29/20 (!) 144/100   12/10/20 (!) 153/98   11/20/20 (!) 141/94     See resident note      Impression/Plan:   1) HTN - improving - will cont to titrate        Health Maintenance Due   Topic Date Due    Varicella vaccine (1 of 2 - 2-dose childhood series) 07/21/1991    Pneumococcal 0-64 years Vaccine (1 of 1 - PPSV23) 07/21/1996    Diabetic foot exam  07/30/2019    Flu vaccine (1) 09/01/2020         Attending Physician Statement  I have discussed the case, including pertinent history and exam findings with the resident. I agree with the documented assessment and plan.       Laurie Zapien MD

## 2021-01-04 ENCOUNTER — HOSPITAL ENCOUNTER (OUTPATIENT)
Age: 31
Discharge: HOME | End: 2021-01-06
Payer: COMMERCIAL

## 2021-01-04 DIAGNOSIS — I15.0 RENOVASCULAR HYPERTENSION: ICD-10-CM

## 2021-01-04 DIAGNOSIS — I10 ESSENTIAL HYPERTENSION: ICD-10-CM

## 2021-01-04 DIAGNOSIS — R80.9 TYPE 1 DIABETES MELLITUS WITH MICROALBUMINURIA (HCC): ICD-10-CM

## 2021-01-04 DIAGNOSIS — E10.29 TYPE 1 DIABETES MELLITUS WITH MICROALBUMINURIA (HCC): ICD-10-CM

## 2021-01-04 DIAGNOSIS — Z01.818 PRE-OP TESTING: ICD-10-CM

## 2021-01-04 PROCEDURE — U0003 INFECTIOUS AGENT DETECTION BY NUCLEIC ACID (DNA OR RNA); SEVERE ACUTE RESPIRATORY SYNDROME CORONAVIRUS 2 (SARS-COV-2) (CORONAVIRUS DISEASE [COVID-19]), AMPLIFIED PROBE TECHNIQUE, MAKING USE OF HIGH THROUGHPUT TECHNOLOGIES AS DESCRIBED BY CMS-2020-01-R: HCPCS

## 2021-01-04 RX ORDER — BLOOD SUGAR DIAGNOSTIC
STRIP MISCELLANEOUS
Qty: 200 EACH | Refills: 2 | Status: SHIPPED
Start: 2021-01-04 | End: 2021-11-02 | Stop reason: SDUPTHER

## 2021-01-04 RX ORDER — INSULIN DEGLUDEC INJECTION 100 U/ML
40 INJECTION, SOLUTION SUBCUTANEOUS NIGHTLY
Qty: 5 PEN | Refills: 2 | Status: SHIPPED
Start: 2021-01-04 | End: 2021-04-15 | Stop reason: DRUGHIGH

## 2021-01-04 RX ORDER — METOPROLOL SUCCINATE 50 MG/1
50 TABLET, EXTENDED RELEASE ORAL 2 TIMES DAILY
Qty: 60 TABLET | Refills: 1 | Status: SHIPPED
Start: 2021-01-04 | End: 2021-04-13 | Stop reason: SDUPTHER

## 2021-01-04 RX ORDER — AMLODIPINE BESYLATE 10 MG/1
10 TABLET ORAL DAILY
Qty: 30 TABLET | Refills: 2 | Status: SHIPPED
Start: 2021-01-04 | End: 2021-04-13 | Stop reason: SDUPTHER

## 2021-01-05 LAB
SARS-COV-2: NOT DETECTED
SOURCE: NORMAL

## 2021-01-07 ENCOUNTER — PREP FOR PROCEDURE (OUTPATIENT)
Dept: OPHTHALMOLOGY | Age: 31
End: 2021-01-07

## 2021-01-07 ENCOUNTER — ANESTHESIA EVENT (OUTPATIENT)
Dept: OPERATING ROOM | Age: 31
End: 2021-01-07
Payer: COMMERCIAL

## 2021-01-07 RX ORDER — SODIUM CHLORIDE 0.9 % (FLUSH) 0.9 %
10 SYRINGE (ML) INJECTION PRN
Status: CANCELLED | OUTPATIENT
Start: 2021-01-07

## 2021-01-07 RX ORDER — TROPICAMIDE 10 MG/ML
1 SOLUTION/ DROPS OPHTHALMIC SEE ADMIN INSTRUCTIONS
Status: CANCELLED | OUTPATIENT
Start: 2021-01-08

## 2021-01-07 RX ORDER — SODIUM CHLORIDE 9 MG/ML
INJECTION, SOLUTION INTRAVENOUS CONTINUOUS
Status: CANCELLED | OUTPATIENT
Start: 2021-01-07

## 2021-01-07 RX ORDER — PHENYLEPHRINE HCL 2.5 %
1 DROPS OPHTHALMIC (EYE) SEE ADMIN INSTRUCTIONS
Status: CANCELLED | OUTPATIENT
Start: 2021-01-08

## 2021-01-07 RX ORDER — PROPARACAINE HYDROCHLORIDE 5 MG/ML
1 SOLUTION/ DROPS OPHTHALMIC SEE ADMIN INSTRUCTIONS
Status: CANCELLED | OUTPATIENT
Start: 2021-01-08

## 2021-01-07 RX ORDER — SODIUM CHLORIDE 0.9 % (FLUSH) 0.9 %
10 SYRINGE (ML) INJECTION EVERY 12 HOURS SCHEDULED
Status: CANCELLED | OUTPATIENT
Start: 2021-01-07

## 2021-01-07 RX ORDER — TOBRAMYCIN 3 MG/ML
1 SOLUTION/ DROPS OPHTHALMIC SEE ADMIN INSTRUCTIONS
Status: CANCELLED | OUTPATIENT
Start: 2021-01-08

## 2021-01-07 NOTE — H&P
I have examined the patient and reviewed the history and physical from 12/16/20 and find no relevant changes. I have reviewed with the patient and/or family the risks, benefits, and alternatives to the procedure and they have agreed to proceed.     Tristan Dodd

## 2021-01-07 NOTE — OP NOTE
Operative Note      Patient: Rice Litten  YOB: 1990  MRN: [id number]    Date of Procedure: 1/8/2021    Surgeon(s):  Richard Neri MD     Assistant:  Malcom Ji P.A.-C    Preoperative Diagnosis:  1. Macular hole,  Right eye     Postoperative Diagnosis:   1. Macular hole, Right eye     Blood Loss: <5ml    Procedure Performed:        1. Pars plana vitrectomy with peeling of preretinal tissue, Right eye  2. Peeling of internal limiting membrane, Right  eye  3. Gas fluid exchange, Right  eye      Complications: None    Anesthesia Used:  Monitored Anesthesia Care    Operative Course:  After history, physical, and consent was obtained, the patient was taken to the operating room and given a bolus of IV sedating medication. The patient then received a retrobulbar injection of a solution that contained Lidocaine and bupivacaine. The operative eye was then prepped and draped in the usual sterile fashion. Lid speculum was placed between the eyelids. The transscleral cannulas were inserted in the usual location, two temporally and one superonasally at 3 to 3.5 mm. posterior to the limbus. An infusion cannula was inserted inferotemporally. The light pipe and vitrector handpiece were inserted in the eye and a core vitrectomy was performed. A posterior hyaloid separation was confirmed by using aspiration and the posterior hyaloid was confirmed to be peeled into the periphery and excised. The internal limiting membrane was then removed using a combination of retinal picks and forceps (staining with ICG). The periphery was inspected and there were no retinal tears or detachments. An air-fluid exchange was then performed and the air was exchanged for 15% mixture of C3F8. The transscleral cannulas were removed and the wounds were self-sealing. The eye was dressed with Maxitrol ointment, pressure patch and Flaherty shield.   The patient was taken from the operating room to recovery awake and in good condition. The patient has been asked to maintain a face-down position on either side for 5 days and will follow-up in the office for a postoperative check.             Electronically signed by Gurjit Mas MD on 1/7/2021 at 12:33 PM

## 2021-01-07 NOTE — ANESTHESIA PRE PROCEDURE
Department of Anesthesiology  Preprocedure Note       Name:  Ester Manriquez   Age:  27 y.o.  :  1990                                          MRN:  [id number]         Date:  2021      Surgeon: Philip Nichols):  Paco Bartlett MD    Procedure: Procedure(s):  PARS PLANA VITRECTOMY, INTERNAL LIMITING MEMBRANE PEEL, INDOCYANINE GREEN, GAS FLUID EXCHANGE, 25g, MAC, RIGHT EYE    Medications prior to admission:   Prior to Admission medications    Medication Sig Start Date End Date Taking? Authorizing Provider   metoprolol succinate (TOPROL XL) 50 MG extended release tablet Take 1 tablet by mouth 2 times daily 21   [facility], DO   Insulin Syringe-Needle U-100 (B-D INS SYRINGE 0.5CC/31GX5/16) 31G X 5/16\" 0.5 ML MISC use four times a day 21   [facility], DO   Insulin Degludec (TRESIBA FLEXTOUCH) 100 UNIT/ML SOPN Inject 40 Units into the skin nightly 21   [facility], DO   amLODIPine (NORVASC) 10 MG tablet Take 1 tablet by mouth daily 21   [facility], DO   insulin aspart (NOVOLOG) 100 UNIT/ML injection vial Inject 6-10 Units into the skin 4 times daily 21   [facility], DO   lisinopril (PRINIVIL;ZESTRIL) 40 MG tablet Take 1 tablet by mouth daily 12/15/20   [facility], DO   cloNIDine (CATAPRES) 0.1 MG tablet Take 1 tablet by mouth 2 times daily 12/10/20   [facility], DO   prednisoLONE acetate (PRED FORTE) 1 % ophthalmic suspension  20   Historical Provider, MD   ofloxacin (OCUFLOX) 0.3 % solution  20   Historical Provider, MD   atorvastatin (LIPITOR) 80 MG tablet take 1 tablet by mouth at bedtime 9/10/20   Historical Provider, MD   blood glucose test strips (TRUE METRIX BLOOD GLUCOSE TEST) strip 1 each by In Vitro route 5 times daily As needed.  20   [facility], DO   fluticasone (FLOVENT HFA) 220 MCG/ACT inhaler Inhale 1 puff into the lungs 2 times daily 20  [facility], DO aspirin 81 MG chewable tablet Take 1 tablet by mouth daily  Patient taking differently: Take 81 mg by mouth daily Pt states per Dr Kellie Vincent he is to\" check with his PCP regarding holding ASA\" 10/5/20   Sergio Corbin DO   gabapentin (NEURONTIN) 300 MG capsule Take 1 capsule by mouth 2 times daily for 120 days. 10/5/20 2/2/21  Corazon Corbin DO   SUBOXONE 8-2 MG FILM SL film Place 1 Film under the tongue daily. 9/14/17   Historical Provider, MD       Current medications:    No current facility-administered medications for this encounter. Current Outpatient Medications   Medication Sig Dispense Refill    metoprolol succinate (TOPROL XL) 50 MG extended release tablet Take 1 tablet by mouth 2 times daily 60 tablet 1    Insulin Syringe-Needle U-100 (B-D INS SYRINGE 0.5CC/31GX5/16) 31G X 5/16\" 0.5 ML MISC use four times a day 200 each 2    Insulin Degludec (TRESIBA FLEXTOUCH) 100 UNIT/ML SOPN Inject 40 Units into the skin nightly 5 pen 2    amLODIPine (NORVASC) 10 MG tablet Take 1 tablet by mouth daily 30 tablet 2    insulin aspart (NOVOLOG) 100 UNIT/ML injection vial Inject 6-10 Units into the skin 4 times daily 1 vial 2    lisinopril (PRINIVIL;ZESTRIL) 40 MG tablet Take 1 tablet by mouth daily 90 tablet 1    cloNIDine (CATAPRES) 0.1 MG tablet Take 1 tablet by mouth 2 times daily 60 tablet 3    prednisoLONE acetate (PRED FORTE) 1 % ophthalmic suspension       ofloxacin (OCUFLOX) 0.3 % solution       atorvastatin (LIPITOR) 80 MG tablet take 1 tablet by mouth at bedtime      blood glucose test strips (TRUE METRIX BLOOD GLUCOSE TEST) strip 1 each by In Vitro route 5 times daily As needed.  150 each 11    fluticasone (FLOVENT HFA) 220 MCG/ACT inhaler Inhale 1 puff into the lungs 2 times daily 1 Inhaler 3  aspirin 81 MG chewable tablet Take 1 tablet by mouth daily (Patient taking differently: Take 81 mg by mouth daily Pt states per Dr Kumar Bran he is to\" check with his PCP regarding holding ASA\") 30 tablet 3    gabapentin (NEURONTIN) 300 MG capsule Take 1 capsule by mouth 2 times daily for 120 days. 60 capsule 3    SUBOXONE 8-2 MG FILM SL film Place 1 Film under the tongue daily. Allergies:     Allergies   Allergen Reactions    Chlorthalidone      Vomiting     Hctz [Hydrochlorothiazide] Nausea Only    Hydralazine      Nausea, headache       Problem List:    Patient Active Problem List   Diagnosis Code    Hyperlipidemia E78.5    Elevated liver enzymes R74.8    HTN (hypertension) I10    Anxiety F41.9    Vitamin D insufficiency E55.9    Acquired hypothyroidism E03.9    Uncontrolled type 1 diabetes mellitus with hyperglycemia (HCC) E10.65    Chest pain R07.9    Tobacco abuse Z72.0    Opioid dependence ([address] F11.20    Bipolar affective ([address] F31.9    Other microscopic hematuria R31.29    Elevated alkaline phosphatase level R74.8    Lactic acidosis E87.2    Proliferative diabetic retinopathy of both eyes with macular edema associated with type 1 diabetes mellitus (HCC) S15.2072    Moderate persistent asthma without complication X35.83    Left renal artery stenosis ([facility]) I70.1       Past Medical History:        Diagnosis Date    Asthma     Bipolar affective ([address]     COPD (chronic obstructive pulmonary disease) ([facility])     Diabetes mellitus type I ([address]     Diabetic neuropathy ([facility])     Diabetic, retinopathy, proliferative ([address]     HTN (hypertension)     due to left renal artery stenosis    Hypercholesteremia     Left renal artery stenosis ([address] 09/19/2019    Macular edema due to secondary diabetes ([address]     Opioid dependence ([facility])     Proteinuria     Retinal detachment     right eye    Vitreous hemorrhage of right eye due to diabetes mellitus ([address] Past Surgical History:        Procedure Laterality Date    EYE SURGERY Right 11/20/2020    pars plana vitrectomy, membrane peel, endolaser, gas fluid exchange w/ Dr. Abreu Prom Right 11/20/2020    PARS PLANA VITRECTOMY, MEMBRANE PEEL, ENDOLASER, GAS FLUID EXCHANGE, 25G, MAC, RIGHT EYE performed by Shavon Ortiz MD at [address] History:    Social History     Tobacco Use    Smoking status: Current Every Day Smoker     Packs/day: 0.50     Years: 15.00     Pack years: 7.50     Types: Cigarettes    Smokeless tobacco: Never Used   Substance Use Topics    Alcohol use: No                                Ready to quit: Not Answered  Counseling given: Not Answered      Vital Signs (Current):   Vitals:    01/04/21 1122   Weight: 117 lb (53.1 kg)   Height: 5' 1\" (1.549 m)                                              BP Readings from Last 3 Encounters:   12/29/20 (!) 144/100   12/10/20 (!) 153/98   11/20/20 (!) 141/94       NPO Status:                                                                                 BMI:   Wt Readings from Last 3 Encounters:   12/29/20 117 lb (53.1 kg)   12/10/20 109 lb (49.4 kg)   11/20/20 107 lb (48.5 kg)     Body mass index is 22.11 kg/m².     CBC:   Lab Results   Component Value Date    WBC 9.2 11/18/2020    RBC 4.30 11/18/2020    HGB 12.6 11/18/2020    HCT 39.9 11/18/2020    MCV 92.8 11/18/2020    RDW 12.6 11/18/2020     11/18/2020       CMP:   Lab Results   Component Value Date     12/29/2020    K 4.9 12/29/2020     12/29/2020    CO2 24 12/29/2020    BUN 23 12/29/2020    CREATININE 1.1 12/29/2020    GFRAA >60 12/29/2020    LABGLOM >60 12/29/2020    GLUCOSE 57 12/29/2020    GLUCOSE 282 04/22/2012    PROT 7.5 11/18/2020    CALCIUM 9.8 12/29/2020    BILITOT 0.2 11/18/2020    ALKPHOS 115 11/18/2020    AST 19 11/18/2020    ALT 12 11/18/2020 POC Tests: No results for input(s): POCGLU, POCNA, POCK, POCCL, POCBUN, POCHEMO, POCHCT in the last 72 hours. Coags:   Lab Results   Component Value Date    PROTIME 10.9 09/14/2015    INR 1.0 09/14/2015       HCG (If Applicable): No results found for: PREGTESTUR, PREGSERUM, HCG, HCGQUANT     ABGs: No results found for: PHART, PO2ART, NGW5YYT, AAH0AAZ, BEART, E2QSJKSU     Type & Screen (If Applicable):  No results found for: LABABO, LABRH    Drug/Infectious Status (If Applicable):  No results found for: HIV, HEPCAB    COVID-19 Screening (If Applicable):   Lab Results   Component Value Date    COVID19 Not Detected 01/04/2021         Anesthesia Evaluation  Patient summary reviewed no history of anesthetic complications (prior retina surgery without complication): Airway: Mallampati: II  TM distance: >3 FB   Neck ROM: full  Mouth opening: > = 3 FB Dental:          Pulmonary: breath sounds clear to auscultation  (+) COPD:  asthma: current smoker                          ROS comment: Tobacco abuse   Cardiovascular:    (+) hypertension:, hyperlipidemia      ECG reviewed  Rhythm: regular  Rate: normal  Echocardiogram reviewed         Beta Blocker:  Dose within 24 Hrs      ROS comment: Left renal artery stenosis    Stress Test 4/2019: achieved > 10 METS, no EKG evidence of ischemia    EKG 11/2020: NSR    Echo 2017:    Summary   Normal diastolic function   Normal left ventricular ejection fraction. Ejection fraction is visually estimated at 55-65%. Normal sized left atrium. Structurally normal mitral valve. Structurally normal aortic valve. Neuro/Psych:   (+) neuromuscular disease (Diabetic neuropathy):, psychiatric history: stable with treatmentdepression/anxiety  (Bipolar)             ROS comment: Hx.  Of retinal detachment  Bipolar affective disorder  Opioid dependence GI/Hepatic/Renal: Neg GI/Hepatic/Renal ROS            Endo/Other:

## 2021-01-08 ENCOUNTER — HOSPITAL ENCOUNTER (OUTPATIENT)
Age: 31
Setting detail: OUTPATIENT SURGERY
Discharge: HOME | End: 2021-01-08
Attending: OPHTHALMOLOGY | Admitting: OPHTHALMOLOGY
Payer: COMMERCIAL

## 2021-01-08 ENCOUNTER — ANESTHESIA (OUTPATIENT)
Dept: OPERATING ROOM | Age: 31
End: 2021-01-08
Payer: COMMERCIAL

## 2021-01-08 VITALS
TEMPERATURE: 96.8 F | DIASTOLIC BLOOD PRESSURE: 64 MMHG | RESPIRATION RATE: 16 BRPM | HEART RATE: 88 BPM | BODY MASS INDEX: 21.34 KG/M2 | WEIGHT: 113 LBS | HEIGHT: 61 IN | OXYGEN SATURATION: 99 % | SYSTOLIC BLOOD PRESSURE: 91 MMHG

## 2021-01-08 VITALS
DIASTOLIC BLOOD PRESSURE: 64 MMHG | TEMPERATURE: 98.6 F | OXYGEN SATURATION: 100 % | SYSTOLIC BLOOD PRESSURE: 104 MMHG | RESPIRATION RATE: 17 BRPM

## 2021-01-08 DIAGNOSIS — H35.341 MACULAR HOLE OF RIGHT EYE: Primary | ICD-10-CM

## 2021-01-08 LAB
METER GLUCOSE: 149 MG/DL (ref 74–99)
METER GLUCOSE: 71 MG/DL (ref 74–99)

## 2021-01-08 PROCEDURE — 82962 GLUCOSE BLOOD TEST: CPT

## 2021-01-08 PROCEDURE — 6360000002 HC RX W HCPCS: Performed by: NURSE ANESTHETIST, CERTIFIED REGISTERED

## 2021-01-08 PROCEDURE — 2500000003 HC RX 250 WO HCPCS: Performed by: OPHTHALMOLOGY

## 2021-01-08 PROCEDURE — 3600000003 HC SURGERY LEVEL 3 BASE: Performed by: OPHTHALMOLOGY

## 2021-01-08 PROCEDURE — 6370000000 HC RX 637 (ALT 250 FOR IP): Performed by: OPHTHALMOLOGY

## 2021-01-08 PROCEDURE — 2580000003 HC RX 258: Performed by: ANESTHESIOLOGY

## 2021-01-08 PROCEDURE — 7100000010 HC PHASE II RECOVERY - FIRST 15 MIN: Performed by: OPHTHALMOLOGY

## 2021-01-08 PROCEDURE — 3700000001 HC ADD 15 MINUTES (ANESTHESIA): Performed by: OPHTHALMOLOGY

## 2021-01-08 PROCEDURE — 6370000000 HC RX 637 (ALT 250 FOR IP): Performed by: PHYSICIAN ASSISTANT

## 2021-01-08 PROCEDURE — 7100000011 HC PHASE II RECOVERY - ADDTL 15 MIN: Performed by: OPHTHALMOLOGY

## 2021-01-08 PROCEDURE — 82962 GLUCOSE BLOOD TEST: CPT | Performed by: OPHTHALMOLOGY

## 2021-01-08 PROCEDURE — 2500000003 HC RX 250 WO HCPCS: Performed by: PHYSICIAN ASSISTANT

## 2021-01-08 PROCEDURE — 3600000013 HC SURGERY LEVEL 3 ADDTL 15MIN: Performed by: OPHTHALMOLOGY

## 2021-01-08 PROCEDURE — 2720000010 HC SURG SUPPLY STERILE: Performed by: OPHTHALMOLOGY

## 2021-01-08 PROCEDURE — 3700000000 HC ANESTHESIA ATTENDED CARE: Performed by: OPHTHALMOLOGY

## 2021-01-08 PROCEDURE — 2709999900 HC NON-CHARGEABLE SUPPLY: Performed by: OPHTHALMOLOGY

## 2021-01-08 RX ORDER — SODIUM CHLORIDE 0.9 % (FLUSH) 0.9 %
10 SYRINGE (ML) INJECTION EVERY 12 HOURS SCHEDULED
Status: DISCONTINUED | OUTPATIENT
Start: 2021-01-08 | End: 2021-01-08 | Stop reason: HOSPADM

## 2021-01-08 RX ORDER — SODIUM CHLORIDE 9 MG/ML
INJECTION, SOLUTION INTRAVENOUS CONTINUOUS
Status: DISCONTINUED | OUTPATIENT
Start: 2021-01-08 | End: 2021-01-08 | Stop reason: HOSPADM

## 2021-01-08 RX ORDER — TOBRAMYCIN 3 MG/ML
1 SOLUTION/ DROPS OPHTHALMIC SEE ADMIN INSTRUCTIONS
Status: DISCONTINUED | OUTPATIENT
Start: 2021-01-08 | End: 2021-01-08 | Stop reason: HOSPADM

## 2021-01-08 RX ORDER — SODIUM CHLORIDE, SODIUM LACTATE, POTASSIUM CHLORIDE, CALCIUM CHLORIDE 600; 310; 30; 20 MG/100ML; MG/100ML; MG/100ML; MG/100ML
INJECTION, SOLUTION INTRAVENOUS CONTINUOUS
Status: DISCONTINUED | OUTPATIENT
Start: 2021-01-08 | End: 2021-01-08 | Stop reason: HOSPADM

## 2021-01-08 RX ORDER — PROPARACAINE HYDROCHLORIDE 5 MG/ML
1 SOLUTION/ DROPS OPHTHALMIC SEE ADMIN INSTRUCTIONS
Status: DISCONTINUED | OUTPATIENT
Start: 2021-01-08 | End: 2021-01-08 | Stop reason: HOSPADM

## 2021-01-08 RX ORDER — MEPERIDINE HYDROCHLORIDE 25 MG/ML
12.5 INJECTION INTRAMUSCULAR; INTRAVENOUS; SUBCUTANEOUS EVERY 5 MIN PRN
Status: DISCONTINUED | OUTPATIENT
Start: 2021-01-08 | End: 2021-01-08 | Stop reason: HOSPADM

## 2021-01-08 RX ORDER — TROPICAMIDE 10 MG/ML
1 SOLUTION/ DROPS OPHTHALMIC SEE ADMIN INSTRUCTIONS
Status: DISCONTINUED | OUTPATIENT
Start: 2021-01-08 | End: 2021-01-08 | Stop reason: HOSPADM

## 2021-01-08 RX ORDER — PHENYLEPHRINE HCL 2.5 %
1 DROPS OPHTHALMIC (EYE) SEE ADMIN INSTRUCTIONS
Status: DISCONTINUED | OUTPATIENT
Start: 2021-01-08 | End: 2021-01-08 | Stop reason: HOSPADM

## 2021-01-08 RX ORDER — MIDAZOLAM HYDROCHLORIDE 1 MG/ML
INJECTION INTRAMUSCULAR; INTRAVENOUS PRN
Status: DISCONTINUED | OUTPATIENT
Start: 2021-01-08 | End: 2021-01-08 | Stop reason: SDUPTHER

## 2021-01-08 RX ORDER — SODIUM CHLORIDE 0.9 % (FLUSH) 0.9 %
10 SYRINGE (ML) INJECTION PRN
Status: DISCONTINUED | OUTPATIENT
Start: 2021-01-08 | End: 2021-01-08 | Stop reason: HOSPADM

## 2021-01-08 RX ORDER — TETRACAINE HYDROCHLORIDE 5 MG/ML
SOLUTION OPHTHALMIC PRN
Status: DISCONTINUED | OUTPATIENT
Start: 2021-01-08 | End: 2021-01-08 | Stop reason: ALTCHOICE

## 2021-01-08 RX ORDER — HYDRALAZINE HYDROCHLORIDE 20 MG/ML
5 INJECTION INTRAMUSCULAR; INTRAVENOUS EVERY 10 MIN PRN
Status: DISCONTINUED | OUTPATIENT
Start: 2021-01-08 | End: 2021-01-08 | Stop reason: HOSPADM

## 2021-01-08 RX ORDER — OXYCODONE HYDROCHLORIDE AND ACETAMINOPHEN 5; 325 MG/1; MG/1
1 TABLET ORAL
Status: DISCONTINUED | OUTPATIENT
Start: 2021-01-08 | End: 2021-01-08 | Stop reason: HOSPADM

## 2021-01-08 RX ORDER — NEOMYCIN SULFATE, POLYMYXIN B SULFATE, AND DEXAMETHASONE 3.5; 10000; 1 MG/G; [USP'U]/G; MG/G
OINTMENT OPHTHALMIC PRN
Status: DISCONTINUED | OUTPATIENT
Start: 2021-01-08 | End: 2021-01-08 | Stop reason: ALTCHOICE

## 2021-01-08 RX ORDER — LABETALOL HYDROCHLORIDE 5 MG/ML
5 INJECTION, SOLUTION INTRAVENOUS EVERY 10 MIN PRN
Status: DISCONTINUED | OUTPATIENT
Start: 2021-01-08 | End: 2021-01-08 | Stop reason: HOSPADM

## 2021-01-08 RX ORDER — FENTANYL CITRATE 50 UG/ML
INJECTION, SOLUTION INTRAMUSCULAR; INTRAVENOUS PRN
Status: DISCONTINUED | OUTPATIENT
Start: 2021-01-08 | End: 2021-01-08 | Stop reason: SDUPTHER

## 2021-01-08 RX ORDER — INDOCYANINE GREEN AND WATER 25 MG
KIT INJECTION PRN
Status: DISCONTINUED | OUTPATIENT
Start: 2021-01-08 | End: 2021-01-08 | Stop reason: ALTCHOICE

## 2021-01-08 RX ORDER — PROMETHAZINE HYDROCHLORIDE 25 MG/ML
6.25 INJECTION, SOLUTION INTRAMUSCULAR; INTRAVENOUS
Status: DISCONTINUED | OUTPATIENT
Start: 2021-01-08 | End: 2021-01-08 | Stop reason: HOSPADM

## 2021-01-08 RX ADMIN — MIDAZOLAM 2 MG: 1 INJECTION INTRAMUSCULAR; INTRAVENOUS at 10:04

## 2021-01-08 RX ADMIN — SODIUM CHLORIDE, POTASSIUM CHLORIDE, SODIUM LACTATE AND CALCIUM CHLORIDE: 600; 310; 30; 20 INJECTION, SOLUTION INTRAVENOUS at 09:09

## 2021-01-08 RX ADMIN — MIDAZOLAM 2 MG: 1 INJECTION INTRAMUSCULAR; INTRAVENOUS at 09:59

## 2021-01-08 RX ADMIN — PHENYLEPHRINE HYDROCHLORIDE 1 DROP: 25 SOLUTION/ DROPS OPHTHALMIC at 09:22

## 2021-01-08 RX ADMIN — FENTANYL CITRATE 50 MCG: 50 INJECTION, SOLUTION INTRAMUSCULAR; INTRAVENOUS at 10:00

## 2021-01-08 RX ADMIN — TOBRAMYCIN 1 DROP: 3 SOLUTION/ DROPS OPHTHALMIC at 09:22

## 2021-01-08 RX ADMIN — PROPARACAINE HYDROCHLORIDE 1 DROP: 5 SOLUTION/ DROPS OPHTHALMIC at 09:22

## 2021-01-08 RX ADMIN — TROPICAMIDE 1 DROP: 10 SOLUTION/ DROPS OPHTHALMIC at 09:22

## 2021-01-08 RX ADMIN — FENTANYL CITRATE 50 MCG: 50 INJECTION, SOLUTION INTRAMUSCULAR; INTRAVENOUS at 10:02

## 2021-01-08 ASSESSMENT — LIFESTYLE VARIABLES: SMOKING_STATUS: 1

## 2021-01-08 NOTE — ANESTHESIA POSTPROCEDURE EVALUATION
Department of Anesthesiology  Postprocedure Note    Patient: Joey Marroquin  MRN: [id number]  YOB: 1990  Date of evaluation: 1/8/2021  Time:  11:00 AM     Procedure Summary     Date: 01/08/21 Room / Location: [address] [place] [address] 04 / [address] [facility]    Anesthesia Start: 4122 Anesthesia Stop: 4756    Procedure: PARS PLANA VITRECTOMY, INTERNAL LIMITING MEMBRANE PEEL, INDOCYANINE GREEN, GAS FLUID EXCHANGE, 25g, MAC, RIGHT EYE (Right Eye) Diagnosis: (MAC HOLE RIGHT EYE)    Surgeons: Belén Parr MD Responsible Provider: Oh Kenny MD    Anesthesia Type: MAC ASA Status: 3          Anesthesia Type: MAC    Naye Phase I: Naye Score: 10    Naye Phase II: Naye Score: 10    Last vitals: Reviewed and per EMR flowsheets.        Anesthesia Post Evaluation    Patient location during evaluation: PACU  Patient participation: complete - patient participated  Level of consciousness: awake and alert  Airway patency: patent  Nausea & Vomiting: no nausea and no vomiting  Complications: no  Cardiovascular status: hemodynamically stable  Respiratory status: acceptable  Hydration status: euvolemic

## 2021-01-28 ENCOUNTER — OFFICE VISIT (OUTPATIENT)
Dept: FAMILY MEDICINE CLINIC | Age: 31
End: 2021-01-28
Payer: COMMERCIAL

## 2021-01-28 VITALS
WEIGHT: 119 LBS | BODY MASS INDEX: 22.47 KG/M2 | DIASTOLIC BLOOD PRESSURE: 84 MMHG | RESPIRATION RATE: 16 BRPM | HEIGHT: 61 IN | SYSTOLIC BLOOD PRESSURE: 132 MMHG | HEART RATE: 92 BPM | TEMPERATURE: 97.5 F | OXYGEN SATURATION: 98 %

## 2021-01-28 DIAGNOSIS — I15.0 RENOVASCULAR HYPERTENSION: ICD-10-CM

## 2021-01-28 DIAGNOSIS — E10.65 TYPE 1 DIABETES MELLITUS WITH HYPERGLYCEMIA (HCC): ICD-10-CM

## 2021-01-28 DIAGNOSIS — I15.0 RENOVASCULAR HYPERTENSION: Primary | ICD-10-CM

## 2021-01-28 PROCEDURE — 36415 COLL VENOUS BLD VENIPUNCTURE: CPT | Performed by: FAMILY MEDICINE

## 2021-01-28 PROCEDURE — 99213 OFFICE O/P EST LOW 20 MIN: CPT | Performed by: FAMILY MEDICINE

## 2021-01-28 PROCEDURE — 4004F PT TOBACCO SCREEN RCVD TLK: CPT | Performed by: FAMILY MEDICINE

## 2021-01-28 PROCEDURE — 2022F DILAT RTA XM EVC RTNOPTHY: CPT | Performed by: FAMILY MEDICINE

## 2021-01-28 PROCEDURE — 3046F HEMOGLOBIN A1C LEVEL >9.0%: CPT | Performed by: FAMILY MEDICINE

## 2021-01-28 PROCEDURE — G8420 CALC BMI NORM PARAMETERS: HCPCS | Performed by: FAMILY MEDICINE

## 2021-01-28 PROCEDURE — G8484 FLU IMMUNIZE NO ADMIN: HCPCS | Performed by: FAMILY MEDICINE

## 2021-01-28 PROCEDURE — G8427 DOCREV CUR MEDS BY ELIG CLIN: HCPCS | Performed by: FAMILY MEDICINE

## 2021-01-28 ASSESSMENT — ENCOUNTER SYMPTOMS
NAUSEA: 0
VOMITING: 0
EYE PAIN: 0
BLOOD IN STOOL: 0
COLOR CHANGE: 0
CONSTIPATION: 0
SHORTNESS OF BREATH: 0
DIARRHEA: 0
COUGH: 0
ABDOMINAL PAIN: 0
WHEEZING: 0

## 2021-01-28 NOTE — PROGRESS NOTES
differently: Take 81 mg by mouth daily Pt states per Dr Lyn Carranza he is to\" check with his PCP regarding holding ASA\") 30 tablet 3    gabapentin (NEURONTIN) 300 MG capsule Take 1 capsule by mouth 2 times daily for 120 days. 60 capsule 3    SUBOXONE 8-2 MG FILM SL film Place 1 Film under the tongue daily. No current facility-administered medications for this visit.        Allergies   Allergen Reactions    Chlorthalidone      Vomiting     Hctz [Hydrochlorothiazide] Nausea Only    Hydralazine      Nausea, headache       Past Medical & Surgical History:      Diagnosis Date    Asthma     Bipolar affective ([address]     [facility]) [facility]     Diabetes mellitus type I ([address]     Diabetic neuropathy ([facility])     Diabetic, retinopathy, proliferative ([address]     HTN (hypertension)     due to left renal artery stenosis    Hypercholesteremia     Left renal artery stenosis ([address] 09/19/2019    Macular edema due to secondary diabetes ([address]     Opioid dependence ([facility])     Proteinuria     Retinal detachment     right eye    Vitreous hemorrhage of right eye due to diabetes mellitus ([address]      Past Surgical History:   Procedure Laterality Date    EYE SURGERY Right 11/20/2020    PARS PLANA VITRECTOMY, MEMBRANE PEEL, ENDOLASER, GAS FLUID EXCHANGE, 25G, MAC, RIGHT EYE performed by Yonis Richter MD at [address] [place] [address] Right 01/08/2021    pars plana vitrectomy with peeling of preretinal tissue and gas fluid exchange    EYE SURGERY Right 1/8/2021    PARS PLANA VITRECTOMY, INTERNAL LIMITING MEMBRANE PEEL, INDOCYANINE GREEN, GAS FLUID EXCHANGE, 25g, MAC, RIGHT EYE performed by Yonis Richter MD at [address] [place] [address]       Family History:      Problem Relation Age of Onset    No Known Problems Mother     No Known Problems Father     No Known Problems Brother         Homicide     No Known Problems Daughter     No Known Problems Son     Diabetes type 2  Maternal Grandmother        Social History:  Social History     Tobacco Use    Smoking status: Current Every Day Smoker     Packs/day: 0.50     Years: 15.00     Pack years: 7.50     Types: Cigarettes    Smokeless tobacco: Never Used   Substance Use Topics    Alcohol use: No       Immunization History   Administered Date(s) Administered    Hepatitis B Ped/Adol (Engerix-B, Recombivax HB) 01/08/2002, 02/12/2002, 08/19/2002    Influenza Vaccine, unspecified formulation 10/09/2002    MMR 08/19/2002    Tdap (Boostrix, Adacel) 10/07/2019       Review of Systems   Constitutional: Negative for chills and fever. Eyes: Positive for visual disturbance (bilateral, s/p recent surgery on R eye). Negative for pain. Respiratory: Negative for cough, shortness of breath and wheezing. Cardiovascular: Negative for chest pain and palpitations. Gastrointestinal: Negative for abdominal pain, blood in stool, constipation, diarrhea, nausea and vomiting. Skin: Negative for color change and rash. Neurological: Negative for dizziness, syncope and light-headedness. BP Readings from Last 3 Encounters:   01/28/21 132/84   01/08/21 104/64   01/08/21 91/64       VS:  /84   Pulse 92   Temp 97.5 °F (36.4 °C) (Temporal)   Resp 16   Ht 5' 1\" (1.549 m)   Wt 119 lb (54 kg)   SpO2 98%   BMI 22.48 kg/m²     Physical Exam  Vitals signs reviewed. Constitutional:       General: He is awake. He is not in acute distress. Appearance: He is well-developed and well-groomed. He is not ill-appearing, toxic-appearing or diaphoretic. Cardiovascular:      Rate and Rhythm: Normal rate and regular rhythm. Heart sounds: S1 normal and S2 normal. No murmur. Pulmonary:      Breath sounds: No decreased breath sounds, wheezing, rhonchi or rales. Abdominal:      General: Bowel sounds are normal.      Palpations: Abdomen is soft. Tenderness: There is no abdominal tenderness. There is no guarding or rebound.    Musculoskeletal: Right lower leg: He exhibits no deformity. No edema. Left lower leg: He exhibits no deformity. No edema. Skin:     General: Skin is warm and dry. Neurological:      General: No focal deficit present. Mental Status: He is alert. Psychiatric:         Attention and Perception: Attention normal.         Mood and Affect: Mood normal.         Speech: Speech normal.         Behavior: Behavior is cooperative. Thought Content: Thought content normal.         Cognition and Memory: Cognition normal.         Judgment: Judgment normal.         Assessment/Plan:  1. Renovascular hypertension  Well-controlled. Continue to monitor BP readings at home and send BP readings to me via [employer]. Will continue amlodipine 10 mg QD, clonidine 0.1 mg BID, lisinopril 40 mg QD, and Toprol 50 mg BID. Check BMP for hyperkalemia due to recent increase in lisinopril.   - BASIC METABOLIC PANEL; Future  - [employer] Blood Pressure Flowsheet    2. Type 1 diabetes mellitus with hyperglycemia ([facility]  Referral placed today. - Abiola Donovan MD, Endocrinology, [place]      Return in about 1 month (around 2/28/2021) for follow-up for T1DM.     Matilde Curtis DO, PGY-3

## 2021-01-28 NOTE — PROGRESS NOTES
Subjective:    Lala Edgar is here for a blood pressure check. He is finally well controlled on the current med changes and he is tolerating them well. ROS: Otherwise negative    Patient Active Problem List   Diagnosis    Hyperlipidemia    Elevated liver enzymes    HTN (hypertension)    Anxiety    Vitamin D insufficiency    Acquired hypothyroidism    Uncontrolled type 1 diabetes mellitus with hyperglycemia ([address]    Chest pain    Tobacco abuse    Opioid dependence ([address]    Bipolar affective ([address]    Other microscopic hematuria    Elevated alkaline phosphatase level    Lactic acidosis    Proliferative diabetic retinopathy of both eyes with macular edema associated with type 1 diabetes mellitus (HCC)    Moderate persistent asthma without complication    Left renal artery stenosis (HCC)       Past medical, surgical, family and social history were reviewed, non-contributory, and unchanged unless otherwise stated. Objective:    /84   Pulse 92   Temp 97.5 °F (36.4 °C) (Temporal)   Resp 16   Ht 5' 1\" (1.549 m)   Wt 119 lb (54 kg)   SpO2 98%   BMI 22.48 kg/m²     Exam is as noted by resident with the following changes, additions or corrections:      Assessment/Plan:        Lala Edgar was seen today for hypertension and referral - general.    Diagnoses and all orders for this visit:    Renovascular hypertension  -     BASIC METABOLIC PANEL; Future  -     [facility]    Type 1 diabetes mellitus with hyperglycemia [facility])  -     Birgit Alfaro MD, Endocrinology, [place]           Attending Physician Statement    I have reviewed the chart, including any radiology or labs. I have discussed the case, including pertinent history and exam findings with the resident. I agree with the assessment, plan and orders as documented by the resident. Please refer to the resident note for additional information.       Electronically signed by Saray Hook DO on 1/28/2021 at

## 2021-01-29 ENCOUNTER — HOSPITAL ENCOUNTER (OUTPATIENT)
Age: 31
Discharge: HOME | End: 2021-01-29
Payer: COMMERCIAL

## 2021-01-29 DIAGNOSIS — I15.1 SECONDARY HYPERTENSION DUE TO RENAL DISEASE: ICD-10-CM

## 2021-01-29 DIAGNOSIS — E87.5 HYPERKALEMIA: Primary | ICD-10-CM

## 2021-01-29 LAB
ANION GAP SERPL CALCULATED.3IONS-SCNC: 11 MMOL/L (ref 7–16)
ANION GAP SERPL CALCULATED.3IONS-SCNC: 8 MMOL/L (ref 7–16)
BUN BLDV-MCNC: 20 MG/DL (ref 6–20)
BUN BLDV-MCNC: 24 MG/DL (ref 6–20)
CALCIUM SERPL-MCNC: 10 MG/DL (ref 8.6–10.2)
CALCIUM SERPL-MCNC: 9.8 MG/DL (ref 8.6–10.2)
CHLORIDE BLD-SCNC: 103 MMOL/L (ref 98–107)
CHLORIDE BLD-SCNC: 105 MMOL/L (ref 98–107)
CO2: 27 MMOL/L (ref 22–29)
CO2: 27 MMOL/L (ref 22–29)
CREAT SERPL-MCNC: 1.1 MG/DL (ref 0.7–1.2)
CREAT SERPL-MCNC: 1.1 MG/DL (ref 0.7–1.2)
GFR AFRICAN AMERICAN: >60
GFR AFRICAN AMERICAN: >60
GFR NON-AFRICAN AMERICAN: >60 ML/MIN/1.73
GFR NON-AFRICAN AMERICAN: >60 ML/MIN/1.73
GLUCOSE BLD-MCNC: 134 MG/DL (ref 74–99)
GLUCOSE BLD-MCNC: 162 MG/DL (ref 74–99)
POTASSIUM SERPL-SCNC: 5.5 MMOL/L (ref 3.5–5)
POTASSIUM SERPL-SCNC: 5.7 MMOL/L (ref 3.5–5)
SODIUM BLD-SCNC: 140 MMOL/L (ref 132–146)
SODIUM BLD-SCNC: 141 MMOL/L (ref 132–146)

## 2021-01-29 PROCEDURE — 80048 BASIC METABOLIC PNL TOTAL CA: CPT

## 2021-01-29 PROCEDURE — 36415 COLL VENOUS BLD VENIPUNCTURE: CPT

## 2021-01-29 RX ORDER — LISINOPRIL 40 MG/1
20 TABLET ORAL DAILY
Qty: 90 TABLET | Refills: 1
Start: 2021-01-29 | End: 2021-04-15

## 2021-03-10 ENCOUNTER — APPOINTMENT (OUTPATIENT)
Dept: GENERAL RADIOLOGY | Age: 31
End: 2021-03-10
Payer: COMMERCIAL

## 2021-03-10 ENCOUNTER — HOSPITAL ENCOUNTER (EMERGENCY)
Age: 31
Discharge: HOME | End: 2021-03-11
Attending: EMERGENCY MEDICINE
Payer: COMMERCIAL

## 2021-03-10 DIAGNOSIS — R73.9 HYPERGLYCEMIA: Primary | ICD-10-CM

## 2021-03-10 DIAGNOSIS — R11.2 NAUSEA AND VOMITING, INTRACTABILITY OF VOMITING NOT SPECIFIED, UNSPECIFIED VOMITING TYPE: ICD-10-CM

## 2021-03-10 LAB
BASOPHILS ABSOLUTE: 0.08 E9/L (ref 0–0.2)
BASOPHILS RELATIVE PERCENT: 0.6 % (ref 0–2)
EOSINOPHILS ABSOLUTE: 0.16 E9/L (ref 0.05–0.5)
EOSINOPHILS RELATIVE PERCENT: 1.2 % (ref 0–6)
HCT VFR BLD CALC: 39.6 % (ref 37–54)
HEMOGLOBIN: 13.5 G/DL (ref 12.5–16.5)
IMMATURE GRANULOCYTES #: 0.05 E9/L
IMMATURE GRANULOCYTES %: 0.4 % (ref 0–5)
LACTIC ACID: 2.2 MMOL/L (ref 0.5–2.2)
LYMPHOCYTES ABSOLUTE: 1.77 E9/L (ref 1.5–4)
LYMPHOCYTES RELATIVE PERCENT: 12.8 % (ref 20–42)
MCH RBC QN AUTO: 29.8 PG (ref 26–35)
MCHC RBC AUTO-ENTMCNC: 34.1 % (ref 32–34.5)
MCV RBC AUTO: 87.4 FL (ref 80–99.9)
MONOCYTES ABSOLUTE: 0.66 E9/L (ref 0.1–0.95)
MONOCYTES RELATIVE PERCENT: 4.8 % (ref 2–12)
NEUTROPHILS ABSOLUTE: 11.14 E9/L (ref 1.8–7.3)
NEUTROPHILS RELATIVE PERCENT: 80.2 % (ref 43–80)
PDW BLD-RTO: 13.2 FL (ref 11.5–15)
PLATELET # BLD: 421 E9/L (ref 130–450)
PMV BLD AUTO: 10 FL (ref 7–12)
RBC # BLD: 4.53 E12/L (ref 3.8–5.8)
REASON FOR REJECTION: NORMAL
REJECTED TEST: NORMAL
WBC # BLD: 13.9 E9/L (ref 4.5–11.5)

## 2021-03-10 PROCEDURE — 99284 EMERGENCY DEPT VISIT MOD MDM: CPT

## 2021-03-10 PROCEDURE — 96361 HYDRATE IV INFUSION ADD-ON: CPT

## 2021-03-10 PROCEDURE — 83605 ASSAY OF LACTIC ACID: CPT

## 2021-03-10 PROCEDURE — 71045 X-RAY EXAM CHEST 1 VIEW: CPT

## 2021-03-10 PROCEDURE — 2500000003 HC RX 250 WO HCPCS: Performed by: EMERGENCY MEDICINE

## 2021-03-10 PROCEDURE — 81001 URINALYSIS AUTO W/SCOPE: CPT

## 2021-03-10 PROCEDURE — 87040 BLOOD CULTURE FOR BACTERIA: CPT

## 2021-03-10 PROCEDURE — 2580000003 HC RX 258: Performed by: EMERGENCY MEDICINE

## 2021-03-10 PROCEDURE — 96374 THER/PROPH/DIAG INJ IV PUSH: CPT

## 2021-03-10 PROCEDURE — 93005 ELECTROCARDIOGRAM TRACING: CPT | Performed by: EMERGENCY MEDICINE

## 2021-03-10 PROCEDURE — 85025 COMPLETE CBC W/AUTO DIFF WBC: CPT

## 2021-03-10 RX ORDER — 0.9 % SODIUM CHLORIDE 0.9 %
1000 INTRAVENOUS SOLUTION INTRAVENOUS ONCE
Status: COMPLETED | OUTPATIENT
Start: 2021-03-10 | End: 2021-03-11

## 2021-03-10 RX ORDER — ONDANSETRON 2 MG/ML
4 INJECTION INTRAMUSCULAR; INTRAVENOUS EVERY 6 HOURS PRN
Status: DISCONTINUED | OUTPATIENT
Start: 2021-03-10 | End: 2021-03-11 | Stop reason: HOSPADM

## 2021-03-10 RX ADMIN — FAMOTIDINE 20 MG: 10 INJECTION INTRAVENOUS at 22:41

## 2021-03-10 RX ADMIN — SODIUM CHLORIDE 1000 ML: 9 INJECTION, SOLUTION INTRAVENOUS at 22:27

## 2021-03-10 ASSESSMENT — PAIN SCALES - GENERAL: PAINLEVEL_OUTOF10: 5

## 2021-03-10 ASSESSMENT — PAIN DESCRIPTION - LOCATION: LOCATION: ABDOMEN

## 2021-03-10 ASSESSMENT — PAIN DESCRIPTION - PAIN TYPE: TYPE: ACUTE PAIN

## 2021-03-11 ENCOUNTER — APPOINTMENT (OUTPATIENT)
Dept: CT IMAGING | Age: 31
End: 2021-03-11
Payer: COMMERCIAL

## 2021-03-11 VITALS
BODY MASS INDEX: 21.26 KG/M2 | TEMPERATURE: 98.5 F | HEART RATE: 106 BPM | WEIGHT: 120 LBS | OXYGEN SATURATION: 98 % | SYSTOLIC BLOOD PRESSURE: 153 MMHG | HEIGHT: 63 IN | RESPIRATION RATE: 15 BRPM | DIASTOLIC BLOOD PRESSURE: 108 MMHG

## 2021-03-11 LAB
ALBUMIN SERPL-MCNC: 3.9 G/DL (ref 3.5–5.2)
ALP BLD-CCNC: 132 U/L (ref 40–129)
ALT SERPL-CCNC: 7 U/L (ref 0–40)
ANION GAP SERPL CALCULATED.3IONS-SCNC: 15 MMOL/L (ref 7–16)
AST SERPL-CCNC: 8 U/L (ref 0–39)
BACTERIA: NORMAL /HPF
BILIRUB SERPL-MCNC: 0.3 MG/DL (ref 0–1.2)
BILIRUBIN URINE: ABNORMAL
BLOOD, URINE: NEGATIVE
BUN BLDV-MCNC: 37 MG/DL (ref 6–20)
CALCIUM SERPL-MCNC: 8.7 MG/DL (ref 8.6–10.2)
CHLORIDE BLD-SCNC: 100 MMOL/L (ref 98–107)
CHP ED QC CHECK: NORMAL
CLARITY: CLEAR
CO2: 27 MMOL/L (ref 22–29)
COLOR: YELLOW
CREAT SERPL-MCNC: 1.3 MG/DL (ref 0.7–1.2)
EKG ATRIAL RATE: 124 BPM
EKG P AXIS: 72 DEGREES
EKG P-R INTERVAL: 132 MS
EKG Q-T INTERVAL: 294 MS
EKG QRS DURATION: 78 MS
EKG QTC CALCULATION (BAZETT): 422 MS
EKG R AXIS: 81 DEGREES
EKG T AXIS: 56 DEGREES
EKG VENTRICULAR RATE: 124 BPM
GFR AFRICAN AMERICAN: >60
GFR NON-AFRICAN AMERICAN: >60 ML/MIN/1.73
GLUCOSE BLD-MCNC: 171 MG/DL (ref 74–99)
GLUCOSE BLD-MCNC: 257 MG/DL
GLUCOSE URINE: 250 MG/DL
KETONES, URINE: 15 MG/DL
LEUKOCYTE ESTERASE, URINE: NEGATIVE
LIPASE: 34 U/L (ref 13–60)
METER GLUCOSE: 257 MG/DL (ref 74–99)
NITRITE, URINE: NEGATIVE
PH UA: 6 (ref 5–9)
POTASSIUM REFLEX MAGNESIUM: 4.2 MMOL/L (ref 3.5–5)
PROTEIN UA: 100 MG/DL
RBC UA: NORMAL /HPF (ref 0–2)
SODIUM BLD-SCNC: 142 MMOL/L (ref 132–146)
SPECIFIC GRAVITY UA: 1.02 (ref 1–1.03)
TOTAL PROTEIN: 7.1 G/DL (ref 6.4–8.3)
TROPONIN: <0.01 NG/ML (ref 0–0.03)
UROBILINOGEN, URINE: 1 E.U./DL
WBC UA: NORMAL /HPF (ref 0–5)

## 2021-03-11 PROCEDURE — 96361 HYDRATE IV INFUSION ADD-ON: CPT

## 2021-03-11 PROCEDURE — 82962 GLUCOSE BLOOD TEST: CPT

## 2021-03-11 PROCEDURE — 6360000002 HC RX W HCPCS: Performed by: EMERGENCY MEDICINE

## 2021-03-11 PROCEDURE — 93010 ELECTROCARDIOGRAM REPORT: CPT | Performed by: INTERNAL MEDICINE

## 2021-03-11 PROCEDURE — 96375 TX/PRO/DX INJ NEW DRUG ADDON: CPT

## 2021-03-11 PROCEDURE — 84484 ASSAY OF TROPONIN QUANT: CPT

## 2021-03-11 PROCEDURE — 2580000003 HC RX 258: Performed by: EMERGENCY MEDICINE

## 2021-03-11 PROCEDURE — 83690 ASSAY OF LIPASE: CPT

## 2021-03-11 PROCEDURE — 74176 CT ABD & PELVIS W/O CONTRAST: CPT

## 2021-03-11 PROCEDURE — 2500000003 HC RX 250 WO HCPCS: Performed by: EMERGENCY MEDICINE

## 2021-03-11 PROCEDURE — 80053 COMPREHEN METABOLIC PANEL: CPT

## 2021-03-11 RX ORDER — ONDANSETRON 4 MG/1
4 TABLET, ORALLY DISINTEGRATING ORAL EVERY 8 HOURS PRN
Qty: 9 TABLET | Refills: 0 | Status: SHIPPED | OUTPATIENT
Start: 2021-03-11 | End: 2021-03-14

## 2021-03-11 RX ORDER — 0.9 % SODIUM CHLORIDE 0.9 %
1000 INTRAVENOUS SOLUTION INTRAVENOUS ONCE
Status: COMPLETED | OUTPATIENT
Start: 2021-03-11 | End: 2021-03-11

## 2021-03-11 RX ORDER — LABETALOL HYDROCHLORIDE 5 MG/ML
5 INJECTION, SOLUTION INTRAVENOUS ONCE
Status: COMPLETED | OUTPATIENT
Start: 2021-03-11 | End: 2021-03-11

## 2021-03-11 RX ORDER — FAMOTIDINE 20 MG/1
20 TABLET, FILM COATED ORAL 2 TIMES DAILY
Qty: 14 TABLET | Refills: 0 | Status: SHIPPED | OUTPATIENT
Start: 2021-03-11 | End: 2021-04-15

## 2021-03-11 RX ORDER — ONDANSETRON 2 MG/ML
4 INJECTION INTRAMUSCULAR; INTRAVENOUS EVERY 6 HOURS PRN
Status: DISCONTINUED | OUTPATIENT
Start: 2021-03-11 | End: 2021-03-11 | Stop reason: HOSPADM

## 2021-03-11 RX ADMIN — ONDANSETRON 4 MG: 2 INJECTION INTRAMUSCULAR; INTRAVENOUS at 02:37

## 2021-03-11 RX ADMIN — SODIUM CHLORIDE 1000 ML: 9 INJECTION, SOLUTION INTRAVENOUS at 00:48

## 2021-03-11 RX ADMIN — LABETALOL HYDROCHLORIDE 5 MG: 5 INJECTION INTRAVENOUS at 00:48

## 2021-03-11 NOTE — ED NOTES
Bed: 11  Expected date:   Expected time:   Means of arrival:   Comments:  triage     Jose Chang RN  03/10/21 2121

## 2021-03-11 NOTE — ED PROVIDER NOTES
HPI:  3/10/21, Time: 9:02 PM AISSATOU Colin is a 27 y.o. male presenting to the ED for nausea vomiting, beginning 3 days ago. The complaint has been persistent, moderate in severity, and worsened by nothing. Patient reports history of DKA. Patient reporting blood sugar has been elevated. Patient reporting no diarrhea reports some blood in his emesis. Patient reports similar symptoms with his DKA. Patient reports no abdominal pain he reports no chest pain there is no cough there is no fever there is no leg pain or swelling. Patient does take insulin at home. Patient reporting no headache there is no history of trauma or injury. ROS:   Pertinent positives and negatives are stated within HPI, all other systems reviewed and are negative.  --------------------------------------------- PAST HISTORY ---------------------------------------------  Past Medical History:  has a past medical history of Asthma, Bipolar affective ([address] COPD (chronic obstructive pulmonary disease) ([address] Diabetes mellitus type I ([address] Diabetic neuropathy ([address] Diabetic, retinopathy, proliferative ([address] HTN (hypertension), Hypercholesteremia, Left renal artery stenosis ([address] Macular edema due to secondary diabetes ([address] Opioid dependence ([address] Proteinuria, Retinal detachment, and Vitreous hemorrhage of right eye due to diabetes mellitus ([address] Past Surgical History:  has a past surgical history that includes Eye surgery (Right, 11/20/2020); Eye surgery (Right, 01/08/2021); and Eye surgery (Right, 1/8/2021). Social History:  reports that he has been smoking cigarettes. He has a 7.50 pack-year smoking history. He has never used smokeless tobacco. He reports current alcohol use. He reports previous drug use. Drug: Opiates . Family History: family history includes Diabetes type 2  in his maternal grandmother; No Known Problems in his brother, daughter, father, mother, and son.      The patients home medications have been reviewed. Allergies: Chlorthalidone, Hctz [hydrochlorothiazide], and Hydralazine    ---------------------------------------------------PHYSICAL EXAM--------------------------------------    Constitutional/General: Alert and oriented x3, ill-appearing  Head: Normocephalic and atraumatic  Eyes: PERRL, EOMI  Mouth: Oropharynx clear, handling secretions, no trismus  Neck: Supple, full ROM, non tender to palpation in the midline, no stridor, no crepitus, no meningeal signs  Pulmonary: Lungs clear to auscultation bilaterally, no wheezes, rales, or rhonchi. Not in respiratory distress  Cardiovascular: Tachycardic. Regular rhythm. No murmurs, gallops, or rubs. 2+ distal pulses  Chest: no chest wall tenderness  Abdomen: Soft. Non tender. Non distended. +BS. No rebound, guarding, or rigidity. No pulsatile masses appreciated. Musculoskeletal: Moves all extremities x 4. Warm and well perfused, no clubbing, cyanosis, or edema. Capillary refill <3 seconds  Skin: warm and dry. No rashes. Neurologic: GCS 15, CN 2-12 grossly intact, no focal deficits, symmetric strength 5/5 in the upper and lower extremities bilaterally  Psych: Normal Affect    -------------------------------------------------- RESULTS -------------------------------------------------  I have personally reviewed all laboratory and imaging results for this patient. Results are listed below.      LABS:  Results for orders placed or performed during the hospital encounter of 03/10/21   CBC auto differential   Result Value Ref Range    WBC 13.9 (H) 4.5 - 11.5 E9/L    RBC 4.53 3.80 - 5.80 E12/L    Hemoglobin 13.5 12.5 - 16.5 g/dL    Hematocrit 39.6 37.0 - 54.0 %    MCV 87.4 80.0 - 99.9 fL    MCH 29.8 26.0 - 35.0 pg    MCHC 34.1 32.0 - 34.5 %    RDW 13.2 11.5 - 15.0 fL    Platelets 078 535 - 529 E9/L    MPV 10.0 7.0 - 12.0 fL    Neutrophils % 80.2 (H) 43.0 - 80.0 %    Immature Granulocytes % 0.4 0.0 - 5.0 %    Lymphocytes % 12.8 (L) 20.0 - 42.0 % Glucose   Result Value Ref Range    Meter Glucose 257 (H) 74 - 99 mg/dL   POCT GLUCOSE   Result Value Ref Range    Glucose 257 mg/dL    QC OK? y    EKG 12 Lead   Result Value Ref Range    Ventricular Rate 124 BPM    Atrial Rate 124 BPM    P-R Interval 132 ms    QRS Duration 78 ms    Q-T Interval 294 ms    QTc Calculation (Bazett) 422 ms    P Axis 72 degrees    R Axis 81 degrees    T Axis 56 degrees       RADIOLOGY:  Interpreted by Radiologist.  CT ABDOMEN PELVIS WO CONTRAST Additional Contrast? None   Final Result   There is a punctate non-obstructing left intrarenal calculus. There is no   hydronephrosis or obstructive uropathy. Normal appendix. No acute abnormality identified. XR CHEST PORTABLE   Final Result   No acute process. EKG:  This EKG is signed and interpreted by me. Rate: 124  Rhythm: Sinus tachycardia  Interpretation: non-specific EKG  Comparison: Compared to previous      ------------------------- NURSING NOTES AND VITALS REVIEWED ---------------------------   The nursing notes within the ED encounter and vital signs as below have been reviewed by myself. BP (!) 141/94   Pulse 103   Temp 98.5 °F (36.9 °C) (Oral)   Resp 15   Ht 5' 3\" (1.6 m)   Wt 120 lb (54.4 kg)   SpO2 97%   BMI 21.26 kg/m²   Oxygen Saturation Interpretation: Normal    The patients available past medical records and past encounters were reviewed.         ------------------------------ ED COURSE/MEDICAL DECISION MAKING----------------------  Medications   ondansetron (ZOFRAN) injection 4 mg (4 mg Intravenous Given 3/11/21 0237)   ondansetron (ZOFRAN) injection 4 mg (has no administration in time range)   0.9 % sodium chloride bolus (0 mLs Intravenous Stopped 3/11/21 0045)   famotidine (PEPCID) injection 20 mg (20 mg Intravenous Given 3/10/21 2241)   0.9 % sodium chloride bolus (0 mLs Intravenous Stopped 3/11/21 0237)   labetalol (NORMODYNE;TRANDATE) injection 5 mg (5 mg Intravenous Given 3/11/21 0048)             Medical Decision Making:   Presenting here because of nausea vomiting for the last several days. Patient reports similar symptoms when he had DKA. Patient having no active abdominal pain. He does report he had some scant blood in his emesis. Patient reporting no black or tarry stools. He reports no cough or chest pain. Patient was noted to be tachycardic here. Patient given IV fluids as well as antiemetic. Patient labs reviewed and noted he is not in DKA CT does not show any signs of infectious process or obstruction. Patient plan will be to discharge home. Re-Evaluations:             Recheck several times given IV fluids here. Patient also given antihypertensive due to his blood pressure. Patient reports that he did not take his blood pressure medication today due to the fact that he was ill today. Patient reporting no chest pain or shortness of breath. Abdomen is soft. Patient made aware lab results and findings. There is no signs of DKA. His anion gap is 15. Patient CT and pelvis shows no inflammatory changes. Patient was medicated several times for nausea. Patient vital signs improved. Pain on will be to discharge home. Patient made aware of findings. Patient present with family member. Consultations:                 Critical Care: This patient's ED course included: a personal history and physicial eaxmination    This patient has been closely monitored during their ED course. Counseling: The emergency provider has spoken with the patient and discussed todays results, in addition to providing specific details for the plan of care and counseling regarding the diagnosis and prognosis. Questions are answered at this time and they are agreeable with the plan.       --------------------------------- IMPRESSION AND DISPOSITION ---------------------------------    IMPRESSION  1. Hyperglycemia    2.  Nausea and vomiting, intractability of vomiting not specified, unspecified vomiting type        DISPOSITION  Disposition: Discharge home  Patient condition is fair        NOTE: This report was transcribed using voice recognition software.  Every effort was made to ensure accuracy; however, inadvertent computerized transcription errors may be present          Emily Flower MD  03/11/21 9913

## 2021-03-11 NOTE — ED NOTES
Repeat labs obtained and sent. Pt given warm blanket per request. Pt awaiting dispo.       Africa Spicer RN  03/11/21 0006

## 2021-03-15 ENCOUNTER — CARE COORDINATION (OUTPATIENT)
Dept: CARE COORDINATION | Age: 31
End: 2021-03-15

## 2021-03-15 NOTE — CARE COORDINATION
-Attempted to reach pt to offer enrollment into care coordination program, however no answer.  -Detailed VM left introducing self, reason for call, and brief explanation of program.  -Left [facility]'s contact information, requesting call back.  -Will send CC intro letter via pt's active [employer] account

## 2021-03-16 LAB
BLOOD CULTURE, ROUTINE: NORMAL
CULTURE, BLOOD 2: NORMAL

## 2021-03-19 ENCOUNTER — CARE COORDINATION (OUTPATIENT)
Dept: CARE COORDINATION | Age: 31
End: 2021-03-19

## 2021-03-19 NOTE — CARE COORDINATION
-2nd attempt to reach pt to offer enrollment into the care coordination program, however, no answer.  -Left detailed VM introducing self and explaining care coordination program.  -Left contact information requesting call back to discuss further.

## 2021-03-23 ENCOUNTER — CARE COORDINATION (OUTPATIENT)
Dept: CARE COORDINATION | Age: 31
End: 2021-03-23

## 2021-03-23 NOTE — CARE COORDINATION
-[facility] made final attempt to reach pt to offer care coordination services, however, no answer.  -Left detailed VM requesting call back with [facility]'s contact information provided. -Previous [employer] message sent (unread)  -No further outreaches will be made at this time.  -Will place pt on temporary exclusion from care coordination.

## 2021-04-13 DIAGNOSIS — I15.0 RENOVASCULAR HYPERTENSION: ICD-10-CM

## 2021-04-13 DIAGNOSIS — I10 ESSENTIAL HYPERTENSION: ICD-10-CM

## 2021-04-13 RX ORDER — AMLODIPINE BESYLATE 10 MG/1
10 TABLET ORAL DAILY
Qty: 30 TABLET | Refills: 2 | Status: SHIPPED
Start: 2021-04-13 | End: 2021-08-10 | Stop reason: SINTOL

## 2021-04-13 RX ORDER — METOPROLOL SUCCINATE 50 MG/1
50 TABLET, EXTENDED RELEASE ORAL 2 TIMES DAILY
Qty: 60 TABLET | Refills: 2 | Status: SHIPPED
Start: 2021-04-13 | End: 2021-07-12 | Stop reason: SDUPTHER

## 2021-04-15 ENCOUNTER — OFFICE VISIT (OUTPATIENT)
Dept: FAMILY MEDICINE CLINIC | Age: 31
End: 2021-04-15
Payer: COMMERCIAL

## 2021-04-15 ENCOUNTER — TELEPHONE (OUTPATIENT)
Dept: FAMILY MEDICINE CLINIC | Age: 31
End: 2021-04-15

## 2021-04-15 VITALS
TEMPERATURE: 97 F | WEIGHT: 104 LBS | HEART RATE: 86 BPM | DIASTOLIC BLOOD PRESSURE: 92 MMHG | SYSTOLIC BLOOD PRESSURE: 152 MMHG | RESPIRATION RATE: 16 BRPM | BODY MASS INDEX: 18.43 KG/M2 | HEIGHT: 63 IN | OXYGEN SATURATION: 99 %

## 2021-04-15 DIAGNOSIS — F11.20 OPIOID DEPENDENCE ON MAINTENANCE AGONIST THERAPY, NO SYMPTOMS (HCC): ICD-10-CM

## 2021-04-15 DIAGNOSIS — E10.65 UNCONTROLLED TYPE 1 DIABETES MELLITUS WITH HYPERGLYCEMIA (HCC): Primary | ICD-10-CM

## 2021-04-15 DIAGNOSIS — J45.40 MODERATE PERSISTENT ASTHMA WITHOUT COMPLICATION: ICD-10-CM

## 2021-04-15 DIAGNOSIS — I15.0 RENOVASCULAR HYPERTENSION: ICD-10-CM

## 2021-04-15 DIAGNOSIS — F31.70 BIPOLAR AFFECTIVE DISORDER IN REMISSION (HCC): ICD-10-CM

## 2021-04-15 LAB
ALBUMIN SERPL-MCNC: 4.2 G/DL (ref 3.5–5.2)
ALP BLD-CCNC: 169 U/L (ref 40–129)
ALT SERPL-CCNC: 8 U/L (ref 0–40)
ANION GAP SERPL CALCULATED.3IONS-SCNC: 20 MMOL/L (ref 7–16)
AST SERPL-CCNC: 11 U/L (ref 0–39)
BASOPHILS ABSOLUTE: 0.11 E9/L (ref 0–0.2)
BASOPHILS RELATIVE PERCENT: 0.9 % (ref 0–2)
BILIRUB SERPL-MCNC: 0.2 MG/DL (ref 0–1.2)
BUN BLDV-MCNC: 32 MG/DL (ref 6–20)
CALCIUM SERPL-MCNC: 10.6 MG/DL (ref 8.6–10.2)
CHLORIDE BLD-SCNC: 93 MMOL/L (ref 98–107)
CO2: 19 MMOL/L (ref 22–29)
CREAT SERPL-MCNC: 1.2 MG/DL (ref 0.7–1.2)
EOSINOPHILS ABSOLUTE: 0.19 E9/L (ref 0.05–0.5)
EOSINOPHILS RELATIVE PERCENT: 1.6 % (ref 0–6)
GFR AFRICAN AMERICAN: >60
GFR NON-AFRICAN AMERICAN: >60 ML/MIN/1.73
GLUCOSE BLD-MCNC: 598 MG/DL (ref 74–99)
HBA1C MFR BLD: 11.3 %
HCT VFR BLD CALC: 40 % (ref 37–54)
HEMOGLOBIN: 12.7 G/DL (ref 12.5–16.5)
IMMATURE GRANULOCYTES #: 0.06 E9/L
IMMATURE GRANULOCYTES %: 0.5 % (ref 0–5)
LYMPHOCYTES ABSOLUTE: 1.41 E9/L (ref 1.5–4)
LYMPHOCYTES RELATIVE PERCENT: 11.5 % (ref 20–42)
MCH RBC QN AUTO: 29.4 PG (ref 26–35)
MCHC RBC AUTO-ENTMCNC: 31.8 % (ref 32–34.5)
MCV RBC AUTO: 92.6 FL (ref 80–99.9)
MONOCYTES ABSOLUTE: 0.35 E9/L (ref 0.1–0.95)
MONOCYTES RELATIVE PERCENT: 2.9 % (ref 2–12)
NEUTROPHILS ABSOLUTE: 10.1 E9/L (ref 1.8–7.3)
NEUTROPHILS RELATIVE PERCENT: 82.6 % (ref 43–80)
PDW BLD-RTO: 13.4 FL (ref 11.5–15)
PLATELET # BLD: 481 E9/L (ref 130–450)
PMV BLD AUTO: 11.1 FL (ref 7–12)
POTASSIUM SERPL-SCNC: 5.7 MMOL/L (ref 3.5–5)
RBC # BLD: 4.32 E12/L (ref 3.8–5.8)
SODIUM BLD-SCNC: 132 MMOL/L (ref 132–146)
TOTAL PROTEIN: 8.3 G/DL (ref 6.4–8.3)
WBC # BLD: 12.2 E9/L (ref 4.5–11.5)

## 2021-04-15 PROCEDURE — 3046F HEMOGLOBIN A1C LEVEL >9.0%: CPT | Performed by: FAMILY MEDICINE

## 2021-04-15 PROCEDURE — 2022F DILAT RTA XM EVC RTNOPTHY: CPT | Performed by: FAMILY MEDICINE

## 2021-04-15 PROCEDURE — 36415 COLL VENOUS BLD VENIPUNCTURE: CPT | Performed by: FAMILY MEDICINE

## 2021-04-15 PROCEDURE — G8427 DOCREV CUR MEDS BY ELIG CLIN: HCPCS | Performed by: FAMILY MEDICINE

## 2021-04-15 PROCEDURE — 99214 OFFICE O/P EST MOD 30 MIN: CPT | Performed by: FAMILY MEDICINE

## 2021-04-15 PROCEDURE — G8419 CALC BMI OUT NRM PARAM NOF/U: HCPCS | Performed by: FAMILY MEDICINE

## 2021-04-15 PROCEDURE — 4004F PT TOBACCO SCREEN RCVD TLK: CPT | Performed by: FAMILY MEDICINE

## 2021-04-15 PROCEDURE — 83036 HEMOGLOBIN GLYCOSYLATED A1C: CPT | Performed by: FAMILY MEDICINE

## 2021-04-15 RX ORDER — CLONIDINE HYDROCHLORIDE 0.2 MG/1
0.2 TABLET ORAL 2 TIMES DAILY
Qty: 60 TABLET | Refills: 2 | Status: SHIPPED
Start: 2021-04-15 | End: 2021-07-06 | Stop reason: SDUPTHER

## 2021-04-15 RX ORDER — LISINOPRIL 20 MG/1
20 TABLET ORAL DAILY
Qty: 30 TABLET | Refills: 2 | Status: SHIPPED
Start: 2021-04-15 | End: 2021-07-06 | Stop reason: SDUPTHER

## 2021-04-15 ASSESSMENT — ENCOUNTER SYMPTOMS
COUGH: 0
DIARRHEA: 0
EYE PAIN: 0
COLOR CHANGE: 0
WHEEZING: 0
CONSTIPATION: 0
SHORTNESS OF BREATH: 0
NAUSEA: 0
VOMITING: 0
ABDOMINAL PAIN: 0

## 2021-04-15 NOTE — PROGRESS NOTES
mouth 2 times daily    Moderate persistent asthma without complication    Bipolar affective disorder in remission ([facility]    Opioid dependence on maintenance agonist therapy, no symptoms [facility])           Attending Physician Statement    I have reviewed the chart, including any radiology or labs. I have discussed the case, including pertinent history and exam findings with the resident. I agree with the assessment, plan and orders as documented by the resident. Please refer to the resident note for additional information.       Electronically signed by Rene Novak DO on 4/16/2021 at 1:19 PM

## 2021-04-15 NOTE — PROGRESS NOTES
he is not very physically active, and that is the reason why he hasn't had to use his inhaler.   - Denies wheezing, shortness of breath, and coughing.   - Still smoking cigarettes. Bipolar disorder   - Not on any psychiatric medications at this time  - Denies depression and anxiety   - Admits to drinking 3 times per week and drinking 7-8 beverages each day. - Admits to gambling once per month. Only takes $100 with him to the [facility] when he gambles. - Denies sexual promiscuity. Denies trouble sleeping. Denies grandiose delusions.   - No SI or HI    Opioid dependence  - Follows with Dr. Jesse Prescott   - On Suboxone therapy  - No drug use currently. Had past addiction to PO narcotics. Current Outpatient Medications   Medication Sig Dispense Refill    lisinopril (PRINIVIL;ZESTRIL) 20 MG tablet Take 1 tablet by mouth daily 30 tablet 2    cloNIDine (CATAPRES) 0.2 MG tablet Take 1 tablet by mouth 2 times daily 60 tablet 2    Insulin Degludec 100 UNIT/ML SOLN Inject 50 Units into the skin nightly 2 vial 2    insulin aspart (NOVOLOG) 100 UNIT/ML injection vial Inject 6-10 Units into the skin 4 times daily 3 vial 2    metoprolol succinate (TOPROL XL) 50 MG extended release tablet Take 1 tablet by mouth 2 times daily 60 tablet 2    amLODIPine (NORVASC) 10 MG tablet Take 1 tablet by mouth daily 30 tablet 2    aspirin 81 MG chewable tablet Take 1 tablet by mouth daily 30 tablet 2    gabapentin (NEURONTIN) 300 MG capsule Take 1 capsule by mouth 2 times daily for 30 days. 60 capsule 2    Insulin Syringe-Needle U-100 (B-D INS SYRINGE 0.5CC/31GX5/16) 31G X 5/16\" 0.5 ML MISC use four times a day 200 each 2    atorvastatin (LIPITOR) 80 MG tablet take 1 tablet by mouth at bedtime      blood glucose test strips (TRUE METRIX BLOOD GLUCOSE TEST) strip 1 each by In Vitro route 5 times daily As needed.  150 each 11    fluticasone (FLOVENT HFA) 220 MCG/ACT inhaler Inhale 1 puff into the lungs 2 times daily 1 Inhaler 3    SUBOXONE 8-2 MG FILM SL film Place 1 Film under the tongue daily. No current facility-administered medications for this visit.        Allergies   Allergen Reactions    Chlorthalidone      Vomiting     Hctz [Hydrochlorothiazide] Nausea Only    Hydralazine      Nausea, headache       Past Medical & Surgical History:      Diagnosis Date    Asthma     [facility]     [facility]     Diabetes mellitus type I ([facility]     Diabetic neuropathy [facility]     Diabetic, retinopathy, proliferative ([facility]     HTN (hypertension)     due to left renal artery stenosis    Hypercholesteremia     Left renal artery stenosis ([facility] 09/19/2019    Macular edema due to secondary diabetes ([facility]     Opioid dependence ([facility])     Proteinuria     Retinal detachment     right eye    Vitreous hemorrhage of right eye due to diabetes mellitus ([facility]      Past Surgical History:   Procedure Laterality Date    EYE SURGERY Right 11/20/2020    PARS PLANA VITRECTOMY, MEMBRANE PEEL, ENDOLASER, GAS FLUID EXCHANGE, 25G, MAC, RIGHT EYE performed by Chente Stokes MD at [address] [facility] Right 01/08/2021    pars plana vitrectomy with peeling of preretinal tissue and gas fluid exchange    EYE SURGERY Right 1/8/2021    PARS PLANA VITRECTOMY, INTERNAL LIMITING MEMBRANE PEEL, INDOCYANINE GREEN, GAS FLUID EXCHANGE, 25g, MAC, RIGHT EYE performed by Chente Stokes MD at [address] [place] [address]       Family History:      Problem Relation Age of Onset    No Known Problems Mother     No Known Problems Father     No Known Problems Brother         Homicide     No Known Problems Daughter     No Known Problems Son     Diabetes type 2  Maternal Grandmother        Social History:  Social History     Tobacco Use    Smoking status: Current Every Day Smoker     Packs/day: 0.50     Years: 15.00     Pack years: 7.50     Types: Cigarettes    Smokeless tobacco: Never Used   Substance Use Topics    Alcohol use: Yes     Comment: not usually        Immunization History   Administered Date(s) Administered    Hepatitis B Ped/Adol (Engerix-B, Recombivax HB) 01/08/2002, 02/12/2002, 08/19/2002    Influenza Vaccine, unspecified formulation 10/09/2002    MMR 08/19/2002    Tdap (Boostrix, Adacel) 10/07/2019       Review of Systems   Constitutional: Negative for appetite change, chills and fever. Eyes: Positive for visual disturbance. Negative for pain. Respiratory: Negative for cough, shortness of breath and wheezing. Cardiovascular: Negative for chest pain, palpitations and leg swelling. Gastrointestinal: Negative for abdominal pain, constipation, diarrhea, nausea and vomiting. Endocrine: Positive for polydipsia. Negative for polyuria. Genitourinary: Negative for dysuria, frequency, hematuria and urgency. Skin: Negative for color change. Neurological: Negative for dizziness, syncope, light-headedness and numbness. BP Readings from Last 3 Encounters:   04/15/21 (!) 152/92   03/11/21 (!) 153/108   01/28/21 132/84       VS:  BP (!) 152/92 (Site: Left Upper Arm, Position: Sitting, Cuff Size: Medium Adult)   Pulse 86   Temp 97 °F (36.1 °C) (Temporal)   Resp 16   Ht 5' 3\" (1.6 m)   Wt 104 lb (47.2 kg)   SpO2 99%   BMI 18.42 kg/m²     Physical Exam  Vitals signs reviewed. Constitutional:       General: He is awake. He is not in acute distress. Appearance: He is well-developed and well-groomed. He is not ill-appearing, toxic-appearing or diaphoretic. Cardiovascular:      Rate and Rhythm: Normal rate and regular rhythm. Heart sounds: S1 normal and S2 normal. No murmur. Pulmonary:      Breath sounds: No decreased breath sounds, wheezing, rhonchi or rales. Abdominal:      General: Abdomen is flat. Bowel sounds are normal.      Palpations: Abdomen is soft. Tenderness: There is no abdominal tenderness. There is no guarding or rebound.    Musculoskeletal:      Right lower leg: He exhibits no deformity. No edema. Left lower leg: He exhibits no deformity. No edema. Feet:      Comments: Diabetic foot exam deferred   Skin:     General: Skin is warm and dry. Neurological:      General: No focal deficit present. Mental Status: He is alert. Psychiatric:         Attention and Perception: Attention normal.         Mood and Affect: Mood normal.         Speech: Speech normal.         Behavior: Behavior is cooperative. Thought Content: Thought content normal.         Cognition and Memory: Cognition normal.         Judgment: Judgment normal.         Assessment/Plan:  1. Uncontrolled type 1 diabetes mellitus with hyperglycemia (HCC)  Uncontrolled. Patient's HgbA1c is 11.3% today. This is increased from 10.8% in 11/16/2020. Will increase patient's Tresiba from 40 units HS to 50 units HS. Continue Novolog SSI. Advised patient to continue to check his BG readings at home. Would benefit from changes in meal-time insulin as well. Consider this adjustment at next appointment. Of note, patient has not established with Endocrinology despite multiple referrals to Endocrinology. He is not interested in the insulin pump, though I think this would benefit patient and help get his DM under control.  - POCT glycosylated hemoglobin (Hb A1C)  - Insulin Degludec 100 UNIT/ML SOLN; Inject 50 Units into the skin nightly  Dispense: 2 vial; Refill: 2    2. Renovascular hypertension  Uncontrolled. Hx of left POLO diagnosed in 2019. Will increase clonidine from 0.1 mg BID to 0.2 mg BID. Continue lisinopril 20 mg QD, amlodipine 10 mg QD, and Toprol XL 50 mg BID. Will check labs. Of note, patient does not tolerate diuretics and has had poor follow-up with Nephrology. Advised patient to check his BP at home and bring his BP readings with him to his next appointment with me in 3 weeks. - lisinopril (PRINIVIL;ZESTRIL) 20 MG tablet; Take 1 tablet by mouth daily  Dispense: 30 tablet;  Refill: 2  - COMPREHENSIVE METABOLIC PANEL; Future  - CBC WITH AUTO DIFFERENTIAL; Future  - cloNIDine (CATAPRES) 0.2 MG tablet; Take 1 tablet by mouth 2 times daily  Dispense: 60 tablet; Refill: 2    3. Moderate persistent asthma without complication  Stable. Advised patient to cut down on cigarette smoking. 4. Bipolar affective disorder in remission ([facility]  Stable. Did discuss with patient that an appropriate amount of alcohol consumption is no more than 2 EtOH drinks per day. Educated patient that alcohol consumption may also be contributing to his HTN. 5. Opioid dependence on maintenance agonist therapy, no symptoms (HCC)  Stable. Continue follow-up with Dr. Ayla Ordonez for Suboxone therapy. Return in about 3 weeks (around 5/6/2021) for follow-up for DM, HTN .     Trisha Guevara DO, PGY-3

## 2021-04-16 ENCOUNTER — TELEPHONE (OUTPATIENT)
Dept: FAMILY MEDICINE CLINIC | Age: 31
End: 2021-04-16

## 2021-04-16 ENCOUNTER — HOSPITAL ENCOUNTER (EMERGENCY)
Age: 31
Discharge: HOME | End: 2021-04-16
Attending: EMERGENCY MEDICINE
Payer: COMMERCIAL

## 2021-04-16 VITALS
TEMPERATURE: 97.6 F | HEART RATE: 74 BPM | BODY MASS INDEX: 18.43 KG/M2 | RESPIRATION RATE: 17 BRPM | HEIGHT: 63 IN | OXYGEN SATURATION: 98 % | WEIGHT: 104 LBS | DIASTOLIC BLOOD PRESSURE: 68 MMHG | SYSTOLIC BLOOD PRESSURE: 114 MMHG

## 2021-04-16 DIAGNOSIS — R73.9 HYPERGLYCEMIA: Primary | ICD-10-CM

## 2021-04-16 LAB
ALBUMIN SERPL-MCNC: 4.1 G/DL (ref 3.5–5.2)
ALP BLD-CCNC: 159 U/L (ref 40–129)
ALT SERPL-CCNC: 6 U/L (ref 0–40)
ANION GAP SERPL CALCULATED.3IONS-SCNC: 17 MMOL/L (ref 7–16)
ANION GAP SERPL CALCULATED.3IONS-SCNC: 9 MMOL/L (ref 7–16)
AST SERPL-CCNC: 12 U/L (ref 0–39)
BACTERIA: ABNORMAL /HPF
BASOPHILS ABSOLUTE: 0.11 E9/L (ref 0–0.2)
BASOPHILS RELATIVE PERCENT: 0.6 % (ref 0–2)
BETA-HYDROXYBUTYRATE: 0.27 MMOL/L (ref 0.02–0.27)
BILIRUB SERPL-MCNC: <0.2 MG/DL (ref 0–1.2)
BILIRUBIN URINE: NEGATIVE
BLOOD, URINE: NEGATIVE
BUN BLDV-MCNC: 27 MG/DL (ref 6–20)
BUN BLDV-MCNC: 31 MG/DL (ref 6–20)
CALCIUM SERPL-MCNC: 8.4 MG/DL (ref 8.6–10.2)
CALCIUM SERPL-MCNC: 9.7 MG/DL (ref 8.6–10.2)
CHLORIDE BLD-SCNC: 109 MMOL/L (ref 98–107)
CHLORIDE BLD-SCNC: 97 MMOL/L (ref 98–107)
CLARITY: CLEAR
CO2: 22 MMOL/L (ref 22–29)
CO2: 23 MMOL/L (ref 22–29)
COLOR: YELLOW
CREAT SERPL-MCNC: 1.1 MG/DL (ref 0.7–1.2)
CREAT SERPL-MCNC: 1.3 MG/DL (ref 0.7–1.2)
EOSINOPHILS ABSOLUTE: 0.18 E9/L (ref 0.05–0.5)
EOSINOPHILS RELATIVE PERCENT: 1 % (ref 0–6)
GFR AFRICAN AMERICAN: >60
GFR AFRICAN AMERICAN: >60
GFR NON-AFRICAN AMERICAN: >60 ML/MIN/1.73
GFR NON-AFRICAN AMERICAN: >60 ML/MIN/1.73
GLUCOSE BLD-MCNC: 113 MG/DL (ref 74–99)
GLUCOSE BLD-MCNC: 375 MG/DL (ref 74–99)
GLUCOSE URINE: >=1000 MG/DL
HCT VFR BLD CALC: 37.7 % (ref 37–54)
HEMOGLOBIN: 12.4 G/DL (ref 12.5–16.5)
IMMATURE GRANULOCYTES #: 0.16 E9/L
IMMATURE GRANULOCYTES %: 0.9 % (ref 0–5)
KETONES, URINE: NEGATIVE MG/DL
LACTIC ACID: 1.9 MMOL/L (ref 0.5–2.2)
LACTIC ACID: 5.4 MMOL/L (ref 0.5–2.2)
LEUKOCYTE ESTERASE, URINE: ABNORMAL
LYMPHOCYTES ABSOLUTE: 1.64 E9/L (ref 1.5–4)
LYMPHOCYTES RELATIVE PERCENT: 9 % (ref 20–42)
MAGNESIUM: 1.9 MG/DL (ref 1.6–2.6)
MCH RBC QN AUTO: 30 PG (ref 26–35)
MCHC RBC AUTO-ENTMCNC: 32.9 % (ref 32–34.5)
MCV RBC AUTO: 91.3 FL (ref 80–99.9)
METER GLUCOSE: 106 MG/DL (ref 74–99)
METER GLUCOSE: 365 MG/DL (ref 74–99)
MONOCYTES ABSOLUTE: 0.52 E9/L (ref 0.1–0.95)
MONOCYTES RELATIVE PERCENT: 2.9 % (ref 2–12)
NEUTROPHILS ABSOLUTE: 15.53 E9/L (ref 1.8–7.3)
NEUTROPHILS RELATIVE PERCENT: 85.6 % (ref 43–80)
NITRITE, URINE: NEGATIVE
OSMOLALITY: 307 MOSM/KG (ref 285–310)
PDW BLD-RTO: 13.5 FL (ref 11.5–15)
PH UA: 5.5 (ref 5–9)
PHOSPHORUS: 2.1 MG/DL (ref 2.5–4.5)
PLATELET # BLD: 404 E9/L (ref 130–450)
PMV BLD AUTO: 10 FL (ref 7–12)
POTASSIUM SERPL-SCNC: 4.5 MMOL/L (ref 3.5–5)
POTASSIUM SERPL-SCNC: 4.9 MMOL/L (ref 3.5–5)
PROTEIN UA: 30 MG/DL
RBC # BLD: 4.13 E12/L (ref 3.8–5.8)
RBC UA: ABNORMAL /HPF (ref 0–2)
SODIUM BLD-SCNC: 136 MMOL/L (ref 132–146)
SODIUM BLD-SCNC: 141 MMOL/L (ref 132–146)
SPECIFIC GRAVITY UA: 1.02 (ref 1–1.03)
TOTAL PROTEIN: 7.7 G/DL (ref 6.4–8.3)
UROBILINOGEN, URINE: 0.2 E.U./DL
WBC # BLD: 18.1 E9/L (ref 4.5–11.5)
WBC UA: ABNORMAL /HPF (ref 0–5)

## 2021-04-16 PROCEDURE — 83735 ASSAY OF MAGNESIUM: CPT

## 2021-04-16 PROCEDURE — 82962 GLUCOSE BLOOD TEST: CPT

## 2021-04-16 PROCEDURE — 83930 ASSAY OF BLOOD OSMOLALITY: CPT

## 2021-04-16 PROCEDURE — 87088 URINE BACTERIA CULTURE: CPT

## 2021-04-16 PROCEDURE — 80048 BASIC METABOLIC PNL TOTAL CA: CPT

## 2021-04-16 PROCEDURE — 83605 ASSAY OF LACTIC ACID: CPT

## 2021-04-16 PROCEDURE — 2580000003 HC RX 258: Performed by: EMERGENCY MEDICINE

## 2021-04-16 PROCEDURE — 80053 COMPREHEN METABOLIC PANEL: CPT

## 2021-04-16 PROCEDURE — 85025 COMPLETE CBC W/AUTO DIFF WBC: CPT

## 2021-04-16 PROCEDURE — 82010 KETONE BODYS QUAN: CPT

## 2021-04-16 PROCEDURE — 99284 EMERGENCY DEPT VISIT MOD MDM: CPT

## 2021-04-16 PROCEDURE — 81001 URINALYSIS AUTO W/SCOPE: CPT

## 2021-04-16 PROCEDURE — 84100 ASSAY OF PHOSPHORUS: CPT

## 2021-04-16 RX ORDER — 0.9 % SODIUM CHLORIDE 0.9 %
1000 INTRAVENOUS SOLUTION INTRAVENOUS ONCE
Status: COMPLETED | OUTPATIENT
Start: 2021-04-16 | End: 2021-04-16

## 2021-04-16 RX ORDER — SODIUM CHLORIDE 0.9 % (FLUSH) 0.9 %
10 SYRINGE (ML) INJECTION PRN
Status: DISCONTINUED | OUTPATIENT
Start: 2021-04-16 | End: 2021-04-16 | Stop reason: HOSPADM

## 2021-04-16 RX ADMIN — SODIUM CHLORIDE 1000 ML: 9 INJECTION, SOLUTION INTRAVENOUS at 13:41

## 2021-04-16 RX ADMIN — SODIUM CHLORIDE 1000 ML: 9 INJECTION, SOLUTION INTRAVENOUS at 12:11

## 2021-04-16 ASSESSMENT — ENCOUNTER SYMPTOMS
RHINORRHEA: 0
BLOOD IN STOOL: 0
VOMITING: 0
SHORTNESS OF BREATH: 0
TROUBLE SWALLOWING: 0
COLOR CHANGE: 0
ABDOMINAL PAIN: 0
COUGH: 0
NAUSEA: 0
DIARRHEA: 0

## 2021-04-16 NOTE — ED NOTES
Pt alert and oriented x4. Speech clear. Respirations easy/unlabored. Skin warm/dry. Appropriate color. No signs of acute distress noted. Pt teaching provided; verbalized understanding. Pt stable for discharge.        Onel Castro RN  04/16/21 1600

## 2021-04-16 NOTE — ED NOTES
FIRST PROVIDER CONTACT ASSESSMENT NOTE      Department of Emergency Medicine   ED  First Provider Note   4/16/21  11:18 AM EDT    Chief Complaint: Hyperglycemia (, Gap was 20 per PCP)      History of Present Illness:    Thompson Councilman is a 27 y.o. male who presents to the ED by private car at request of primary care doctor for elevated blood sugar and increased anion gap. Patient is insulin-dependent diabetic with concerns for acute DKA. Focused Screening Exam:  Constitutional:  Alert, appears stated age and is in no distress.       *ALLERGIES*     Chlorthalidone, Hctz [hydrochlorothiazide], and Hydralazine     ED Triage Vitals   BP Temp Temp src Pulse Resp SpO2 Height Weight   04/16/21 1115 04/16/21 1050 -- 04/16/21 1050 04/16/21 1050 04/16/21 1050 04/16/21 1115 04/16/21 1115   112/71 97.5 °F (36.4 °C)  94 16 98 % 5' 3\" (1.6 m) 104 lb (47.2 kg)        Initial Plan of Care:  Initiate Treatment-Testing, Proceed toTreatment Area When Bed Available for ED Attending/MLP to Continue Care    -----------------[address] ASSESSMENT NOTE--------------  Electronically signed by DEVAUGHN Moran   DD: 4/16/21       [facility] [place], [place]  04/16/21 1119

## 2021-04-16 NOTE — ED NOTES
Bed: 17A-17  Expected date:   Expected time:   Means of arrival:   Comments:  triage     Salinas Price RN  04/16/21 1124

## 2021-04-16 NOTE — ED PROVIDER NOTES
ED PROVIDER NOTE    Chief Complaint   Patient presents with    Hyperglycemia     , Gap was 20 per PCP       HPI:  4/16/21,   Time: 12:04 PM EDT       Michael Brown is a 27 y.o. male presenting to the ED for abnormal labs. Sent by PCP who checked labs yesterday which showed hyperglycemia w/ high anion gap metabolic acidosis. Patient denies any complaints currently. BG has been running high over the past few days, taking insulin as prescribed. No recent illness, fever, chills, nausea, vomiting, diarrhea, chest pain, shortness of breath, cough. +polyuria and polydipsia. Hx of DKA in the past, does not feel similar right now. Complaint has been gradual onset, persistent since onset, moderate in severity, no aggravating/alleviating factors. Chart review: hx of DM1, HTN, HLD, bipolar affective, asthma    Review of Systems:     Review of Systems   Constitutional: Negative for appetite change, chills and fever. HENT: Negative for congestion, rhinorrhea and trouble swallowing. Eyes: Negative for visual disturbance. Respiratory: Negative for cough and shortness of breath. Cardiovascular: Negative for chest pain and leg swelling. Gastrointestinal: Negative for abdominal pain, blood in stool, diarrhea, nausea and vomiting. Endocrine: Positive for polydipsia and polyuria. Genitourinary: Negative for decreased urine volume, difficulty urinating, dysuria, frequency, hematuria and urgency. Musculoskeletal: Negative for myalgias, neck pain and neck stiffness. Skin: Negative for color change.    Neurological: Negative for dizziness, syncope, weakness, light-headedness, numbness and headaches.         --------------------------------------------- PAST HISTORY ---------------------------------------------  Past Medical History:   Past Medical History:   Diagnosis Date    Asthma     Bipolar affective ([facility]     COPD (chronic obstructive pulmonary disease) ([facility]     Diabetes mellitus type I ([facility]  Diabetic neuropathy (HCC)     Diabetic, retinopathy, proliferative ([address]     HTN (hypertension)     due to left renal artery stenosis    Hypercholesteremia     Left renal artery stenosis ([address] 09/19/2019    Macular edema due to secondary diabetes ([address]     Opioid dependence (HCC)     Proteinuria     Retinal detachment     right eye    Vitreous hemorrhage of right eye due to diabetes mellitus ([address]        Past Surgical History:   Past Surgical History:   Procedure Laterality Date    EYE SURGERY Right 11/20/2020    PARS PLANA VITRECTOMY, MEMBRANE PEEL, ENDOLASER, GAS FLUID EXCHANGE, 25G, MAC, RIGHT EYE performed by Tai Ruffin MD at [address] [facility] Right 01/08/2021    pars plana vitrectomy with peeling of preretinal tissue and gas fluid exchange    EYE SURGERY Right 1/8/2021    PARS PLANA VITRECTOMY, INTERNAL LIMITING MEMBRANE PEEL, INDOCYANINE GREEN, GAS FLUID EXCHANGE, 25g, MAC, RIGHT EYE performed by Tai Ruffin MD at [address] History:   Social History     Socioeconomic History    Marital status:      Spouse name: None    Number of children: 2    Years of education: None    Highest education level: None   Occupational History    Occupation: Unemployed     Comment: Last job [employer]   Social Needs    Financial resource strain: None    Food insecurity     Worry: None     Inability: None    Transportation needs     Medical: None     Non-medical: None   Tobacco Use    Smoking status: Current Every Day Smoker     Packs/day: 0.50     Years: 15.00     Pack years: 7.50     Types: Cigarettes    Smokeless tobacco: Never Used   Substance and Sexual Activity    Alcohol use: Yes     Comment: not usually     Drug use: Not Currently     Types: Opiates      Comment: 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 10/2017, now takes Suboxone    Sexual activity: None   Lifestyle    Physical activity     Days per week: None     Minutes per session: None    Stress: None   Relationships    Social connections     Talks on phone: None     Gets together: None     Attends Muslim service: None     Active member of club or organization: None     Attends meetings of clubs or organizations: None     Relationship status: None    Intimate partner violence     Fear of current or ex partner: None     Emotionally abused: None     Physically abused: None     Forced sexual activity: None   Other Topics Concern    None   Social History Narrative    None       Family History:   Family History   Problem Relation Age of Onset    No Known Problems Mother     No Known Problems Father     No Known Problems Brother         Homicide     No Known Problems Daughter     No Known Problems Son     Diabetes type 2  Maternal Grandmother        The patients home medications have been reviewed. Allergies: Allergies   Allergen Reactions    Chlorthalidone      Vomiting     Hctz [Hydrochlorothiazide] Nausea Only    Hydralazine      Nausea, headache           ---------------------------------------------------PHYSICAL EXAM--------------------------------------    /71   Pulse 95   Temp 97.5 °F (36.4 °C)   Resp 16   Ht 5' 3\" (1.6 m)   Wt 104 lb (47.2 kg)   SpO2 99%   BMI 18.42 kg/m²     Physical Exam  Vitals signs and nursing note reviewed. Constitutional:       General: He is not in acute distress. Appearance: He is not toxic-appearing. HENT:      Mouth/Throat:      Mouth: Mucous membranes are moist.   Eyes:      General: No scleral icterus. Extraocular Movements: Extraocular movements intact. Pupils: Pupils are equal, round, and reactive to light. Neck:      Musculoskeletal: Normal range of motion and neck supple. No neck rigidity or muscular tenderness. Cardiovascular:      Rate and Rhythm: Normal rate and regular rhythm. Pulses: Normal pulses. Heart sounds: Normal heart sounds. No murmur.    Pulmonary:      Effort: Pulmonary effort is normal. No respiratory distress. Breath sounds: Normal breath sounds. No wheezing or rales. Abdominal:      General: There is no distension. Palpations: Abdomen is soft. Tenderness: There is no abdominal tenderness. There is no guarding or rebound. Musculoskeletal: Normal range of motion. General: No swelling or tenderness. Comments: Radial, DP, and PT pulses 2+ bilaterally. Skin:     General: Skin is warm and dry. Neurological:      Mental Status: He is alert and oriented to person, place, and time. Comments: Strength 5/5 and sensation grossly intact to light touch and equal bilaterally throughout all extremities            -------------------------------------------------- RESULTS -------------------------------------------------  I have personally reviewed all laboratory and imaging results for this patient. Results are listed below.      LABS:  Labs Reviewed   CBC WITH AUTO DIFFERENTIAL - Abnormal; Notable for the following components:       Result Value    WBC 18.1 (*)     Hemoglobin 12.4 (*)     Neutrophils % 85.6 (*)     Lymphocytes % 9.0 (*)     Neutrophils Absolute 15.53 (*)     All other components within normal limits   COMPREHENSIVE METABOLIC PANEL - Abnormal; Notable for the following components:    Chloride 97 (*)     Anion Gap 17 (*)     Glucose 375 (*)     BUN 31 (*)     CREATININE 1.3 (*)     Alkaline Phosphatase 159 (*)     All other components within normal limits   URINALYSIS - Abnormal; Notable for the following components:    Glucose, Ur >=1000 (*)     Protein, UA 30 (*)     Leukocyte Esterase, Urine TRACE (*)     All other components within normal limits   LACTIC ACID, PLASMA - Abnormal; Notable for the following components:    Lactic Acid 5.4 (*)     All other components within normal limits    Narrative:     CALL  Lyle  H34 tel. ,  Chemistry results called to and read back by nnamdi grimes rn, 04/16/2021  12:33, by EDWAR   PHOSPHORUS - Abnormal; Notable for the following components:    Phosphorus 2.1 (*)     All other components within normal limits   BASIC METABOLIC PANEL - Abnormal; Notable for the following components:    Chloride 109 (*)     Glucose 113 (*)     BUN 27 (*)     Calcium 8.4 (*)     All other components within normal limits   MICROSCOPIC URINALYSIS - Abnormal; Notable for the following components:    Bacteria, UA RARE (*)     All other components within normal limits   POCT GLUCOSE - Abnormal; Notable for the following components:    Meter Glucose 365 (*)     All other components within normal limits   POCT GLUCOSE - Abnormal; Notable for the following components:    Meter Glucose 106 (*)     All other components within normal limits   CULTURE, URINE   BETA-HYDROXYBUTYRATE   OSMOLALITY, SERUM   MAGNESIUM   LACTIC ACID, PLASMA       ------------------------- NURSING NOTES AND VITALS REVIEWED ---------------------------   The nursing notes within the ED encounter and vital signs as below have been reviewed by myself. /71   Pulse 95   Temp 97.5 °F (36.4 °C)   Resp 16   Ht 5' 3\" (1.6 m)   Wt 104 lb (47.2 kg)   SpO2 99%   BMI 18.42 kg/m²   Oxygen Saturation Interpretation: Normal    The patients available past medical records and past encounters were reviewed. ------------------------------ ED COURSE/MEDICAL DECISION MAKING----------------------  Medications   sodium chloride flush 0.9 % injection 10 mL (has no administration in time range)   0.9 % sodium chloride bolus (0 mLs Intravenous Stopped 21 1311)   0.9 % sodium chloride bolus (0 mLs Intravenous Stopped 21 1441)     Counseling: The emergency provider has spoken with the patient and discussed todays results, in addition to providing specific details for the plan of care and counseling regarding the diagnosis and prognosis. Questions are answered at this time and they are agreeable with the plan.     ED Course/Medical Decision Makin y.o. male here with abnormal labs. Non-toxic appearing, afebrile, hemodynamically stable, and in no acute distress. Hyperglycemic w/ mild anion gap, no acidosis, treated w/ IV fluids x 2L and on repeat BMP elevated AG resolved. After discussion of findings and return precautions, patient agrees with plan for discharge and outpatient follow up with PCP.       --------------------------------- IMPRESSION AND DISPOSITION ---------------------------------    IMPRESSION  1. Hyperglycemia        DISPOSITION  Disposition: Discharge to home  Patient condition is good    NOTE: This report was transcribed using voice recognition software.  Every effort was made to ensure accuracy; however, inadvertent computerized transcription errors may be present    Lianne Mahan MD  Attending Emergency Physician          Lianne Mahan MD  04/16/21 1958

## 2021-04-16 NOTE — TELEPHONE ENCOUNTER
----- Message from Alisson Guzman MD sent at 4/16/2021  1:06 AM EDT -----  Regarding: CALL IMMEDIATELY  Pt labs show DKA. Refused to go to ED last night. Please call and check him this morning to see what is status is. He told me he was going to go in the morning. If he is confused and not coherent, please reach me ASAP, he ll need medical attention immediately. Thank you!

## 2021-04-16 NOTE — TELEPHONE ENCOUNTER
Patients mom called in and states that she just dropped him off at [facility] on [address]

## 2021-04-16 NOTE — TELEPHONE ENCOUNTER
I tried called multiple time to pt # with no answer. Spoke with Dr. Bebe Pallas, attending on call regarding his case. Given this sticky situation and him avoiding the calls or no answer on the other end, worrisome for DKA symptoms setting in with possibility of confusion and incoherency. Vmail left to pt that if no call received, I ll have to call  to stop by at his address. 911 was called 12:33 AM in concern for pt's health given his condition. They are aware with the situation and will have an officer stop by to check on the patient. Pt called back 12:39 AM, he reports that he doesn't want to go to ED tonight. He will go in the morning. He lives with mom and she is upstairs. He is alert and oriented. He know the risks of not going to ED/hospital. He claims that Alyssa Aldana has gone to ED before for same DKA and signed out AMA. Therefore he doesn't see the point in going if he is going to just sign out again. He understands why I m concerned for his health but he has been dealing with this for 20 some years, so he is [id number] [address]. He just wants to sleep right now. \" He has no intension of harming himself. Before ending call, he reports that he will likely go in the morning. I told him that I can't force him to admission as he has capacity and is coherent of his decisions. I stressed how life threatening and dangerous his decision to staying home can be. He fully understands and verbalizes it back. Will have clinic call him in the morning for status update on him. 12:49 911 recalled by me to update on pt and likely no officer check need. However, I was informed that they did stop and assessed him already which might have been why he was awake and called back to our #.

## 2021-04-16 NOTE — TELEPHONE ENCOUNTER
Lab called regarding patient glucose over 500. Reviewing his lab work, pt is in DKA. Called patient to inform him about his results. He is a type 1 DM. Reports his glucose has been hard to manage right now. He is about to go to bed. I advised him that he needs to go to the neared ER ASAP to be evaluated. He reports he has been in DKA before and this doesn't feel like one. I went over what his labs mean and why it would be dangerous if he doesn't go to ER as risk of being in diabetic coma and death. He reports he doesn't drive, but will call someone to have him drive over. Patient alert and oriented and reports he will go to nearest ED either [facility] or [facility]. Pt to call me back again when he is on his way to ED. He understands and verbalizes the plan.

## 2021-04-18 LAB — URINE CULTURE, ROUTINE: NORMAL

## 2021-04-19 ENCOUNTER — TELEPHONE (OUTPATIENT)
Dept: FAMILY MEDICINE CLINIC | Age: 31
End: 2021-04-19

## 2021-04-19 NOTE — TELEPHONE ENCOUNTER
Patient was seen at [address] [place] [address] ED. Patient needs ED follow-up appointment with me. 30 minutes. Thanks!

## 2021-04-19 NOTE — TELEPHONE ENCOUNTER
----- Message from Pam Davies DO sent at 4/19/2021  7:51 AM EDT Needs ER follow-up appointment with me. 30 minutes. Thanks!

## 2021-04-20 ENCOUNTER — TELEPHONE (OUTPATIENT)
Dept: FAMILY MEDICINE CLINIC | Age: 31
End: 2021-04-20

## 2021-04-20 NOTE — TELEPHONE ENCOUNTER
Spoke to patient just now. States that he took his BP medications around 8:00 AM this morning. States that he picked up his BP medication refills around 11:00 AM today. He reports that he checked his BP about 20 minutes ago via his home BP wrist cuff. He states that it read 99/54 mmHg. He denies chest pain, palpitations, headaches, lightheadedness, dizziness, and shortness of breath at this time. He does have double vision and blurry vision secondary to his DM retinopathy, macular edema, and vitreous detachment, which are long-standing conditions and are being followed by his retinal surgeon, Dr. Erlinda Hilton. He has failed anti-VEGF injections and is now getting laser therapy. Patient has a long-standing history of having high BP readings in the 180s/90-100s in the office settings. On recheck in the office, his BP usually comes down to 140-150s/90s. As he is currently asymptomatic, I advised patient to continue his BP medications as prescribed. I asked patient to come to the office tomorrow morning to have his BP rechecked in the office. He states that he cannot come to the office tomorrow because he has an appointment with Dr. Erlinda Hilton. I asked patient to take his BP with an arm cuff tomorrow morning at home, as he states that his Mom, who is a nurse, has an arm BP cuff. He states that he will call the office tomorrow with his BP reading for me to review.

## 2021-04-21 LAB — DIABETIC RETINOPATHY: POSITIVE

## 2021-05-17 RX ORDER — ASPIRIN 81 MG/1
81 TABLET, CHEWABLE ORAL DAILY
Qty: 90 TABLET | Refills: 1 | Status: SHIPPED
Start: 2021-05-17 | End: 2021-11-02 | Stop reason: SDUPTHER

## 2021-05-17 NOTE — TELEPHONE ENCOUNTER
Medication approved and sent to pharmacy. Patient needs follow-up appointment with me for T1DM and HTN.

## 2021-06-09 ENCOUNTER — HOSPITAL ENCOUNTER (EMERGENCY)
Age: 31
Discharge: HOME | End: 2021-06-09
Attending: EMERGENCY MEDICINE
Payer: COMMERCIAL

## 2021-06-09 VITALS
HEART RATE: 102 BPM | DIASTOLIC BLOOD PRESSURE: 92 MMHG | HEIGHT: 63 IN | WEIGHT: 108 LBS | TEMPERATURE: 98 F | BODY MASS INDEX: 19.14 KG/M2 | OXYGEN SATURATION: 98 % | RESPIRATION RATE: 18 BRPM | SYSTOLIC BLOOD PRESSURE: 162 MMHG

## 2021-06-09 DIAGNOSIS — L23.7 POISON IVY DERMATITIS: Primary | ICD-10-CM

## 2021-06-09 PROCEDURE — 6370000000 HC RX 637 (ALT 250 FOR IP): Performed by: EMERGENCY MEDICINE

## 2021-06-09 PROCEDURE — 99284 EMERGENCY DEPT VISIT MOD MDM: CPT

## 2021-06-09 RX ORDER — TRIAMCINOLONE ACETONIDE 5 MG/G
CREAM TOPICAL
Qty: 45 G | Refills: 0 | Status: SHIPPED | OUTPATIENT
Start: 2021-06-09 | End: 2021-06-16

## 2021-06-09 RX ORDER — DIPHENHYDRAMINE HCL 25 MG
25 TABLET ORAL ONCE
Status: COMPLETED | OUTPATIENT
Start: 2021-06-09 | End: 2021-06-09

## 2021-06-09 RX ORDER — HYDROXYZINE PAMOATE 50 MG/1
50 CAPSULE ORAL 3 TIMES DAILY PRN
Qty: 21 CAPSULE | Refills: 0 | Status: SHIPPED | OUTPATIENT
Start: 2021-06-09 | End: 2021-06-16

## 2021-06-09 RX ORDER — PREDNISONE 10 MG/1
40 TABLET ORAL DAILY
Qty: 20 TABLET | Refills: 0 | Status: SHIPPED | OUTPATIENT
Start: 2021-06-09 | End: 2021-06-14

## 2021-06-09 RX ADMIN — DIPHENHYDRAMINE HYDROCHLORIDE 25 MG: 25 TABLET ORAL at 15:00

## 2021-06-09 NOTE — ED PROVIDER NOTES
HPI:  6/9/21,   Time: 3:03 PM EDT         Michael Brown is a 27 y.o. male presenting to the ED for rash, beginning more than 1 day ago. The complaint has been persistent, moderate in severity, and worsened by nothing. Patient has developed a rash on both forearms especially the right. He states it is very itchy. He has multiple areas of rash on the right forearm and one noted on the left forearm. Denies any shortness of breath or throat closing. States no new soaps close detergents. No antibiotics. States was cutting grass but did not notice any wheeze. ROS:   Pertinent positives and negatives are stated within HPI, all other systems reviewed and are negative.  --------------------------------------------- PAST HISTORY ---------------------------------------------  Past Medical History:  has a past medical history of Asthma, Bipolar affective ([address] COPD (chronic obstructive pulmonary disease) ([address] Diabetes mellitus type I ([address] Diabetic neuropathy ([address] Diabetic, retinopathy, proliferative ([address] HTN (hypertension), Hypercholesteremia, Left renal artery stenosis ([address] Macular edema due to secondary diabetes ([address] Opioid dependence ([address] Proteinuria, Retinal detachment, and Vitreous hemorrhage of right eye due to diabetes mellitus ([address] Past Surgical History:  has a past surgical history that includes Eye surgery (Right, 11/20/2020); Eye surgery (Right, 01/08/2021); and Eye surgery (Right, 1/8/2021). Social History:  reports that he has been smoking cigarettes. He has a 7.50 pack-year smoking history. He has never used smokeless tobacco. He reports current alcohol use. He reports previous drug use. Drug: Opiates . Family History: family history includes Diabetes type 2  in his maternal grandmother; No Known Problems in his brother, daughter, father, mother, and son. The patients home medications have been reviewed.     Allergies: Chlorthalidone, Hctz [hydrochlorothiazide], and on steroids and antihistamines, Vistaril    Counseling: The emergency provider has spoken with the patient and discussed todays results, in addition to providing specific details for the plan of care and counseling regarding the diagnosis and prognosis. Questions are answered at this time and they are agreeable with the plan.      --------------------------------- IMPRESSION AND DISPOSITION ---------------------------------    IMPRESSION  1.  Poison ivy dermatitis        DISPOSITION  Disposition: Discharge to home  Patient condition is fair                  Marco Antonio Duran MD  06/09/21 [id number] Has seen therapist and  psychiatrist in intensive outpatient program in [place] in the past.  2 prior hospitalizations at [facility] in 2010 and 2011 while on bail for armed robbery charges - previous meds include klonopin, risperidone, cogentin, seroquel (currently), zoloft, and abilify; unknown if pt is currently treated by psychiatrist Has seen therapist and  psychiatrist in intensive outpatient program in [place] in the past.  2 prior hospitalizations at [facility] in 2010 and 2011 while on bail for armed robbery charges - previous meds Multiple psychiatric hospitalizations, Patient Has seen therapist and  psychiatrist in intensive outpatient program in [place] in the past.  Multiple prior hospitalizations at [facility] in 2010 and 2011 while on bail for armed robbery charges. Also hospitalized 2016 for swallowing a razor blade  swallowed a toothpaste/tube//chewed on restraint while in [facility], pills and spork ([id number]) all in [facility] Multiple psychiatric hospitalizations, Patient Has seen therapist and  psychiatrist in intensive outpatient program in [place] in the past.  Multiple prior hospitalizations at [facility] in 2010 and 2011 while on bail for armed robbery charges. Also hospitalized 2016 for swallowing a razor blade  swallowed a toothpaste/tube//chewed on restraint while in [facility], pills and spork ([id number]) all in [facility].  Was following up at [facility] but non compliant.

## 2021-07-06 DIAGNOSIS — I15.0 RENOVASCULAR HYPERTENSION: ICD-10-CM

## 2021-07-06 RX ORDER — LISINOPRIL 20 MG/1
20 TABLET ORAL DAILY
Qty: 30 TABLET | Refills: 2 | Status: SHIPPED
Start: 2021-07-06 | End: 2021-08-05 | Stop reason: SDUPTHER

## 2021-07-06 RX ORDER — CLONIDINE HYDROCHLORIDE 0.2 MG/1
0.2 TABLET ORAL 2 TIMES DAILY
Qty: 60 TABLET | Refills: 2 | Status: SHIPPED
Start: 2021-07-06 | End: 2021-11-02 | Stop reason: SDUPTHER

## 2021-07-06 NOTE — TELEPHONE ENCOUNTER
Last Appointment   4/15/2021  Next Appointment  Visit date not found  Pt instructed to call and set up appt

## 2021-07-12 DIAGNOSIS — I15.0 RENOVASCULAR HYPERTENSION: ICD-10-CM

## 2021-07-12 RX ORDER — GABAPENTIN 300 MG/1
300 CAPSULE ORAL 2 TIMES DAILY
Qty: 60 CAPSULE | Refills: 2 | Status: SHIPPED
Start: 2021-07-12 | End: 2021-11-02 | Stop reason: SDUPTHER

## 2021-07-12 RX ORDER — METOPROLOL SUCCINATE 50 MG/1
50 TABLET, EXTENDED RELEASE ORAL 2 TIMES DAILY
Qty: 60 TABLET | Refills: 2 | Status: SHIPPED
Start: 2021-07-12 | End: 2021-11-02 | Stop reason: SDUPTHER

## 2021-08-06 ENCOUNTER — TELEPHONE (OUTPATIENT)
Dept: FAMILY MEDICINE CLINIC | Age: 31
End: 2021-08-06

## 2021-08-06 NOTE — TELEPHONE ENCOUNTER
----- Message from [facility] sent at 8/6/2021  2:52 PM EDT -----  Subject: Appointment Request    Reason for Call: Routine Physical Exam    QUESTIONS  Type of Appointment? Established Patient  Reason for appointment request? No appointments available during search  Additional Information for Provider? Pt is requesting a physical required   for work and he beings on 8/11/21. This physical must be completed prior   to that appointment. Please. thank you  ---------------------------------------------------------------------------  --------------  CALL BACK INFO  What is the best way for the office to contact you? OK to leave message on   voicemail  Preferred Call Back Phone Number? [phone]  ---------------------------------------------------------------------------  --------------  SCRIPT ANSWERS  Relationship to Patient? Self  (If the patient has Medicare as their primary insurance coverage ask this   question) Are you requesting a Medicare Annual Wellness Visit? No  (Is the patient requesting a pap smear with their physical exam?)? No  (Is the patient requesting their annual physical and does not need PAP or   AWV per above?)? Yes   Have you been diagnosed with, awaiting test results for, or told that you   are suspected of having COVID-19 (Coronavirus)? (If patient has tested   negative or was tested as a requirement for work, school, or travel and   not based on symptoms, answer no)? No  Do you currently have flu-like symptoms including fever or chills, cough,   shortness of breath, difficulty breathing, or new loss of taste or smell? No  Have you had close contact with someone with COVID-19 in the last 14 days? No  (Service Expert  click yes below to proceed with [employer] As Usual   Scheduling)?  Yes

## 2021-08-10 ENCOUNTER — OFFICE VISIT (OUTPATIENT)
Dept: FAMILY MEDICINE CLINIC | Age: 31
End: 2021-08-10
Payer: COMMERCIAL

## 2021-08-10 VITALS
WEIGHT: 106.8 LBS | HEART RATE: 132 BPM | BODY MASS INDEX: 18.23 KG/M2 | RESPIRATION RATE: 16 BRPM | SYSTOLIC BLOOD PRESSURE: 130 MMHG | TEMPERATURE: 97.1 F | OXYGEN SATURATION: 97 % | DIASTOLIC BLOOD PRESSURE: 98 MMHG | HEIGHT: 64 IN

## 2021-08-10 DIAGNOSIS — Z02.89 ENCOUNTER FOR PHYSICAL EXAMINATION RELATED TO EMPLOYMENT: Primary | ICD-10-CM

## 2021-08-10 DIAGNOSIS — F31.9 BIPOLAR AFFECTIVE DISORDER, REMISSION STATUS UNSPECIFIED (HCC): ICD-10-CM

## 2021-08-10 DIAGNOSIS — E10.65 TYPE 1 DIABETES MELLITUS WITH HYPERGLYCEMIA (HCC): ICD-10-CM

## 2021-08-10 LAB — HBA1C MFR BLD: 11.7 %

## 2021-08-10 PROCEDURE — 83036 HEMOGLOBIN GLYCOSYLATED A1C: CPT | Performed by: FAMILY MEDICINE

## 2021-08-10 PROCEDURE — 99395 PREV VISIT EST AGE 18-39: CPT | Performed by: FAMILY MEDICINE

## 2021-08-10 SDOH — ECONOMIC STABILITY: INCOME INSECURITY: IN THE LAST 12 MONTHS, WAS THERE A TIME WHEN YOU WERE NOT ABLE TO PAY THE MORTGAGE OR RENT ON TIME?: NO

## 2021-08-10 SDOH — ECONOMIC STABILITY: TRANSPORTATION INSECURITY
IN THE PAST 12 MONTHS, HAS THE LACK OF TRANSPORTATION KEPT YOU FROM MEDICAL APPOINTMENTS OR FROM GETTING MEDICATIONS?: YES

## 2021-08-10 SDOH — ECONOMIC STABILITY: FOOD INSECURITY: WITHIN THE PAST 12 MONTHS, THE FOOD YOU BOUGHT JUST DIDN'T LAST AND YOU DIDN'T HAVE MONEY TO GET MORE.: NEVER TRUE

## 2021-08-10 SDOH — ECONOMIC STABILITY: HOUSING INSECURITY: IN THE LAST 12 MONTHS, HOW MANY PLACES HAVE YOU LIVED?: 2

## 2021-08-10 SDOH — ECONOMIC STABILITY: TRANSPORTATION INSECURITY
IN THE PAST 12 MONTHS, HAS LACK OF TRANSPORTATION KEPT YOU FROM MEETINGS, WORK, OR FROM GETTING THINGS NEEDED FOR DAILY LIVING?: YES

## 2021-08-10 SDOH — ECONOMIC STABILITY: HOUSING INSECURITY
IN THE LAST 12 MONTHS, WAS THERE A TIME WHEN YOU DID NOT HAVE A STEADY PLACE TO SLEEP OR SLEPT IN A SHELTER (INCLUDING NOW)?: NO

## 2021-08-10 SDOH — ECONOMIC STABILITY: FOOD INSECURITY: WITHIN THE PAST 12 MONTHS, YOU WORRIED THAT YOUR FOOD WOULD RUN OUT BEFORE YOU GOT MONEY TO BUY MORE.: NEVER TRUE

## 2021-08-10 ASSESSMENT — ENCOUNTER SYMPTOMS
DIARRHEA: 0
SORE THROAT: 0
RHINORRHEA: 0
TROUBLE SWALLOWING: 0
VOMITING: 0
COUGH: 0
SHORTNESS OF BREATH: 0
ABDOMINAL PAIN: 0
NAUSEA: 0
COLOR CHANGE: 0
WHEEZING: 0
BLOOD IN STOOL: 0
CONSTIPATION: 0
EYE PAIN: 0

## 2021-08-10 ASSESSMENT — SOCIAL DETERMINANTS OF HEALTH (SDOH): HOW HARD IS IT FOR YOU TO PAY FOR THE VERY BASICS LIKE FOOD, HOUSING, MEDICAL CARE, AND HEATING?: HARD

## 2021-08-10 NOTE — PROGRESS NOTES
8/10/2021    Alissa Arndt is a 32 y.o. male here for:    Chief Complaint   Patient presents with    Employment Physical     STNA        HPI:  Pre-employment physical  - Starts job at [employer]. Going to be working as an [address] [employer]. - Last eye exam: next appointment 08/20/2021 for eye glasses  - Smoking 0.5 ppd of cigarettes. - No hx of TB. Mood disorder   - PMHx of bipolar disorder   - Sees Psychiatrist, Dr. Cydney Stewart, for Suboxone. Has never seen a Psychiatrist for his bipolar disorder.  - His girlfriend told him that he seems depressed, sad, and angry. Patient does not realize when his mood shifts. - No SI or HI.   - Was on a medication \"years ago\" for bipolar disorder but does not remember the name of this medication. Current Outpatient Medications   Medication Sig Dispense Refill    lisinopril (PRINIVIL;ZESTRIL) 20 MG tablet Take 1 tablet by mouth daily 30 tablet 0    gabapentin (NEURONTIN) 300 MG capsule Take 1 capsule by mouth 2 times daily for 30 days. 60 capsule 2    metoprolol succinate (TOPROL XL) 50 MG extended release tablet Take 1 tablet by mouth 2 times daily 60 tablet 2    cloNIDine (CATAPRES) 0.2 MG tablet Take 1 tablet by mouth 2 times daily 60 tablet 2    aspirin 81 MG chewable tablet Take 1 tablet by mouth daily 90 tablet 1    Insulin Degludec 100 UNIT/ML SOLN Inject 50 Units into the skin nightly 2 vial 2    insulin aspart (NOVOLOG) 100 UNIT/ML injection vial Inject 6-10 Units into the skin 4 times daily 3 vial 2    atorvastatin (LIPITOR) 80 MG tablet take 1 tablet by mouth at bedtime      SUBOXONE 8-2 MG FILM SL film Place 1 Film under the tongue daily.  Insulin Syringe-Needle U-100 (B-D INS SYRINGE 0.5CC/31GX5/16) 31G X 5/16\" 0.5 ML MISC use four times a day 200 each 2    blood glucose test strips (TRUE METRIX BLOOD GLUCOSE TEST) strip 1 each by In Vitro route 5 times daily As needed.  150 each 11     No current facility-administered medications for this visit.       Allergies   Allergen Reactions    Chlorthalidone      Vomiting     Hctz [Hydrochlorothiazide] Nausea Only    Hydralazine      Nausea, headache       Past Medical & Surgical History:      Diagnosis Date    Asthma     Bipolar [facility] ([facility]     COPD (chronic obstructive pulmonary disease) ([facility]     Diabetes mellitus type I ([facility]     [facility]     Diabetic, retinopathy, proliferative ([facility]     HTN (hypertension)     due to left renal artery stenosis    Hypercholesteremia     Left renal artery stenosis ([facility] 09/19/2019    Macular edema due to secondary diabetes ([facility]     Opioid dependence ([facility])     Proteinuria     Retinal detachment     right eye    Vitreous hemorrhage of right eye due to diabetes mellitus ([facility]      Past Surgical History:   Procedure Laterality Date    EYE SURGERY Right 11/20/2020    PARS PLANA VITRECTOMY, MEMBRANE PEEL, ENDOLASER, GAS FLUID EXCHANGE, 25G, MAC, RIGHT EYE performed by Sharon Freitas MD at [facility] Right 01/08/2021    pars plana vitrectomy with peeling of preretinal tissue and gas fluid exchange    EYE SURGERY Right 1/8/2021    PARS PLANA VITRECTOMY, INTERNAL LIMITING MEMBRANE PEEL, INDOCYANINE GREEN, GAS FLUID EXCHANGE, 25g, MAC, RIGHT EYE performed by Sharon Freitas MD at [address] [place] [address]       Family History:      Problem Relation Age of Onset    No Known Problems Mother     No Known Problems Father     No Known Problems Brother         Homicide     No Known Problems Daughter     No Known Problems Son     Diabetes type 2  Maternal Grandmother        Social History:  Social History     Tobacco Use    Smoking status: Current Every Day Smoker     Packs/day: 0.50     Years: 15.00     Pack years: 7.50     Types: Cigarettes    Smokeless tobacco: Never Used   Substance Use Topics    Alcohol use: Yes     Comment: not usually        Review of Systems   Constitutional: Negative for appetite change, chills, fever and unexpected weight change. HENT: Negative for congestion, ear pain, rhinorrhea, sore throat and trouble swallowing. Eyes: Positive for visual disturbance (blurry, needs new glasses). Negative for pain. Respiratory: Negative for cough, shortness of breath and wheezing. Cardiovascular: Negative for chest pain, palpitations and leg swelling. Gastrointestinal: Negative for abdominal pain, blood in stool, constipation, diarrhea, nausea and vomiting. Genitourinary: Negative for dysuria and hematuria. Skin: Negative for color change and rash. Neurological: Negative for dizziness, syncope and light-headedness. BP Readings from Last 3 Encounters:   08/10/21 (!) 130/98   06/09/21 (!) 162/92   04/16/21 114/68       VS:  BP (!) 130/98 (Site: Left Upper Arm, Position: Sitting, Cuff Size: Medium Adult)   Pulse 132   Temp 97.1 °F (36.2 °C) (Temporal)   Resp 16   Ht 5' 4\" (1.626 m)   Wt 106 lb 12.8 oz (48.4 kg)   SpO2 97%   BMI 18.33 kg/m²     Physical Exam  Vitals reviewed. Constitutional:       General: He is awake. He is not in acute distress. HENT:      Head: Normocephalic and atraumatic. Right Ear: Tympanic membrane normal. There is no impacted cerumen. Tympanic membrane is not erythematous or bulging. Left Ear: Tympanic membrane normal. There is no impacted cerumen. Tympanic membrane is not erythematous or bulging. Nose: No congestion or rhinorrhea. Right Turbinates: Swollen. Not pale. Left Turbinates: Swollen. Not pale. Mouth/Throat:      Lips: Pink. Mouth: Mucous membranes are moist.      Dentition: Abnormal dentition. Pharynx: Uvula midline. No oropharyngeal exudate or posterior oropharyngeal erythema. Eyes:      General: No scleral icterus. Extraocular Movements:      Right eye: Normal extraocular motion. Left eye: Normal extraocular motion.       Conjunctiva/sclera:      Right eye: Right conjunctiva is not

## 2021-08-12 ENCOUNTER — PATIENT MESSAGE (OUTPATIENT)
Dept: FAMILY MEDICINE CLINIC | Age: 31
End: 2021-08-12

## 2021-08-13 NOTE — TELEPHONE ENCOUNTER
From: Anuj Granados  To: Kenji Galvan, DO  Sent: 8/12/2021 5:13 PM EDT  Subject: Prescription Question    My pharmacy asked me to get a hold of you to fill a prescription for pen needles for my tresiba, I use the pen and they said that I haven't had a script for them so they need one called in and when I tried to call the phone number it won't connect me so I'm writing you on here. I use [facility] pharmacy their phone number is [phone] Thank you!

## 2021-09-15 LAB — DIABETIC RETINOPATHY: POSITIVE

## 2021-10-28 ENCOUNTER — APPOINTMENT (OUTPATIENT)
Dept: GENERAL RADIOLOGY | Age: 31
End: 2021-10-28
Payer: COMMERCIAL

## 2021-10-28 ENCOUNTER — HOSPITAL ENCOUNTER (EMERGENCY)
Age: 31
Discharge: LEFT BEFORE DISCHARGE | End: 2021-10-29
Attending: STUDENT IN AN ORGANIZED HEALTH CARE EDUCATION/TRAINING PROGRAM
Payer: COMMERCIAL

## 2021-10-28 DIAGNOSIS — E10.10 DIABETIC KETOACIDOSIS WITHOUT COMA ASSOCIATED WITH TYPE 1 DIABETES MELLITUS (HCC): Primary | ICD-10-CM

## 2021-10-28 DIAGNOSIS — N17.9 AKI (ACUTE KIDNEY INJURY) (HCC): ICD-10-CM

## 2021-10-28 DIAGNOSIS — B34.9 VIRAL SYNDROME: ICD-10-CM

## 2021-10-28 LAB
ALBUMIN SERPL-MCNC: 4.6 G/DL (ref 3.5–5.2)
ALP BLD-CCNC: 189 U/L (ref 40–129)
ALT SERPL-CCNC: 12 U/L (ref 0–40)
ANION GAP SERPL CALCULATED.3IONS-SCNC: 23 MMOL/L (ref 7–16)
AST SERPL-CCNC: 14 U/L (ref 0–39)
BACTERIA: ABNORMAL /HPF
BASOPHILS ABSOLUTE: 0.14 E9/L (ref 0–0.2)
BASOPHILS RELATIVE PERCENT: 0.7 % (ref 0–2)
BILIRUB SERPL-MCNC: 0.4 MG/DL (ref 0–1.2)
BILIRUBIN URINE: ABNORMAL
BLOOD, URINE: ABNORMAL
BUN BLDV-MCNC: 28 MG/DL (ref 6–20)
CALCIUM SERPL-MCNC: 10.3 MG/DL (ref 8.6–10.2)
CHLORIDE BLD-SCNC: 90 MMOL/L (ref 98–107)
CLARITY: CLEAR
CO2: 22 MMOL/L (ref 22–29)
COLOR: YELLOW
CREAT SERPL-MCNC: 1.6 MG/DL (ref 0.7–1.2)
EOSINOPHILS ABSOLUTE: 0.16 E9/L (ref 0.05–0.5)
EOSINOPHILS RELATIVE PERCENT: 0.8 % (ref 0–6)
GFR AFRICAN AMERICAN: >60
GFR NON-AFRICAN AMERICAN: 51 ML/MIN/1.73
GLUCOSE BLD-MCNC: 314 MG/DL (ref 74–99)
GLUCOSE URINE: >=1000 MG/DL
HCT VFR BLD CALC: 40.4 % (ref 37–54)
HEMOGLOBIN: 13.7 G/DL (ref 12.5–16.5)
IMMATURE GRANULOCYTES #: 0.11 E9/L
IMMATURE GRANULOCYTES %: 0.6 % (ref 0–5)
KETONES, URINE: >=80 MG/DL
LACTIC ACID: 5.4 MMOL/L (ref 0.5–2.2)
LEUKOCYTE ESTERASE, URINE: NEGATIVE
LYMPHOCYTES ABSOLUTE: 1.6 E9/L (ref 1.5–4)
LYMPHOCYTES RELATIVE PERCENT: 8.1 % (ref 20–42)
MCH RBC QN AUTO: 29 PG (ref 26–35)
MCHC RBC AUTO-ENTMCNC: 33.9 % (ref 32–34.5)
MCV RBC AUTO: 85.4 FL (ref 80–99.9)
METER GLUCOSE: 338 MG/DL (ref 74–99)
METER GLUCOSE: >500 MG/DL (ref 74–99)
MONOCYTES ABSOLUTE: 0.61 E9/L (ref 0.1–0.95)
MONOCYTES RELATIVE PERCENT: 3.1 % (ref 2–12)
NEUTROPHILS ABSOLUTE: 17.03 E9/L (ref 1.8–7.3)
NEUTROPHILS RELATIVE PERCENT: 86.7 % (ref 43–80)
NITRITE, URINE: NEGATIVE
PDW BLD-RTO: 12.7 FL (ref 11.5–15)
PH UA: 5.5 (ref 5–9)
PLATELET # BLD: 455 E9/L (ref 130–450)
PMV BLD AUTO: 10.1 FL (ref 7–12)
POTASSIUM REFLEX MAGNESIUM: 4.9 MMOL/L (ref 3.5–5)
PROTEIN UA: 100 MG/DL
RBC # BLD: 4.73 E12/L (ref 3.8–5.8)
RBC UA: ABNORMAL /HPF (ref 0–2)
SARS-COV-2, NAAT: NOT DETECTED
SODIUM BLD-SCNC: 135 MMOL/L (ref 132–146)
SPECIFIC GRAVITY UA: 1.02 (ref 1–1.03)
STREP GRP A PCR: NEGATIVE
TOTAL PROTEIN: 8.1 G/DL (ref 6.4–8.3)
UROBILINOGEN, URINE: 0.2 E.U./DL
WBC # BLD: 19.7 E9/L (ref 4.5–11.5)
WBC UA: ABNORMAL /HPF (ref 0–5)

## 2021-10-28 PROCEDURE — 36415 COLL VENOUS BLD VENIPUNCTURE: CPT

## 2021-10-28 PROCEDURE — 81001 URINALYSIS AUTO W/SCOPE: CPT

## 2021-10-28 PROCEDURE — 99284 EMERGENCY DEPT VISIT MOD MDM: CPT

## 2021-10-28 PROCEDURE — 87635 SARS-COV-2 COVID-19 AMP PRB: CPT

## 2021-10-28 PROCEDURE — 71045 X-RAY EXAM CHEST 1 VIEW: CPT

## 2021-10-28 PROCEDURE — 6370000000 HC RX 637 (ALT 250 FOR IP): Performed by: STUDENT IN AN ORGANIZED HEALTH CARE EDUCATION/TRAINING PROGRAM

## 2021-10-28 PROCEDURE — 93005 ELECTROCARDIOGRAM TRACING: CPT | Performed by: STUDENT IN AN ORGANIZED HEALTH CARE EDUCATION/TRAINING PROGRAM

## 2021-10-28 PROCEDURE — 80053 COMPREHEN METABOLIC PANEL: CPT

## 2021-10-28 PROCEDURE — 87880 STREP A ASSAY W/OPTIC: CPT

## 2021-10-28 PROCEDURE — 96375 TX/PRO/DX INJ NEW DRUG ADDON: CPT

## 2021-10-28 PROCEDURE — 2580000003 HC RX 258: Performed by: STUDENT IN AN ORGANIZED HEALTH CARE EDUCATION/TRAINING PROGRAM

## 2021-10-28 PROCEDURE — 82010 KETONE BODYS QUAN: CPT

## 2021-10-28 PROCEDURE — 6360000002 HC RX W HCPCS: Performed by: STUDENT IN AN ORGANIZED HEALTH CARE EDUCATION/TRAINING PROGRAM

## 2021-10-28 PROCEDURE — 82962 GLUCOSE BLOOD TEST: CPT

## 2021-10-28 PROCEDURE — 83605 ASSAY OF LACTIC ACID: CPT

## 2021-10-28 PROCEDURE — 85025 COMPLETE CBC W/AUTO DIFF WBC: CPT

## 2021-10-28 PROCEDURE — 96365 THER/PROPH/DIAG IV INF INIT: CPT

## 2021-10-28 RX ORDER — CODEINE PHOSPHATE AND GUAIFENESIN 10; 100 MG/5ML; MG/5ML
5 SOLUTION ORAL ONCE
Status: COMPLETED | OUTPATIENT
Start: 2021-10-28 | End: 2021-10-28

## 2021-10-28 RX ORDER — 0.9 % SODIUM CHLORIDE 0.9 %
1000 INTRAVENOUS SOLUTION INTRAVENOUS ONCE
Status: COMPLETED | OUTPATIENT
Start: 2021-10-28 | End: 2021-10-29

## 2021-10-28 RX ORDER — 0.9 % SODIUM CHLORIDE 0.9 %
1000 INTRAVENOUS SOLUTION INTRAVENOUS ONCE
Status: COMPLETED | OUTPATIENT
Start: 2021-10-28 | End: 2021-10-28

## 2021-10-28 RX ORDER — ONDANSETRON 2 MG/ML
4 INJECTION INTRAMUSCULAR; INTRAVENOUS ONCE
Status: COMPLETED | OUTPATIENT
Start: 2021-10-28 | End: 2021-10-28

## 2021-10-28 RX ADMIN — SODIUM CHLORIDE 0.1 UNITS/KG/HR: 9 INJECTION, SOLUTION INTRAVENOUS at 23:30

## 2021-10-28 RX ADMIN — ONDANSETRON 4 MG: 2 INJECTION INTRAMUSCULAR; INTRAVENOUS at 21:55

## 2021-10-28 RX ADMIN — SODIUM CHLORIDE 1000 ML: 9 INJECTION, SOLUTION INTRAVENOUS at 21:50

## 2021-10-28 RX ADMIN — GUAIFENESIN AND CODEINE PHOSPHATE 5 ML: 10; 100 LIQUID ORAL at 23:05

## 2021-10-28 RX ADMIN — SODIUM CHLORIDE 1000 ML: 9 INJECTION, SOLUTION INTRAVENOUS at 23:05

## 2021-10-29 ENCOUNTER — TELEPHONE (OUTPATIENT)
Dept: FAMILY MEDICINE CLINIC | Age: 31
End: 2021-10-29

## 2021-10-29 ENCOUNTER — PATIENT MESSAGE (OUTPATIENT)
Dept: FAMILY MEDICINE CLINIC | Age: 31
End: 2021-10-29

## 2021-10-29 VITALS
BODY MASS INDEX: 21.6 KG/M2 | OXYGEN SATURATION: 96 % | HEART RATE: 105 BPM | DIASTOLIC BLOOD PRESSURE: 104 MMHG | SYSTOLIC BLOOD PRESSURE: 178 MMHG | WEIGHT: 110 LBS | HEIGHT: 60 IN | RESPIRATION RATE: 20 BRPM | TEMPERATURE: 98.6 F

## 2021-10-29 LAB
ANION GAP SERPL CALCULATED.3IONS-SCNC: 21 MMOL/L (ref 7–16)
BETA-HYDROXYBUTYRATE: 2.55 MMOL/L (ref 0.02–0.27)
BUN BLDV-MCNC: 23 MG/DL (ref 6–20)
CALCIUM SERPL-MCNC: 8.7 MG/DL (ref 8.6–10.2)
CHLORIDE BLD-SCNC: 97 MMOL/L (ref 98–107)
CO2: 18 MMOL/L (ref 22–29)
CREAT SERPL-MCNC: 1.3 MG/DL (ref 0.7–1.2)
GFR AFRICAN AMERICAN: >60
GFR NON-AFRICAN AMERICAN: >60 ML/MIN/1.73
GLUCOSE BLD-MCNC: 224 MG/DL (ref 74–99)
METER GLUCOSE: 81 MG/DL (ref 74–99)
POTASSIUM SERPL-SCNC: 4.3 MMOL/L (ref 3.5–5)
REASON FOR REJECTION: NORMAL
REJECTED TEST: NORMAL
SODIUM BLD-SCNC: 136 MMOL/L (ref 132–146)

## 2021-10-29 PROCEDURE — 36415 COLL VENOUS BLD VENIPUNCTURE: CPT

## 2021-10-29 PROCEDURE — 80048 BASIC METABOLIC PNL TOTAL CA: CPT

## 2021-10-29 PROCEDURE — 96366 THER/PROPH/DIAG IV INF ADDON: CPT

## 2021-10-29 PROCEDURE — 82962 GLUCOSE BLOOD TEST: CPT

## 2021-10-29 RX ORDER — GUAIFENESIN 600 MG/1
600 TABLET, EXTENDED RELEASE ORAL 2 TIMES DAILY
Qty: 30 TABLET | Refills: 0 | Status: SHIPPED | OUTPATIENT
Start: 2021-10-29 | End: 2021-11-13

## 2021-10-29 RX ORDER — ONDANSETRON 4 MG/1
4 TABLET, FILM COATED ORAL 3 TIMES DAILY PRN
Qty: 15 TABLET | Refills: 0 | Status: SHIPPED
Start: 2021-10-29 | End: 2021-11-17

## 2021-10-29 NOTE — TELEPHONE ENCOUNTER
From: Camryn Shoemaker  To: Estrada Elliott DO  Sent: 10/29/2021 1:52 PM EDT  Subject: Prescription Question    I tried calling the office w couple of times, I was in DKA last night from a cold or sinus infection and was wondering if you would be able to call in a prescription for the zofran and which ever cough medicine they gave me last night because they both helped alot. Thank you.

## 2021-10-29 NOTE — ED NOTES
Pt leaving AMA risks explained by Dr. Jan Ramirez. Pt verbalized understanding.      Josephine Sheppard RN  10/29/21 [id number]

## 2021-10-29 NOTE — TELEPHONE ENCOUNTER
[facility] ([facility]) ED Follow up Call    No answer to call. Detailed voicemail message left. See below. Reason for ED visit:  Diabetic ketoacidosis, ALEJANDRO, viral syndrome    FU appts/Provider:    No future appointments. Hi, this message is for Emely Simons  This is Shae from [facility] office. Just calling to see how you are doing after your recent visit to the Emergency Room yesterday and today. [facility] wants to make sure you were able to fill any prescriptions and that you understand your discharge instructions. Please return our call later today when you have a moment. Dr. Nupur Michaels would like you to schedule an appointment as soon as you can for a follow up to your ED visit and regarding your diabetes. Our phone number is [phone]. Have a great day and we hope you're feeling better.

## 2021-10-29 NOTE — ED PROVIDER NOTES
Department of Emergency Medicine   ED  Provider Note  Admit Date/RoomTime: 10/28/2021  7:54 PM  ED Room: 15/15          History of Present Illness:  10/29/21, Time: 11:28 AM EDT  Chief Complaint   Patient presents with    Emesis     vomiting since yesterday,     Cough     since yesterday    Pharyngitis    Headache    Hyperglycemia     reading high pta, 6 units novolog taken       Janene Ordonez is a 32 y.o. male presenting to the ED for vomiting, sore throat and high sugar. The patient has past medical history of type 1 diabetes on insulin. He states that today he began feeling unwell. He has had nausea and vomiting approximately 7 episodes which he describes as nonbloody. He denies any abdominal pain with this. He does note that yesterday he developed a cough that is productive of yellow sputum. He was tested for Covid at work which was found to be negative. He denies any chest pain or shortness of breath. No fevers. He is noted to have told triage that he has a headache but denies any headache to myself. No history of trauma. No numbness, tingling or weakness. The patient does have a sore throat. He is still able to eat and drink and denies any change to voice or difficulty handling secretions. The patient states his blood sugar is typically around 200. He takes [place] 15 units and sliding scale NovoLog. He did take 6 units of NovoLog prior to arrival as his sugar was reading high. He is concerned he is in DKA as he has been in the past.    Review of Systems:   Constitutional symptoms: Denies fever  Eyes: Denies eye pain  Ears, Nose, Mouth, Throat: Denies ear pain. Positive for sore throat  Cardiovascular: Denies chest pain  Respiratory: Denies shortness of breath. Positive for cough  Gastrointestinal: Denies blood per rectum.   Positive for nausea and vomiting  Genitourinary: Denies hematuria  Skin: Denies rashes  Neurological: Denies headache  Musculoskeletal: Denies extremity pain    --------------------------------------------- PAST HISTORY ---------------------------------------------  Past Medical History:  has a past medical history of Asthma, Bipolar affective ([facility] COPD (chronic obstructive pulmonary disease) ([facility] Diabetes mellitus type I ([facility] Diabetic neuropathy ([facility] Diabetic, retinopathy, proliferative ([facility] HTN (hypertension), Hypercholesteremia, Left renal artery stenosis ([facility] Macular edema due to secondary diabetes ([facility] Opioid dependence ([facility] Proteinuria, Retinal detachment, and Vitreous hemorrhage of right eye due to diabetes mellitus ([facility] Past Surgical History:  has a past surgical history that includes Eye surgery (Right, 11/20/2020); Eye surgery (Right, 01/08/2021); and Eye surgery (Right, 1/8/2021). Social History:  reports that he has been smoking cigarettes. He has a 7.50 pack-year smoking history. He has never used smokeless tobacco. He reports current alcohol use. He reports previous drug use. Drug: Opiates . Family History: family history includes Diabetes type 2  in his maternal grandmother; No Known Problems in his brother, daughter, father, mother, and son. . Unless otherwise noted, family history is non contributory    The patients home medications have been reviewed. Allergies: Chlorthalidone, Hctz [hydrochlorothiazide], and Hydralazine    I have reviewed the past medical history, past surgical history, social history, and family history    ---------------------------------------------------PHYSICAL EXAM--------------------------------------    General: The patient is conversational and lying comfortably in bed. Head: Normocephalic and atraumatic. Eyes: Sclera non-icteric and no conjunctival injection  ENT: Nasal and oral membranes appear dry. Uvula midline. No peritonsillar exudate or swelling. Floor of mouth soft. Tongue protrudes midline. No pooling of secretions. No stridor. Neck: Trachea midline.   No JVD  Respiratory: Lungs clear to auscultation bilaterally. Patient speaking in full sentences. The patient does have a coarse cough. Cardiovascular: Regular rate. No pedal edema. Gastrointestinal:  Abdomen is soft and nondistended. Bowel sounds present. There is no tenderness. There is no guarding or rebound. Musculoskeletal: No deformity  Skin: Skin warm and dry. No rashes. Neurologic: No gross motor deficits. No aphasia. Metric movement of the extremities. Pupils equal reactive to light. Extraocular eye movements intact. Symmetric facies. Sensation intact. Following simple commands. No nuchal rigidity  Psychiatric: Normal affect.    -------------------------------------------------- RESULTS -------------------------------------------------  I have personally reviewed all laboratory and imaging results for this patient. Results are listed below.      LABS: (Lab results interpreted by me)  Results for orders placed or performed during the hospital encounter of 10/28/21   Strep Screen Group A Throat    Specimen: Throat   Result Value Ref Range    Strep Grp A PCR Negative Negative   COVID-19, Rapid    Specimen: Nasopharyngeal Swab   Result Value Ref Range    SARS-CoV-2, NAAT Not Detected Not Detected   CBC Auto Differential   Result Value Ref Range    WBC 19.7 (H) 4.5 - 11.5 E9/L    RBC 4.73 3.80 - 5.80 E12/L    Hemoglobin 13.7 12.5 - 16.5 g/dL    Hematocrit 40.4 37.0 - 54.0 %    MCV 85.4 80.0 - 99.9 fL    MCH 29.0 26.0 - 35.0 pg    MCHC 33.9 32.0 - 34.5 %    RDW 12.7 11.5 - 15.0 fL    Platelets 669 (H) 409 - 450 E9/L    MPV 10.1 7.0 - 12.0 fL    Neutrophils % 86.7 (H) 43.0 - 80.0 %    Immature Granulocytes % 0.6 0.0 - 5.0 %    Lymphocytes % 8.1 (L) 20.0 - 42.0 %    Monocytes % 3.1 2.0 - 12.0 %    Eosinophils % 0.8 0.0 - 6.0 %    Basophils % 0.7 0.0 - 2.0 %    Neutrophils Absolute 17.03 (H) 1.80 - 7.30 E9/L    Immature Granulocytes # 0.11 E9/L    Lymphocytes Absolute 1.60 1.50 - 4.00 E9/L    Monocytes Absolute 0.61 0.10 - 0.95 E9/L    Eosinophils Absolute 0.16 0.05 - 0.50 E9/L    Basophils Absolute 0.14 0.00 - 0.20 E9/L   Comprehensive Metabolic Panel w/ Reflex to MG   Result Value Ref Range    Sodium 135 132 - 146 mmol/L    Potassium reflex Magnesium 4.9 3.5 - 5.0 mmol/L    Chloride 90 (L) 98 - 107 mmol/L    CO2 22 22 - 29 mmol/L    Anion Gap 23 (H) 7 - 16 mmol/L    Glucose 314 (H) 74 - 99 mg/dL    BUN 28 (H) 6 - 20 mg/dL    CREATININE 1.6 (H) 0.7 - 1.2 mg/dL    GFR Non-African American 51 >=60 mL/min/1.73    GFR African American >60     Calcium 10.3 (H) 8.6 - 10.2 mg/dL    Total Protein 8.1 6.4 - 8.3 g/dL    Albumin 4.6 3.5 - 5.2 g/dL    Total Bilirubin 0.4 0.0 - 1.2 mg/dL    Alkaline Phosphatase 189 (H) 40 - 129 U/L    ALT 12 0 - 40 U/L    AST 14 0 - 39 U/L   Beta-Hydroxybutyrate   Result Value Ref Range    Beta-Hydroxybutyrate 2.55 (H) 0.02 - 0.27 mmol/L   Lactic Acid, Plasma   Result Value Ref Range    Lactic Acid 5.4 (HH) 0.5 - 2.2 mmol/L   Urinalysis with Microscopic   Result Value Ref Range    Color, UA Yellow Straw/Yellow    Clarity, UA Clear Clear    Glucose, Ur >=1000 (A) Negative mg/dL    Bilirubin Urine MODERATE (A) Negative    Ketones, Urine >=80 (A) Negative mg/dL    Specific Gravity, UA 1.025 1.005 - 1.030    Blood, Urine SMALL (A) Negative    pH, UA 5.5 5.0 - 9.0    Protein,  (A) Negative mg/dL    Urobilinogen, Urine 0.2 <2.0 E.U./dL    Nitrite, Urine Negative Negative    Leukocyte Esterase, Urine Negative Negative    WBC, UA NONE 0 - 5 /HPF    RBC, UA 1-3 0 - 2 /HPF    Bacteria, UA NONE SEEN None Seen /HPF   Basic metabolic panel   Result Value Ref Range    Sodium 136 132 - 146 mmol/L    Potassium 4.3 3.5 - 5.0 mmol/L    Chloride 97 (L) 98 - 107 mmol/L    CO2 18 (L) 22 - 29 mmol/L    Anion Gap 21 (H) 7 - 16 mmol/L    Glucose 224 (H) 74 - 99 mg/dL    BUN 23 (H) 6 - 20 mg/dL    CREATININE 1.3 (H) 0.7 - 1.2 mg/dL    GFR Non-African American >60 >=60 mL/min/1.73    GFR African American >60     Calcium 8.7 8.6 - 10.2 mg/dL   SPECIMEN REJECTION   Result Value Ref Range    Rejected Test lacid     Reason for Rejection see below    POCT Glucose   Result Value Ref Range    Meter Glucose >500 (H) 74 - 99 mg/dL   POCT Glucose   Result Value Ref Range    Meter Glucose 338 (H) 74 - 99 mg/dL   POCT Glucose   Result Value Ref Range    Meter Glucose 81 74 - 99 mg/dL   ,     RADIOLOGY:  Interpreted by Radiologist unless otherwise specified  XR CHEST PORTABLE   Final Result   No evidence of active cardiopulmonary pathology. ------------------------- NURSING NOTES AND VITALS REVIEWED ---------------------------   The nursing notes within the ED encounter and vital signs as below have been reviewed by myself  BP (!) 178/104   Pulse 105   Temp 98.6 °F (37 °C) (Oral)   Resp 20   Ht 5' (1.524 m)   Wt 110 lb (49.9 kg)   SpO2 96%   BMI 21.48 kg/m²     Oxygen Saturation Interpretation: Normal    The patients available past medical records and past encounters were reviewed. ------------------------------ ED COURSE/MEDICAL DECISION MAKING----------------------  Medications   0.9 % sodium chloride bolus (0 mLs IntraVENous Stopped 10/28/21 2306)   0.9 % sodium chloride bolus (0 mLs IntraVENous Stopped 10/29/21 0116)   ondansetron (ZOFRAN) injection 4 mg (4 mg IntraVENous Given 10/28/21 2155)   guaiFENesin-codeine (GUAIFENESIN AC) 100-10 MG/5ML liquid 5 mL (5 mLs Oral Given 10/28/21 2305)       Medical Decision Making: This is a 32 y.o. male presenting to the ED for vomiting, cough and high blood sugar. On initial presentation, the patient's vital signs are interpreted as tensive, tachycardic and saturating well on room air. Based on history and physical exam, we have a broad differential. This patient has known history of diabetes we are concerned for diabetic ketoacidosis. This may be precipitated by infectious cause such as viral infection, pneumonia, Covid or urinary tract infection.   The patient also endorses a sore throat and we considered strep pharyngitis although there is no evidence of bacterial pharyngitis at this time. No evidence of airway compromise or Steve angina. The patient does have dry mucous membranes and will be hydrated with 2 L of normal saline. Will obtain a chest x-ray, laboratory studies and an EKG. The patient will be symptomatically treated with Zofran. A 12-lead EKG was performed and interpreted by myself. The rate is 114 with sinus tachycardia and normal axis. There is no ectopy. The [place] interval is 140, QRS interval is 76, and QTC interval is 457. No acute ST elevation or depressions. Good R wave progression. This is sinus tachycardia. Laboratory studies show the patient is hyperglycemic at 314. This is likely falsely decreased as he did use NovoLog prior to arrival.  He has an anion gap of 23 although he is not acidotic at 22. The patient does have an acute kidney injury. LFTs within normal limits. Beta hydroxybutyrate 2.55. The patient does have a leukocytosis of 19.7. No anemia. Covid negative. Urinalysis shows evidence of ketonuria but no urinary tract infection. Strep negative. As the patient's potassium is within normal limit he will be started on an insulin drip. He does request something for cough and will be given a dose of Robitussin as we do not have cough drops here. He refused arterial blood gas we will add on a venous blood gas. The patient is in acute diabetic ketoacidosis although his acidosis is not severe. Chest x-ray shows no acute cardiopulmonary process. I am, the patient is pending repeat electrolytes and point-of-care glucose. He will be signed out to the oncoming physician, Dr. Laraine Barthel. He endorses improvement of his symptoms. Tachycardia is improved at 105. He does require admission although at this time he is contemplating leaving [address]     Upon my evaluation, this patient had a high probability of imminent or life-threatening deterioration due to dka, which required my direct attention, intervention, and personal management. I have personally provided 36 minutes of critical care time excluding time spent on separately billable procedures. Time includes review of laboratory data, radiology results, discussion with consultants, and monitoring for potential decompensation. Interventions were performed as documented above. This patient's ED course included: a personal history and physicial examination and re-evaluation prior to disposition    This patient has improved during their ED course. Consultations:  None    The cardiac monitor revealed sinus tachycardia with a heart rate in the 120s as interpreted by me. The cardiac monitor was ordered secondary to the patient's tachycardia and to monitor the patient for dysrhythmia. CPT [id number]    Oxygen Saturation Interpretation: 99 % on room air. Normal    Counseling:   I have spoken with the patient and spouse/SO and discussed todays results, in addition to providing specific details for the plan of care and counseling regarding the diagnosis and prognosis. Questions are answered at this time and they are agreeable with the plan.     --------------------------------- IMPRESSION AND DISPOSITION ---------------------------------    IMPRESSION  1. Diabetic ketoacidosis without coma associated with type 1 diabetes mellitus ([facility]    2. ALEJANDRO (acute kidney injury) ([facility]    3. Viral syndrome        DISPOSITION  Disposition: Pending further work up  Patient condition is fair    I, Dr. Tunde Mcmahan, am the primary provider of record    NOTE: This report was transcribed using voice recognition software.  Every effort was made to ensure accuracy; however, inadvertent computerized transcription errors may be present        Tunde Mcmahan MD  10/29/21 3374

## 2021-11-01 ENCOUNTER — TELEPHONE (OUTPATIENT)
Dept: FAMILY MEDICINE CLINIC | Age: 31
End: 2021-11-01

## 2021-11-01 NOTE — TELEPHONE ENCOUNTER
----- Message from Tana Chirinos sent at 10/29/2021  3:57 PM EDT -----  Subject: Message to Provider    QUESTIONS  Information for Provider? Patient called to return call of [phone] Chevy Farah RN in   Dr. Flavia Bush office . Office is out for the day Patient would like Zofran   as well as the cough medicine (pt unsure of name or dosage on both   medications ) that he was given at his recent visit to [facility] ED   10/29/2021 Patient uses Pharmacy on file [facility]   Please advise patient   ---------------------------------------------------------------------------  --------------  CALL BACK INFO  What is the best way for the office to contact you? OK to leave message on   voicemail, OK to respond with electronic message via [employer] portal (only   for patients who have registered [employer] account)  Preferred Call Back Phone Number?  [phone]  ---------------------------------------------------------------------------  --------------  SCRIPT ANSWERS  undefined

## 2021-11-01 NOTE — TELEPHONE ENCOUNTER
[facility] ([facility]) ED Follow up Call    Reason for ED visit:  DKA associated with type 1 diabetes mellitus, ALEJANDRO, viral syndrome         FU appts/Provider:  Dr. Nj Patches    No future appointments. VOICEMAIL DOCUMENTATION - ERASE IF NOT USED  Hi, this message is for Denilson Faulkner    This is Shae from [facility] office. Just calling to see how you are doing after your recent visit to the Emergency Room. [facility] wants to make sure you were able to fill any prescriptions and that you understand your discharge instructions. Please return our call to make a follow up appointment as soon as possible or have any further needs. Our phone number is [phone]. Have a great day.

## 2021-11-02 ENCOUNTER — OFFICE VISIT (OUTPATIENT)
Dept: FAMILY MEDICINE CLINIC | Age: 31
End: 2021-11-02
Payer: COMMERCIAL

## 2021-11-02 VITALS
SYSTOLIC BLOOD PRESSURE: 121 MMHG | WEIGHT: 107.4 LBS | BODY MASS INDEX: 20.98 KG/M2 | DIASTOLIC BLOOD PRESSURE: 92 MMHG | TEMPERATURE: 97 F | RESPIRATION RATE: 16 BRPM | OXYGEN SATURATION: 95 % | HEART RATE: 130 BPM

## 2021-11-02 DIAGNOSIS — I15.0 RENOVASCULAR HYPERTENSION: ICD-10-CM

## 2021-11-02 DIAGNOSIS — J18.9 PNEUMONIA DUE TO INFECTIOUS ORGANISM, UNSPECIFIED LATERALITY, UNSPECIFIED PART OF LUNG: Primary | ICD-10-CM

## 2021-11-02 LAB
EKG ATRIAL RATE: 114 BPM
EKG P AXIS: 67 DEGREES
EKG P-R INTERVAL: 140 MS
EKG Q-T INTERVAL: 332 MS
EKG QRS DURATION: 76 MS
EKG QTC CALCULATION (BAZETT): 457 MS
EKG R AXIS: 64 DEGREES
EKG T AXIS: 59 DEGREES
EKG VENTRICULAR RATE: 114 BPM

## 2021-11-02 PROCEDURE — 99214 OFFICE O/P EST MOD 30 MIN: CPT | Performed by: FAMILY MEDICINE

## 2021-11-02 RX ORDER — LISINOPRIL 20 MG/1
20 TABLET ORAL DAILY
Qty: 90 TABLET | Refills: 0 | Status: SHIPPED
Start: 2021-11-02 | End: 2022-02-21 | Stop reason: SDUPTHER

## 2021-11-02 RX ORDER — ASPIRIN 81 MG/1
81 TABLET, CHEWABLE ORAL DAILY
Qty: 90 TABLET | Refills: 0 | Status: SHIPPED
Start: 2021-11-02 | End: 2022-03-14 | Stop reason: SDUPTHER

## 2021-11-02 RX ORDER — GABAPENTIN 300 MG/1
300 CAPSULE ORAL 2 TIMES DAILY
Qty: 180 CAPSULE | Refills: 0 | Status: SHIPPED
Start: 2021-11-02 | End: 2021-12-20 | Stop reason: SDUPTHER

## 2021-11-02 RX ORDER — BENZONATATE 100 MG/1
100 CAPSULE ORAL 3 TIMES DAILY PRN
Qty: 30 CAPSULE | Refills: 2 | Status: SHIPPED
Start: 2021-11-02 | End: 2021-11-05 | Stop reason: ALTCHOICE

## 2021-11-02 RX ORDER — ATORVASTATIN CALCIUM 80 MG/1
80 TABLET, FILM COATED ORAL DAILY
Qty: 90 TABLET | Refills: 0 | Status: SHIPPED
Start: 2021-11-02 | End: 2022-01-03 | Stop reason: SDUPTHER

## 2021-11-02 RX ORDER — ALBUTEROL SULFATE 90 UG/1
2 AEROSOL, METERED RESPIRATORY (INHALATION) EVERY 4 HOURS PRN
Qty: 18 G | Refills: 0 | Status: SHIPPED
Start: 2021-11-02 | End: 2021-12-20 | Stop reason: SDUPTHER

## 2021-11-02 RX ORDER — CLONIDINE HYDROCHLORIDE 0.2 MG/1
0.2 TABLET ORAL 2 TIMES DAILY
Qty: 180 TABLET | Refills: 0 | Status: SHIPPED
Start: 2021-11-02 | End: 2021-11-17 | Stop reason: DRUGHIGH

## 2021-11-02 RX ORDER — LEVOFLOXACIN 750 MG/1
750 TABLET ORAL DAILY
Qty: 5 TABLET | Refills: 0 | Status: SHIPPED | OUTPATIENT
Start: 2021-11-02 | End: 2021-11-07

## 2021-11-02 RX ORDER — METOPROLOL SUCCINATE 50 MG/1
50 TABLET, EXTENDED RELEASE ORAL 2 TIMES DAILY
Qty: 180 TABLET | Refills: 0 | Status: SHIPPED
Start: 2021-11-02 | End: 2021-12-20 | Stop reason: SDUPTHER

## 2021-11-02 RX ORDER — CALCIUM CITRATE/VITAMIN D3 200MG-6.25
1 TABLET ORAL
Qty: 500 EACH | Refills: 3 | Status: SHIPPED
Start: 2021-11-02 | End: 2021-11-05 | Stop reason: ALTCHOICE

## 2021-11-02 RX ORDER — BLOOD SUGAR DIAGNOSTIC
STRIP MISCELLANEOUS
Qty: 600 EACH | Refills: 3 | Status: SHIPPED
Start: 2021-11-02 | End: 2021-11-05 | Stop reason: ALTCHOICE

## 2021-11-02 RX ORDER — INSULIN DEGLUDEC INJECTION 100 U/ML
55 INJECTION, SOLUTION SUBCUTANEOUS NIGHTLY
Qty: 5 PEN | Refills: 5 | Status: SHIPPED
Start: 2021-11-02 | End: 2022-03-14 | Stop reason: SDUPTHER

## 2021-11-02 ASSESSMENT — ENCOUNTER SYMPTOMS
SINUS PAIN: 1
SINUS PRESSURE: 1
COLOR CHANGE: 0
SORE THROAT: 0
SHORTNESS OF BREATH: 0
VOMITING: 1
ABDOMINAL PAIN: 0
COUGH: 1
NAUSEA: 1
CONSTIPATION: 0
RHINORRHEA: 1
DIARRHEA: 0
WHEEZING: 1
EYE PAIN: 0

## 2021-11-02 NOTE — PROGRESS NOTES
11/3/2021    Eliana Perry is a 32 y.o. male here for:    Chief Complaint   Patient presents with   [facility] ED Follow-up    Cough     Vomiting mucous        HPI:  ED follow-up  - Was seen on 10/28/2021 at [facility] ED for 2 days of post-tussive vomiting and cough. Had unremarkable CXR.   - Diagnosed with DKA at ED. Patient left AMA. - Patient's BG reading at home was 264 this morning. On Tresiba 50 units HS and Novolog SSI. - Still having productive cough, post-nasal drainage, and post-tussive vomiting.   - Has been using Zofran 4 mg TID PRN and Mucinex 600 mg BID with mild improvement in his symptoms.  - Still smoking. Smoking 2-3 cigarettes per day. - Did not get vaccinated for COVID-19. Took multiple COVID-19 tests at [place] [address], where he works, and they have all been negative. Current Outpatient Medications   Medication Sig Dispense Refill    lisinopril (PRINIVIL;ZESTRIL) 20 MG tablet Take 1 tablet by mouth daily 90 tablet 0    gabapentin (NEURONTIN) 300 MG capsule Take 1 capsule by mouth 2 times daily for 90 days. 180 capsule 0    metoprolol succinate (TOPROL XL) 50 MG extended release tablet Take 1 tablet by mouth 2 times daily 180 tablet 0    cloNIDine (CATAPRES) 0.2 MG tablet Take 1 tablet by mouth 2 times daily 180 tablet 0    aspirin 81 MG chewable tablet Take 1 tablet by mouth daily 90 tablet 0    Insulin Syringe-Needle U-100 (B-D INS SYRINGE 0.5CC/31GX5/16) 31G X 5/16\" 0.5 ML MISC use four times a day 600 each 3    atorvastatin (LIPITOR) 80 MG tablet Take 1 tablet by mouth daily 90 tablet 0    blood glucose test strips (TRUE METRIX BLOOD GLUCOSE TEST) strip 1 each by In Vitro route 5 times daily As needed.  500 each 3    Insulin Degludec (TRESIBA FLEXTOUCH) 100 UNIT/ML SOPN Inject 55 Units into the skin nightly 5 pen 5    insulin aspart (NOVOLOG) 100 UNIT/ML injection vial Inject 6-10 Units into the skin 4 times daily with meals 10 mL 5    albuterol sulfate HFA (VENTOLIN HFA) 108 (90 Base) MCG/ACT inhaler Inhale 2 puffs into the lungs every 4 hours as needed for Wheezing or Shortness of Breath 18 g 0    levoFLOXacin (LEVAQUIN) 750 MG tablet Take 1 tablet by mouth daily for 5 days 5 tablet 0    benzonatate (TESSALON) 100 MG capsule Take 1 capsule by mouth 3 times daily as needed for Cough 30 capsule 2    ondansetron (ZOFRAN) 4 MG tablet Take 1 tablet by mouth 3 times daily as needed for Nausea or Vomiting 15 tablet 0    guaiFENesin (MUCINEX) 600 MG extended release tablet Take 1 tablet by mouth 2 times daily for 15 days 30 tablet 0    Insulin Degludec 100 UNIT/ML SOLN Inject 50 Units into the skin nightly 2 vial 2    SUBOXONE 8-2 MG FILM SL film Place 1 Film under the tongue daily.  Insulin Pen Needle 31G X 5 MM MISC 1 each by Does not apply route daily 100 each 5     No current facility-administered medications for this visit.       Allergies   Allergen Reactions    Chlorthalidone      Vomiting     Hctz [Hydrochlorothiazide] Nausea Only    Hydralazine      Nausea, headache       Past Medical & Surgical History:      Diagnosis Date    Asthma     Bipolar affective ([address]     COPD [facility]     Diabetes mellitus type I ([address]     Diabetic neuropathy ([facility]     Diabetic, retinopathy, proliferative ([address]     HTN (hypertension)     due to left renal artery stenosis    Hypercholesteremia     Left renal artery stenosis ([address] 09/19/2019    Macular edema due to secondary diabetes ([address]     Opioid dependence ([facility]     Proteinuria     Retinal detachment     right eye    Vitreous hemorrhage of right eye due to diabetes mellitus ([address]      Past Surgical History:   Procedure Laterality Date    EYE SURGERY Right 11/20/2020    PARS PLANA VITRECTOMY, MEMBRANE PEEL, ENDOLASER, GAS FLUID EXCHANGE, 25G, MAC, RIGHT EYE performed by Gilford Rather, MD at [address] [place] [address] Right 01/08/2021    pars plana vitrectomy with peeling of preretinal tissue and gas fluid exchange    EYE SURGERY Right 1/8/2021    PARS PLANA VITRECTOMY, INTERNAL LIMITING MEMBRANE PEEL, INDOCYANINE GREEN, GAS FLUID EXCHANGE, 25g, MAC, RIGHT EYE performed by Luigi Metcalf MD at [address] [place] [address]       Family History:      Problem Relation Age of Onset    No Known Problems Mother     No Known Problems Father     No Known Problems Brother         Homicide     No Known Problems Daughter     No Known Problems Son     Diabetes type 2  Maternal Grandmother        Social History:  Social History     Tobacco Use    Smoking status: Current Every Day Smoker     Packs/day: 0.50     Years: 15.00     Pack years: 7.50     Types: Cigarettes    Smokeless tobacco: Never Used   Substance Use Topics    Alcohol use: Yes     Comment: not usually        Review of Systems   Constitutional: Negative for chills and fever. HENT: Positive for congestion, postnasal drip, rhinorrhea, sinus pressure and sinus pain. Negative for ear pain and sore throat. Eyes: Positive for visual disturbance (hx of diabetic retinopathy). Negative for pain. Respiratory: Positive for cough and wheezing. Negative for shortness of breath. Cardiovascular: Negative for chest pain, palpitations and leg swelling. Gastrointestinal: Positive for nausea and vomiting. Negative for abdominal pain, constipation and diarrhea. Musculoskeletal: Negative for arthralgias and myalgias. Skin: Negative for color change and rash. Neurological: Negative for dizziness, syncope and light-headedness. BP Readings from Last 3 Encounters:   11/02/21 (!) 121/92   10/29/21 (!) 178/104   08/10/21 (!) 130/98       VS:  BP (!) 121/92 (Site: Left Upper Arm, Position: Sitting, Cuff Size: Medium Adult)   Pulse 130   Temp 97 °F (36.1 °C) (Temporal)   Resp 16   Wt 107 lb 6.4 oz (48.7 kg)   SpO2 95%   BMI 20.98 kg/m²     Physical Exam  Vitals reviewed. Constitutional:       General: He is awake.  He is not in acute distress. Appearance: He is well-developed and well-groomed. HENT:      Head: Normocephalic and atraumatic. Right Ear: Ear canal and external ear normal. Tympanic membrane is not erythematous or bulging. Left Ear: Ear canal and external ear normal. Tympanic membrane is not erythematous or bulging. Nose: No congestion or rhinorrhea. Right Turbinates: Not swollen. Left Turbinates: Not swollen. Mouth/Throat:      Lips: Pink. Mouth: Mucous membranes are moist.      Pharynx: Uvula midline. No oropharyngeal exudate or uvula swelling. Neck:      Vascular: No carotid bruit. Cardiovascular:      Rate and Rhythm: Regular rhythm. Tachycardia present. Heart sounds: S1 normal and S2 normal. No murmur heard. Pulmonary:      Breath sounds: Wheezing, rhonchi and rales present. Abdominal:      General: Bowel sounds are normal. There is no distension. Palpations: Abdomen is soft. Tenderness: There is no abdominal tenderness. There is no guarding or rebound. Musculoskeletal:      Cervical back: Neck supple. Lymphadenopathy:      Cervical: No cervical adenopathy. Neurological:      General: No focal deficit present. Mental Status: He is alert. Psychiatric:         Attention and Perception: Attention normal.         Mood and Affect: Mood normal.         Speech: Speech normal.         Behavior: Behavior normal. Behavior is cooperative. Thought Content: Thought content normal.         Cognition and Memory: Cognition normal.         Judgment: Judgment normal.         Assessment/Plan:  1. 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 Will do levofloxacin 750 mg QD x 5 days as well as albuterol inhaler 2 puffs every 4 hours PRN and Tessalon perles.  Patient advised to increase his [place] to 54 units HS due to him being ill.  - albuterol sulfate HFA (VENTOLIN HFA) 108 (90 Base) MCG/ACT inhaler; Inhale 2 puffs into the lungs every 4 hours as needed for Wheezing or Shortness of Breath  Dispense: 18 g; Refill: 0  - levoFLOXacin (LEVAQUIN) 750 MG tablet; Take 1 tablet by mouth daily for 5 days  Dispense: 5 tablet; Refill: 0  - benzonatate (TESSALON) 100 MG capsule; Take 1 capsule by mouth 3 times daily as needed for Cough  Dispense: 30 capsule; Refill: 2    2. Renovascular hypertension  Uncontrolled. Patient advised to schedule appointment to discuss his HTN further. Refilled medications today. - lisinopril (PRINIVIL;ZESTRIL) 20 MG tablet; Take 1 tablet by mouth daily  Dispense: 90 tablet; Refill: 0  - metoprolol succinate (TOPROL XL) 50 MG extended release tablet; Take 1 tablet by mouth 2 times daily  Dispense: 180 tablet; Refill: 0  - cloNIDine (CATAPRES) 0.2 MG tablet; Take 1 tablet by mouth 2 times daily  Dispense: 180 tablet; Refill: 0    3. Uncontrolled type 1 diabetes mellitus with complication, with long-term current use of insulin (HCC)  Uncontrolled. Patient has been referred to Endocrinology numerous times and has either cancelled or no-showed for appointments. He does not want an insulin pump, which I think would greatly help him. He has had multiple instances of DKA in the past. Patient advised to schedule appointment to discuss his T1DM further. Refilled medications today. Labs ordered for patient to get prior to his appointment with me.   - gabapentin (NEURONTIN) 300 MG capsule; Take 1 capsule by mouth 2 times daily for 90 days. Dispense: 180 capsule; Refill: 0  - aspirin 81 MG chewable tablet; Take 1 tablet by mouth daily  Dispense: 90 tablet; Refill: 0  - Insulin Syringe-Needle U-100 (B-D INS SYRINGE 0.5CC/31GX5/16) 31G X 5/16\" 0.5 ML MISC; use four times a day  Dispense: 600 each; Refill: 3  - atorvastatin (LIPITOR) 80 MG tablet; Take 1 tablet by mouth daily  Dispense: 90 tablet;  Refill: 0  - blood glucose test strips (TRUE METRIX BLOOD GLUCOSE TEST) strip; 1 each by In Vitro route 5 times daily As needed. Dispense: 500 each; Refill: 3  - Insulin Degludec (TRESIBA FLEXTOUCH) 100 UNIT/ML SOPN; Inject 55 Units into the skin nightly  Dispense: 5 pen; Refill: 5  - insulin aspart (NOVOLOG) 100 UNIT/ML injection vial; Inject 6-10 Units into the skin 4 times daily with meals  Dispense: 10 mL; Refill: 5  - LIPID PANEL; Future  - CBC WITH AUTO DIFFERENTIAL; Future  - COMPREHENSIVE METABOLIC PANEL; Future  - HEMOGLOBIN A1C; Future  - MICROALBUMIN / CREATININE URINE RATIO; Future      Return in about 2 weeks (around 11/16/2021) for follow-up for T1DM, HTN.       Corrie Adams DO  Family Medicine

## 2021-11-02 NOTE — LETTER
[facility] [place] [address]  Phone: [phone]  Fax: [phone]    DO Sage        November 2, 2021     Patient: Liss Romano   YOB: 1990   Date of Visit: 11/2/2021       To Whom it May Concern:    Oneal Bazan was seen in my clinic on 11/2/2021. He may return to work on 11/04/2021. If you have any questions or concerns, please don't hesitate to call.     Sincerely,         DO Sage

## 2021-11-05 ENCOUNTER — APPOINTMENT (OUTPATIENT)
Dept: GENERAL RADIOLOGY | Age: 31
DRG: 420 | End: 2021-11-05
Payer: COMMERCIAL

## 2021-11-05 ENCOUNTER — HOSPITAL ENCOUNTER (INPATIENT)
Age: 31
LOS: 1 days | Discharge: LEFT BEFORE DISCHARGE | DRG: 420 | End: 2021-11-06
Attending: EMERGENCY MEDICINE | Admitting: INTERNAL MEDICINE
Payer: COMMERCIAL

## 2021-11-05 DIAGNOSIS — E10.10 DIABETIC KETOACIDOSIS WITHOUT COMA ASSOCIATED WITH TYPE 1 DIABETES MELLITUS (HCC): Primary | ICD-10-CM

## 2021-11-05 DIAGNOSIS — R79.89 ELEVATED LACTIC ACID LEVEL: ICD-10-CM

## 2021-11-05 LAB
ACETAMINOPHEN LEVEL: <5 MCG/ML (ref 10–30)
ADENOVIRUS BY PCR: NOT DETECTED
ALBUMIN SERPL-MCNC: 3.9 G/DL (ref 3.5–5.2)
ALP BLD-CCNC: 163 U/L (ref 40–129)
ALT SERPL-CCNC: 13 U/L (ref 0–40)
ANION GAP SERPL CALCULATED.3IONS-SCNC: 11 MMOL/L (ref 7–16)
ANION GAP SERPL CALCULATED.3IONS-SCNC: 15 MMOL/L (ref 7–16)
ANION GAP SERPL CALCULATED.3IONS-SCNC: 17 MMOL/L (ref 7–16)
ANION GAP SERPL CALCULATED.3IONS-SCNC: 20 MMOL/L (ref 7–16)
ANION GAP SERPL CALCULATED.3IONS-SCNC: 27 MMOL/L (ref 7–16)
AST SERPL-CCNC: 15 U/L (ref 0–39)
BACTERIA: ABNORMAL /HPF
BASOPHILS ABSOLUTE: 0.11 E9/L (ref 0–0.2)
BASOPHILS RELATIVE PERCENT: 0.7 % (ref 0–2)
BETA-HYDROXYBUTYRATE: >4.5 MMOL/L (ref 0.02–0.27)
BILIRUB SERPL-MCNC: 0.2 MG/DL (ref 0–1.2)
BILIRUBIN URINE: NEGATIVE
BLOOD, URINE: ABNORMAL
BORDETELLA PARAPERTUSSIS BY PCR: NOT DETECTED
BORDETELLA PERTUSSIS BY PCR: NOT DETECTED
BUN BLDV-MCNC: 18 MG/DL (ref 6–20)
BUN BLDV-MCNC: 19 MG/DL (ref 6–20)
BUN BLDV-MCNC: 22 MG/DL (ref 6–20)
BUN BLDV-MCNC: 27 MG/DL (ref 6–20)
BUN BLDV-MCNC: 34 MG/DL (ref 6–20)
CALCIUM SERPL-MCNC: 7.4 MG/DL (ref 8.6–10.2)
CALCIUM SERPL-MCNC: 7.4 MG/DL (ref 8.6–10.2)
CALCIUM SERPL-MCNC: 7.5 MG/DL (ref 8.6–10.2)
CALCIUM SERPL-MCNC: 7.7 MG/DL (ref 8.6–10.2)
CALCIUM SERPL-MCNC: 9.1 MG/DL (ref 8.6–10.2)
CHLAMYDOPHILIA PNEUMONIAE BY PCR: NOT DETECTED
CHLORIDE BLD-SCNC: 103 MMOL/L (ref 98–107)
CHLORIDE BLD-SCNC: 106 MMOL/L (ref 98–107)
CHLORIDE BLD-SCNC: 107 MMOL/L (ref 98–107)
CHLORIDE BLD-SCNC: 91 MMOL/L (ref 98–107)
CHLORIDE BLD-SCNC: 99 MMOL/L (ref 98–107)
CHP ED QC CHECK: NORMAL
CLARITY: CLEAR
CO2: 16 MMOL/L (ref 22–29)
CO2: 17 MMOL/L (ref 22–29)
CO2: 17 MMOL/L (ref 22–29)
CO2: 19 MMOL/L (ref 22–29)
CO2: 21 MMOL/L (ref 22–29)
COLOR: YELLOW
CORONAVIRUS 229E BY PCR: NOT DETECTED
CORONAVIRUS HKU1 BY PCR: NOT DETECTED
CORONAVIRUS NL63 BY PCR: NOT DETECTED
CORONAVIRUS OC43 BY PCR: NOT DETECTED
CREAT SERPL-MCNC: 1.2 MG/DL (ref 0.7–1.2)
CREAT SERPL-MCNC: 1.3 MG/DL (ref 0.7–1.2)
CREAT SERPL-MCNC: 1.6 MG/DL (ref 0.7–1.2)
EKG ATRIAL RATE: 115 BPM
EKG P AXIS: 67 DEGREES
EKG P-R INTERVAL: 138 MS
EKG Q-T INTERVAL: 336 MS
EKG QRS DURATION: 76 MS
EKG QTC CALCULATION (BAZETT): 464 MS
EKG R AXIS: 66 DEGREES
EKG T AXIS: 64 DEGREES
EKG VENTRICULAR RATE: 115 BPM
EOSINOPHILS ABSOLUTE: 0.15 E9/L (ref 0.05–0.5)
EOSINOPHILS RELATIVE PERCENT: 1 % (ref 0–6)
ETHANOL: <10 MG/DL (ref 0–0.08)
GFR AFRICAN AMERICAN: >60
GFR NON-AFRICAN AMERICAN: 51 ML/MIN/1.73
GFR NON-AFRICAN AMERICAN: >60 ML/MIN/1.73
GLUCOSE BLD-MCNC: 116 MG/DL
GLUCOSE BLD-MCNC: 164 MG/DL
GLUCOSE BLD-MCNC: 170 MG/DL (ref 74–99)
GLUCOSE BLD-MCNC: 217 MG/DL
GLUCOSE BLD-MCNC: 238 MG/DL
GLUCOSE BLD-MCNC: 294 MG/DL
GLUCOSE BLD-MCNC: 318 MG/DL (ref 74–99)
GLUCOSE BLD-MCNC: 330 MG/DL (ref 74–99)
GLUCOSE BLD-MCNC: 334 MG/DL (ref 74–99)
GLUCOSE BLD-MCNC: 343 MG/DL
GLUCOSE BLD-MCNC: 406 MG/DL
GLUCOSE BLD-MCNC: 521 MG/DL (ref 74–99)
GLUCOSE URINE: >=1000 MG/DL
HCT VFR BLD CALC: 37.9 % (ref 37–54)
HEMOGLOBIN: 12.6 G/DL (ref 12.5–16.5)
HUMAN METAPNEUMOVIRUS BY PCR: NOT DETECTED
HUMAN RHINOVIRUS/ENTEROVIRUS BY PCR: DETECTED
IMMATURE GRANULOCYTES #: 0.07 E9/L
IMMATURE GRANULOCYTES %: 0.4 % (ref 0–5)
INFLUENZA A BY PCR: NOT DETECTED
INFLUENZA B BY PCR: NOT DETECTED
KETONES, URINE: >=80 MG/DL
LACTIC ACID: 4.5 MMOL/L (ref 0.5–2.2)
LACTIC ACID: 6.1 MMOL/L (ref 0.5–2.2)
LEUKOCYTE ESTERASE, URINE: NEGATIVE
LYMPHOCYTES ABSOLUTE: 2.02 E9/L (ref 1.5–4)
LYMPHOCYTES RELATIVE PERCENT: 12.8 % (ref 20–42)
MAGNESIUM: 1.6 MG/DL (ref 1.6–2.6)
MAGNESIUM: 1.7 MG/DL (ref 1.6–2.6)
MCH RBC QN AUTO: 29.2 PG (ref 26–35)
MCHC RBC AUTO-ENTMCNC: 33.2 % (ref 32–34.5)
MCV RBC AUTO: 87.9 FL (ref 80–99.9)
METER GLUCOSE: 116 MG/DL (ref 74–99)
METER GLUCOSE: 164 MG/DL (ref 74–99)
METER GLUCOSE: 217 MG/DL (ref 74–99)
METER GLUCOSE: 238 MG/DL (ref 74–99)
METER GLUCOSE: 294 MG/DL (ref 74–99)
METER GLUCOSE: 343 MG/DL (ref 74–99)
METER GLUCOSE: 350 MG/DL (ref 74–99)
METER GLUCOSE: 406 MG/DL (ref 74–99)
MONOCYTES ABSOLUTE: 0.57 E9/L (ref 0.1–0.95)
MONOCYTES RELATIVE PERCENT: 3.6 % (ref 2–12)
MYCOPLASMA PNEUMONIAE BY PCR: NOT DETECTED
NEUTROPHILS ABSOLUTE: 12.8 E9/L (ref 1.8–7.3)
NEUTROPHILS RELATIVE PERCENT: 81.5 % (ref 43–80)
NITRITE, URINE: NEGATIVE
PARAINFLUENZA VIRUS 1 BY PCR: NOT DETECTED
PARAINFLUENZA VIRUS 2 BY PCR: NOT DETECTED
PARAINFLUENZA VIRUS 3 BY PCR: NOT DETECTED
PARAINFLUENZA VIRUS 4 BY PCR: NOT DETECTED
PDW BLD-RTO: 12.9 FL (ref 11.5–15)
PH UA: 6 (ref 5–9)
PH VENOUS: 7.34 (ref 7.35–7.45)
PHOSPHORUS: 2.1 MG/DL (ref 2.5–4.5)
PHOSPHORUS: 2.3 MG/DL (ref 2.5–4.5)
PLATELET # BLD: 444 E9/L (ref 130–450)
PMV BLD AUTO: 9.9 FL (ref 7–12)
POTASSIUM REFLEX MAGNESIUM: 4.4 MMOL/L (ref 3.5–5)
POTASSIUM SERPL-SCNC: 4.4 MMOL/L (ref 3.5–5)
POTASSIUM SERPL-SCNC: 4.7 MMOL/L (ref 3.5–5)
POTASSIUM SERPL-SCNC: 4.9 MMOL/L (ref 3.5–5)
POTASSIUM SERPL-SCNC: 5.1 MMOL/L (ref 3.5–5)
PROTEIN UA: 100 MG/DL
RBC # BLD: 4.31 E12/L (ref 3.8–5.8)
RBC UA: ABNORMAL /HPF (ref 0–2)
RESPIRATORY SYNCYTIAL VIRUS BY PCR: NOT DETECTED
SALICYLATE, SERUM: <0.3 MG/DL (ref 0–30)
SARS-COV-2, PCR: NOT DETECTED
SODIUM BLD-SCNC: 135 MMOL/L (ref 132–146)
SODIUM BLD-SCNC: 136 MMOL/L (ref 132–146)
SODIUM BLD-SCNC: 137 MMOL/L (ref 132–146)
SODIUM BLD-SCNC: 139 MMOL/L (ref 132–146)
SODIUM BLD-SCNC: 139 MMOL/L (ref 132–146)
SPECIFIC GRAVITY UA: 1.01 (ref 1–1.03)
TOTAL PROTEIN: 7.4 G/DL (ref 6.4–8.3)
TRICYCLIC ANTIDEPRESSANTS SCREEN SERUM: NEGATIVE NG/ML
TROPONIN, HIGH SENSITIVITY: 28 NG/L (ref 0–11)
TROPONIN, HIGH SENSITIVITY: 37 NG/L (ref 0–11)
UROBILINOGEN, URINE: 0.2 E.U./DL
WBC # BLD: 15.7 E9/L (ref 4.5–11.5)
WBC UA: ABNORMAL /HPF (ref 0–5)

## 2021-11-05 PROCEDURE — 82800 BLOOD PH: CPT

## 2021-11-05 PROCEDURE — 6360000002 HC RX W HCPCS: Performed by: EMERGENCY MEDICINE

## 2021-11-05 PROCEDURE — 82962 GLUCOSE BLOOD TEST: CPT

## 2021-11-05 PROCEDURE — 85025 COMPLETE CBC W/AUTO DIFF WBC: CPT

## 2021-11-05 PROCEDURE — 80143 DRUG ASSAY ACETAMINOPHEN: CPT

## 2021-11-05 PROCEDURE — 96374 THER/PROPH/DIAG INJ IV PUSH: CPT

## 2021-11-05 PROCEDURE — 2060000000 HC ICU INTERMEDIATE R&B

## 2021-11-05 PROCEDURE — 6370000000 HC RX 637 (ALT 250 FOR IP): Performed by: STUDENT IN AN ORGANIZED HEALTH CARE EDUCATION/TRAINING PROGRAM

## 2021-11-05 PROCEDURE — 0202U NFCT DS 22 TRGT SARS-COV-2: CPT

## 2021-11-05 PROCEDURE — 93010 ELECTROCARDIOGRAM REPORT: CPT | Performed by: INTERNAL MEDICINE

## 2021-11-05 PROCEDURE — 80179 DRUG ASSAY SALICYLATE: CPT

## 2021-11-05 PROCEDURE — 87040 BLOOD CULTURE FOR BACTERIA: CPT

## 2021-11-05 PROCEDURE — 99284 EMERGENCY DEPT VISIT MOD MDM: CPT

## 2021-11-05 PROCEDURE — 82010 KETONE BODYS QUAN: CPT

## 2021-11-05 PROCEDURE — 80053 COMPREHEN METABOLIC PANEL: CPT

## 2021-11-05 PROCEDURE — 93005 ELECTROCARDIOGRAM TRACING: CPT | Performed by: STUDENT IN AN ORGANIZED HEALTH CARE EDUCATION/TRAINING PROGRAM

## 2021-11-05 PROCEDURE — 83605 ASSAY OF LACTIC ACID: CPT

## 2021-11-05 PROCEDURE — 71045 X-RAY EXAM CHEST 1 VIEW: CPT

## 2021-11-05 PROCEDURE — 6360000002 HC RX W HCPCS: Performed by: STUDENT IN AN ORGANIZED HEALTH CARE EDUCATION/TRAINING PROGRAM

## 2021-11-05 PROCEDURE — 81001 URINALYSIS AUTO W/SCOPE: CPT

## 2021-11-05 PROCEDURE — 80307 DRUG TEST PRSMV CHEM ANLYZR: CPT

## 2021-11-05 PROCEDURE — 2580000003 HC RX 258: Performed by: EMERGENCY MEDICINE

## 2021-11-05 PROCEDURE — 2580000003 HC RX 258: Performed by: INTERNAL MEDICINE

## 2021-11-05 PROCEDURE — 2500000003 HC RX 250 WO HCPCS: Performed by: STUDENT IN AN ORGANIZED HEALTH CARE EDUCATION/TRAINING PROGRAM

## 2021-11-05 PROCEDURE — 99223 1ST HOSP IP/OBS HIGH 75: CPT | Performed by: INTERNAL MEDICINE

## 2021-11-05 PROCEDURE — 36415 COLL VENOUS BLD VENIPUNCTURE: CPT

## 2021-11-05 PROCEDURE — 96365 THER/PROPH/DIAG IV INF INIT: CPT

## 2021-11-05 PROCEDURE — 84484 ASSAY OF TROPONIN QUANT: CPT

## 2021-11-05 PROCEDURE — 80048 BASIC METABOLIC PNL TOTAL CA: CPT

## 2021-11-05 PROCEDURE — 96361 HYDRATE IV INFUSION ADD-ON: CPT

## 2021-11-05 PROCEDURE — 96376 TX/PRO/DX INJ SAME DRUG ADON: CPT

## 2021-11-05 PROCEDURE — 84100 ASSAY OF PHOSPHORUS: CPT

## 2021-11-05 PROCEDURE — 6370000000 HC RX 637 (ALT 250 FOR IP): Performed by: INTERNAL MEDICINE

## 2021-11-05 PROCEDURE — 83735 ASSAY OF MAGNESIUM: CPT

## 2021-11-05 PROCEDURE — 96367 TX/PROPH/DG ADDL SEQ IV INF: CPT

## 2021-11-05 PROCEDURE — 2580000003 HC RX 258: Performed by: STUDENT IN AN ORGANIZED HEALTH CARE EDUCATION/TRAINING PROGRAM

## 2021-11-05 PROCEDURE — 82077 ASSAY SPEC XCP UR&BREATH IA: CPT

## 2021-11-05 RX ORDER — DEXTROSE MONOHYDRATE 25 G/50ML
12.5 INJECTION, SOLUTION INTRAVENOUS PRN
Status: DISCONTINUED | OUTPATIENT
Start: 2021-11-05 | End: 2021-11-06 | Stop reason: HOSPADM

## 2021-11-05 RX ORDER — POTASSIUM CHLORIDE 7.45 MG/ML
10 INJECTION INTRAVENOUS PRN
Status: DISCONTINUED | OUTPATIENT
Start: 2021-11-05 | End: 2021-11-05 | Stop reason: SDUPTHER

## 2021-11-05 RX ORDER — 0.9 % SODIUM CHLORIDE 0.9 %
2000 INTRAVENOUS SOLUTION INTRAVENOUS ONCE
Status: COMPLETED | OUTPATIENT
Start: 2021-11-05 | End: 2021-11-05

## 2021-11-05 RX ORDER — SODIUM CHLORIDE 9 MG/ML
INJECTION, SOLUTION INTRAVENOUS CONTINUOUS
Status: DISCONTINUED | OUTPATIENT
Start: 2021-11-05 | End: 2021-11-06 | Stop reason: HOSPADM

## 2021-11-05 RX ORDER — GABAPENTIN 300 MG/1
300 CAPSULE ORAL 2 TIMES DAILY
Status: DISCONTINUED | OUTPATIENT
Start: 2021-11-05 | End: 2021-11-06 | Stop reason: HOSPADM

## 2021-11-05 RX ORDER — GUAIFENESIN 600 MG/1
600 TABLET, EXTENDED RELEASE ORAL 2 TIMES DAILY
Status: DISCONTINUED | OUTPATIENT
Start: 2021-11-05 | End: 2021-11-05 | Stop reason: CLARIF

## 2021-11-05 RX ORDER — ASPIRIN 81 MG/1
81 TABLET, CHEWABLE ORAL DAILY
Status: DISCONTINUED | OUTPATIENT
Start: 2021-11-05 | End: 2021-11-06 | Stop reason: HOSPADM

## 2021-11-05 RX ORDER — ATORVASTATIN CALCIUM 40 MG/1
80 TABLET, FILM COATED ORAL DAILY
Status: DISCONTINUED | OUTPATIENT
Start: 2021-11-05 | End: 2021-11-06 | Stop reason: HOSPADM

## 2021-11-05 RX ORDER — 0.9 % SODIUM CHLORIDE 0.9 %
1000 INTRAVENOUS SOLUTION INTRAVENOUS ONCE
Status: COMPLETED | OUTPATIENT
Start: 2021-11-05 | End: 2021-11-05

## 2021-11-05 RX ORDER — ALBUTEROL SULFATE 90 UG/1
2 AEROSOL, METERED RESPIRATORY (INHALATION) EVERY 4 HOURS PRN
Status: DISCONTINUED | OUTPATIENT
Start: 2021-11-05 | End: 2021-11-05 | Stop reason: CLARIF

## 2021-11-05 RX ORDER — ONDANSETRON 4 MG/1
4 TABLET, FILM COATED ORAL 3 TIMES DAILY PRN
Status: DISCONTINUED | OUTPATIENT
Start: 2021-11-05 | End: 2021-11-06 | Stop reason: HOSPADM

## 2021-11-05 RX ORDER — LISINOPRIL 20 MG/1
20 TABLET ORAL DAILY
Status: DISCONTINUED | OUTPATIENT
Start: 2021-11-05 | End: 2021-11-06 | Stop reason: HOSPADM

## 2021-11-05 RX ORDER — POLYETHYLENE GLYCOL 3350 17 G/17G
17 POWDER, FOR SOLUTION ORAL DAILY PRN
Status: DISCONTINUED | OUTPATIENT
Start: 2021-11-05 | End: 2021-11-06 | Stop reason: HOSPADM

## 2021-11-05 RX ORDER — INSULIN GLARGINE 100 [IU]/ML
44 INJECTION, SOLUTION SUBCUTANEOUS NIGHTLY
Status: DISCONTINUED | OUTPATIENT
Start: 2021-11-05 | End: 2021-11-06 | Stop reason: HOSPADM

## 2021-11-05 RX ORDER — DEXTROSE AND SODIUM CHLORIDE 5; .45 G/100ML; G/100ML
INJECTION, SOLUTION INTRAVENOUS CONTINUOUS PRN
Status: DISCONTINUED | OUTPATIENT
Start: 2021-11-05 | End: 2021-11-05 | Stop reason: SDUPTHER

## 2021-11-05 RX ORDER — ONDANSETRON 2 MG/ML
4 INJECTION INTRAMUSCULAR; INTRAVENOUS ONCE
Status: COMPLETED | OUTPATIENT
Start: 2021-11-05 | End: 2021-11-05

## 2021-11-05 RX ORDER — 0.9 % SODIUM CHLORIDE 0.9 %
15 INTRAVENOUS SOLUTION INTRAVENOUS ONCE
Status: COMPLETED | OUTPATIENT
Start: 2021-11-05 | End: 2021-11-05

## 2021-11-05 RX ORDER — SODIUM CHLORIDE 9 MG/ML
INJECTION, SOLUTION INTRAVENOUS ONCE
Status: COMPLETED | OUTPATIENT
Start: 2021-11-05 | End: 2021-11-05

## 2021-11-05 RX ORDER — METOPROLOL SUCCINATE 50 MG/1
50 TABLET, EXTENDED RELEASE ORAL 2 TIMES DAILY
Status: DISCONTINUED | OUTPATIENT
Start: 2021-11-05 | End: 2021-11-06 | Stop reason: HOSPADM

## 2021-11-05 RX ORDER — ONDANSETRON 4 MG/1
TABLET, ORALLY DISINTEGRATING ORAL
Status: DISCONTINUED
Start: 2021-11-05 | End: 2021-11-05 | Stop reason: WASHOUT

## 2021-11-05 RX ORDER — DEXTROSE MONOHYDRATE 25 G/50ML
12.5 INJECTION, SOLUTION INTRAVENOUS PRN
Status: DISCONTINUED | OUTPATIENT
Start: 2021-11-05 | End: 2021-11-05 | Stop reason: SDUPTHER

## 2021-11-05 RX ORDER — SODIUM CHLORIDE 9 MG/ML
INJECTION, SOLUTION INTRAVENOUS CONTINUOUS
Status: DISCONTINUED | OUTPATIENT
Start: 2021-11-05 | End: 2021-11-05 | Stop reason: SDUPTHER

## 2021-11-05 RX ORDER — CLONIDINE HYDROCHLORIDE 0.2 MG/1
0.2 TABLET ORAL 2 TIMES DAILY
Status: DISCONTINUED | OUTPATIENT
Start: 2021-11-05 | End: 2021-11-06 | Stop reason: HOSPADM

## 2021-11-05 RX ORDER — POTASSIUM CHLORIDE 7.45 MG/ML
10 INJECTION INTRAVENOUS PRN
Status: DISCONTINUED | OUTPATIENT
Start: 2021-11-05 | End: 2021-11-06 | Stop reason: HOSPADM

## 2021-11-05 RX ORDER — GUAIFENESIN 400 MG/1
400 TABLET ORAL EVERY 8 HOURS SCHEDULED
Status: DISCONTINUED | OUTPATIENT
Start: 2021-11-05 | End: 2021-11-06 | Stop reason: HOSPADM

## 2021-11-05 RX ORDER — MAGNESIUM SULFATE 1 G/100ML
1000 INJECTION INTRAVENOUS PRN
Status: DISCONTINUED | OUTPATIENT
Start: 2021-11-05 | End: 2021-11-06 | Stop reason: HOSPADM

## 2021-11-05 RX ORDER — SILDENAFIL CITRATE 20 MG/1
60 TABLET ORAL PRN
COMMUNITY
End: 2022-05-20

## 2021-11-05 RX ORDER — DEXTROSE, SODIUM CHLORIDE, AND POTASSIUM CHLORIDE 5; .45; .15 G/100ML; G/100ML; G/100ML
INJECTION INTRAVENOUS CONTINUOUS PRN
Status: DISCONTINUED | OUTPATIENT
Start: 2021-11-05 | End: 2021-11-06 | Stop reason: HOSPADM

## 2021-11-05 RX ORDER — ALBUTEROL SULFATE 2.5 MG/3ML
2.5 SOLUTION RESPIRATORY (INHALATION) EVERY 6 HOURS PRN
Status: DISCONTINUED | OUTPATIENT
Start: 2021-11-05 | End: 2021-11-06 | Stop reason: HOSPADM

## 2021-11-05 RX ADMIN — POTASSIUM CHLORIDE 10 MEQ: 7.45 INJECTION INTRAVENOUS at 11:33

## 2021-11-05 RX ADMIN — POTASSIUM CHLORIDE 10 MEQ: 7.45 INJECTION INTRAVENOUS at 23:06

## 2021-11-05 RX ADMIN — CLONIDINE HYDROCHLORIDE 0.2 MG: 0.2 TABLET ORAL at 20:49

## 2021-11-05 RX ADMIN — POTASSIUM CHLORIDE 10 MEQ: 7.45 INJECTION INTRAVENOUS at 10:17

## 2021-11-05 RX ADMIN — INSULIN LISPRO 5 UNITS: 100 INJECTION, SOLUTION INTRAVENOUS; SUBCUTANEOUS at 20:49

## 2021-11-05 RX ADMIN — MAGNESIUM SULFATE HEPTAHYDRATE 1000 MG: 1 INJECTION, SOLUTION INTRAVENOUS at 23:06

## 2021-11-05 ASSESSMENT — ENCOUNTER SYMPTOMS
EYE REDNESS: 0
EYE PAIN: 0
WHEEZING: 0
SORE THROAT: 0
SHORTNESS OF BREATH: 0
BACK PAIN: 0
SINUS PRESSURE: 0
DIARRHEA: 0
ABDOMINAL PAIN: 0
NAUSEA: 1
COUGH: 0
EYE DISCHARGE: 0
VOMITING: 1

## 2021-11-05 ASSESSMENT — PAIN SCALES - GENERAL: PAINLEVEL_OUTOF10: 0

## 2021-11-05 NOTE — ED PROVIDER NOTES
Mouth/Throat:      Mouth: Mucous membranes are dry. Eyes:      Pupils: Pupils are equal, round, and reactive to light. Cardiovascular:      Rate and Rhythm: Normal rate and regular rhythm. Pulses: Normal pulses. Heart sounds: Normal heart sounds. No murmur heard. Pulmonary:      Effort: Pulmonary effort is normal. No respiratory distress. Breath sounds: Normal breath sounds. No wheezing or rales. Abdominal:      General: Bowel sounds are normal.      Palpations: Abdomen is soft. Tenderness: There is no abdominal tenderness. There is no guarding or rebound. Musculoskeletal:      Cervical back: Normal range of motion and neck supple. Right lower leg: No edema. Left lower leg: No edema. Skin:     General: Skin is warm and dry. Capillary Refill: Capillary refill takes less than 2 seconds. Neurological:      General: No focal deficit present. Mental Status: He is alert and oriented to person, place, and time. Cranial Nerves: No cranial nerve deficit.       Coordination: Coordination normal.          Procedures     Labs Reviewed   CBC WITH AUTO DIFFERENTIAL - Abnormal; Notable for the following components:       Result Value    WBC 15.7 (*)     Neutrophils % 81.5 (*)     Lymphocytes % 12.8 (*)     Neutrophils Absolute 12.80 (*)     All other components within normal limits   COMPREHENSIVE METABOLIC PANEL W/ REFLEX TO MG FOR LOW K - Abnormal; Notable for the following components:    Chloride 91 (*)     CO2 17 (*)     Anion Gap 27 (*)     Glucose 521 (*)     BUN 34 (*)     CREATININE 1.6 (*)     Alkaline Phosphatase 163 (*)     All other components within normal limits    Narrative:     CALL  [facility] tel. [phone],  Chemistry results called to and read back by Corazon Black, 11/05/2021  09:46, by [id number]   TROPONIN - Abnormal; Notable for the following components:    Troponin, High Sensitivity 37 (*)     All other components within normal limits BETA-HYDROXYBUTYRATE - Abnormal; Notable for the following components:    Beta-Hydroxybutyrate >4.50 (*)     All other components within normal limits    Narrative:     Jalen Lopez tel. [phone],  Chemistry results called to and read back by Lidia Hood, 11/05/2021  09:46, by [address]   PH, VENOUS - Abnormal; Notable for the following components:    pH, Luis 7.34 (*)     All other components within normal limits   URINALYSIS - Abnormal; Notable for the following components:    Glucose, Ur >=1000 (*)     Ketones, Urine >=80 (*)     Protein,  (*)     All other components within normal limits   SERUM DRUG SCREEN - Abnormal; Notable for the following components:    Acetaminophen Level <5.0 (*)     All other components within normal limits   LACTIC ACID, PLASMA - Abnormal; Notable for the following components:    Lactic Acid 6.1 (*)     All other components within normal limits    Narrative:     [facility]  [facility] tel. [phone],  Chemistry results called to and read back by Lidia Hood, 11/05/2021  09:12, by [facility] - Abnormal; Notable for the following components:    Bacteria, UA RARE (*)     All other components within normal limits   TROPONIN - Abnormal; Notable for the following components:    Troponin, High Sensitivity 28 (*)     All other components within normal limits   BASIC METABOLIC PANEL - Abnormal; Notable for the following components:    Potassium 5.1 (*)     CO2 21 (*)     Glucose 170 (*)     BUN 22 (*)     Calcium 7.4 (*)     All other components within normal limits   LACTIC ACID, PLASMA - Abnormal; Notable for the following components:    Lactic Acid 4.5 (*)     All other components within normal limits    Narrative:     CALL  [facility] tel. [phone],  Chemistry results called to and read back by John Nguyen, 11/05/2021  12:58, by [address] - Abnormal; Notable for the following components:    CO2 16 (*)     Anion Gap 17 (*)     Glucose 334 (*)     BUN 27 (*)     CREATININE 1.3 (*)     Calcium 7.5 (*)     All other components within normal limits   POCT GLUCOSE - Abnormal; Notable for the following components:    Meter Glucose 406 (*)     All other components within normal limits   POCT GLUCOSE - Abnormal; Notable for the following components:    Meter Glucose 343 (*)     All other components within normal limits   POCT GLUCOSE - Abnormal; Notable for the following components:    Meter Glucose 294 (*)     All other components within normal limits   POCT GLUCOSE - Abnormal; Notable for the following components:    Meter Glucose 164 (*)     All other components within normal limits   POCT GLUCOSE - Abnormal; Notable for the following components:    Meter Glucose 116 (*)     All other components within normal limits   POCT GLUCOSE - Normal   POCT GLUCOSE - Normal   POCT GLUCOSE - Normal   POCT GLUCOSE - Normal   POCT GLUCOSE - Normal   CULTURE, BLOOD 1   CULTURE, BLOOD 2   URINE DRUG SCREEN   BASIC METABOLIC PANEL   BASIC METABOLIC PANEL   MAGNESIUM   MAGNESIUM   PHOSPHORUS   PHOSPHORUS   BASIC METABOLIC PANEL   MAGNESIUM   PHOSPHORUS   BASIC METABOLIC PANEL   MAGNESIUM   PHOSPHORUS   POCT GLUCOSE   POCT GLUCOSE   POCT GLUCOSE   POCT GLUCOSE   POCT GLUCOSE   POCT GLUCOSE   POCT GLUCOSE   POCT GLUCOSE   POCT GLUCOSE   POCT GLUCOSE   POCT GLUCOSE   POCT GLUCOSE   POCT GLUCOSE   POCT GLUCOSE   POCT GLUCOSE   POCT GLUCOSE   POCT GLUCOSE   POCT GLUCOSE   POCT GLUCOSE   POCT GLUCOSE   POCT GLUCOSE   POCT GLUCOSE   POCT GLUCOSE   POCT GLUCOSE   POCT GLUCOSE   POCT GLUCOSE   POCT GLUCOSE   POCT GLUCOSE     XR CHEST PORTABLE   Final Result   No acute process. EKG #1:   I personally interpreted this EKG  Time:  0734    Rate: 115  Rhythm: Sinus. Interpretation: Sinus tachycardia, normal axis, no ST elevation or T wave inversion.       MDM  Number of Diagnoses or Management Options  Diabetic ketoacidosis without coma associated with type 1 diabetes mellitus (HCC)  Elevated lactic acid level  Diagnosis management comments: Patient is a 27-year-old male who is a type I diabetic who presents today for nausea, vomiting, fatigue and elevated blood sugar. Labs and imaging were obtained. Patient was given multiple liters IV fluid. Patient noted to be in DKA with a glucose of 521, anion gap 27, CO2 17. Venous pH 7.34, Beta hydroxy is elevated. UA shows no evidence of urinary infection. Chest x-ray within normal meds. Patient was givenIV bolus as well as insulin dripWith improvement of symptoms. Repeat basic metabolic panel shows glucose improved to 170, anion gap closed 11, CO2 21. Electrolytes have remained stable. At this time patient was taken off ofInsulin drip. Patient will be admitted to the intermediate floorFor initial DKA which has improved. Amount and/or Complexity of Data Reviewed  Clinical lab tests: reviewed  Tests in the radiology section of CPT®: reviewed  Tests in the medicine section of CPT®: reviewed             ED Course as of Nov 05 1448 Fri Nov 05, 2021   1217 In for by nursing staff repeat point-of-care glucose 164. At this time insulin drip will be stopped    []      ED Course User Index  [] New Romano, DO       --------------------------------------------- PAST HISTORY ---------------------------------------------  Past Medical History:  has a past medical history of Asthma, Bipolar affective ([address] COPD (chronic obstructive pulmonary disease) ([facility] [address] Diabetes mellitus type I ([address] Diabetic neuropathy ([address] Diabetic, retinopathy, proliferative ([address] HTN (hypertension), Hypercholesteremia, Left renal artery stenosis ([address] Macular edema due to secondary diabetes ([address] Opioid dependence ([address] Proteinuria, Retinal detachment, and Vitreous hemorrhage of right eye due to diabetes mellitus ([address] Past Surgical History:  has a past surgical history that includes Eye surgery (Right, 11/20/2020);  Eye surgery (Right, 01/08/2021); and Eye surgery (Right, 1/8/2021). Social History:  reports that he has been smoking cigarettes. He has a 7.50 pack-year smoking history. He has never used smokeless tobacco. He reports current alcohol use. He reports previous drug use. Drug: Opiates . Family History: family history includes Diabetes type 2  in his maternal grandmother; No Known Problems in his brother, daughter, father, mother, and son. The patients home medications have been reviewed.     Allergies: Chlorthalidone, Hctz [hydrochlorothiazide], and Hydralazine    -------------------------------------------------- RESULTS -------------------------------------------------    LABS:  Results for orders placed or performed during the hospital encounter of 11/05/21   CBC Auto Differential   Result Value Ref Range    WBC 15.7 (H) 4.5 - 11.5 E9/L    RBC 4.31 3.80 - 5.80 E12/L    Hemoglobin 12.6 12.5 - 16.5 g/dL    Hematocrit 37.9 37.0 - 54.0 %    MCV 87.9 80.0 - 99.9 fL    MCH 29.2 26.0 - 35.0 pg    MCHC 33.2 32.0 - 34.5 %    RDW 12.9 11.5 - 15.0 fL    Platelets 481 181 - 363 E9/L    MPV 9.9 7.0 - 12.0 fL    Neutrophils % 81.5 (H) 43.0 - 80.0 %    Immature Granulocytes % 0.4 0.0 - 5.0 %    Lymphocytes % 12.8 (L) 20.0 - 42.0 %    Monocytes % 3.6 2.0 - 12.0 %    Eosinophils % 1.0 0.0 - 6.0 %    Basophils % 0.7 0.0 - 2.0 %    Neutrophils Absolute 12.80 (H) 1.80 - 7.30 E9/L    Immature Granulocytes # 0.07 E9/L    Lymphocytes Absolute 2.02 1.50 - 4.00 E9/L    Monocytes Absolute 0.57 0.10 - 0.95 E9/L    Eosinophils Absolute 0.15 0.05 - 0.50 E9/L    Basophils Absolute 0.11 0.00 - 0.20 E9/L   Comprehensive Metabolic Panel w/ Reflex to MG   Result Value Ref Range    Sodium 135 132 - 146 mmol/L    Potassium reflex Magnesium 4.4 3.5 - 5.0 mmol/L    Chloride 91 (L) 98 - 107 mmol/L    CO2 17 (L) 22 - 29 mmol/L    Anion Gap 27 (H) 7 - 16 mmol/L    Glucose 521 (HH) 74 - 99 mg/dL    BUN 34 (H) 6 - 20 mg/dL    CREATININE 1.6 (H) 0.7 - 1.2 mg/dL    GFR Non-African American 51 >=60 mL/min/1.73    GFR African American >60     Calcium 9.1 8.6 - 10.2 mg/dL    Total Protein 7.4 6.4 - 8.3 g/dL    Albumin 3.9 3.5 - 5.2 g/dL    Total Bilirubin 0.2 0.0 - 1.2 mg/dL    Alkaline Phosphatase 163 (H) 40 - 129 U/L    ALT 13 0 - 40 U/L    AST 15 0 - 39 U/L   Troponin   Result Value Ref Range    Troponin, High Sensitivity 37 (H) 0 - 11 ng/L   Beta-Hydroxybutyrate   Result Value Ref Range    Beta-Hydroxybutyrate >4.50 (H) 0.02 - 0.27 mmol/L   pH, venous   Result Value Ref Range    pH, Luis 7.34 (L) 7.35 - 7.45   Urinalysis, reflex to microscopic   Result Value Ref Range    Color, UA Yellow Straw/Yellow    Clarity, UA Clear Clear    Glucose, Ur >=1000 (A) Negative mg/dL    Bilirubin Urine Negative Negative    Ketones, Urine >=80 (A) Negative mg/dL    Specific Gravity, UA 1.015 1.005 - 1.030    Blood, Urine TRACE-LYSED Negative    pH, UA 6.0 5.0 - 9.0    Protein,  (A) Negative mg/dL    Urobilinogen, Urine 0.2 <2.0 E.U./dL    Nitrite, Urine Negative Negative    Leukocyte Esterase, Urine Negative Negative   Serum Drug Screen   Result Value Ref Range    Ethanol Lvl <10 mg/dL    Acetaminophen Level <5.0 (L) 10.0 - 70.5 mcg/mL    Salicylate, Serum <3.4 0.0 - 30.0 mg/dL    TCA Scrn NEGATIVE Cutoff:300 ng/mL   LACTIC ACID, PLASMA   Result Value Ref Range    Lactic Acid 6.1 (HH) 0.5 - 2.2 mmol/L   Microscopic Urinalysis   Result Value Ref Range    WBC, UA 0-1 0 - 5 /HPF    RBC, UA 0-1 0 - 2 /HPF    Bacteria, UA RARE (A) None Seen /HPF   Troponin   Result Value Ref Range    Troponin, High Sensitivity 28 (H) 0 - 11 ng/L   Basic Metabolic Panel   Result Value Ref Range    Sodium 139 132 - 146 mmol/L    Potassium 5.1 (H) 3.5 - 5.0 mmol/L    Chloride 107 98 - 107 mmol/L    CO2 21 (L) 22 - 29 mmol/L    Anion Gap 11 7 - 16 mmol/L    Glucose 170 (H) 74 - 99 mg/dL    BUN 22 (H) 6 - 20 mg/dL    CREATININE 1.2 0.7 - 1.2 mg/dL    GFR Non-African American >60 >=60 mL/min/1.73    GFR African American >60 Calcium 7.4 (L) 8.6 - 10.2 mg/dL   Lactic Acid, Plasma   Result Value Ref Range    Lactic Acid 4.5 (HH) 0.5 - 2.2 mmol/L   Basic Metabolic Panel   Result Value Ref Range    Sodium 139 132 - 146 mmol/L    Potassium 4.7 3.5 - 5.0 mmol/L    Chloride 106 98 - 107 mmol/L    CO2 16 (L) 22 - 29 mmol/L    Anion Gap 17 (H) 7 - 16 mmol/L    Glucose 334 (H) 74 - 99 mg/dL    BUN 27 (H) 6 - 20 mg/dL    CREATININE 1.3 (H) 0.7 - 1.2 mg/dL    GFR Non-African American >60 >=60 mL/min/1.73    GFR African American >60     Calcium 7.5 (L) 8.6 - 10.2 mg/dL   POCT Glucose   Result Value Ref Range    Glucose 406 mg/dL    QC OK? ok    POCT Glucose   Result Value Ref Range    Meter Glucose 406 (H) 74 - 99 mg/dL   POCT Glucose - every hour   Result Value Ref Range    Glucose 343 mg/dL    QC OK? ok    POCT Glucose - every hour   Result Value Ref Range    Glucose 294 mg/dL    QC OK? ok    POCT Glucose - every hour   Result Value Ref Range    Glucose 164 mg/dL    QC OK? ok    POCT Glucose - every hour   Result Value Ref Range    Glucose 116 mg/dL    QC OK? ok    POCT Glucose   Result Value Ref Range    Meter Glucose 343 (H) 74 - 99 mg/dL   POCT Glucose   Result Value Ref Range    Meter Glucose 294 (H) 74 - 99 mg/dL   POCT Glucose   Result Value Ref Range    Meter Glucose 164 (H) 74 - 99 mg/dL   POCT Glucose   Result Value Ref Range    Meter Glucose 116 (H) 74 - 99 mg/dL   EKG 12 Lead   Result Value Ref Range    Ventricular Rate 115 BPM    Atrial Rate 115 BPM    P-R Interval 138 ms    QRS Duration 76 ms    Q-T Interval 336 ms    QTc Calculation (Bazett) 464 ms    P Axis 67 degrees    R Axis 66 degrees    T Axis 64 degrees       RADIOLOGY:  XR CHEST PORTABLE   Final Result   No acute process.                  ------------------------- NURSING NOTES AND VITALS REVIEWED ---------------------------  Date / Time Roomed:  11/5/2021  7:16 AM  ED Bed Assignment:  13/13    The nursing notes within the ED encounter and vital signs as below have been reviewed. Patient Vitals for the past 24 hrs:   BP Temp Temp src Pulse Resp SpO2 Height Weight   11/05/21 1228 (!) 157/99 -- -- 107 14 -- -- --   11/05/21 1011 (!) 141/83 -- -- 118 14 96 % -- --   11/05/21 0731 (!) 181/113 98.4 °F (36.9 °C) Oral 117 14 94 % 5' 3\" (1.6 m) 110 lb (49.9 kg)       Oxygen Saturation Interpretation: Normal    ------------------------------------------ PROGRESS NOTES ------------------------------------------    Counseling:  I have spoken with the patient and discussed todays results, in addition to providing specific details for the plan of care and counseling regarding the diagnosis and prognosis. Their questions are answered at this time and they are agreeable with the plan of admission.    --------------------------------- ADDITIONAL PROVIDER NOTES ---------------------------------  Consultations:  Spoke with [facility].  Discussed case. They will admit the patient. This patient's ED course included: a personal history and physicial examination    This patient has remained hemodynamically stable during their ED course.       Medications   dextrose 50 % IV solution (has no administration in time range)   potassium chloride 10 mEq/100 mL IVPB (Peripheral Line) (0 mEq IntraVENous Stopped 11/5/21 1239)   magnesium sulfate 1000 mg in dextrose 5% 100 mL IVPB (has no administration in time range)   0.9 % sodium chloride infusion (0 mL/hr IntraVENous Stopped 11/5/21 1221)   dextrose 5 % and 0.45 % NaCl with KCl 20 mEq infusion ( IntraVENous New Bag 11/5/21 1225)   polyethylene glycol (GLYCOLAX) packet 17 g (has no administration in time range)   enoxaparin (LOVENOX) injection 40 mg (has no administration in time range)   0.9 % sodium chloride bolus (has no administration in time range)   insulin lispro (HUMALOG) injection vial 0-6 Units (has no administration in time range)   0.9 % sodium chloride bolus (0 mLs IntraVENous Stopped 11/5/21 6115)   ondansetron (ZOFRAN) injection 4

## 2021-11-05 NOTE — PROGRESS NOTES
Dr Sharee Perdue,   Sodium phos is on national backorder. We have none in stock. We have potassium phos or [id number]. Thank Ferrera Level Pharm. D 11/5/2021 9:57 AM

## 2021-11-05 NOTE — ED NOTES
RN faxed SBAR to floor. Confirmation received and spoke with staff. Pt ready for transport to room.        Salvador Perales RN  11/05/21 [id number]

## 2021-11-05 NOTE — ED NOTES
Bed: 13  Expected date:   Expected time:   Means of arrival:   Comments:  ems     Alis Arrieta RN  11/05/21 6711

## 2021-11-05 NOTE — H&P
[facility]) -    HISTORY AND PHYSICAL             Date: 11/5/2021        Patient Name: Shay Brooks     YOB: 1990      Age:  32 y.o. Chief Complaint     Chief Complaint   Patient presents with    Hyperglycemia    Hypertension    Emesis    Chest Pain          History Obtained From   {Patient    History of Present Illness   This is 32years old white gentleman significant past medical history of type 1 insulin-dependent diabetes ,hypertension, hyperlipidemia, who presented to the emergency room because of nausea and vomiting patient stated that he has been vomiting for the last 2 days he says every time he gets. He starts throwing up he vomited about 10 times a day he could not eat or drink or keep anything down he did not miss any of his insulin dosages. .  Stated that about a week ago he has been having cough and phlegm and he does not know what color flow he came to the emergency room his white count was elevated he was given Levaquin at home but he still feels sick.     Patient knew that since he was vomiting and was not able to get up that he is probably in DKA he came in the emergency room and his blood sugar was 521 and he was found to be in DKA his anion gap was 27  He was given IV fluids and was placed on insulin drip was admitted for further evaluation    Past Medical History     Past Medical History:   Diagnosis Date    Asthma     Bipolar affective ([address]     COPD (chronic obstructive pulmonary disease) ([address]     Diabetes mellitus type I ([address]     Diabetic neuropathy ([address]     Diabetic, retinopathy, proliferative ([address]     HTN (hypertension)     due to left renal artery stenosis    Hypercholesteremia     Left renal artery stenosis ([address] 09/19/2019    Macular edema due to secondary diabetes ([address]     Opioid dependence ([address]     Proteinuria     Retinal detachment     right eye    Vitreous hemorrhage of right eye due to diabetes mellitus [facility])         Past Surgical Sergio Corbin,    ondansetron (ZOFRAN) 4 MG tablet Take 1 tablet by mouth 3 times daily as needed for Nausea or Vomiting 10/29/21   Sergio Corbin DO   guaiFENesin (MUCINEX) 600 MG extended release tablet Take 1 tablet by mouth 2 times daily for 15 days 10/29/21 11/13/21  ErrolChisholm Shaquille DO   SUBOXONE 8-2 MG FILM SL film Place 1 Film under the tongue daily. 17   Historical Provider, MD        Allergies   Chlorthalidone, Hctz [hydrochlorothiazide], and Hydralazine    Social History     Social History     Tobacco History     Smoking Status  Current Every Day Smoker Smoking Frequency  0.5 packs/day for 15 years (7.5 pk yrs) Smoking Tobacco Type  Cigarettes    Smokeless Tobacco Use  Never Used          Alcohol History     Alcohol Use Status  Yes Comment  not usually           Drug Use     Drug Use Status  Not Currently Types  Opiates  Comment  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 10/2017, now takes Suboxone          Sexual Activity     Sexually Active  Not Asked                Family History     Family History   Problem Relation Age of Onset    No Known Problems Mother     No Known Problems Father     No Known Problems Brother         Homicide     No Known Problems Daughter     No Known Problems Son     Diabetes type 2  Maternal Grandmother    Mom and dad are alive but he has no relationship with his the rest of his family  Siblings he is brother was killed a sister who  also of overdose  Children he has 8years old daughter and 6years old son  Review of Systems   Review of Systems   Psychiatric/Behavioral: Agitation: .        Physical Exam   BP (!) 157/99   Pulse 107   Temp 98.4 °F (36.9 °C) (Oral)   Resp 14   Ht 5' 3\" (1.6 m)   Wt 110 lb (49.9 kg)   SpO2 96%   BMI 19.49 kg/m²     Physical Exam   HEENT pupils equal regular reactive to light  Lungs clear to auscultation bilateral  Heart regular rate and rhythm tachycardic  Abdomen soft nontender nondistended positive bowel sounds  Extremities no edema peripheral pulses felt  Neurological no focal motor or sensory deficit    Labs      Recent Results (from the past 24 hour(s))   EKG 12 Lead    Collection Time: 11/05/21  7:34 AM   Result Value Ref Range    Ventricular Rate 115 BPM    Atrial Rate 115 BPM    P-R Interval 138 ms    QRS Duration 76 ms    Q-T Interval 336 ms    QTc Calculation (Bazett) 464 ms    P Axis 67 degrees    R Axis 66 degrees    T Axis 64 degrees   Urinalysis, reflex to microscopic    Collection Time: 11/05/21  7:52 AM   Result Value Ref Range    Color, UA Yellow Straw/Yellow    Clarity, UA Clear Clear    Glucose, Ur >=1000 (A) Negative mg/dL    Bilirubin Urine Negative Negative    Ketones, Urine >=80 (A) Negative mg/dL    Specific Gravity, UA 1.015 1.005 - 1.030    Blood, Urine TRACE-LYSED Negative    pH, UA 6.0 5.0 - 9.0    Protein,  (A) Negative mg/dL    Urobilinogen, Urine 0.2 <2.0 E.U./dL    Nitrite, Urine Negative Negative    Leukocyte Esterase, Urine Negative Negative   Microscopic Urinalysis    Collection Time: 11/05/21  7:52 AM   Result Value Ref Range    WBC, UA 0-1 0 - 5 /HPF    RBC, UA 0-1 0 - 2 /HPF    Bacteria, UA RARE (A) None Seen /HPF   CBC Auto Differential    Collection Time: 11/05/21  8:15 AM   Result Value Ref Range    WBC 15.7 (H) 4.5 - 11.5 E9/L    RBC 4.31 3.80 - 5.80 E12/L    Hemoglobin 12.6 12.5 - 16.5 g/dL    Hematocrit 37.9 37.0 - 54.0 %    MCV 87.9 80.0 - 99.9 fL    MCH 29.2 26.0 - 35.0 pg    MCHC 33.2 32.0 - 34.5 %    RDW 12.9 11.5 - 15.0 fL    Platelets 994 259 - 562 E9/L    MPV 9.9 7.0 - 12.0 fL    Neutrophils % 81.5 (H) 43.0 - 80.0 %    Immature Granulocytes % 0.4 0.0 - 5.0 %    Lymphocytes % 12.8 (L) 20.0 - 42.0 %    Monocytes % 3.6 2.0 - 12.0 %    Eosinophils % 1.0 0.0 - 6.0 %    Basophils % 0.7 0.0 - 2.0 %    Neutrophils Absolute 12.80 (H) 1.80 - 7.30 E9/L    Immature Granulocytes # 0.07 E9/L    Lymphocytes Absolute 2.02 1.50 - 4.00 E9/L    Monocytes Absolute 0.57 0.10 - 0.95 E9/L    Eosinophils Absolute 0.15 0.05 - 0.50 E9/L    Basophils Absolute 0.11 0.00 - 0.20 E9/L   Comprehensive Metabolic Panel w/ Reflex to MG    Collection Time: 11/05/21  8:15 AM   Result Value Ref Range    Sodium 135 132 - 146 mmol/L    Potassium reflex Magnesium 4.4 3.5 - 5.0 mmol/L    Chloride 91 (L) 98 - 107 mmol/L    CO2 17 (L) 22 - 29 mmol/L    Anion Gap 27 (H) 7 - 16 mmol/L    Glucose 521 (HH) 74 - 99 mg/dL    BUN 34 (H) 6 - 20 mg/dL    CREATININE 1.6 (H) 0.7 - 1.2 mg/dL    GFR Non-African American 51 >=60 mL/min/1.73    GFR African American >60     Calcium 9.1 8.6 - 10.2 mg/dL    Total Protein 7.4 6.4 - 8.3 g/dL    Albumin 3.9 3.5 - 5.2 g/dL    Total Bilirubin 0.2 0.0 - 1.2 mg/dL    Alkaline Phosphatase 163 (H) 40 - 129 U/L    ALT 13 0 - 40 U/L    AST 15 0 - 39 U/L   Troponin    Collection Time: 11/05/21  8:15 AM   Result Value Ref Range    Troponin, High Sensitivity 37 (H) 0 - 11 ng/L   Beta-Hydroxybutyrate    Collection Time: 11/05/21  8:15 AM   Result Value Ref Range    Beta-Hydroxybutyrate >4.50 (H) 0.02 - 0.27 mmol/L   pH, venous    Collection Time: 11/05/21  8:15 AM   Result Value Ref Range    pH, Luis 7.34 (L) 7.35 - 7.45   Serum Drug Screen    Collection Time: 11/05/21  8:15 AM   Result Value Ref Range    Ethanol Lvl <10 mg/dL    Acetaminophen Level <5.0 (L) 10.0 - 36.1 mcg/mL    Salicylate, Serum <9.9 0.0 - 30.0 mg/dL    TCA Scrn NEGATIVE Cutoff:300 ng/mL   LACTIC ACID, PLASMA    Collection Time: 11/05/21  8:15 AM   Result Value Ref Range    Lactic Acid 6.1 (HH) 0.5 - 2.2 mmol/L   POCT Glucose    Collection Time: 11/05/21  8:26 AM   Result Value Ref Range    Glucose 406 mg/dL    QC OK? ok    POCT Glucose    Collection Time: 11/05/21  8:26 AM   Result Value Ref Range    Meter Glucose 406 (H) 74 - 99 mg/dL   POCT Glucose    Collection Time: 11/05/21 10:12 AM   Result Value Ref Range    Meter Glucose 343 (H) 74 - 99 mg/dL   POCT Glucose - every hour    Collection Time: 11/05/21 10:13 AM   Result Value Ref Range    Glucose 343 mg/dL    QC OK? ok    POCT Glucose - every hour    Collection Time: 11/05/21 11:06 AM   Result Value Ref Range    Glucose 294 mg/dL    QC OK? ok    POCT Glucose    Collection Time: 11/05/21 11:06 AM   Result Value Ref Range    Meter Glucose 294 (H) 74 - 99 mg/dL   Troponin    Collection Time: 11/05/21 11:14 AM   Result Value Ref Range    Troponin, High Sensitivity 28 (H) 0 - 11 ng/L   Basic Metabolic Panel    Collection Time: 11/05/21 11:14 AM   Result Value Ref Range    Sodium 139 132 - 146 mmol/L    Potassium 4.7 3.5 - 5.0 mmol/L    Chloride 106 98 - 107 mmol/L    CO2 16 (L) 22 - 29 mmol/L    Anion Gap 17 (H) 7 - 16 mmol/L    Glucose 334 (H) 74 - 99 mg/dL    BUN 27 (H) 6 - 20 mg/dL    CREATININE 1.3 (H) 0.7 - 1.2 mg/dL    GFR Non-African American >60 >=60 mL/min/1.73    GFR African American >60     Calcium 7.5 (L) 8.6 - 10.2 mg/dL   Lactic Acid, Plasma    Collection Time: 11/05/21 12:02 PM   Result Value Ref Range    Lactic Acid 4.5 (HH) 0.5 - 2.2 mmol/L   POCT Glucose - every hour    Collection Time: 11/05/21 12:12 PM   Result Value Ref Range    Glucose 164 mg/dL    QC OK? ok    POCT Glucose    Collection Time: 11/05/21 12:12 PM   Result Value Ref Range    Meter Glucose 164 (H) 74 - 99 mg/dL   POCT Glucose - every hour    Collection Time: 11/05/21  1:30 PM   Result Value Ref Range    Glucose 116 mg/dL    QC OK? ok         Imaging/Diagnostics Last 24 Hours   XR CHEST PORTABLE    Result Date: 11/5/2021  EXAMINATION: ONE XRAY VIEW OF THE CHEST 11/5/2021 8:46 am COMPARISON: 09/16/2017 HISTORY: ORDERING SYSTEM PROVIDED HISTORY: chest pain TECHNOLOGIST PROVIDED HISTORY: Reason for exam:->chest pain FINDINGS: The lungs are without acute focal process. There is no effusion or pneumothorax. The cardiomediastinal silhouette is without acute process. The osseous structures are without acute process. No acute process.        Assessment    DKA  Hypertension  Insulin-dependent diabetes type 1  Hyperlipidemia  Peripheral neuropathy  Diabetic retinopathy  Leukocytosis      Plan   We will place patient on insulin drip will check his BMP every 4 hours no blood sugar every hour, given IV fluids for dehydration, continue with normal saline.   Get blood cultures x2 ,check a urine analysis, chest x-ray was negative will check respiratory film array    Hypertension we will resume his metoprolol and lisinopril    Hyperlipidemia we will resume his Lipitor    Peripheral neuropathy we will resume his gabapentin    Leukocytosis could be reactive from his DKA will check blood cultures and monitor his leukocytic count        Consultations Ordered:  IP CONSULT TO CRITICAL CARE    Electronically signed by Jaycee Crowder MD on 11/5/21 at 1:33 PM EDT

## 2021-11-06 VITALS
DIASTOLIC BLOOD PRESSURE: 98 MMHG | TEMPERATURE: 98.1 F | WEIGHT: 108.6 LBS | HEIGHT: 63 IN | RESPIRATION RATE: 18 BRPM | HEART RATE: 84 BPM | SYSTOLIC BLOOD PRESSURE: 146 MMHG | BODY MASS INDEX: 19.24 KG/M2 | OXYGEN SATURATION: 97 %

## 2021-11-06 LAB
ANION GAP SERPL CALCULATED.3IONS-SCNC: 10 MMOL/L (ref 7–16)
ANION GAP SERPL CALCULATED.3IONS-SCNC: 11 MMOL/L (ref 7–16)
ANION GAP SERPL CALCULATED.3IONS-SCNC: 12 MMOL/L (ref 7–16)
BASOPHILS ABSOLUTE: 0.07 E9/L (ref 0–0.2)
BASOPHILS RELATIVE PERCENT: 0.5 % (ref 0–2)
BUN BLDV-MCNC: 14 MG/DL (ref 6–20)
BUN BLDV-MCNC: 15 MG/DL (ref 6–20)
BUN BLDV-MCNC: 15 MG/DL (ref 6–20)
CALCIUM SERPL-MCNC: 7.6 MG/DL (ref 8.6–10.2)
CALCIUM SERPL-MCNC: 7.8 MG/DL (ref 8.6–10.2)
CALCIUM SERPL-MCNC: 8.4 MG/DL (ref 8.6–10.2)
CHLORIDE BLD-SCNC: 103 MMOL/L (ref 98–107)
CHLORIDE BLD-SCNC: 103 MMOL/L (ref 98–107)
CHLORIDE BLD-SCNC: 104 MMOL/L (ref 98–107)
CO2: 19 MMOL/L (ref 22–29)
CO2: 21 MMOL/L (ref 22–29)
CO2: 23 MMOL/L (ref 22–29)
CREAT SERPL-MCNC: 1.1 MG/DL (ref 0.7–1.2)
CREAT SERPL-MCNC: 1.2 MG/DL (ref 0.7–1.2)
CREAT SERPL-MCNC: 1.2 MG/DL (ref 0.7–1.2)
EOSINOPHILS ABSOLUTE: 0.24 E9/L (ref 0.05–0.5)
EOSINOPHILS RELATIVE PERCENT: 1.7 % (ref 0–6)
GFR AFRICAN AMERICAN: >60
GFR NON-AFRICAN AMERICAN: >60 ML/MIN/1.73
GLUCOSE BLD-MCNC: 127 MG/DL (ref 74–99)
GLUCOSE BLD-MCNC: 225 MG/DL (ref 74–99)
GLUCOSE BLD-MCNC: 97 MG/DL (ref 74–99)
HCT VFR BLD CALC: 30.8 % (ref 37–54)
HEMOGLOBIN: 10.2 G/DL (ref 12.5–16.5)
IMMATURE GRANULOCYTES #: 0.05 E9/L
IMMATURE GRANULOCYTES %: 0.4 % (ref 0–5)
LYMPHOCYTES ABSOLUTE: 3.71 E9/L (ref 1.5–4)
LYMPHOCYTES RELATIVE PERCENT: 26.7 % (ref 20–42)
MAGNESIUM: 2.5 MG/DL (ref 1.6–2.6)
MAGNESIUM: 2.7 MG/DL (ref 1.6–2.6)
MAGNESIUM: 2.9 MG/DL (ref 1.6–2.6)
MCH RBC QN AUTO: 29.5 PG (ref 26–35)
MCHC RBC AUTO-ENTMCNC: 33.1 % (ref 32–34.5)
MCV RBC AUTO: 89 FL (ref 80–99.9)
METER GLUCOSE: 213 MG/DL (ref 74–99)
METER GLUCOSE: 224 MG/DL (ref 74–99)
METER GLUCOSE: 93 MG/DL (ref 74–99)
MONOCYTES ABSOLUTE: 0.65 E9/L (ref 0.1–0.95)
MONOCYTES RELATIVE PERCENT: 4.7 % (ref 2–12)
NEUTROPHILS ABSOLUTE: 9.18 E9/L (ref 1.8–7.3)
NEUTROPHILS RELATIVE PERCENT: 66 % (ref 43–80)
PDW BLD-RTO: 13.2 FL (ref 11.5–15)
PHOSPHORUS: 1.6 MG/DL (ref 2.5–4.5)
PHOSPHORUS: 1.8 MG/DL (ref 2.5–4.5)
PHOSPHORUS: 2.3 MG/DL (ref 2.5–4.5)
PLATELET # BLD: 325 E9/L (ref 130–450)
PMV BLD AUTO: 9.6 FL (ref 7–12)
POTASSIUM SERPL-SCNC: 4.6 MMOL/L (ref 3.5–5)
POTASSIUM SERPL-SCNC: 5.2 MMOL/L (ref 3.5–5)
POTASSIUM SERPL-SCNC: 5.7 MMOL/L (ref 3.5–5)
RBC # BLD: 3.46 E12/L (ref 3.8–5.8)
SODIUM BLD-SCNC: 133 MMOL/L (ref 132–146)
SODIUM BLD-SCNC: 136 MMOL/L (ref 132–146)
SODIUM BLD-SCNC: 137 MMOL/L (ref 132–146)
WBC # BLD: 13.9 E9/L (ref 4.5–11.5)

## 2021-11-06 PROCEDURE — 85025 COMPLETE CBC W/AUTO DIFF WBC: CPT

## 2021-11-06 PROCEDURE — 82962 GLUCOSE BLOOD TEST: CPT

## 2021-11-06 PROCEDURE — 36415 COLL VENOUS BLD VENIPUNCTURE: CPT

## 2021-11-06 PROCEDURE — 6370000000 HC RX 637 (ALT 250 FOR IP): Performed by: HOSPITALIST

## 2021-11-06 PROCEDURE — 84100 ASSAY OF PHOSPHORUS: CPT

## 2021-11-06 PROCEDURE — 80048 BASIC METABOLIC PNL TOTAL CA: CPT

## 2021-11-06 PROCEDURE — 6360000002 HC RX W HCPCS: Performed by: STUDENT IN AN ORGANIZED HEALTH CARE EDUCATION/TRAINING PROGRAM

## 2021-11-06 PROCEDURE — 83735 ASSAY OF MAGNESIUM: CPT

## 2021-11-06 RX ORDER — INSULIN GLARGINE 100 [IU]/ML
44 INJECTION, SOLUTION SUBCUTANEOUS ONCE
Status: DISCONTINUED | OUTPATIENT
Start: 2021-11-06 | End: 2021-11-06 | Stop reason: HOSPADM

## 2021-11-06 RX ORDER — SODIUM POLYSTYRENE SULFONATE 15 G/60ML
15 SUSPENSION ORAL; RECTAL ONCE
Status: DISCONTINUED | OUTPATIENT
Start: 2021-11-06 | End: 2021-11-06 | Stop reason: HOSPADM

## 2021-11-06 RX ADMIN — INSULIN LISPRO 2 UNITS: 100 INJECTION, SOLUTION INTRAVENOUS; SUBCUTANEOUS at 06:35

## 2021-11-06 RX ADMIN — POTASSIUM CHLORIDE 10 MEQ: 7.45 INJECTION INTRAVENOUS at 02:30

## 2021-11-06 RX ADMIN — MAGNESIUM SULFATE HEPTAHYDRATE 1000 MG: 1 INJECTION, SOLUTION INTRAVENOUS at 00:23

## 2021-11-06 RX ADMIN — POTASSIUM CHLORIDE 10 MEQ: 7.45 INJECTION INTRAVENOUS at 04:22

## 2021-11-06 RX ADMIN — POTASSIUM CHLORIDE 10 MEQ: 7.45 INJECTION INTRAVENOUS at 00:22

## 2021-11-06 ASSESSMENT — PAIN SCALES - GENERAL
PAINLEVEL_OUTOF10: 0
PAINLEVEL_OUTOF10: 0

## 2021-11-06 NOTE — DISCHARGE SUMMARY
Gabrielle Alejandre       Hospitalist Physician Discharge Summary       No follow-up provider specified. Activity level: As Tolerated     Diet: ADULT DIET; Regular; 3 carb choices (45 gm/meal); Low Fat/Low Chol/High Fiber/MADDY      Condition at discharge: Stable    Dispo: [address]        Patient ID:  Clarence Piper  [id number]  86 y.o.  1990    Admit date: 11/5/2021    Discharge date and time:  11/6/2021  11:32 AM    Admission Diagnoses: Active Problems:    DKA, type 1, not at goal [facility])  Resolved Problems:    * No resolved hospital problems. *      Discharge Diagnoses: Active Problems:    DKA, type 1, not at goal [facility])  Resolved Problems:    * No resolved hospital problems. *      Consults:  IP CONSULT TO CRITICAL CARE    Procedures: None    Hospital Course:      65/21 32year old male with PMH DM I, HTN, HLD, presented to [facility] ED with complaints of N/V over the last couple of days. One week prior to presentation he also developed cough and phlegm production He came to the emergency room his white count was elevated he was given Levaquin.  since he was vomiting and was not able to get up that he is probably in DKA he came in the emergency room and his blood sugar was 521 and he was found to be in DKA his anion gap was 27  He was given IV fluids and was placed on insulin drip was admitted for further evaluation. Pt AG did improve. Pt refused am Lantus and am blood pressure medication. Non-compliant to medication and treatmnet plan. Pt noted with Hyperkalemia. Kayexalate order. Pt states \"my gap is better\". Received call from nursing, pt is leaving AMA. Discussed with attending.          Discharge Exam:  Vitals:    11/05/21 2030 11/06/21 0200 11/06/21 0600 11/06/21 0945   BP: (!) 180/104 116/80  (!) 146/98   Pulse: 94 90  84   Resp: 18 18  18   Temp: 98.2 °F (36.8 °C) 98.2 °F (36.8 °C)  98.1 °F (36.7 °C)   TempSrc: Oral Oral  Oral   SpO2: 100% 99%  97%   Weight:   108 lb 9.6 oz (49.3 kg)    Height:       General Appearance: alert and oriented to person, place and time and in no acute distress  Skin: warm and dry  Head: normocephalic and atraumatic  Eyes: pupils equal, round, and reactive to light, extraocular eye movements intact, conjunctivae normal  Neck: neck supple and non tender without mass   Pulmonary/Chest: clear to auscultation bilaterally- no wheezes, rales or rhonchi, normal air movement, no respiratory distress  Cardiovascular: normal rate, normal S1 and S2 and no RGM  Abdomen: soft, non-tender, non-distended, normal bowel sounds   Extremities: no cyanosis, no clubbing and no edema  Neurologic: no cranial nerve deficit and speech normal      No intake/output data recorded. No intake/output data recorded. LABS:  Recent Labs     11/05/21  2200 11/06/21  0200 11/06/21  0615    136 133   K 4.4 4.6 5.7*   CL 99 103 103   CO2 17* 23 19*   BUN 18 15 15   CREATININE 1.2 1.2 1.1   GLUCOSE 330* 97 225*   CALCIUM 7.7* 7.6* 7.8*       Recent Labs     11/05/21  0815 11/06/21  0615   WBC 15.7* 13.9*   RBC 4.31 3.46*   HGB 12.6 10.2*   HCT 37.9 30.8*   MCV 87.9 89.0   MCH 29.2 29.5   MCHC 33.2 33.1   RDW 12.9 13.2    325   MPV 9.9 9.6       No results for input(s): POCGLU in the last 72 hours. Imaging:  XR CHEST PORTABLE    Result Date: 11/5/2021  EXAMINATION: ONE XRAY VIEW OF THE CHEST 11/5/2021 8:46 am COMPARISON: 09/16/2017 HISTORY: ORDERING SYSTEM PROVIDED HISTORY: chest pain TECHNOLOGIST PROVIDED HISTORY: Reason for exam:->chest pain FINDINGS: The lungs are without acute focal process. There is no effusion or pneumothorax. The cardiomediastinal silhouette is without acute process. The osseous structures are without acute process. No acute process.          Patient Instructions:   Current Discharge Medication List      CONTINUE these medications which have NOT CHANGED    Details   sildenafil (REVATIO) 20 MG tablet Take 60 mg by mouth as needed (BEFORE SEXUAL ACTIVITY)      lisinopril (PRINIVIL;ZESTRIL) 20 MG tablet Take 1 tablet by mouth daily  Qty: 90 tablet, Refills: 0    Associated Diagnoses: Renovascular hypertension      gabapentin (NEURONTIN) 300 MG capsule Take 1 capsule by mouth 2 times daily for 90 days.   Qty: 180 capsule, Refills: 0    Associated Diagnoses: Uncontrolled type 1 diabetes mellitus with complication, with long-term current use of insulin ([facility])      metoprolol succinate (TOPROL XL) 50 MG extended release tablet Take 1 tablet by mouth 2 times daily  Qty: 180 tablet, Refills: 0    Associated Diagnoses: Renovascular hypertension      cloNIDine (CATAPRES) 0.2 MG tablet Take 1 tablet by mouth 2 times daily  Qty: 180 tablet, Refills: 0    Associated Diagnoses: Renovascular hypertension      aspirin 81 MG chewable tablet Take 1 tablet by mouth daily  Qty: 90 tablet, Refills: 0    Associated Diagnoses: Uncontrolled type 1 diabetes mellitus with complication, with long-term current use of insulin ([facility])      atorvastatin (LIPITOR) 80 MG tablet Take 1 tablet by mouth daily  Qty: 90 tablet, Refills: 0    Associated Diagnoses: Uncontrolled type 1 diabetes mellitus with complication, with long-term current use of insulin ([facility])      Insulin Degludec (TRESIBA FLEXTOUCH) 100 UNIT/ML SOPN Inject 55 Units into the skin nightly  Qty: 5 pen, Refills: 5    Associated Diagnoses: Uncontrolled type 1 diabetes mellitus with complication, with long-term current use of insulin ([facility])      insulin aspart (NOVOLOG) 100 UNIT/ML injection vial Inject 6-10 Units into the skin 4 times daily with meals  Qty: 10 mL, Refills: 5    Associated Diagnoses: Uncontrolled type 1 diabetes mellitus with complication, with long-term current use of insulin ([facility])      albuterol sulfate HFA (VENTOLIN HFA) 108 (90 Base) MCG/ACT inhaler Inhale 2 puffs into the lungs every 4 hours as needed for Wheezing or Shortness of Breath  Qty: 18 g, Refills: 0    Associated Diagnoses: Pneumonia due to infectious organism, unspecified laterality, unspecified part of lung      levoFLOXacin (LEVAQUIN) 750 MG tablet Take 1 tablet by mouth daily for 5 days  Qty: 5 tablet, Refills: 0    Associated Diagnoses: Pneumonia due to infectious organism, unspecified laterality, unspecified part of lung      ondansetron (ZOFRAN) 4 MG tablet Take 1 tablet by mouth 3 times daily as needed for Nausea or Vomiting  Qty: 15 tablet, Refills: 0      guaiFENesin (MUCINEX) 600 MG extended release tablet Take 1 tablet by mouth 2 times daily for 15 days  Qty: 30 tablet, Refills: 0      SUBOXONE 8-2 MG FILM SL film Place 1 Film under the tongue 2 times daily. Note that more than 30 minutes was spent in preparing discharge papers, discussing discharge with patient, medication review, etc.    NOTE: This report was transcribed using voice recognition software. Every effort was made to ensure accuracy; however, inadvertent computerized transcription errors may be present. Signed:  Electronically signed by Derrek Murrieta CNP on 11/6/2021 at 11:32 AM     He was FUNMILAYO - he did not waited till I see him. But was seen / examined by Ru Paulson. So will be billed by her.

## 2021-11-06 NOTE — PROGRESS NOTES
Patient stated he was ready to leave, informed him he has not been discharged from a medical standpoint yet. Said he would like to sign out AMA. Informed him of risks of leaving AMA. Form signed and placed in chart.

## 2021-11-06 NOTE — PLAN OF CARE
Problem: Falls - Risk of:  Goal: Will remain free from falls  Description: Will remain free from falls  11/6/2021 1029 by Curtis Ortega RN  Outcome: Met This Shift     Problem: Falls - Risk of:  Goal: Absence of physical injury  Description: Absence of physical injury  11/6/2021 1029 by Curtis Ortega RN  Outcome: Met This Shift

## 2021-11-06 NOTE — PROGRESS NOTES
Patient refused one time dose of lantus this morning, patient was educated on importance of taking the medication.

## 2021-11-07 LAB
AMPHETAMINE SCREEN, URINE: NOT DETECTED
BARBITURATE SCREEN URINE: NOT DETECTED
BENZODIAZEPINE SCREEN, URINE: NOT DETECTED
CANNABINOID SCREEN URINE: NOT DETECTED
COCAINE METABOLITE SCREEN URINE: NOT DETECTED
FENTANYL SCREEN, URINE: NOT DETECTED
Lab: NORMAL
METHADONE SCREEN, URINE: NOT DETECTED
OPIATE SCREEN URINE: NOT DETECTED
OXYCODONE URINE: NOT DETECTED
PHENCYCLIDINE SCREEN URINE: NOT DETECTED

## 2021-11-10 LAB
BLOOD CULTURE, ROUTINE: NORMAL
CULTURE, BLOOD 2: NORMAL

## 2021-11-15 ENCOUNTER — HOSPITAL ENCOUNTER (EMERGENCY)
Age: 31
Discharge: HOME | End: 2021-11-16

## 2021-11-15 ENCOUNTER — TELEPHONE (OUTPATIENT)
Dept: OTHER | Facility: CLINIC | Age: 31
End: 2021-11-15

## 2021-11-15 VITALS
WEIGHT: 115 LBS | OXYGEN SATURATION: 99 % | RESPIRATION RATE: 16 BRPM | TEMPERATURE: 97.7 F | BODY MASS INDEX: 20.38 KG/M2 | HEIGHT: 63 IN | SYSTOLIC BLOOD PRESSURE: 170 MMHG | DIASTOLIC BLOOD PRESSURE: 115 MMHG | HEART RATE: 103 BPM

## 2021-11-15 NOTE — ED PROVIDER NOTES
FIRST PROVIDER CONTACT ASSESSMENT NOTE           Department of Emergency Medicine                 First Provider Note            11/15/21  3:44 PM EST    Date of Encounter: No admission date for patient encounter. Patient Name: Vandana Schumacher  : 1990  MRN: [id number]    Chief Complaint: Emesis (x4, onset last night) and Abdominal Pain (RLQ, reports sharp pain only prior to emesis)      History of Present Illness:   Vandana Schumacher is a 32 y.o. male who presents to the ED for N/V and RLQ pain. The patient is a diabetic. He states he was just in the hospital for DKA secondary to an upper respiratory infection. His Covid swabs were negative. He states that his blood sugars at this point have been in the 150s. He feels that this feels very different than his usual DKA. He is reporting 4 episodes of emesis with pain localized to the right lower quadrant. Focused Physical Exam:  VS:    ED Triage Vitals [11/15/21 1541]   BP Temp Temp Source Pulse Resp SpO2 Height Weight   (!) 170/115 97.7 °F (36.5 °C) Temporal -- -- -- -- --        Physical Ex: Constitutional: Alert and non-toxic. Medical History:  has a past medical history of Asthma, Bipolar affective ([address] COPD (chronic obstructive pulmonary disease) ([address] Diabetes mellitus type I ([address] Diabetic neuropathy ([address] Diabetic, retinopathy, proliferative ([address] HTN (hypertension), Hypercholesteremia, Left renal artery stenosis ([address] Macular edema due to secondary diabetes ([address] Opioid dependence ([address] Proteinuria, Retinal detachment, and Vitreous hemorrhage of right eye due to diabetes mellitus ([address] Surgical History:  has a past surgical history that includes Eye surgery (Right, 2020); Eye surgery (Right, 2021); and Eye surgery (Right, 2021). Social History:  reports that he has been smoking cigarettes. He has a 7.50 pack-year smoking history. He has never used smokeless tobacco. He reports current alcohol use.  He reports previous drug use. Drug: Opiates . Family History: family history includes Diabetes type 2  in his maternal grandmother; No Known Problems in his brother, daughter, father, mother, and son.     Allergies: Chlorthalidone, Hctz [hydrochlorothiazide], and Hydralazine     Initial Plan of Care: Initiate Treatment-Testing, Proceed toTreatment Area When Bed Available for ED Attending/MLP to Continue Care      ---END OF FIRST PROVIDER CONTACT ASSESSMENT NOTE---  Electronically signed by Rylee Ram PA-C   DD: 11/15/21       Rylee Ram PA-C  11/15/21 1544

## 2021-11-16 NOTE — ED NOTES
Called Patient's name three times to draw lab work and no anwser.      Stefani Camp  11/15/21 [place]  11/15/21 4210

## 2021-11-17 ENCOUNTER — OFFICE VISIT (OUTPATIENT)
Dept: FAMILY MEDICINE CLINIC | Age: 31
End: 2021-11-17
Payer: COMMERCIAL

## 2021-11-17 VITALS
TEMPERATURE: 97.1 F | SYSTOLIC BLOOD PRESSURE: 166 MMHG | DIASTOLIC BLOOD PRESSURE: 104 MMHG | WEIGHT: 111 LBS | OXYGEN SATURATION: 99 % | BODY MASS INDEX: 19.66 KG/M2 | HEART RATE: 95 BPM

## 2021-11-17 DIAGNOSIS — F32.A DEPRESSION, UNSPECIFIED DEPRESSION TYPE: ICD-10-CM

## 2021-11-17 DIAGNOSIS — I15.9 SECONDARY HYPERTENSION: ICD-10-CM

## 2021-11-17 LAB
CHP ED QC CHECK: NORMAL
GLUCOSE BLD-MCNC: 293 MG/DL
HBA1C MFR BLD: 11.1 %

## 2021-11-17 PROCEDURE — 83036 HEMOGLOBIN GLYCOSYLATED A1C: CPT | Performed by: FAMILY MEDICINE

## 2021-11-17 PROCEDURE — 82962 GLUCOSE BLOOD TEST: CPT | Performed by: FAMILY MEDICINE

## 2021-11-17 PROCEDURE — 99214 OFFICE O/P EST MOD 30 MIN: CPT | Performed by: FAMILY MEDICINE

## 2021-11-17 RX ORDER — CLONIDINE HYDROCHLORIDE 0.3 MG/1
0.3 TABLET ORAL 2 TIMES DAILY
Qty: 60 TABLET | Refills: 2 | Status: SHIPPED
Start: 2021-11-17 | End: 2022-02-21 | Stop reason: SDUPTHER

## 2021-11-17 ASSESSMENT — ENCOUNTER SYMPTOMS
COLOR CHANGE: 0
SHORTNESS OF BREATH: 0
DIARRHEA: 0
CONSTIPATION: 0
EYE PAIN: 0
VOMITING: 0
BLOOD IN STOOL: 0
NAUSEA: 0
WHEEZING: 0
ABDOMINAL PAIN: 0
COUGH: 1

## 2021-11-17 NOTE — PROGRESS NOTES
11/17/2021    Vandana Schumacher is a 32 y.o. male here for:    Chief Complaint   Patient presents with    Follow-Up from Hospital     DKA        HPI:  DKA, T1DM  - Was admitted to [facility] on 11/05/2021 for DKA. His blood sugar was 521 in ED. He was put on insulin drip and received IV fluids. Positive for rhinovirus.   - Patient signed out on 11/06/2021 AMA. - Was seen again at [facility] ED on 11/15/2021 for emesis and abdominal pain. Patient left ED before evaluation was performed. - Patient states that he is doing better today. He has not vomited since 11/15/2021.   - POCT glucose today was 293. - Insulin regimen include Tresiba 55 units HS and SSI Novolog 4-8 units with meals.   - Patient states that his BG readings have been 150-180 (fasting). - No hypoglycemic episodes. - Following with Ophthalmology for VEG-F injections for diabetic retinopathy and macular edema.   - On Lipitor 80 mg QD.   - On gabapentin 300 mg BID. HTN   - On clonidine 0.2 mg BID, lisinopril 20 mg QD, and metoprolol succinate 50 mg BID  - Denies side effects on BP medications. - BP this morning was 134/91 mmHg with HR=93.   - BP has been 140s/100s at the highest at home. Depression   - Feels depressed  - Patient states that he is \"fed up with everything. \"  - Reports trouble sleeping and decreased motivation.  - Denies feelings of worthlessness, guilt, and hopelessness. Denies SI and HI.    - Patient states that he took this week off of work because he has been feeling \"blah\". Works at [employer]. - Patient states that relationship with his [facility] is going well. - Following with Dr. Rekha Mallory, Psychiatry, for mood. Has seen counselor, Delvis Rivera, for 2 sessions now. Has appointment with Dr. Irene Fabian on 11/26/2021.           Current Outpatient Medications   Medication Sig Dispense Refill    cloNIDine (CATAPRES) 0.3 MG tablet Take 1 tablet by mouth 2 times daily 60 tablet 2    sildenafil (REVATIO) 20 MG tablet Take 60 mg by mouth as needed (BEFORE SEXUAL ACTIVITY)      lisinopril (PRINIVIL;ZESTRIL) 20 MG tablet Take 1 tablet by mouth daily 90 tablet 0    gabapentin (NEURONTIN) 300 MG capsule Take 1 capsule by mouth 2 times daily for 90 days. 180 capsule 0    metoprolol succinate (TOPROL XL) 50 MG extended release tablet Take 1 tablet by mouth 2 times daily 180 tablet 0    aspirin 81 MG chewable tablet Take 1 tablet by mouth daily 90 tablet 0    atorvastatin (LIPITOR) 80 MG tablet Take 1 tablet by mouth daily 90 tablet 0    Insulin Degludec (TRESIBA FLEXTOUCH) 100 UNIT/ML SOPN Inject 55 Units into the skin nightly 5 pen 5    insulin aspart (NOVOLOG) 100 UNIT/ML injection vial Inject 6-10 Units into the skin 4 times daily with meals 10 mL 5    albuterol sulfate HFA (VENTOLIN HFA) 108 (90 Base) MCG/ACT inhaler Inhale 2 puffs into the lungs every 4 hours as needed for Wheezing or Shortness of Breath 18 g 0    SUBOXONE 8-2 MG FILM SL film Place 1 Film under the tongue 2 times daily. No current facility-administered medications for this visit.       Allergies   Allergen Reactions    Chlorthalidone      Vomiting     Hctz [Hydrochlorothiazide] Nausea Only    Hydralazine      Nausea, headache       Past Medical & Surgical History:      Diagnosis Date    Asthma     Bipolar affective ([address]     COPD [facility]chronic obstructive pulmonary disease) ([facility])     Diabetes mellitus type I ([address]     Diabetic neuropathy ([facility])     Diabetic, retinopathy, proliferative ([address]     HTN (hypertension)     due to left renal artery stenosis    Hypercholesteremia     Left renal artery stenosis ([address] 09/19/2019    Macular edema due to secondary diabetes ([address]     Opioid dependence ([facility])     Proteinuria     Retinal detachment     right eye    Vitreous hemorrhage of right eye due to diabetes mellitus [facility])      Past Surgical History:   Procedure Laterality Date    EYE SURGERY Right 11/20/2020    PARS PLANA VITRECTOMY, MEMBRANE PEEL, ENDOLASER, GAS FLUID EXCHANGE, 25G, MAC, RIGHT EYE performed by Luigi Metcalf MD at . [address] Right 01/08/2021    pars plana vitrectomy with peeling of preretinal tissue and gas fluid exchange    EYE SURGERY Right 1/8/2021    PARS PLANA VITRECTOMY, INTERNAL LIMITING MEMBRANE PEEL, INDOCYANINE GREEN, GAS FLUID EXCHANGE, 25g, MAC, RIGHT EYE performed by Luigi Metcalf MD at [address] [place] [address]       Family History:      Problem Relation Age of Onset    No Known Problems Mother     No Known Problems Father     No Known Problems Brother         Homicide     No Known Problems Daughter     No Known Problems Son     Diabetes type 2  Maternal Grandmother        Social History:  Social History     Tobacco Use    Smoking status: Current Every Day Smoker     Packs/day: 0.50     Years: 15.00     Pack years: 7.50     Types: Cigarettes    Smokeless tobacco: Never Used   Substance Use Topics    Alcohol use: Yes     Comment: not usually        Review of Systems   Constitutional: Negative for chills and fever. Eyes: Positive for visual disturbance (diabetic retinopathy). Negative for pain. Respiratory: Positive for cough (recently diagnosed with Rhinovirus, improving). Negative for shortness of breath and wheezing. Cardiovascular: Negative for chest pain, palpitations and leg swelling. Gastrointestinal: Negative for abdominal pain, blood in stool, constipation, diarrhea, nausea and vomiting. Genitourinary: Negative for dysuria and hematuria. Skin: Negative for color change and rash. Neurological: Negative for dizziness, syncope and light-headedness. Psychiatric/Behavioral: Positive for dysphoric mood. Negative for suicidal ideas. The patient is not nervous/anxious.          Denies homicidal ideation       BP Readings from Last 3 Encounters:   11/17/21 (!) 166/104   11/15/21 (!) 170/115   11/06/21 (!) 146/98       VS:  BP (!) 166/104 (Site: Right Upper Arm, Position: Sitting) Pulse 95   Temp 97.1 °F (36.2 °C) (Temporal)   Wt 111 lb (50.3 kg)   SpO2 99%   BMI 19.66 kg/m²     Physical Exam  Vitals reviewed. Constitutional:       General: He is awake. He is not in acute distress. Appearance: He is well-developed and well-groomed. He is not ill-appearing, toxic-appearing or diaphoretic. Neck:      Vascular: No carotid bruit. Cardiovascular:      Rate and Rhythm: Normal rate and regular rhythm. Heart sounds: S1 normal and S2 normal. No murmur heard. Pulmonary:      Breath sounds: No decreased breath sounds, wheezing, rhonchi or rales. Musculoskeletal:      Cervical back: Neck supple. Skin:     General: Skin is warm and dry. Neurological:      General: No focal deficit present. Mental Status: He is alert. Psychiatric:         Attention and Perception: Attention normal.         Mood and Affect: Affect is blunt. Speech: Speech normal.         Behavior: Behavior normal. Behavior is cooperative. Thought Content: Thought content normal.         Cognition and Memory: Cognition normal.         Judgment: Judgment normal.         Assessment/Plan:  1. Uncontrolled type 1 diabetes mellitus with complication, with long-term current use of insulin (HCC)  Uncontrolled. HgbA1c= 11.1%. Check labs. Refer to Endocrinology, as I think patient would benefit from insulin pump. - POCT Glucose  - Vika Javier MD, Endocrinology, [place]  - MICROALBUMIN / [address] [place] [address]; Future  - CBC WITH AUTO DIFFERENTIAL; Future  - COMPREHENSIVE METABOLIC PANEL; Future  - LIPID PANEL; Future  - PHOSPHORUS; Future  - MAGNESIUM; Future  - POCT glycosylated hemoglobin (Hb A1C)    2. Secondary hypertension  Uncontrolled. Increase clonidine 0.2 mg BID to 0.3 mg BID. Continue lisinopril 20 mg QD and metoprolol succinate 50 mg BID. - cloNIDine (CATAPRES) 0.3 MG tablet; Take 1 tablet by mouth 2 times daily  Dispense: 60 tablet; Refill: 2    3.  Depression, unspecified depression type  Uncontrolled. Denies SI and HI. Follow-up with Psychiatry, Dr. Benigno Ann. Return in about 5 weeks (around 12/22/2021) for follow-up for HTN .       DO Sage  Family Medicine

## 2021-11-18 LAB
ALBUMIN SERPL-MCNC: 4.1 G/DL (ref 3.5–5.2)
ALP BLD-CCNC: 133 U/L (ref 40–129)
ALT SERPL-CCNC: 17 U/L (ref 0–40)
ANION GAP SERPL CALCULATED.3IONS-SCNC: 19 MMOL/L (ref 7–16)
AST SERPL-CCNC: 17 U/L (ref 0–39)
BASOPHILS ABSOLUTE: 0.09 E9/L (ref 0–0.2)
BASOPHILS RELATIVE PERCENT: 0.8 % (ref 0–2)
BILIRUB SERPL-MCNC: <0.2 MG/DL (ref 0–1.2)
BUN BLDV-MCNC: 27 MG/DL (ref 6–20)
CALCIUM SERPL-MCNC: 10.4 MG/DL (ref 8.6–10.2)
CHLORIDE BLD-SCNC: 101 MMOL/L (ref 98–107)
CHOLESTEROL, TOTAL: 283 MG/DL (ref 0–199)
CO2: 22 MMOL/L (ref 22–29)
CREAT SERPL-MCNC: 1.3 MG/DL (ref 0.7–1.2)
CREATININE URINE: 58 MG/DL (ref 40–278)
EOSINOPHILS ABSOLUTE: 0.33 E9/L (ref 0.05–0.5)
EOSINOPHILS RELATIVE PERCENT: 2.9 % (ref 0–6)
GFR AFRICAN AMERICAN: >60
GFR NON-AFRICAN AMERICAN: >60 ML/MIN/1.73
GLUCOSE BLD-MCNC: 207 MG/DL (ref 74–99)
HCT VFR BLD CALC: 37.3 % (ref 37–54)
HDLC SERPL-MCNC: 60 MG/DL
HEMOGLOBIN: 12.2 G/DL (ref 12.5–16.5)
IMMATURE GRANULOCYTES #: 0.04 E9/L
IMMATURE GRANULOCYTES %: 0.4 % (ref 0–5)
LDL CHOLESTEROL CALCULATED: 169 MG/DL (ref 0–99)
LYMPHOCYTES ABSOLUTE: 2.54 E9/L (ref 1.5–4)
LYMPHOCYTES RELATIVE PERCENT: 22.7 % (ref 20–42)
MAGNESIUM: 1.9 MG/DL (ref 1.6–2.6)
MCH RBC QN AUTO: 28.6 PG (ref 26–35)
MCHC RBC AUTO-ENTMCNC: 32.7 % (ref 32–34.5)
MCV RBC AUTO: 87.6 FL (ref 80–99.9)
MICROALBUMIN UR-MCNC: 2254.3 MG/L
MICROALBUMIN/CREAT UR-RTO: 3886.7 (ref 0–30)
MONOCYTES ABSOLUTE: 0.5 E9/L (ref 0.1–0.95)
MONOCYTES RELATIVE PERCENT: 4.5 % (ref 2–12)
NEUTROPHILS ABSOLUTE: 7.71 E9/L (ref 1.8–7.3)
NEUTROPHILS RELATIVE PERCENT: 68.7 % (ref 43–80)
PDW BLD-RTO: 13.5 FL (ref 11.5–15)
PHOSPHORUS: 3.7 MG/DL (ref 2.5–4.5)
PLATELET # BLD: 470 E9/L (ref 130–450)
PMV BLD AUTO: 10.5 FL (ref 7–12)
POTASSIUM SERPL-SCNC: 4.8 MMOL/L (ref 3.5–5)
RBC # BLD: 4.26 E12/L (ref 3.8–5.8)
SODIUM BLD-SCNC: 142 MMOL/L (ref 132–146)
TOTAL PROTEIN: 7.9 G/DL (ref 6.4–8.3)
TRIGL SERPL-MCNC: 272 MG/DL (ref 0–149)
VLDLC SERPL CALC-MCNC: 54 MG/DL
WBC # BLD: 11.2 E9/L (ref 4.5–11.5)

## 2021-11-24 DIAGNOSIS — R74.8 ALKALINE PHOSPHATASE ELEVATION: ICD-10-CM

## 2021-11-24 DIAGNOSIS — D64.9 ANEMIA, UNSPECIFIED TYPE: ICD-10-CM

## 2021-11-24 DIAGNOSIS — E78.2 MIXED HYPERLIPIDEMIA: Primary | ICD-10-CM

## 2021-11-24 DIAGNOSIS — E83.52 SERUM CALCIUM ELEVATED: ICD-10-CM

## 2021-11-24 RX ORDER — FENOFIBRATE 145 MG/1
145 TABLET, COATED ORAL DAILY
Qty: 30 TABLET | Refills: 2 | Status: SHIPPED
Start: 2021-11-24 | End: 2022-03-14 | Stop reason: SDUPTHER

## 2021-12-20 DIAGNOSIS — I15.0 RENOVASCULAR HYPERTENSION: ICD-10-CM

## 2021-12-20 DIAGNOSIS — J18.9 PNEUMONIA DUE TO INFECTIOUS ORGANISM, UNSPECIFIED LATERALITY, UNSPECIFIED PART OF LUNG: ICD-10-CM

## 2021-12-20 RX ORDER — ALBUTEROL SULFATE 90 UG/1
2 AEROSOL, METERED RESPIRATORY (INHALATION) EVERY 4 HOURS PRN
Qty: 18 G | Refills: 0 | Status: SHIPPED
Start: 2021-12-20 | End: 2022-03-14 | Stop reason: SDUPTHER

## 2021-12-20 RX ORDER — GABAPENTIN 300 MG/1
300 CAPSULE ORAL 2 TIMES DAILY
Qty: 180 CAPSULE | Refills: 0 | Status: SHIPPED
Start: 2021-12-20 | End: 2022-03-14 | Stop reason: SDUPTHER

## 2021-12-20 RX ORDER — METOPROLOL SUCCINATE 50 MG/1
50 TABLET, EXTENDED RELEASE ORAL 2 TIMES DAILY
Qty: 180 TABLET | Refills: 0 | Status: SHIPPED
Start: 2021-12-20 | End: 2022-02-22 | Stop reason: SDUPTHER

## 2022-01-03 RX ORDER — ATORVASTATIN CALCIUM 80 MG/1
80 TABLET, FILM COATED ORAL DAILY
Qty: 30 TABLET | Refills: 0 | Status: SHIPPED
Start: 2022-01-03 | End: 2022-02-22 | Stop reason: SDUPTHER

## 2022-01-03 NOTE — TELEPHONE ENCOUNTER
Medication Refill Request-Received fax from [employer] [facility] for Atorvastatin 80 mg    LOV 11/17/2021  NOV Visit date not found    Lab Results   Component Value Date    CREATININE 1.3 (H) 11/18/2021

## 2022-01-04 NOTE — TELEPHONE ENCOUNTER
Left message notifying patient and instructing them to call the office with any questions or concerns and to make an apt

## 2022-01-18 ENCOUNTER — PATIENT MESSAGE (OUTPATIENT)
Dept: FAMILY MEDICINE CLINIC | Age: 32
End: 2022-01-18

## 2022-01-18 NOTE — TELEPHONE ENCOUNTER
Could not fit patient in for date and time he asked as doctor had a full morning with no cancellations. ([facility] half day)  Advised patient to get back to me with some other dates/times and I would try to fit him in. Patient to call back with date/times.

## 2022-01-18 NOTE — TELEPHONE ENCOUNTER
From: Key Benitez  To: Dr. Efren Desai  Sent: 1/18/2022 11:56 AM EST  Subject: Visit Follow-Up Question    Is there any available appointments for January 19th? I was trying to book an appointment around my work schedule and my rides work schedule. Any time after 10am. I was supposed to come in a couple weeks ago but I ended up testing positive for covid at work 3 weeks ago.

## 2022-01-20 ENCOUNTER — OFFICE VISIT (OUTPATIENT)
Dept: FAMILY MEDICINE CLINIC | Age: 32
End: 2022-01-20
Payer: COMMERCIAL

## 2022-01-20 VITALS
OXYGEN SATURATION: 99 % | SYSTOLIC BLOOD PRESSURE: 132 MMHG | BODY MASS INDEX: 20.98 KG/M2 | HEIGHT: 63 IN | HEART RATE: 103 BPM | DIASTOLIC BLOOD PRESSURE: 100 MMHG | WEIGHT: 118.4 LBS | TEMPERATURE: 98.3 F

## 2022-01-20 DIAGNOSIS — I15.0 RENOVASCULAR HYPERTENSION: ICD-10-CM

## 2022-01-20 DIAGNOSIS — E10.65 UNCONTROLLED TYPE 1 DIABETES MELLITUS WITH HYPERGLYCEMIA (HCC): Primary | ICD-10-CM

## 2022-01-20 PROBLEM — E10.10 DKA, TYPE 1, NOT AT GOAL (HCC): Status: RESOLVED | Noted: 2021-11-05 | Resolved: 2022-01-20

## 2022-01-20 PROCEDURE — G8420 CALC BMI NORM PARAMETERS: HCPCS | Performed by: FAMILY MEDICINE

## 2022-01-20 PROCEDURE — G8484 FLU IMMUNIZE NO ADMIN: HCPCS | Performed by: FAMILY MEDICINE

## 2022-01-20 PROCEDURE — 2022F DILAT RTA XM EVC RTNOPTHY: CPT | Performed by: FAMILY MEDICINE

## 2022-01-20 PROCEDURE — G8427 DOCREV CUR MEDS BY ELIG CLIN: HCPCS | Performed by: FAMILY MEDICINE

## 2022-01-20 PROCEDURE — 4004F PT TOBACCO SCREEN RCVD TLK: CPT | Performed by: FAMILY MEDICINE

## 2022-01-20 PROCEDURE — 99214 OFFICE O/P EST MOD 30 MIN: CPT | Performed by: FAMILY MEDICINE

## 2022-01-20 PROCEDURE — 3046F HEMOGLOBIN A1C LEVEL >9.0%: CPT | Performed by: FAMILY MEDICINE

## 2022-01-20 RX ORDER — BUPROPION HYDROCHLORIDE 150 MG/1
TABLET ORAL
COMMUNITY
Start: 2021-12-01 | End: 2022-05-18

## 2022-01-20 ASSESSMENT — ENCOUNTER SYMPTOMS
ABDOMINAL PAIN: 0
VOMITING: 0
COUGH: 0
WHEEZING: 0
SHORTNESS OF BREATH: 0
DIARRHEA: 0
CONSTIPATION: 0
BLOOD IN STOOL: 0
EYE PAIN: 0
NAUSEA: 0

## 2022-01-20 NOTE — PROGRESS NOTES
1/22/2022    Ferman Kanner is a 32 y.o. male here for:    Chief Complaint   Patient presents with    Diabetes    Hypertension        HPI:  T1DM  - Last HgbA1c= 11.1%, 11/17/2021  - Was referred to Endocrinology. Has not scheduled an appointment due to transportation difficulties. - Has not been taking atorvastatin 80 mg HS. Has been taking fenofibrate 145 mg QD.   - On gabapentin 300 mg BID for peripheral neuropathy.   - On Novolog 6-10 units QID. On Tresiba 55 units HS.   - Fasting BG readings are 60-130s, depending on if he eats in the evening. Postprandial BG readings are 180-200 mid-afternoon.   - Reports hypoglycemic episodes in the morning.   - Following with Ophthalmology, Dr. Giovana Pacheco, for VEG-F injections for diabetic retinopathy and macular edema. HTN   - On clonidine 0.3 mg BID, lisinopril 20 mg QD, and metoprolol succinate 50 mg BID. Denies side effects of medications. Reports compliance with medications. - Last eye exam: appointment with Dr. Giovana Pacheco on 01/26/2022  - Denies chest pain, palpitations, lightheadedness, dizziness, and syncope   - Has an upcoming appointment with Nephrology, Dr. Todd Barthel, in February.   - BP reading on wrist BP cuff at home today was 119/72    Current Outpatient Medications   Medication Sig Dispense Refill    buPROPion (WELLBUTRIN XL) 150 MG extended release tablet       atorvastatin (LIPITOR) 80 MG tablet Take 1 tablet by mouth daily 30 tablet 0    gabapentin (NEURONTIN) 300 MG capsule Take 1 capsule by mouth 2 times daily for 90 days.  180 capsule 0    metoprolol succinate (TOPROL XL) 50 MG extended release tablet Take 1 tablet by mouth 2 times daily 180 tablet 0    insulin aspart (NOVOLOG) 100 UNIT/ML injection vial Inject 6-10 Units into the skin 4 times daily with meals 10 mL 5    albuterol sulfate HFA (VENTOLIN HFA) 108 (90 Base) MCG/ACT inhaler Inhale 2 puffs into the lungs every 4 hours as needed for Wheezing or Shortness of Breath 18 g 0  fenofibrate (TRICOR) 145 MG tablet Take 1 tablet by mouth daily 30 tablet 2    cloNIDine (CATAPRES) 0.3 MG tablet Take 1 tablet by mouth 2 times daily 60 tablet 2    sildenafil (REVATIO) 20 MG tablet Take 60 mg by mouth as needed (BEFORE SEXUAL ACTIVITY)      lisinopril (PRINIVIL;ZESTRIL) 20 MG tablet Take 1 tablet by mouth daily 90 tablet 0    aspirin 81 MG chewable tablet Take 1 tablet by mouth daily 90 tablet 0    Insulin Degludec (TRESIBA FLEXTOUCH) 100 UNIT/ML SOPN Inject 55 Units into the skin nightly 5 pen 5    SUBOXONE 8-2 MG FILM SL film Place 1 Film under the tongue 2 times daily. No current facility-administered medications for this visit.       Allergies   Allergen Reactions    Chlorthalidone      Vomiting     Hctz [Hydrochlorothiazide] Nausea Only    Hydralazine      Nausea, headache       Past Medical & Surgical History:      Diagnosis Date    Asthma     Bipolar affective ([address]     [facility]     Diabetes mellitus type I ([address]     Diabetic neuropathy ([facility]     Diabetic, retinopathy, proliferative ([address]     HTN (hypertension)     due to left renal artery stenosis    Hypercholesteremia     Left renal artery stenosis ([address] 09/19/2019    Macular edema due to secondary diabetes ([address]     Opioid dependence ([facility])     Proteinuria     Retinal detachment     right eye    Vitreous hemorrhage of right eye due to diabetes mellitus ([address]      Past Surgical History:   Procedure Laterality Date    EYE SURGERY Right 11/20/2020    PARS PLANA VITRECTOMY, MEMBRANE PEEL, ENDOLASER, GAS FLUID EXCHANGE, 25G, MAC, RIGHT EYE performed by Denita Briggs MD [facility] Right 01/08/2021    pars plana vitrectomy with peeling of preretinal tissue and gas fluid exchange    EYE SURGERY Right 1/8/2021    PARS PLANA VITRECTOMY, INTERNAL LIMITING MEMBRANE PEEL, INDOCYANINE GREEN, GAS FLUID EXCHANGE, 25g, MAC, RIGHT EYE performed by Jordyn Briscoe MD [facility] at [address] [place] [address]       Family History:      Problem Relation Age of Onset    No Known Problems Mother     No Known Problems Father     No Known Problems Brother         Homicide     No Known Problems Daughter     No Known Problems Son     Diabetes type 2  Maternal Grandmother        Social History:  Social History     Tobacco Use    Smoking status: Current Every Day Smoker     Packs/day: 0.50     Years: 15.00     Pack years: 7.50     Types: Cigarettes    Smokeless tobacco: Never Used   Substance Use Topics    Alcohol use: Yes     Comment: not usually        Review of Systems   Constitutional: Negative for chills and fever. Eyes: Positive for visual disturbance (diabetic retinopathy with macular edema). Negative for pain. Respiratory: Negative for cough, shortness of breath and wheezing. Cardiovascular: Negative for chest pain, palpitations and leg swelling. Gastrointestinal: Negative for abdominal pain, blood in stool, constipation, diarrhea, nausea and vomiting. Endocrine: Negative for polydipsia and polyuria. Neurological: Positive for numbness (neuropathy ). Negative for dizziness, syncope and light-headedness. BP Readings from Last 3 Encounters:   01/20/22 (!) 132/100   11/17/21 (!) 166/104   11/15/21 (!) 170/115       VS:  BP (!) 132/100 (Site: Left Upper Arm, Position: Sitting, Cuff Size: Medium Adult)   Pulse 103   Temp 98.3 °F (36.8 °C) (Temporal)   Ht 5' 3\" (1.6 m)   Wt 118 lb 6.4 oz (53.7 kg)   SpO2 99%   BMI 20.97 kg/m²     Physical Exam  Vitals reviewed. Constitutional:       General: He is awake. He is not in acute distress. Appearance: He is well-developed and well-groomed. He is not ill-appearing, toxic-appearing or diaphoretic. Cardiovascular:      Rate and Rhythm: Regular rhythm. Tachycardia present. Pulses:           Dorsalis pedis pulses are 2+ on the right side and 2+ on the left side.         Posterior tibial pulses are 2+ on the right side and 2+ on the left side. Heart sounds: S1 normal and S2 normal. No murmur heard. Pulmonary:      Breath sounds: No decreased breath sounds, wheezing, rhonchi or rales. Musculoskeletal:      Right lower leg: No edema. Left lower leg: No edema. Feet:      Right foot:      Protective Sensation: 10 sites tested. 8 sites sensed. Skin integrity: Skin integrity normal. No ulcer, blister, skin breakdown, erythema, warmth, callus, dry skin or fissure. Left foot:      Protective Sensation: 10 sites tested. 10 sites sensed. Skin integrity: Skin integrity normal. No ulcer, blister, skin breakdown, erythema, warmth, callus, dry skin or fissure. Skin:     General: Skin is warm and dry. Neurological:      General: No focal deficit present. Mental Status: He is alert. Psychiatric:         Attention and Perception: Attention normal.         Mood and Affect: Mood normal.         Speech: Speech normal.         Behavior: Behavior normal. Behavior is cooperative. Thought Content: Thought content normal.         Cognition and Memory: Cognition normal.         Judgment: Judgment normal.         Assessment/Plan:  1. Uncontrolled type 1 diabetes mellitus with hyperglycemia (HCC)  Uncontrolled. I stressed the importance of patient following up with Endocrinology as soon as possible. I offered to refer patient to Care Coordination/Social Work to help with transportation to get to his appointments. Patient declined. I advised patient that he has to eat at night when taking his insulin. For now, will continue  Novolog 6-10 units QID and Tresiba 55 units HS.   - Diabetic Foot Exam    2. Renovascular hypertension  Uncontrolled in the office. Patient assures me that his BP prior to coming today was 119/72. Patient will be following with Nephrology soon.  He has been noncompliant with following with Nephrology in the past. I will continue the same medications: clonidine 0.3 mg BID, lisinopril 20 mg QD, and metoprolol succinate 50 mg BID. Return in about 3 months (around 4/20/2022) for HTN, DM.       Mario Gandhi DO  Family Medicine

## 2022-01-21 ENCOUNTER — PATIENT MESSAGE (OUTPATIENT)
Dept: FAMILY MEDICINE CLINIC | Age: 32
End: 2022-01-21

## 2022-01-21 ENCOUNTER — TELEPHONE (OUTPATIENT)
Dept: FAMILY MEDICINE CLINIC | Age: 32
End: 2022-01-21

## 2022-01-21 ENCOUNTER — HOSPITAL ENCOUNTER (OUTPATIENT)
Age: 32
Discharge: HOME | End: 2022-01-21
Payer: COMMERCIAL

## 2022-01-21 DIAGNOSIS — D64.9 ANEMIA, UNSPECIFIED TYPE: ICD-10-CM

## 2022-01-21 DIAGNOSIS — R74.8 ALKALINE PHOSPHATASE ELEVATION: ICD-10-CM

## 2022-01-21 DIAGNOSIS — E83.52 SERUM CALCIUM ELEVATED: ICD-10-CM

## 2022-01-21 DIAGNOSIS — E10.65 TYPE 1 DIABETES MELLITUS WITH HYPERGLYCEMIA (HCC): Primary | ICD-10-CM

## 2022-01-21 LAB
ALBUMIN SERPL-MCNC: 3.9 G/DL (ref 3.5–5.2)
ALP BLD-CCNC: 153 U/L (ref 40–129)
ALT SERPL-CCNC: 21 U/L (ref 0–40)
ANION GAP SERPL CALCULATED.3IONS-SCNC: 23 MMOL/L (ref 7–16)
AST SERPL-CCNC: 19 U/L (ref 0–39)
BASOPHILS ABSOLUTE: 0.09 E9/L (ref 0–0.2)
BASOPHILS RELATIVE PERCENT: 0.4 % (ref 0–2)
BILIRUB SERPL-MCNC: <0.2 MG/DL (ref 0–1.2)
BILIRUBIN DIRECT: <0.2 MG/DL (ref 0–0.3)
BILIRUBIN, INDIRECT: ABNORMAL MG/DL (ref 0–1)
BUN BLDV-MCNC: 31 MG/DL (ref 6–20)
CALCIUM IONIZED: 1.3 MMOL/L (ref 1.15–1.33)
CALCIUM SERPL-MCNC: 9.7 MG/DL (ref 8.6–10.2)
CHLORIDE BLD-SCNC: 91 MMOL/L (ref 98–107)
CO2: 18 MMOL/L (ref 22–29)
CREAT SERPL-MCNC: 1.6 MG/DL (ref 0.7–1.2)
EOSINOPHILS ABSOLUTE: 0.31 E9/L (ref 0.05–0.5)
EOSINOPHILS RELATIVE PERCENT: 1.5 % (ref 0–6)
FERRITIN: 94 NG/ML
FOLATE: 8.4 NG/ML (ref 4.8–24.2)
GFR AFRICAN AMERICAN: >60
GFR NON-AFRICAN AMERICAN: 51 ML/MIN/1.73
GLUCOSE BLD-MCNC: 588 MG/DL (ref 74–99)
HAPTOGLOBIN: 314 MG/DL (ref 30–200)
HCT VFR BLD CALC: 37.5 % (ref 37–54)
HEMOGLOBIN: 12.6 G/DL (ref 12.5–16.5)
IMMATURE GRANULOCYTES #: 0.11 E9/L
IMMATURE GRANULOCYTES %: 0.5 % (ref 0–5)
IMMATURE RETIC FRACT: 9.5 % (ref 2.3–13.4)
IRON SATURATION: 21 % (ref 20–55)
IRON: 68 MCG/DL (ref 59–158)
LACTATE DEHYDROGENASE: 219 U/L (ref 135–225)
LYMPHOCYTES ABSOLUTE: 3.08 E9/L (ref 1.5–4)
LYMPHOCYTES RELATIVE PERCENT: 15.4 % (ref 20–42)
MCH RBC QN AUTO: 29.9 PG (ref 26–35)
MCHC RBC AUTO-ENTMCNC: 33.6 % (ref 32–34.5)
MCV RBC AUTO: 89.1 FL (ref 80–99.9)
MONOCYTES ABSOLUTE: 0.62 E9/L (ref 0.1–0.95)
MONOCYTES RELATIVE PERCENT: 3.1 % (ref 2–12)
NEUTROPHILS ABSOLUTE: 15.85 E9/L (ref 1.8–7.3)
NEUTROPHILS RELATIVE PERCENT: 79.1 % (ref 43–80)
PATHOLOGIST REVIEW: NORMAL
PDW BLD-RTO: 14 FL (ref 11.5–15)
PLATELET # BLD: 417 E9/L (ref 130–450)
PMV BLD AUTO: 9.5 FL (ref 7–12)
POTASSIUM SERPL-SCNC: 4.8 MMOL/L (ref 3.5–5)
RBC # BLD: 4.21 E12/L (ref 3.8–5.8)
RETIC HGB EQUIVALENT: 35.2 PG (ref 28.2–36.6)
RETICULOCYTE ABSOLUTE COUNT: 0.1 E12/L
RETICULOCYTE COUNT PCT: 2.4 % (ref 0.4–1.9)
SODIUM BLD-SCNC: 132 MMOL/L (ref 132–146)
TOTAL IRON BINDING CAPACITY: 321 MCG/DL (ref 250–450)
TOTAL PROTEIN: 7.5 G/DL (ref 6.4–8.3)
TSH SERPL DL<=0.05 MIU/L-ACNC: 1.89 UIU/ML (ref 0.27–4.2)
VITAMIN B-12: 336 PG/ML (ref 211–946)
WBC # BLD: 20.1 E9/L (ref 4.5–11.5)

## 2022-01-21 PROCEDURE — 82607 VITAMIN B-12: CPT

## 2022-01-21 PROCEDURE — 85045 AUTOMATED RETICULOCYTE COUNT: CPT

## 2022-01-21 PROCEDURE — 83010 ASSAY OF HAPTOGLOBIN QUANT: CPT

## 2022-01-21 PROCEDURE — 36415 COLL VENOUS BLD VENIPUNCTURE: CPT

## 2022-01-21 PROCEDURE — 83540 ASSAY OF IRON: CPT

## 2022-01-21 PROCEDURE — 82248 BILIRUBIN DIRECT: CPT

## 2022-01-21 PROCEDURE — 85025 COMPLETE CBC W/AUTO DIFF WBC: CPT

## 2022-01-21 PROCEDURE — 83550 IRON BINDING TEST: CPT

## 2022-01-21 PROCEDURE — 80053 COMPREHEN METABOLIC PANEL: CPT

## 2022-01-21 PROCEDURE — 84443 ASSAY THYROID STIM HORMONE: CPT

## 2022-01-21 PROCEDURE — 82746 ASSAY OF FOLIC ACID SERUM: CPT

## 2022-01-21 PROCEDURE — 82728 ASSAY OF FERRITIN: CPT

## 2022-01-21 PROCEDURE — 83615 LACTATE (LD) (LDH) ENZYME: CPT

## 2022-01-21 PROCEDURE — 82330 ASSAY OF CALCIUM: CPT

## 2022-01-21 NOTE — TELEPHONE ENCOUNTER
Dr. Grubbs called office in regard to critical lab value. Glucose 588. I spoke with Lelia Escalante and advised him that Dr. Fajardo Rather said  he needs to go to ER now. He understands and will go to [facility] ER.

## 2022-01-25 DIAGNOSIS — E53.8 LOW SERUM VITAMIN B12: ICD-10-CM

## 2022-01-25 DIAGNOSIS — R74.8 ELEVATED ALKALINE PHOSPHATASE LEVEL: Primary | ICD-10-CM

## 2022-02-21 DIAGNOSIS — J18.9 PNEUMONIA DUE TO INFECTIOUS ORGANISM, UNSPECIFIED LATERALITY, UNSPECIFIED PART OF LUNG: ICD-10-CM

## 2022-02-21 DIAGNOSIS — I15.9 SECONDARY HYPERTENSION: ICD-10-CM

## 2022-02-21 DIAGNOSIS — I15.0 RENOVASCULAR HYPERTENSION: ICD-10-CM

## 2022-02-21 RX ORDER — ALBUTEROL SULFATE 90 UG/1
2 AEROSOL, METERED RESPIRATORY (INHALATION) EVERY 4 HOURS PRN
Qty: 18 G | Refills: 0 | OUTPATIENT
Start: 2022-02-21

## 2022-02-21 RX ORDER — BLOOD SUGAR DIAGNOSTIC
1 STRIP MISCELLANEOUS DAILY
Qty: 30 EACH | Refills: 5 | Status: SHIPPED
Start: 2022-02-21 | End: 2022-03-14 | Stop reason: SDUPTHER

## 2022-02-21 RX ORDER — CLONIDINE HYDROCHLORIDE 0.3 MG/1
0.3 TABLET ORAL 2 TIMES DAILY
Qty: 60 TABLET | Refills: 2 | Status: SHIPPED
Start: 2022-02-21 | End: 2022-03-14 | Stop reason: SDUPTHER

## 2022-02-21 RX ORDER — GABAPENTIN 300 MG/1
300 CAPSULE ORAL 2 TIMES DAILY
Qty: 180 CAPSULE | Refills: 0 | OUTPATIENT
Start: 2022-02-21 | End: 2022-05-22

## 2022-02-21 RX ORDER — LISINOPRIL 20 MG/1
20 TABLET ORAL DAILY
Qty: 90 TABLET | Refills: 0 | Status: SHIPPED
Start: 2022-02-21 | End: 2022-03-14 | Stop reason: SDUPTHER

## 2022-02-21 NOTE — TELEPHONE ENCOUNTER
Medication Refill Request    LOV 1/20/2022  NOV 4/20/2022    Lab Results   Component Value Date    CREATININE 1.6 (H) 01/21/2022

## 2022-02-22 DIAGNOSIS — I15.0 RENOVASCULAR HYPERTENSION: ICD-10-CM

## 2022-02-22 RX ORDER — ATORVASTATIN CALCIUM 80 MG/1
80 TABLET, FILM COATED ORAL DAILY
Qty: 90 TABLET | Refills: 0 | Status: SHIPPED
Start: 2022-02-22 | End: 2022-03-14 | Stop reason: SDUPTHER

## 2022-02-22 RX ORDER — METOPROLOL SUCCINATE 50 MG/1
50 TABLET, EXTENDED RELEASE ORAL 2 TIMES DAILY
Qty: 180 TABLET | Refills: 0 | Status: SHIPPED
Start: 2022-02-22 | End: 2022-03-14 | Stop reason: SDUPTHER

## 2022-03-14 DIAGNOSIS — E78.2 MIXED HYPERLIPIDEMIA: ICD-10-CM

## 2022-03-14 DIAGNOSIS — E10.65 TYPE 1 DIABETES MELLITUS WITH HYPERGLYCEMIA (HCC): ICD-10-CM

## 2022-03-14 DIAGNOSIS — I15.0 RENOVASCULAR HYPERTENSION: ICD-10-CM

## 2022-03-14 DIAGNOSIS — E53.8 LOW SERUM VITAMIN B12: ICD-10-CM

## 2022-03-14 DIAGNOSIS — I15.9 SECONDARY HYPERTENSION: ICD-10-CM

## 2022-03-14 DIAGNOSIS — J18.9 PNEUMONIA DUE TO INFECTIOUS ORGANISM, UNSPECIFIED LATERALITY, UNSPECIFIED PART OF LUNG: ICD-10-CM

## 2022-03-14 RX ORDER — INSULIN DEGLUDEC INJECTION 100 U/ML
55 INJECTION, SOLUTION SUBCUTANEOUS NIGHTLY
Qty: 5 PEN | Refills: 5 | Status: SHIPPED
Start: 2022-03-14 | End: 2022-06-01 | Stop reason: SDUPTHER

## 2022-03-14 RX ORDER — FENOFIBRATE 145 MG/1
145 TABLET, COATED ORAL DAILY
Qty: 90 TABLET | Refills: 0 | Status: SHIPPED
Start: 2022-03-14 | End: 2022-06-01 | Stop reason: SDUPTHER

## 2022-03-14 RX ORDER — GABAPENTIN 300 MG/1
300 CAPSULE ORAL 2 TIMES DAILY
Qty: 180 CAPSULE | Refills: 0 | Status: SHIPPED
Start: 2022-03-14 | End: 2022-03-14 | Stop reason: SDUPTHER

## 2022-03-14 RX ORDER — GABAPENTIN 300 MG/1
300 CAPSULE ORAL 2 TIMES DAILY
Qty: 180 CAPSULE | Refills: 0 | Status: SHIPPED
Start: 2022-04-19 | End: 2022-04-20 | Stop reason: SDUPTHER

## 2022-03-14 RX ORDER — ALBUTEROL SULFATE 90 UG/1
2 AEROSOL, METERED RESPIRATORY (INHALATION) EVERY 4 HOURS PRN
Qty: 18 G | Refills: 0 | Status: SHIPPED | OUTPATIENT
Start: 2022-03-14

## 2022-03-14 RX ORDER — BLOOD SUGAR DIAGNOSTIC
1 STRIP MISCELLANEOUS DAILY
Qty: 30 EACH | Refills: 5 | Status: SHIPPED
Start: 2022-03-14 | End: 2022-06-01 | Stop reason: SDUPTHER

## 2022-03-14 RX ORDER — METOPROLOL SUCCINATE 50 MG/1
50 TABLET, EXTENDED RELEASE ORAL 2 TIMES DAILY
Qty: 180 TABLET | Refills: 0 | Status: SHIPPED
Start: 2022-03-14 | End: 2022-06-01 | Stop reason: SDUPTHER

## 2022-03-14 RX ORDER — LISINOPRIL 20 MG/1
20 TABLET ORAL DAILY
Qty: 90 TABLET | Refills: 0 | Status: SHIPPED
Start: 2022-03-14 | End: 2022-06-01 | Stop reason: SDUPTHER

## 2022-03-14 RX ORDER — ATORVASTATIN CALCIUM 80 MG/1
80 TABLET, FILM COATED ORAL DAILY
Qty: 90 TABLET | Refills: 0 | Status: SHIPPED
Start: 2022-03-14 | End: 2022-06-01 | Stop reason: SDUPTHER

## 2022-03-14 RX ORDER — CLONIDINE HYDROCHLORIDE 0.3 MG/1
0.3 TABLET ORAL 2 TIMES DAILY
Qty: 180 TABLET | Refills: 0 | Status: SHIPPED
Start: 2022-03-14 | End: 2022-06-01 | Stop reason: SDUPTHER

## 2022-03-14 RX ORDER — ASPIRIN 81 MG/1
81 TABLET, CHEWABLE ORAL DAILY
Qty: 90 TABLET | Refills: 0 | Status: SHIPPED
Start: 2022-03-14 | End: 2022-06-01 | Stop reason: SDUPTHER

## 2022-03-18 ENCOUNTER — APPOINTMENT (OUTPATIENT)
Dept: GENERAL RADIOLOGY | Age: 32
DRG: 812 | End: 2022-03-18
Payer: COMMERCIAL

## 2022-03-18 ENCOUNTER — HOSPITAL ENCOUNTER (INPATIENT)
Age: 32
LOS: 1 days | Discharge: LEFT BEFORE DISCHARGE | DRG: 812 | End: 2022-03-20
Attending: EMERGENCY MEDICINE | Admitting: FAMILY MEDICINE
Payer: COMMERCIAL

## 2022-03-18 DIAGNOSIS — R79.89 ELEVATED LFTS: ICD-10-CM

## 2022-03-18 DIAGNOSIS — E10.10 DIABETIC KETOACIDOSIS WITHOUT COMA ASSOCIATED WITH TYPE 1 DIABETES MELLITUS (HCC): ICD-10-CM

## 2022-03-18 DIAGNOSIS — E87.6 HYPOKALEMIA: ICD-10-CM

## 2022-03-18 DIAGNOSIS — K85.90 ACUTE PANCREATITIS WITHOUT INFECTION OR NECROSIS, UNSPECIFIED PANCREATITIS TYPE: ICD-10-CM

## 2022-03-18 DIAGNOSIS — T40.604A OPIATE OVERDOSE, UNDETERMINED INTENT, INITIAL ENCOUNTER (HCC): Primary | ICD-10-CM

## 2022-03-18 DIAGNOSIS — I63.81 LACUNAR INFARCTION (HCC): ICD-10-CM

## 2022-03-18 DIAGNOSIS — R73.9 HYPERGLYCEMIA: ICD-10-CM

## 2022-03-18 DIAGNOSIS — F11.10 HEROIN ABUSE (HCC): ICD-10-CM

## 2022-03-18 DIAGNOSIS — R74.8 ELEVATED LIPASE: ICD-10-CM

## 2022-03-18 LAB
BASOPHILS ABSOLUTE: 0.05 E9/L (ref 0–0.2)
BASOPHILS RELATIVE PERCENT: 0.2 % (ref 0–2)
EOSINOPHILS ABSOLUTE: 0.01 E9/L (ref 0.05–0.5)
EOSINOPHILS RELATIVE PERCENT: 0 % (ref 0–6)
HCT VFR BLD CALC: 32 % (ref 37–54)
HEMOGLOBIN: 10.9 G/DL (ref 12.5–16.5)
IMMATURE GRANULOCYTES #: 0.17 E9/L
IMMATURE GRANULOCYTES %: 0.8 % (ref 0–5)
LYMPHOCYTES ABSOLUTE: 1.36 E9/L (ref 1.5–4)
LYMPHOCYTES RELATIVE PERCENT: 6.5 % (ref 20–42)
MCH RBC QN AUTO: 29.9 PG (ref 26–35)
MCHC RBC AUTO-ENTMCNC: 34.1 % (ref 32–34.5)
MCV RBC AUTO: 87.7 FL (ref 80–99.9)
MONOCYTES ABSOLUTE: 1.33 E9/L (ref 0.1–0.95)
MONOCYTES RELATIVE PERCENT: 6.4 % (ref 2–12)
NEUTROPHILS ABSOLUTE: 18 E9/L (ref 1.8–7.3)
NEUTROPHILS RELATIVE PERCENT: 86.1 % (ref 43–80)
PDW BLD-RTO: 13.9 FL (ref 11.5–15)
PLATELET # BLD: 358 E9/L (ref 130–450)
PMV BLD AUTO: 10.1 FL (ref 7–12)
RBC # BLD: 3.65 E12/L (ref 3.8–5.8)
WBC # BLD: 20.9 E9/L (ref 4.5–11.5)

## 2022-03-18 PROCEDURE — 80179 DRUG ASSAY SALICYLATE: CPT

## 2022-03-18 PROCEDURE — 96374 THER/PROPH/DIAG INJ IV PUSH: CPT

## 2022-03-18 PROCEDURE — 71045 X-RAY EXAM CHEST 1 VIEW: CPT

## 2022-03-18 PROCEDURE — 2580000003 HC RX 258: Performed by: EMERGENCY MEDICINE

## 2022-03-18 PROCEDURE — 85025 COMPLETE CBC W/AUTO DIFF WBC: CPT

## 2022-03-18 PROCEDURE — 93005 ELECTROCARDIOGRAM TRACING: CPT | Performed by: EMERGENCY MEDICINE

## 2022-03-18 PROCEDURE — 80143 DRUG ASSAY ACETAMINOPHEN: CPT

## 2022-03-18 PROCEDURE — 99285 EMERGENCY DEPT VISIT HI MDM: CPT

## 2022-03-18 PROCEDURE — 83690 ASSAY OF LIPASE: CPT

## 2022-03-18 PROCEDURE — 80053 COMPREHEN METABOLIC PANEL: CPT

## 2022-03-18 PROCEDURE — 84484 ASSAY OF TROPONIN QUANT: CPT

## 2022-03-18 PROCEDURE — 82077 ASSAY SPEC XCP UR&BREATH IA: CPT

## 2022-03-18 PROCEDURE — 80307 DRUG TEST PRSMV CHEM ANLYZR: CPT

## 2022-03-18 PROCEDURE — 81001 URINALYSIS AUTO W/SCOPE: CPT

## 2022-03-18 PROCEDURE — 83605 ASSAY OF LACTIC ACID: CPT

## 2022-03-18 RX ORDER — SODIUM CHLORIDE 9 MG/ML
INJECTION, SOLUTION INTRAVENOUS CONTINUOUS
Status: DISCONTINUED | OUTPATIENT
Start: 2022-03-18 | End: 2022-03-19

## 2022-03-18 RX ADMIN — SODIUM CHLORIDE: 9 INJECTION, SOLUTION INTRAVENOUS at 23:43

## 2022-03-18 ASSESSMENT — ENCOUNTER SYMPTOMS
DIARRHEA: 0
CONSTIPATION: 0
TROUBLE SWALLOWING: 0
VOMITING: 0
RHINORRHEA: 0
WHEEZING: 0
ABDOMINAL DISTENTION: 0
NAUSEA: 0
EYE ITCHING: 0
CHEST TIGHTNESS: 0
SHORTNESS OF BREATH: 0
ABDOMINAL PAIN: 0
BACK PAIN: 0
EYE REDNESS: 0

## 2022-03-19 ENCOUNTER — APPOINTMENT (OUTPATIENT)
Dept: CT IMAGING | Age: 32
DRG: 812 | End: 2022-03-19
Payer: COMMERCIAL

## 2022-03-19 PROBLEM — E10.10 DKA, TYPE 1, NOT AT GOAL (HCC): Status: ACTIVE | Noted: 2022-03-19

## 2022-03-19 PROBLEM — R73.9 HYPERGLYCEMIA: Status: ACTIVE | Noted: 2022-03-19

## 2022-03-19 LAB
ACETAMINOPHEN LEVEL: <5 MCG/ML (ref 10–30)
ALBUMIN SERPL-MCNC: 2.9 G/DL (ref 3.5–5.2)
ALBUMIN SERPL-MCNC: 3.6 G/DL (ref 3.5–5.2)
ALP BLD-CCNC: 133 U/L (ref 40–129)
ALP BLD-CCNC: 179 U/L (ref 40–129)
ALT SERPL-CCNC: 122 U/L (ref 0–40)
ALT SERPL-CCNC: 83 U/L (ref 0–40)
AMPHETAMINE SCREEN, URINE: NOT DETECTED
ANION GAP SERPL CALCULATED.3IONS-SCNC: 15 MMOL/L (ref 7–16)
ANION GAP SERPL CALCULATED.3IONS-SCNC: 22 MMOL/L (ref 7–16)
AST SERPL-CCNC: 205 U/L (ref 0–39)
AST SERPL-CCNC: 88 U/L (ref 0–39)
BACTERIA: ABNORMAL /HPF
BARBITURATE SCREEN URINE: NOT DETECTED
BASOPHILS ABSOLUTE: 0.04 E9/L (ref 0–0.2)
BASOPHILS RELATIVE PERCENT: 0.2 % (ref 0–2)
BENZODIAZEPINE SCREEN, URINE: NOT DETECTED
BETA-HYDROXYBUTYRATE: 2.57 MMOL/L (ref 0.02–0.27)
BILIRUB SERPL-MCNC: 0.2 MG/DL (ref 0–1.2)
BILIRUB SERPL-MCNC: <0.2 MG/DL (ref 0–1.2)
BILIRUBIN URINE: NEGATIVE
BLOOD, URINE: ABNORMAL
BUN BLDV-MCNC: 26 MG/DL (ref 6–20)
BUN BLDV-MCNC: 33 MG/DL (ref 6–20)
CALCIUM SERPL-MCNC: 8 MG/DL (ref 8.6–10.2)
CALCIUM SERPL-MCNC: 9 MG/DL (ref 8.6–10.2)
CANNABINOID SCREEN URINE: NOT DETECTED
CHLORIDE BLD-SCNC: 105 MMOL/L (ref 98–107)
CHLORIDE BLD-SCNC: 96 MMOL/L (ref 98–107)
CHLORIDE URINE RANDOM: 53 MMOL/L
CHOLESTEROL, TOTAL: 173 MG/DL (ref 0–199)
CLARITY: CLEAR
CO2: 20 MMOL/L (ref 22–29)
CO2: 22 MMOL/L (ref 22–29)
COCAINE METABOLITE SCREEN URINE: NOT DETECTED
COLOR: ABNORMAL
CREAT SERPL-MCNC: 1.4 MG/DL (ref 0.7–1.2)
CREAT SERPL-MCNC: 1.6 MG/DL (ref 0.7–1.2)
CREATININE URINE: 56 MG/DL (ref 40–278)
EOSINOPHILS ABSOLUTE: 0.02 E9/L (ref 0.05–0.5)
EOSINOPHILS RELATIVE PERCENT: 0.1 % (ref 0–6)
ETHANOL: <10 MG/DL (ref 0–0.08)
FENTANYL SCREEN, URINE: POSITIVE
FOLATE: 10.6 NG/ML (ref 4.8–24.2)
GAMMA GLUTAMYL TRANSFERASE: 61 U/L (ref 10–71)
GFR AFRICAN AMERICAN: >60
GFR AFRICAN AMERICAN: >60
GFR NON-AFRICAN AMERICAN: 50 ML/MIN/1.73
GFR NON-AFRICAN AMERICAN: 59 ML/MIN/1.73
GLUCOSE BLD-MCNC: 174 MG/DL (ref 74–99)
GLUCOSE BLD-MCNC: 229 MG/DL
GLUCOSE BLD-MCNC: 229 MG/DL
GLUCOSE BLD-MCNC: 311 MG/DL
GLUCOSE BLD-MCNC: 408 MG/DL
GLUCOSE BLD-MCNC: 430 MG/DL
GLUCOSE BLD-MCNC: 450 MG/DL (ref 74–99)
GLUCOSE URINE: >=1000 MG/DL
HBA1C MFR BLD: 11.7 % (ref 4–5.6)
HCT VFR BLD CALC: 24.7 % (ref 37–54)
HDLC SERPL-MCNC: 40 MG/DL
HEMOGLOBIN: 8.5 G/DL (ref 12.5–16.5)
IMMATURE GRANULOCYTES #: 0.07 E9/L
IMMATURE GRANULOCYTES %: 0.4 % (ref 0–5)
KETONES, URINE: ABNORMAL MG/DL
LACTIC ACID, SEPSIS: 1.6 MMOL/L (ref 0.5–1.9)
LACTIC ACID: 4.6 MMOL/L (ref 0.5–2.2)
LDL CHOLESTEROL CALCULATED: 76 MG/DL (ref 0–99)
LEUKOCYTE ESTERASE, URINE: NEGATIVE
LIPASE: 907 U/L (ref 13–60)
LYMPHOCYTES ABSOLUTE: 3.08 E9/L (ref 1.5–4)
LYMPHOCYTES RELATIVE PERCENT: 18 % (ref 20–42)
Lab: ABNORMAL
MAGNESIUM: 1.7 MG/DL (ref 1.6–2.6)
MCH RBC QN AUTO: 29.7 PG (ref 26–35)
MCHC RBC AUTO-ENTMCNC: 34.4 % (ref 32–34.5)
MCV RBC AUTO: 86.4 FL (ref 80–99.9)
METER GLUCOSE: 111 MG/DL (ref 74–99)
METER GLUCOSE: 111 MG/DL (ref 74–99)
METER GLUCOSE: 137 MG/DL (ref 74–99)
METER GLUCOSE: 169 MG/DL (ref 74–99)
METER GLUCOSE: 188 MG/DL (ref 74–99)
METER GLUCOSE: 199 MG/DL (ref 74–99)
METER GLUCOSE: 229 MG/DL (ref 74–99)
METER GLUCOSE: 232 MG/DL (ref 74–99)
METER GLUCOSE: 311 MG/DL (ref 74–99)
METER GLUCOSE: 408 MG/DL (ref 74–99)
METER GLUCOSE: 430 MG/DL (ref 74–99)
METER GLUCOSE: 500 MG/DL (ref 74–99)
METER GLUCOSE: 60 MG/DL (ref 74–99)
METER GLUCOSE: 67 MG/DL (ref 74–99)
METER GLUCOSE: 67 MG/DL (ref 74–99)
METER GLUCOSE: 79 MG/DL (ref 74–99)
METER GLUCOSE: 92 MG/DL (ref 74–99)
METHADONE SCREEN, URINE: NOT DETECTED
MONOCYTES ABSOLUTE: 0.99 E9/L (ref 0.1–0.95)
MONOCYTES RELATIVE PERCENT: 5.8 % (ref 2–12)
NEUTROPHILS ABSOLUTE: 12.94 E9/L (ref 1.8–7.3)
NEUTROPHILS RELATIVE PERCENT: 75.5 % (ref 43–80)
NITRITE, URINE: NEGATIVE
OPIATE SCREEN URINE: POSITIVE
OXYCODONE URINE: NOT DETECTED
PDW BLD-RTO: 14.1 FL (ref 11.5–15)
PH UA: 6.5 (ref 5–9)
PH VENOUS: 7.42 (ref 7.35–7.45)
PHENCYCLIDINE SCREEN URINE: NOT DETECTED
PHOSPHORUS: 3.1 MG/DL (ref 2.5–4.5)
PLATELET # BLD: 295 E9/L (ref 130–450)
PMV BLD AUTO: 10 FL (ref 7–12)
POTASSIUM SERPL-SCNC: 4.1 MMOL/L (ref 3.5–5)
POTASSIUM SERPL-SCNC: 5.3 MMOL/L (ref 3.5–5)
POTASSIUM, UR: 15.4 MMOL/L
PROCALCITONIN: 12.75 NG/ML (ref 0–0.08)
PROTEIN UA: 100 MG/DL
RBC # BLD: 2.86 E12/L (ref 3.8–5.8)
RBC UA: ABNORMAL /HPF (ref 0–2)
SALICYLATE, SERUM: <0.3 MG/DL (ref 0–30)
SODIUM BLD-SCNC: 138 MMOL/L (ref 132–146)
SODIUM BLD-SCNC: 142 MMOL/L (ref 132–146)
SODIUM URINE: 79 MMOL/L
SPECIFIC GRAVITY UA: 1.01 (ref 1–1.03)
TOTAL PROTEIN: 5.6 G/DL (ref 6.4–8.3)
TOTAL PROTEIN: 7.2 G/DL (ref 6.4–8.3)
TRICYCLIC ANTIDEPRESSANTS SCREEN SERUM: NEGATIVE NG/ML
TRIGL SERPL-MCNC: 283 MG/DL (ref 0–149)
TROPONIN, HIGH SENSITIVITY: 88 NG/L (ref 0–11)
TROPONIN, HIGH SENSITIVITY: 94 NG/L (ref 0–11)
TROPONIN, HIGH SENSITIVITY: 98 NG/L (ref 0–11)
UREA NITROGEN, UR: 497 MG/DL (ref 800–1666)
UROBILINOGEN, URINE: 0.2 E.U./DL
VITAMIN B-12: 320 PG/ML (ref 211–946)
VLDLC SERPL CALC-MCNC: 57 MG/DL
WBC # BLD: 17.1 E9/L (ref 4.5–11.5)
WBC UA: ABNORMAL /HPF (ref 0–5)

## 2022-03-19 PROCEDURE — 85025 COMPLETE CBC W/AUTO DIFF WBC: CPT

## 2022-03-19 PROCEDURE — 84484 ASSAY OF TROPONIN QUANT: CPT

## 2022-03-19 PROCEDURE — 83036 HEMOGLOBIN GLYCOSYLATED A1C: CPT

## 2022-03-19 PROCEDURE — 82977 ASSAY OF GGT: CPT

## 2022-03-19 PROCEDURE — 83735 ASSAY OF MAGNESIUM: CPT

## 2022-03-19 PROCEDURE — 83605 ASSAY OF LACTIC ACID: CPT

## 2022-03-19 PROCEDURE — 2580000003 HC RX 258: Performed by: STUDENT IN AN ORGANIZED HEALTH CARE EDUCATION/TRAINING PROGRAM

## 2022-03-19 PROCEDURE — 83874 ASSAY OF MYOGLOBIN: CPT

## 2022-03-19 PROCEDURE — 2580000003 HC RX 258

## 2022-03-19 PROCEDURE — 6370000000 HC RX 637 (ALT 250 FOR IP): Performed by: INTERNAL MEDICINE

## 2022-03-19 PROCEDURE — 2500000003 HC RX 250 WO HCPCS: Performed by: INTERNAL MEDICINE

## 2022-03-19 PROCEDURE — 84300 ASSAY OF URINE SODIUM: CPT

## 2022-03-19 PROCEDURE — 87150 DNA/RNA AMPLIFIED PROBE: CPT

## 2022-03-19 PROCEDURE — 2580000003 HC RX 258: Performed by: INTERNAL MEDICINE

## 2022-03-19 PROCEDURE — 82436 ASSAY OF URINE CHLORIDE: CPT

## 2022-03-19 PROCEDURE — 80061 LIPID PANEL: CPT

## 2022-03-19 PROCEDURE — 87040 BLOOD CULTURE FOR BACTERIA: CPT

## 2022-03-19 PROCEDURE — 84145 PROCALCITONIN (PCT): CPT

## 2022-03-19 PROCEDURE — 2500000003 HC RX 250 WO HCPCS: Performed by: EMERGENCY MEDICINE

## 2022-03-19 PROCEDURE — 84100 ASSAY OF PHOSPHORUS: CPT

## 2022-03-19 PROCEDURE — 82607 VITAMIN B-12: CPT

## 2022-03-19 PROCEDURE — 99253 IP/OBS CNSLTJ NEW/EST LOW 45: CPT | Performed by: INTERNAL MEDICINE

## 2022-03-19 PROCEDURE — 82570 ASSAY OF URINE CREATININE: CPT

## 2022-03-19 PROCEDURE — 74176 CT ABD & PELVIS W/O CONTRAST: CPT

## 2022-03-19 PROCEDURE — 6370000000 HC RX 637 (ALT 250 FOR IP)

## 2022-03-19 PROCEDURE — 6360000002 HC RX W HCPCS: Performed by: INTERNAL MEDICINE

## 2022-03-19 PROCEDURE — 82962 GLUCOSE BLOOD TEST: CPT

## 2022-03-19 PROCEDURE — 84540 ASSAY OF URINE/UREA-N: CPT

## 2022-03-19 PROCEDURE — 82800 BLOOD PH: CPT

## 2022-03-19 PROCEDURE — 82746 ASSAY OF FOLIC ACID SERUM: CPT

## 2022-03-19 PROCEDURE — 70450 CT HEAD/BRAIN W/O DYE: CPT

## 2022-03-19 PROCEDURE — S5553 INSULIN LONG ACTING 5 U: HCPCS | Performed by: INTERNAL MEDICINE

## 2022-03-19 PROCEDURE — 84133 ASSAY OF URINE POTASSIUM: CPT

## 2022-03-19 PROCEDURE — 36415 COLL VENOUS BLD VENIPUNCTURE: CPT

## 2022-03-19 PROCEDURE — 6360000002 HC RX W HCPCS: Performed by: STUDENT IN AN ORGANIZED HEALTH CARE EDUCATION/TRAINING PROGRAM

## 2022-03-19 PROCEDURE — 82010 KETONE BODYS QUAN: CPT

## 2022-03-19 PROCEDURE — 2000000000 HC ICU R&B

## 2022-03-19 PROCEDURE — 80053 COMPREHEN METABOLIC PANEL: CPT

## 2022-03-19 RX ORDER — ONDANSETRON 4 MG/1
4 TABLET, ORALLY DISINTEGRATING ORAL EVERY 8 HOURS PRN
Status: DISCONTINUED | OUTPATIENT
Start: 2022-03-19 | End: 2022-03-20 | Stop reason: HOSPADM

## 2022-03-19 RX ORDER — DEXTROSE MONOHYDRATE 25 G/50ML
12.5 INJECTION, SOLUTION INTRAVENOUS PRN
Status: CANCELLED | OUTPATIENT
Start: 2022-03-19

## 2022-03-19 RX ORDER — MAGNESIUM SULFATE IN WATER 40 MG/ML
2000 INJECTION, SOLUTION INTRAVENOUS ONCE
Status: COMPLETED | OUTPATIENT
Start: 2022-03-19 | End: 2022-03-19

## 2022-03-19 RX ORDER — ASPIRIN 81 MG/1
81 TABLET, CHEWABLE ORAL DAILY
Status: DISCONTINUED | OUTPATIENT
Start: 2022-03-19 | End: 2022-03-20 | Stop reason: HOSPADM

## 2022-03-19 RX ORDER — MAGNESIUM SULFATE 1 G/100ML
1000 INJECTION INTRAVENOUS PRN
Status: CANCELLED | OUTPATIENT
Start: 2022-03-19

## 2022-03-19 RX ORDER — DEXTROSE MONOHYDRATE 25 G/50ML
12.5 INJECTION, SOLUTION INTRAVENOUS PRN
Status: DISCONTINUED | OUTPATIENT
Start: 2022-03-19 | End: 2022-03-19 | Stop reason: SDUPTHER

## 2022-03-19 RX ORDER — CLONIDINE HYDROCHLORIDE 0.1 MG/1
0.1 TABLET ORAL PRN
Status: DISCONTINUED | OUTPATIENT
Start: 2022-03-19 | End: 2022-03-19

## 2022-03-19 RX ORDER — ACETAMINOPHEN 650 MG/1
650 SUPPOSITORY RECTAL EVERY 6 HOURS PRN
Status: DISCONTINUED | OUTPATIENT
Start: 2022-03-19 | End: 2022-03-20 | Stop reason: HOSPADM

## 2022-03-19 RX ORDER — DEXTROSE MONOHYDRATE 50 MG/ML
100 INJECTION, SOLUTION INTRAVENOUS PRN
Status: DISCONTINUED | OUTPATIENT
Start: 2022-03-19 | End: 2022-03-19 | Stop reason: SDUPTHER

## 2022-03-19 RX ORDER — NICOTINE POLACRILEX 4 MG
15 LOZENGE BUCCAL PRN
Status: DISCONTINUED | OUTPATIENT
Start: 2022-03-19 | End: 2022-03-20 | Stop reason: HOSPADM

## 2022-03-19 RX ORDER — SODIUM CHLORIDE 0.9 % (FLUSH) 0.9 %
5-40 SYRINGE (ML) INJECTION PRN
Status: DISCONTINUED | OUTPATIENT
Start: 2022-03-19 | End: 2022-03-20 | Stop reason: HOSPADM

## 2022-03-19 RX ORDER — NICOTINE POLACRILEX 4 MG
15 LOZENGE BUCCAL PRN
Status: DISCONTINUED | OUTPATIENT
Start: 2022-03-19 | End: 2022-03-19 | Stop reason: SDUPTHER

## 2022-03-19 RX ORDER — POLYETHYLENE GLYCOL 3350 17 G/17G
17 POWDER, FOR SOLUTION ORAL DAILY PRN
Status: DISCONTINUED | OUTPATIENT
Start: 2022-03-19 | End: 2022-03-20 | Stop reason: HOSPADM

## 2022-03-19 RX ORDER — DEXTROSE MONOHYDRATE 25 G/50ML
12.5 INJECTION, SOLUTION INTRAVENOUS PRN
Status: DISCONTINUED | OUTPATIENT
Start: 2022-03-19 | End: 2022-03-20 | Stop reason: HOSPADM

## 2022-03-19 RX ORDER — SODIUM CHLORIDE 450 MG/100ML
INJECTION, SOLUTION INTRAVENOUS CONTINUOUS
Status: CANCELLED | OUTPATIENT
Start: 2022-03-19

## 2022-03-19 RX ORDER — ONDANSETRON 2 MG/ML
4 INJECTION INTRAMUSCULAR; INTRAVENOUS EVERY 6 HOURS PRN
Status: DISCONTINUED | OUTPATIENT
Start: 2022-03-19 | End: 2022-03-20 | Stop reason: HOSPADM

## 2022-03-19 RX ORDER — 0.9 % SODIUM CHLORIDE 0.9 %
2000 INTRAVENOUS SOLUTION INTRAVENOUS ONCE
Status: COMPLETED | OUTPATIENT
Start: 2022-03-19 | End: 2022-03-19

## 2022-03-19 RX ORDER — ACETAMINOPHEN 325 MG/1
650 TABLET ORAL EVERY 6 HOURS PRN
Status: DISCONTINUED | OUTPATIENT
Start: 2022-03-19 | End: 2022-03-20 | Stop reason: HOSPADM

## 2022-03-19 RX ORDER — LABETALOL HYDROCHLORIDE 5 MG/ML
5 INJECTION, SOLUTION INTRAVENOUS ONCE
Status: COMPLETED | OUTPATIENT
Start: 2022-03-19 | End: 2022-03-19

## 2022-03-19 RX ORDER — DEXTROSE MONOHYDRATE 50 MG/ML
100 INJECTION, SOLUTION INTRAVENOUS PRN
Status: DISCONTINUED | OUTPATIENT
Start: 2022-03-19 | End: 2022-03-20 | Stop reason: HOSPADM

## 2022-03-19 RX ORDER — LABETALOL HYDROCHLORIDE 5 MG/ML
10 INJECTION, SOLUTION INTRAVENOUS EVERY 4 HOURS PRN
Status: DISCONTINUED | OUTPATIENT
Start: 2022-03-19 | End: 2022-03-19

## 2022-03-19 RX ORDER — DIPHENHYDRAMINE HCL 25 MG
25 TABLET ORAL NIGHTLY PRN
Status: DISCONTINUED | OUTPATIENT
Start: 2022-03-19 | End: 2022-03-20 | Stop reason: HOSPADM

## 2022-03-19 RX ORDER — LANOLIN ALCOHOL/MO/W.PET/CERES
3 CREAM (GRAM) TOPICAL NIGHTLY
Status: DISCONTINUED | OUTPATIENT
Start: 2022-03-19 | End: 2022-03-20 | Stop reason: HOSPADM

## 2022-03-19 RX ORDER — LIDOCAINE 4 G/G
1 PATCH TOPICAL DAILY
Status: DISCONTINUED | OUTPATIENT
Start: 2022-03-19 | End: 2022-03-20 | Stop reason: HOSPADM

## 2022-03-19 RX ORDER — IPRATROPIUM BROMIDE AND ALBUTEROL SULFATE 2.5; .5 MG/3ML; MG/3ML
1 SOLUTION RESPIRATORY (INHALATION) EVERY 4 HOURS PRN
Status: DISCONTINUED | OUTPATIENT
Start: 2022-03-19 | End: 2022-03-20 | Stop reason: HOSPADM

## 2022-03-19 RX ORDER — LANOLIN ALCOHOL/MO/W.PET/CERES
1000 CREAM (GRAM) TOPICAL DAILY
Status: DISCONTINUED | OUTPATIENT
Start: 2022-03-19 | End: 2022-03-20 | Stop reason: HOSPADM

## 2022-03-19 RX ORDER — POLYETHYLENE GLYCOL 3350 17 G/17G
17 POWDER, FOR SOLUTION ORAL DAILY PRN
Status: CANCELLED | OUTPATIENT
Start: 2022-03-19

## 2022-03-19 RX ORDER — AMLODIPINE BESYLATE 10 MG/1
10 TABLET ORAL DAILY
Status: DISCONTINUED | OUTPATIENT
Start: 2022-03-20 | End: 2022-03-20

## 2022-03-19 RX ORDER — INSULIN GLARGINE-YFGN 100 [IU]/ML
35 INJECTION, SOLUTION SUBCUTANEOUS DAILY
Status: DISCONTINUED | OUTPATIENT
Start: 2022-03-19 | End: 2022-03-19

## 2022-03-19 RX ORDER — POTASSIUM CHLORIDE 7.45 MG/ML
10 INJECTION INTRAVENOUS PRN
Status: CANCELLED | OUTPATIENT
Start: 2022-03-19

## 2022-03-19 RX ORDER — LABETALOL HYDROCHLORIDE 5 MG/ML
20 INJECTION, SOLUTION INTRAVENOUS EVERY 4 HOURS PRN
Status: DISCONTINUED | OUTPATIENT
Start: 2022-03-19 | End: 2022-03-20 | Stop reason: HOSPADM

## 2022-03-19 RX ORDER — SODIUM CHLORIDE, SODIUM LACTATE, POTASSIUM CHLORIDE, CALCIUM CHLORIDE 600; 310; 30; 20 MG/100ML; MG/100ML; MG/100ML; MG/100ML
INJECTION, SOLUTION INTRAVENOUS CONTINUOUS
Status: ACTIVE | OUTPATIENT
Start: 2022-03-19 | End: 2022-03-20

## 2022-03-19 RX ORDER — METOPROLOL SUCCINATE 50 MG/1
50 TABLET, EXTENDED RELEASE ORAL 2 TIMES DAILY
Status: DISCONTINUED | OUTPATIENT
Start: 2022-03-19 | End: 2022-03-20 | Stop reason: HOSPADM

## 2022-03-19 RX ORDER — SODIUM CHLORIDE 9 MG/ML
25 INJECTION, SOLUTION INTRAVENOUS PRN
Status: DISCONTINUED | OUTPATIENT
Start: 2022-03-19 | End: 2022-03-20 | Stop reason: HOSPADM

## 2022-03-19 RX ORDER — INSULIN GLARGINE-YFGN 100 [IU]/ML
20 INJECTION, SOLUTION SUBCUTANEOUS DAILY
Status: DISCONTINUED | OUTPATIENT
Start: 2022-03-20 | End: 2022-03-20

## 2022-03-19 RX ORDER — LABETALOL HYDROCHLORIDE 5 MG/ML
10 INJECTION, SOLUTION INTRAVENOUS EVERY 6 HOURS PRN
Status: DISCONTINUED | OUTPATIENT
Start: 2022-03-19 | End: 2022-03-19

## 2022-03-19 RX ORDER — SODIUM CHLORIDE 0.9 % (FLUSH) 0.9 %
5-40 SYRINGE (ML) INJECTION EVERY 12 HOURS SCHEDULED
Status: DISCONTINUED | OUTPATIENT
Start: 2022-03-19 | End: 2022-03-20 | Stop reason: HOSPADM

## 2022-03-19 RX ORDER — NICOTINE 21 MG/24HR
1 PATCH, TRANSDERMAL 24 HOURS TRANSDERMAL DAILY
Status: DISCONTINUED | OUTPATIENT
Start: 2022-03-19 | End: 2022-03-20 | Stop reason: HOSPADM

## 2022-03-19 RX ORDER — DEXTROSE AND SODIUM CHLORIDE 5; .45 G/100ML; G/100ML
INJECTION, SOLUTION INTRAVENOUS CONTINUOUS PRN
Status: CANCELLED | OUTPATIENT
Start: 2022-03-19

## 2022-03-19 RX ORDER — FENOFIBRATE 54 MG/1
145 TABLET ORAL DAILY
Status: DISCONTINUED | OUTPATIENT
Start: 2022-03-19 | End: 2022-03-20 | Stop reason: HOSPADM

## 2022-03-19 RX ORDER — BUPRENORPHINE 2 MG/1
2 TABLET SUBLINGUAL PRN
Status: DISCONTINUED | OUTPATIENT
Start: 2022-03-19 | End: 2022-03-20 | Stop reason: HOSPADM

## 2022-03-19 RX ADMIN — AMPICILLIN SODIUM AND SULBACTAM SODIUM 3000 MG: 2; 1 INJECTION, POWDER, FOR SOLUTION INTRAMUSCULAR; INTRAVENOUS at 18:05

## 2022-03-19 RX ADMIN — SODIUM CHLORIDE 2000 ML: 9 INJECTION, SOLUTION INTRAVENOUS at 01:13

## 2022-03-19 RX ADMIN — AMPICILLIN SODIUM AND SULBACTAM SODIUM 3000 MG: 2; 1 INJECTION, POWDER, FOR SOLUTION INTRAMUSCULAR; INTRAVENOUS at 12:58

## 2022-03-19 RX ADMIN — BUPRENORPHINE HCL 2 MG: 2 TABLET SUBLINGUAL at 21:02

## 2022-03-19 RX ADMIN — ASPIRIN 81 MG 81 MG: 81 TABLET ORAL at 09:16

## 2022-03-19 RX ADMIN — CLONIDINE HYDROCHLORIDE 0.3 MG: 0.1 TABLET ORAL at 09:15

## 2022-03-19 RX ADMIN — DEXTROSE MONOHYDRATE 100 ML/HR: 50 INJECTION, SOLUTION INTRAVENOUS at 05:18

## 2022-03-19 RX ADMIN — Medication 10 ML: at 10:25

## 2022-03-19 RX ADMIN — CLONIDINE HYDROCHLORIDE 0.3 MG: 0.1 TABLET ORAL at 19:44

## 2022-03-19 RX ADMIN — LABETALOL HYDROCHLORIDE 10 MG: 5 INJECTION INTRAVENOUS at 17:02

## 2022-03-19 RX ADMIN — SODIUM CHLORIDE 0.1 UNITS/KG/HR: 9 INJECTION, SOLUTION INTRAVENOUS at 01:57

## 2022-03-19 RX ADMIN — LABETALOL HYDROCHLORIDE 5 MG: 5 INJECTION INTRAVENOUS at 01:00

## 2022-03-19 RX ADMIN — METOPROLOL SUCCINATE 50 MG: 50 TABLET, EXTENDED RELEASE ORAL at 19:44

## 2022-03-19 RX ADMIN — SODIUM CHLORIDE, POTASSIUM CHLORIDE, SODIUM LACTATE AND CALCIUM CHLORIDE: 600; 310; 30; 20 INJECTION, SOLUTION INTRAVENOUS at 11:58

## 2022-03-19 RX ADMIN — METOPROLOL SUCCINATE 50 MG: 50 TABLET, EXTENDED RELEASE ORAL at 09:16

## 2022-03-19 RX ADMIN — Medication 20 ML: at 21:09

## 2022-03-19 RX ADMIN — LABETALOL HYDROCHLORIDE 10 MG: 5 INJECTION INTRAVENOUS at 09:21

## 2022-03-19 RX ADMIN — Medication 3 MG: at 20:26

## 2022-03-19 RX ADMIN — MAGNESIUM SULFATE HEPTAHYDRATE 2000 MG: 40 INJECTION, SOLUTION INTRAVENOUS at 09:56

## 2022-03-19 RX ADMIN — ACETAMINOPHEN 650 MG: 325 TABLET ORAL at 18:11

## 2022-03-19 RX ADMIN — INSULIN GLARGINE-YFGN 35 UNITS: 100 INJECTION, SOLUTION SUBCUTANEOUS at 09:55

## 2022-03-19 RX ADMIN — CYANOCOBALAMIN TAB 1000 MCG 1000 MCG: 1000 TAB at 09:16

## 2022-03-19 RX ADMIN — CEFTRIAXONE 1000 MG: 1 INJECTION, POWDER, FOR SOLUTION INTRAMUSCULAR; INTRAVENOUS at 06:27

## 2022-03-19 RX ADMIN — INSULIN LISPRO 1 UNITS: 100 INJECTION, SOLUTION INTRAVENOUS; SUBCUTANEOUS at 16:25

## 2022-03-19 RX ADMIN — LABETALOL HYDROCHLORIDE 20 MG: 5 INJECTION INTRAVENOUS at 20:26

## 2022-03-19 ASSESSMENT — PAIN SCALES - GENERAL
PAINLEVEL_OUTOF10: 0
PAINLEVEL_OUTOF10: 7
PAINLEVEL_OUTOF10: 3
PAINLEVEL_OUTOF10: 3

## 2022-03-19 NOTE — CARE COORDINATION
Peer Recovery Support Note    Name: Margaret Johnson  Date: 3/19/2022    Chief Complaint   Patient presents with    Drug Overdose     (patient \"cant remember anything\", EMS found patient's mother doing CPR on patient, patient given 1 of narcan,patient now alert)       Peer Support met with patient. [x] Support and education provided  [] Resources provided   [] Treatment referral:   [] Other:   [] Patient declined peer recovery services     Referred By:     Notes: Will continue to follow.     SignedEmelina Morales, 3/19/2022

## 2022-03-19 NOTE — ED NOTES
Pt stable except elevated 640'C systolic blood pressure, doctor notified. labetalol was given and now blood pressure 147/91 currently. Call bell within reach, AOx4, respers easy. Mother is at the bedside. Waiting for insulin drip for pt from main pharmacy.        Christian Herzog RN  03/19/22 [id number]

## 2022-03-19 NOTE — ED NOTES
PT refused ABG provider notified     [facility]  03/18/22 2012

## 2022-03-19 NOTE — ED NOTES
Pt's mother bedside currently, states that pt began early morning 03/18/22 with vomiting and diarrhea. Per mom she knew that pt was most likely in DKA. States that she obtained glucose levels approximately every 1.5 hour, pt read high on meter for most of the day. Pt's mother states that she gave pt units based on pt's sliding scale then this evening pt began to be more alert. Per mother pt's glucose had decreased to approximately 120 and pt was up and moving about the house. States that the pt went to the fridge and grabbed a Gatorade and then went outside to smoke. Per mother, as she was closing down the house for bed she checked back and found pt lifeless slumped over on her back porch and began giving CPR. Mother also notes that she has been cleaning/ bleaching the pt's bathroom all day and once EMS left her home with the pt she discovered something on the bathroom counter. Pt was given a dose of narcan on scene. Mother would like to know if pt took heroin and requests to know results of drug screen.       Oziel Reid RN  03/19/22 [id number]

## 2022-03-19 NOTE — H&P
Hospitalist History & Physical      PCP: Meka Cardenas DO    Date of Service: Pt seen/examined on 3/19/2022     Chief Complaint:  had concerns including Drug Overdose ((patient \"cant remember anything\", EMS found patient's mother doing CPR on patient, patient given 1 of narcan,patient now alert)). History Of Present Illness:    Mr. Iram Simmons, a 32y.o. year old male  who  has a past medical history of Asthma, Bipolar affective ([address] COPD (chronic obstructive pulmonary disease) ([address] Diabetes mellitus type I ([address] Diabetic neuropathy ([address] Diabetic, retinopathy, proliferative ([address] HTN (hypertension), Hypercholesteremia, Left renal artery stenosis ([address] Macular edema due to secondary diabetes ([address] Opioid dependence ([address] Proteinuria, Retinal detachment, and Vitreous hemorrhage of right eye due to diabetes mellitus ([address] Patient presents to the emergency department after a suspected drug overdose. EMS was called and found mother doing CPR on the patient. Patient apparently cannot remember anything that happened. He was given Narcan by EMS and mentation improved significantly. Patient has a history of opioid dependence and is currently on Suboxone. When EMS arrived he had agonal breathing and pinpoint pupils. He is also type I diabetic. Mother reports his glucose has been high. On arrival patient is tachycardic with a rate in the 110s. Hypoxic initially with SPO2 in the mid 80s. Currently 97%. On room air. Laboratory studies demonstrate potassium 5.3, BUN 33, creatinine 1.6, anion gap 22, lactic acid 4.6, glucose 450, troponin of 94, alk phos 179, , , beta hydroxybutyrate 2.57, WBC 20.9. EKG shows sinus tachycardia. Patient was given IV fluids and started on insulin infusion patient be admitted to the ICU.       Past Medical History:   Diagnosis Date    Asthma     Bipolar affective ([address]     COPD (chronic obstructive pulmonary disease) ([address]     Diabetes mellitus type I ([address]     Diabetic neuropathy ([address]     Diabetic, retinopathy, proliferative ([address]     HTN (hypertension)     due to left renal artery stenosis    Hypercholesteremia     Left renal artery stenosis ([address] 09/19/2019    Macular edema due to secondary diabetes ([address]     Opioid dependence (HCC)     Proteinuria     Retinal detachment     right eye    Vitreous hemorrhage of right eye due to diabetes mellitus [facility])        Past Surgical History:   Procedure Laterality Date    EYE SURGERY Right 11/20/2020    PARS PLANA VITRECTOMY, MEMBRANE PEEL, ENDOLASER, GAS FLUID EXCHANGE, 25G, MAC, RIGHT EYE performed by Tremayne Nicole MD at [address] [facility] Right 01/08/2021    pars plana vitrectomy with peeling of preretinal tissue and gas fluid exchange    EYE SURGERY Right 1/8/2021    PARS PLANA VITRECTOMY, INTERNAL LIMITING MEMBRANE PEEL, INDOCYANINE GREEN, GAS FLUID EXCHANGE, 25g, MAC, RIGHT EYE performed by Tremayne Nicole MD at [address] [place] [address]       Prior to Admission medications    Medication Sig Start Date End Date Taking? Authorizing Provider   aspirin 81 MG chewable tablet Take 1 tablet by mouth daily 3/14/22   [facility] Shaquille, DO   Insulin Degludec (TRESIBA FLEXTOUCH) 100 UNIT/ML SOPN Inject 55 Units into the skin nightly 3/14/22   [facility] Shaquille DO   fenofibrate (TRICOR) 145 MG tablet Take 1 tablet by mouth daily 3/14/22   [facility] Shaquille, DO   insulin aspart (NOVOLOG) 100 UNIT/ML injection vial Inject 6-10 Units into the skin 4 times daily with meals 3/14/22   Sergio Corbin DO   albuterol sulfate HFA (VENTOLIN HFA) 108 (90 Base) MCG/ACT inhaler Inhale 2 puffs into the lungs every 4 hours as needed for Wheezing or Shortness of Breath 3/14/22   Sergio Corbin DO   Blood Glucose Monitoring Suppl (TRUE METRIX METER) w/Device KIT Check blood glucose four times daily (fasting and with each meal).  3/14/22   Sergio Corbin DO   cyanocobalamin ([employer] VITAMIN B12) 1000 MCG tablet Take 1 tablet by mouth daily 3/14/22   [facility], DO   cloNIDine (CATAPRES) 0.3 MG tablet Take 1 tablet by mouth 2 times daily 3/14/22 6/12/22  [facility], DO   lisinopril (PRINIVIL;ZESTRIL) 20 MG tablet Take 1 tablet by mouth daily 3/14/22   [facility], DO   Insulin Pen Needle (PEN NEEDLES) 32G X 5 MM MISC 1 each by Does not apply route daily 3/14/22   [facility], DO   metoprolol succinate (TOPROL XL) 50 MG extended release tablet Take 1 tablet by mouth 2 times daily 3/14/22 6/12/22  [facility], DO   atorvastatin (LIPITOR) 80 MG tablet Take 1 tablet by mouth daily 3/14/22   [facility], DO   gabapentin (NEURONTIN) 300 MG capsule Take 1 capsule by mouth 2 times daily for 90 days. 4/19/22 7/18/22  [facility], DO   buPROPion (WELLBUTRIN XL) 150 MG extended release tablet  12/1/21   Historical Provider, MD   sildenafil (REVATIO) 20 MG tablet Take 60 mg by mouth as needed (BEFORE SEXUAL ACTIVITY)    Historical Provider, MD   SUBOXONE 8-2 MG FILM SL film Place 1 Film under the tongue 2 times daily. 9/14/17   Historical Provider, MD         Allergies:  Chlorthalidone, Hctz [hydrochlorothiazide], and Hydralazine    Social History:    TOBACCO:   reports that he has been smoking cigarettes. He has a 7.50 pack-year smoking history. He has never used smokeless tobacco.  ETOH:   reports current alcohol use. Family History:    Reviewed in detail and negative for DM, CAD, Cancer, CVA. Positive as follows\"      Problem Relation Age of Onset    No Known Problems Mother     No Known Problems Father     No Known Problems Brother         Homicide     No Known Problems Daughter     No Known Problems Son     Diabetes type 2  Maternal Grandmother        REVIEW OF SYSTEMS:   Pertinent positives as noted in the HPI. All other systems reviewed and negative.     PHYSICAL EXAM:  BP (!) 152/95   Pulse 105   Temp 98.7 °F (37.1 °C)   Resp 14   Ht 5' 3\" (1.6 m)   Wt 118 lb 4 oz (53.6 kg)   SpO2 97%   BMI 20.95 kg/m²   General appearance: No apparent distress  HEENT: Normal cephalic, atraumatic without obvious deformity  Neck: Supple, with full range of motion. No jugular venous distention. Trachea midline. Respiratory: Clear to auscultation bilaterally  Cardiovascular: Tachycardic  Abdomen: Soft, nontender, nondistended  Musculoskeletal: No clubbing, cyanosis, edema of bilateral lower extremities. Brisk capillary refill. Skin: Normal skin color. No rashes or lesions. Neurologic:  Neurovascularly intact without any focal sensory/motor deficits. Cranial nerves: II-XII intact, grossly non-focal.  Psychiatric: Alert and oriented, thought content appropriate, normal insight    Reviewed EKG and CXR personally      CBC:   Recent Labs     03/18/22 2334   WBC 20.9*   RBC 3.65*   HGB 10.9*   HCT 32.0*   MCV 87.7   RDW 13.9        BMP:   Recent Labs     03/18/22 2334      K 5.3*   CL 96*   CO2 20*   BUN 33*   CREATININE 1.6*     LFT:  Recent Labs     03/18/22 2334   PROT 7.2   ALKPHOS 179*   *   *   BILITOT 0.2   LIPASE 907*     CE:  No results for input(s): Erle Keepers in the last 72 hours. PT/INR: No results for input(s): INR, APTT in the last 72 hours. BNP: No results for input(s): BNP in the last 72 hours.   ESR:   Lab Results   Component Value Date    SEDRATE 30 (H) 09/13/2015     CRP:   Lab Results   Component Value Date    CRP 0.0 09/13/2015     D Dimer: No results found for: DDIMER   Folate and B12:   Lab Results   Component Value Date    [id number] 043 01/21/2022   ,   Lab Results   Component Value Date    FOLATE 8.4 01/21/2022     Lactic Acid:   Lab Results   Component Value Date    LACTA 4.6 ([facility]) 03/18/2022     Thyroid Studies:   Lab Results   Component Value Date    TSH 1.890 01/21/2022    Z7KKXYT 151.50 09/16/2017    K5UPQCI 5.5 09/16/2017       Oupatient labs:  Lab Results   Component Value Date    CHOL 283 (H) 11/18/2021 TRIG 272 (H) 11/18/2021    HDL 60 11/18/2021    LDLCALC 169 (H) 11/18/2021    TSH 1.890 01/21/2022    INR 1.0 09/14/2015    LABA1C 11.1 11/17/2021       Urinalysis:    Lab Results   Component Value Date    NITRU Negative 03/18/2022    WBCUA NONE 03/18/2022    WBCUA NONE 04/21/2012    BACTERIA FEW 03/18/2022    RBCUA 1-3 03/18/2022    RBCUA NONE 03/20/2014    BLOODU MODERATE 03/18/2022    SPECGRAV 1.015 03/18/2022    GLUCOSEU >=1000 03/18/2022    GLUCOSEU >=1000 04/21/2012       Imaging:  CT HEAD WO CONTRAST    Result Date: 3/19/2022  EXAMINATION: CT OF THE HEAD WITHOUT CONTRAST  3/19/2022 2:42 am TECHNIQUE: CT of the head was performed without the administration of intravenous contrast. Dose modulation, iterative reconstruction, and/or weight based adjustment of the mA/kV was utilized to reduce the radiation dose to as low as reasonably achievable. COMPARISON: None. HISTORY: ORDERING SYSTEM PROVIDED HISTORY: drug overdose TECHNOLOGIST PROVIDED HISTORY: Reason for exam:->drug overdose Has a \"code stroke\" or \"stroke alert\" been called? ->No Decision Support Exception - unselect if not a suspected or confirmed emergency medical condition->Emergency Medical Condition (MA) What reading provider will be dictating this exam?->CRC FINDINGS: BRAIN/VENTRICLES: There is no acute intracranial hemorrhage, mass effect or midline shift. No abnormal extra-axial fluid collection. The gray-white differentiation is maintained without evidence of an acute infarct. There is no evidence of hydrocephalus. Mild generalized cerebral and cerebellar volume loss. Small ovoid areas of hypoattenuation in the bilateral basal ganglia may represent prominent perivascular spaces or prior lacunar type infarcts. ORBITS: The visualized portion of the orbits demonstrate no acute abnormality. SINUSES: Left maxillary sinus mucous retention cyst.  The other visualized paranasal sinuses and mastoid air cells demonstrate no acute abnormality.  SOFT TISSUES/SKULL:  No acute abnormality of the visualized skull or soft tissues. 1.  No acute intracranial abnormality. Specifically, no acute intracranial hemorrhage or mass effect. 2.  Small areas of hypoattenuation in the bilateral basal ganglia may represent prominent perivascular spaces or prior lacunar-type infarcts. Follow-up brain MRI may be helpful for further evaluation. XR CHEST PORTABLE    Result Date: 3/18/2022  EXAMINATION: ONE XRAY VIEW OF THE CHEST 3/18/2022 11:42 pm COMPARISON: October 28, 2021. HISTORY: ORDERING SYSTEM PROVIDED HISTORY: overdose TECHNOLOGIST PROVIDED HISTORY: Reason for exam:->overdose What reading provider will be dictating this exam?->CRC FINDINGS: Cardiomediastinal contours and pulmonary vasculature are within normal limits. No effusion, focal consolidation or pneumothorax is seen. No acute osseous abnormality identified. No acute findings. ASSESSMENT:  -Drug overdose  -DKA  -Lactic acidosis  -Elevated troponin  -Leukocytosis  -History of opioid abuse/dependence  -Type 1 diabetes  -Asthma  -Bipolar 1 disorder      PLAN:  -Admit to ICU  -Consult intensivist  -Insulin infusion  -IV fluids per DKA protocol  -Telemetry  -Repeat troponin  -Monitor serum electrolytes  -Continue home medications        Diet: No diet orders on file  Code Status: Prior  Surrogate decision maker confirmed with patient:   Extended Emergency Contact Information  Primary Emergency Contact: Alexandrea Gambino  Address: [address] [place] [address] Phone: [phone]  Mobile Phone: [phone]  Relation: Parent  Preferred language: English   needed? No  Secondary Emergency Contact: Francisco Hastings Phone: [phone]  Work Phone: [phone]  Mobile Phone: [phone]  Relation: Other   needed?  No    DVT Prophylaxis: []Lovenox []Heparin []PCD [] [address] []Encouraged ambulation  Disposition: []Med/Surg [] Intermediate [] ICU/CCU  Admit status: [] Observation [] Inpatient     +++++++++++++++++++++++++++++++++++++++++++++++++  Georgiana Hunter, DO  +++++++++++++++++++++++++++++++++++++++++++++++++  NOTE: This report was transcribed using voice recognition software. Every effort was made to ensure accuracy; however, inadvertent computerized transcription errors may be present.

## 2022-03-19 NOTE — ED PROVIDER NOTES
Norris Light is a 32 y.o. male    Chief Complaint   Patient presents with    Drug Overdose     (patient \"cant remember anything\", EMS found patient's mother doing CPR on patient, patient given 1 of narcan,patient now alert)         HPI   Norris Light is a 32 y.o. male presenting to the ED for Drug Overdose ((patient \"cant remember anything\", EMS found patient's mother doing CPR on patient, patient given 1 of narcan,patient now alert))    History comes primarily from the patient and EMS. Presents after an apparent drug overdose with opiates. He has a history of opiate drug abuse currently on Suboxone. According to EMS the story is that his mother noticed him go down to the basement thinks he may have had something stash down there. She went out to smoke a cigarette and found him outside unresponsive. She then delivered quite high-quality CPR until EMS arrived. EMS noted patient had pinpoint pupils and was breathing agonal he. They administered 1 mg of Narcan and patient woke up immediately. The mother also noted that the patient's glucose has been quite high as he is a type I diabetic. She has been giving him multiple doses of insulin throughout the day and for EMS his glucose was 341. He was tachycardic to 110 for them but his other vital signs were normal.    Here in the emergency room, the patient is awake and shivering. His pupils are round and equally reactive to light approximately 5 mm. He is complaining of sternal chest pain where his mother was doing compressions but has no other complaints at this time. He states he does not know what happened and does not remember ingesting anything or what happened. Review of Systems   Constitutional: Negative for appetite change, fatigue and fever. HENT: Negative for congestion, rhinorrhea and trouble swallowing. Eyes: Negative for redness and itching. Respiratory: Negative for chest tightness, shortness of breath and wheezing. Cardiovascular: Positive for chest pain. Negative for palpitations and leg swelling. Gastrointestinal: Negative for abdominal distention, abdominal pain, constipation, diarrhea, nausea and vomiting. Genitourinary: Negative for decreased urine volume, difficulty urinating and frequency. Musculoskeletal: Negative for arthralgias, back pain and myalgias. Neurological: Negative for dizziness, syncope, weakness, numbness and headaches. Psychiatric/Behavioral: Positive for confusion (unresponsiveness). Negative for agitation, behavioral problems and decreased concentration. The patient is not nervous/anxious. All other systems reviewed and are negative. Physical Exam  Vitals reviewed. Constitutional:       General: He is not in acute distress. Appearance: Normal appearance. He is normal weight. He is not ill-appearing or toxic-appearing. Comments: Shivering   HENT:      Head: Normocephalic and atraumatic. Right Ear: External ear normal.      Left Ear: External ear normal.      Nose: Nose normal. No congestion or rhinorrhea. Mouth/Throat:      Mouth: Mucous membranes are moist.      Pharynx: Oropharynx is clear. No oropharyngeal exudate or posterior oropharyngeal erythema. Eyes:      Extraocular Movements: Extraocular movements intact. Conjunctiva/sclera: Conjunctivae normal.      Pupils: Pupils are equal, round, and reactive to light. Cardiovascular:      Rate and Rhythm: Regular rhythm. Tachycardia present. Heart sounds: Normal heart sounds. No murmur heard. Pulmonary:      Effort: Pulmonary effort is normal. No respiratory distress. Breath sounds: Normal breath sounds. Chest:      Chest wall: Tenderness present. Abdominal:      General: Abdomen is flat. There is no distension. Tenderness: There is no abdominal tenderness. There is no guarding. Musculoskeletal:         General: No swelling or tenderness. Normal range of motion.       Cervical back: Normal range of motion. No rigidity or tenderness. Skin:     General: Skin is warm and dry. Coloration: Skin is not jaundiced or pale. Findings: No bruising or erythema. Neurological:      General: No focal deficit present. Mental Status: He is alert and oriented to person, place, and time. Cranial Nerves: No cranial nerve deficit. Motor: No weakness. Psychiatric:         Mood and Affect: Mood normal.         Behavior: Behavior normal.         Thought Content: Thought content normal.          Procedures     MDM   Patient presented to the Emergency Department for Drug Overdose ((patient \"cant remember anything\", EMS found patient's mother doing CPR on patient, patient given 1 of narcan,patient now alert))    Patient presented after an apparent drug overdose in which she became unresponsive and his mother administered CPR until EMS arrived. He is currently being treated with Suboxone for opiate abuse. Patient's initial work-up shows potassium of 5.3, lactic acid 1.6 glucose elevated at 450. He also has pancreatitis with a lipase of 907, elevated LFTs from baseline, WBC 20.9. At this point, beta hydroxybutyrate, blood cultures and repeat lactic acids ordered. Patient is given another 2 L of saline and started on insulin infusion for DKA. Patient also noted to have a troponin elevated to 94, likely due to his hypoxia when he was unresponsive as well as the CPR that was done. Patient did not require any more dose of Narcan while in the emergency room. Patient was noted to have mild abdominal pain. Patient's lipase was significantly elevated and CT of the abdomen formed a diagnosis of pancreatitis. Discussed case with internal medicine as well as ICU who agreed to admit patient. EKG: This EKG is signed and interpreted by me.     Rate: 116  Rhythm: sinus tachy  Axis: normal  Interpretation: no acute changes  Comparison: stable as compared to patient's most recent EKG    ED Course as of 03/19/22 0515   Sat Mar 19, 2022   0108 I received this patient at sign out from previous physician   I have discussed the patient's initial exam, treatment and plan of care with the out going physician. I have introduced my self to the patient. I have examined the patient myself and reviewed ordered tests / medications and  reviewed any available results to this point. []   2047 Patient CT head indicated possible acute infarcts that may require further evaluation with MRI. Patient was also noted to have correction of his lactic acidosis. Patient remains on insulin drip. Patient's pH was normal. []   0407 Discussed case the internal medicine who agreed to admit patient. []      ED Course User Index  [] Denzel Andino MD       --------------------------------------------- PAST HISTORY ---------------------------------------------  Past Medical History:  has a past medical history of Asthma, Bipolar affective ([address] COPD (chronic obstructive pulmonary disease) ([address] Diabetes mellitus type I ([address] Diabetic neuropathy ([address] Diabetic, retinopathy, proliferative ([address] HTN (hypertension), Hypercholesteremia, Left renal artery stenosis ([address] Macular edema due to secondary diabetes ([address] Opioid dependence ([address] Proteinuria, Retinal detachment, and Vitreous hemorrhage of right eye due to diabetes mellitus ([address] Past Surgical History:  has a past surgical history that includes Eye surgery (Right, 11/20/2020); Eye surgery (Right, 01/08/2021); and Eye surgery (Right, 1/8/2021). Social History:  reports that he has been smoking cigarettes. He has a 7.50 pack-year smoking history. He has never used smokeless tobacco. He reports current alcohol use. He reports previous drug use. Drug: Opiates . Family History: family history includes Diabetes type 2  in his maternal grandmother; No Known Problems in his brother, daughter, father, mother, and son.      The patients home medications have been reviewed.     Allergies: Chlorthalidone, Hctz [hydrochlorothiazide], and Hydralazine    -------------------------------------------------- RESULTS -------------------------------------------------    LABS:  Results for orders placed or performed during the hospital encounter of 03/18/22   CBC with Auto Differential   Result Value Ref Range    WBC 20.9 (H) 4.5 - 11.5 E9/L    RBC 3.65 (L) 3.80 - 5.80 E12/L    Hemoglobin 10.9 (L) 12.5 - 16.5 g/dL    Hematocrit 32.0 (L) 37.0 - 54.0 %    MCV 87.7 80.0 - 99.9 fL    MCH 29.9 26.0 - 35.0 pg    MCHC 34.1 32.0 - 34.5 %    RDW 13.9 11.5 - 15.0 fL    Platelets 880 579 - 406 E9/L    MPV 10.1 7.0 - 12.0 fL    Neutrophils % 86.1 (H) 43.0 - 80.0 %    Immature Granulocytes % 0.8 0.0 - 5.0 %    Lymphocytes % 6.5 (L) 20.0 - 42.0 %    Monocytes % 6.4 2.0 - 12.0 %    Eosinophils % 0.0 0.0 - 6.0 %    Basophils % 0.2 0.0 - 2.0 %    Neutrophils Absolute 18.00 (H) 1.80 - 7.30 E9/L    Immature Granulocytes # 0.17 E9/L    Lymphocytes Absolute 1.36 (L) 1.50 - 4.00 E9/L    Monocytes Absolute 1.33 (H) 0.10 - 0.95 E9/L    Eosinophils Absolute 0.01 (L) 0.05 - 0.50 E9/L    Basophils Absolute 0.05 0.00 - 0.20 E9/L   Comprehensive Metabolic Panel   Result Value Ref Range    Sodium 138 132 - 146 mmol/L    Potassium 5.3 (H) 3.5 - 5.0 mmol/L    Chloride 96 (L) 98 - 107 mmol/L    CO2 20 (L) 22 - 29 mmol/L    Anion Gap 22 (H) 7 - 16 mmol/L    Glucose 450 (H) 74 - 99 mg/dL    BUN 33 (H) 6 - 20 mg/dL    CREATININE 1.6 (H) 0.7 - 1.2 mg/dL    GFR Non-African American 50 >=60 mL/min/1.73    GFR African American >60     Calcium 9.0 8.6 - 10.2 mg/dL    Total Protein 7.2 6.4 - 8.3 g/dL    Albumin 3.6 3.5 - 5.2 g/dL    Total Bilirubin 0.2 0.0 - 1.2 mg/dL    Alkaline Phosphatase 179 (H) 40 - 129 U/L     (H) 0 - 40 U/L     (H) 0 - 39 U/L   Lipase   Result Value Ref Range    Lipase 907 (H) 13 - 60 U/L   Lactic Acid   Result Value Ref Range    Lactic Acid 4.6 (HH) 0.5 - 2.2 mmol/L Urinalysis   Result Value Ref Range    Color, UA Straw Straw/Yellow    Clarity, UA Clear Clear    Glucose, Ur >=1000 (A) Negative mg/dL    Bilirubin Urine Negative Negative    Ketones, Urine TRACE (A) Negative mg/dL    Specific Gravity, UA 1.015 1.005 - 1.030    Blood, Urine MODERATE (A) Negative    pH, UA 6.5 5.0 - 9.0    Protein,  (A) Negative mg/dL    Urobilinogen, Urine 0.2 <2.0 E.U./dL    Nitrite, Urine Negative Negative    Leukocyte Esterase, Urine Negative Negative   URINE DRUG SCREEN   Result Value Ref Range    Amphetamine Screen, Urine NOT DETECTED Negative <1000 ng/mL    Barbiturate Screen, Ur NOT DETECTED Negative < 200 ng/mL    Benzodiazepine Screen, Urine NOT DETECTED Negative < 200 ng/mL    Cannabinoid Scrn, Ur NOT DETECTED Negative < 50ng/mL    Cocaine Metabolite Screen, Urine NOT DETECTED Negative < 300 ng/mL    Opiate Scrn, Ur POSITIVE (A) Negative < 300ng/mL    PCP Screen, Urine NOT DETECTED Negative < 25 ng/mL    Methadone Screen, Urine NOT DETECTED Negative <300 ng/mL    Oxycodone Urine NOT DETECTED Negative <100 ng/mL    FENTANYL SCREEN, URINE POSITIVE (A) Negative <1 ng/mL    Drug Screen Comment: see below    Serum Drug Screen   Result Value Ref Range    Ethanol Lvl <10 mg/dL    Acetaminophen Level <5.0 (L) 10.0 - 67.3 mcg/mL    Salicylate, Serum <9.3 0.0 - 30.0 mg/dL    TCA Scrn NEGATIVE Cutoff:300 ng/mL   Troponin   Result Value Ref Range    Troponin, High Sensitivity 94 (H) 0 - 11 ng/L   Beta-Hydroxybutyrate   Result Value Ref Range    Beta-Hydroxybutyrate 2.57 (H) 0.02 - 0.27 mmol/L   Lactate, Sepsis   Result Value Ref Range    Lactic Acid, Sepsis 1.6 0.5 - 1.9 mmol/L   Troponin   Result Value Ref Range    Troponin, High Sensitivity 98 (H) 0 - 11 ng/L   Microscopic Urinalysis   Result Value Ref Range    WBC, UA NONE 0 - 5 /HPF    RBC, UA 1-3 0 - 2 /HPF    Bacteria, UA FEW (A) None Seen /HPF   PH, VENOUS   Result Value Ref Range    pH, Luis 7.42 7.35 - 7.45   POCT Glucose   Result Value Ref Range    Glucose 430 mg/dL   POCT Glucose   Result Value Ref Range    Glucose 408 mg/dL   POCT Glucose   Result Value Ref Range    Glucose 311 mg/dL   POCT Glucose   Result Value Ref Range    Glucose 229 mg/dL   POCT Glucose   Result Value Ref Range    Glucose 229 mg/dL   POCT Glucose   Result Value Ref Range    Meter Glucose 430 (H) 74 - 99 mg/dL   POCT Glucose   Result Value Ref Range    Meter Glucose 500 (H) 74 - 99 mg/dL   POCT Glucose   Result Value Ref Range    Meter Glucose 408 (H) 74 - 99 mg/dL   POCT Glucose   Result Value Ref Range    Meter Glucose 311 (H) 74 - 99 mg/dL   POCT Glucose   Result Value Ref Range    Meter Glucose 229 (H) 74 - 99 mg/dL   EKG 12 Lead   Result Value Ref Range    Ventricular Rate 116 BPM    Atrial Rate 116 BPM    P-R Interval 152 ms    QRS Duration 74 ms    Q-T Interval 324 ms    QTc Calculation (Bazett) 450 ms    P Axis 52 degrees    R Axis 61 degrees    T Axis 54 degrees       RADIOLOGY:  CT HEAD WO CONTRAST   Final Result   1. No acute intracranial abnormality. Specifically, no acute intracranial   hemorrhage or mass effect. 2.  Small areas of hypoattenuation in the bilateral basal ganglia may   represent prominent perivascular spaces or prior lacunar-type infarcts. Follow-up brain MRI may be helpful for further evaluation. CT ABDOMEN PELVIS WO CONTRAST Additional Contrast? None   Final Result   Findings suggesting pancreatitis, without evidence of pancreatic ductal   dilatation. Abdominal and pelvic ascites, likely representing reactive   changes. No bowel obstruction, free air, pedis itis or obstructive uropathy. Bilateral pleural effusions with superimposed consolidation, which may   suggest atelectasis and/or superimposed pneumonia. XR CHEST PORTABLE   Final Result   No acute findings.                ------------------------- NURSING NOTES AND VITALS REVIEWED ---------------------------  Date / Time Roomed:  3/18/2022 11:20 PM  ED Bed Assignment:  22/22    The nursing notes within the ED encounter and vital signs as below have been reviewed. Patient Vitals for the past 24 hrs:   BP Temp Pulse Resp SpO2 Height Weight   03/19/22 0400 (!) 152/95 -- 105 14 97 % -- --   03/19/22 0330 (!) 162/96 -- 103 10 99 % -- --   03/19/22 0315 (!) 167/100 -- 111 19 100 % -- --   03/19/22 0238 -- -- -- -- 98 % -- --   03/19/22 0237 -- -- -- -- 97 % -- --   03/19/22 0236 -- -- -- -- 96 % -- --   03/19/22 0235 -- -- -- -- 95 % -- --   03/19/22 0234 -- -- -- -- 96 % -- --   03/19/22 0233 -- -- -- -- 95 % -- --   03/19/22 0232 -- -- -- -- 97 % -- --   03/19/22 0231 -- -- -- -- 95 % -- --   03/19/22 0230 (!) 172/113 -- 106 9 94 % -- --   03/19/22 0200 (!) 156/102 -- 112 13 (!) 86 % -- --   03/19/22 0159 -- -- 110 10 90 % -- --   03/19/22 0158 -- -- 111 12 (!) 85 % -- --   03/19/22 0157 -- -- 111 14 (!) 86 % -- --   03/19/22 0156 -- -- 113 17 (!) 88 % -- --   03/19/22 0155 -- -- 111 11 (!) 86 % -- --   03/19/22 0154 -- -- 111 10 (!) 89 % -- --   03/19/22 0130 (!) 147/91 -- 108 12 92 % -- --   03/19/22 0115 (!) 161/104 -- 107 13 91 % -- --   03/19/22 0100 (!) 183/117 -- 108 17 97 % -- --   03/19/22 0030 (!) 188/120 -- 110 18 94 % -- --   03/19/22 0002 (!) 176/115 -- 111 19 94 % -- --   03/18/22 2325 (!) 191/130 98.7 °F (37.1 °C) 125 12 99 % 5' 3\" (1.6 m) 118 lb 4 oz (53.6 kg)       Oxygen Saturation Interpretation: Normal    ------------------------------------------ PROGRESS NOTES ------------------------------------------  Re-evaluation(s):  Time: 0040  Patients symptoms show no change  Repeat physical examination is not changed    Counseling:  I have spoken with the patient and discussed todays results, in addition to providing specific details for the plan of care and counseling regarding the diagnosis and prognosis.   Their questions are answered at this time and they are agreeable with the plan of admission.    --------------------------------- ADDITIONAL PROVIDER NOTES ---------------------------------  Consultations:  . Spoke with Dr. Tereso Kat. Discussed case. They will admit the patient. This patient's ED course included: a personal history and physicial examination, multiple bedside re-evaluations, IV medications, cardiac monitoring, continuous pulse oximetry and complex medical decision making and emergency management    This patient has remained hemodynamically stable during their ED course. Diagnosis:  1. Opiate overdose, undetermined intent, initial encounter ([address]    2. Heroin abuse ([address]    3. Diabetic ketoacidosis without coma associated with type 1 diabetes mellitus (HCC)    4. Elevated LFTs    5. Hyperglycemia    6. Hypokalemia    7. Elevated lipase    8. Lacunar infarction ([address]    9. Acute pancreatitis without infection or necrosis, unspecified pancreatitis type        Disposition:  Patient's disposition: Admit to CCU/ICU  Patient's condition is serious. Del Leblanc MD  Resident  03/19/22 4295  ATTENDING PROVIDER ATTESTATION:     I have personally performed and/or participated in the history, exam, medical decision making, and procedures and agree with all pertinent clinical information. I have also reviewed and agree with the past medical, family and social history unless otherwise noted. I have discussed this patient in detail with the resident, and provided the instruction and education regarding suspected overdose. My findings/Plan: I was the primary provider for patient. Patient was brought in by EMS. Family found patient unresponsive. His blood sugars have been elevated throughout the day. Patient did not want to come to the hospital.  His mother reported to me that he has had been having some vomiting and diarrhea at home. Patient does take insulin for his diabetes. Patient reportedly was unresponsive at home and mother did CPR on him.   EMS arrived and patient was given Narcan with improvement of symptoms. Patient denying any substance abuse he reportedly is on Suboxone. Patient on arrival here is awake alert to person and place but not to time. He is pale appearing. Heart lung exam normal abdomen is soft he has some mild tenderness upper abdomen. Patient able to move all extremities. There is no history of trauma or injury. Patient denying any substance abuse. Patient labs noted reviewed his blood sugar was elevated patient refused ABG. Patient beta hydroxybutyrate is elevated. Patient anion gap elevated. Patient was given IV fluids as well as started on insulin drip. Patient also medicated for his hypertension. Blood pressure was significant elevated on arrival.  Patient did undergo CTs of his head as well as his abdomen EKG noted reviewed. Patient medicated here and vital signs have improved. Patient made aware of findings as well as his mother. I did discuss drug screen and patient gave me approval to discuss results with her. Patient will be admitted to ICU we did speak to on-call internal medicine. We did speak to the intensivist as well. Please note that the withdrawal or failure to initiate urgent interventions for this patient would likely result in a life threatening deterioration or permanent disability. Accordingly this patient received 35 minutes of critical care time, excluding separately billable procedures.          Edgar Fulton MD  03/19/22 8154 ARLENE Mejia MD  03/19/22 5835

## 2022-03-19 NOTE — CONSULTS
Medical Intensive Care Unit     [address] [address]  Resident Consult Note    Date and time: 3/19/2022 7:53 AM  Patient's name:  Beto Mcmahan  Medical Record Number: [id number]  Patient's account/billing number: [de-identified]  Patient's YOB: 1990  Age: 32 y.o. Date of Admission: 3/18/2022 11:20 PM  Length of stay during current admission: 0    Primary Care Physician: Naty Noe DO  ICU Attending Physician: Dr. 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 History is taken from the patient, patient's mother, and EMR. According to the patient's mom, the patient has been having nausea, and vomiting few hours PTA. She states that the patient has not been feeling well and that his blood sugars has been elevated throughout the day. Patient does not remember anything that happened prior to be being brought to the hospital aside from endorsing vomiting. He was found by her mother in their basement unresponsive. She called the EMS and did chest compressions on him. EMS arrived and gave him 1 mg of Narcan and patient woke up immediately. He arrived in the ED hypertensive at 121/130 and tachycardic, saturating well on room air. Labs were remarkable for K 5.3, bicarb 20, BUN 33, creatinine 1.6, anion gap 22, lactic acid 4.6 -> 1.6, glucose was initially at 450, BHB 2.57, venous pH 7.42, liver enzymes were elevated as well as lipase. Serum drug screen was negative but urine drug screen was positive for opiates and fentanyl. Oriented the patient he is using only Xanax and did not know what he was taking was laced with fentanyl.   CBC was significant for white counts of 20.9 and hemoglobin of 10.9. There were concerns for pancreatitis as his lipase were elevated and CT scan shows findings suggestive of pancreatitis. There were also concerns for DKA, patient was started on DKA protocol while in the ED. He was also given 1 dose of ceftriaxone. Patient was then transferred to the ICU for further evaluation and management. Patient arrived in the unit on insulin drip. He was awake alert and oriented x3 with no active complaints aside from chest pain. He does not remember anything that happened prior to coming in the hospital.     CURRENT VENTILATION STATUS:   [] Ventilator  [] BIPAP  [] Nasal Cannula [x] Room Air      SECRETIONS   Amount:  [] Small [] Moderate  [] Large [] None    Color:     [] White [] Colored  [] Bloody    SEDATION:  RAAS Score:  [] Propofol gtt  [] Versed gtt  [] Fentanyl gtt  [x] No Sedation    PARALYZED:  [x] No    [] Yes    VASOPRESSORS:  [x] No    [] Yes    If yes -   [] Levophed       [] Dopamine     [] Vasopressin       [] Dobutamine  [] Phenylephrine         [] Epinephrine    CENTRAL LINES:     [x] No   [] Yes   (Date of Insertion:   )           If yes -     [] Right IJ     [] Left IJ [] Right Femoral [] Left Femoral                   [] Right Subclavian [] Left Subclavian     DRAPER'S CATHETER:   [x] No   [] Yes  (Date of Insertion:   )     URINE OUTPUT:            [x] Good   [] Low              [] Anuric    REVIEW OF SYSTEMS:    · Constitutional: No fever, no chills, no change in weight; good appetite  · HEENT: No blurred vision, no ear problems, no sore throat, no rhinorrhea. · Respiratory: No cough, no sputum production, no pleuritic chest pain, no shortness of breath  · Cardiology: No angina, no dyspnea on exertion, no paroxysmal nocturnal dyspnea, no orthopnea, no palpitation, no leg swelling. · Gastroenterology: No dysphagia, no reflux; no abdominal pain, no nausea; + vomiting; no constipation; +diarrhea.  No hematochezia · Genitourinary: No dysuria, no frequency, hesitancy; no hematuria  · Musculoskeletal: no joint pain, no myalgia, no change in range of movement  · Neurology: no focal weakness in extremities, no slurred speech, no double vision, no tingling or numbness sensation  · Endocrinology: no temperature intolerance, no polyphagia, polydipsia or polyuria  · Hematology: no increased bleeding, no bruising, no lymphadenopathy  · Skin: no skin changes noticed by patient  · Psychology: no depressed mood, no suicidal ideation    OBJECTIVE:     VITAL SIGNS:  BP (!) 163/100   Pulse 101   Temp 98.6 °F (37 °C) (Temporal)   Resp 14   Ht 5' 3\" (1.6 m)   Wt 118 lb 4 oz (53.6 kg)   SpO2 90%   BMI 20.95 kg/m²   Tmax over 24 hours:  Temp (24hrs), Av.7 °F (37.1 °C), Min:98.6 °F (37 °C), Max:98.7 °F (37.1 °C)      Patient Vitals for the past 6 hrs:   BP Temp Temp src Pulse Resp SpO2   22 0732 (!) 163/100 -- -- 101 -- --   22 0632 (!) 161/97 -- -- 101 14 90 %   22 0617 (!) 162/103 -- -- 108 24 96 %   22 0612 -- -- -- 106 19 96 %   22 0602 -- 98.6 °F (37 °C) Temporal -- -- --   22 0500 (!) 150/94 -- -- 104 13 97 %   22 0430 (!) 153/100 -- -- 105 23 98 %   22 0400 (!) 152/95 -- -- 105 14 97 %   22 0330 (!) 162/96 -- -- 103 10 99 %   22 0315 (!) 167/100 -- -- 111 19 100 %   22 0238 -- -- -- -- -- 98 %   22 0237 -- -- -- -- -- 97 %   22 0236 -- -- -- -- -- 96 %   22 -- -- -- -- -- 95 %   22 -- -- -- -- -- 96 %   22 -- -- -- -- -- 95 %   22 -- -- -- -- -- 97 %   221 -- -- -- -- -- 95 %   220 (!) 172/113 -- -- 106 9 94 %   22 0200 (!) 156/102 -- -- 112 13 (!) 86 %   22 -- -- -- 110 10 90 %   22 -- -- -- 111 12 (!) 85 %   22 -- -- -- 111 14 (!) 86 %   22 -- -- -- 113 17 (!) 88 %   22 -- -- -- 111 11 (!) 86 %   22 -- -- -- 111 10 (!) 89 %         Intake/Output Summary (Last 24 hours) at 3/19/2022 0753  Last data filed at 3/19/2022 0617  Gross per 24 hour   Intake 154 ml   Output 400 ml   Net -246 ml     Wt Readings from Last 2 Encounters:   03/18/22 118 lb 4 oz (53.6 kg)   01/20/22 118 lb 6.4 oz (53.7 kg)     Body mass index is 20.95 kg/m².       PHYSICAL EXAMINATION:  General appearance - alert, well appearing, and in no distress, oriented to person, place, and time and dehydrated  Mental status - alert, oriented to person, place, and time  Eyes - pupils equal and reactive, extraocular eye movements intact  Ears - hearing grossly normal bilaterally  Nose - normal and patent, no erythema, discharge or polyps  Mouth - dental hygiene poor and dry oral mucosa  Neck - supple, no significant adenopathy  Chest - clear to auscultation, no wheezes, rales or rhonchi, symmetric air entry  Heart - S1 and S2 normal, tachycardic, normal rhyhtm  Abdomen - soft, nontender, nondistended, no masses or organomegaly  Neurological - alert, oriented, normal speech, no focal findings or movement disorder noted}  Extremities - peripheral pulses normal, no pedal edema, no clubbing or cyanosis  Skin - normal coloration and turgor, no rashes, no suspicious skin lesions noted      Any additional physical findings:    MEDICATIONS:  Scheduled Meds:   sodium chloride flush  5-40 mL IntraVENous 2 times per day    enoxaparin  40 mg SubCUTAneous Daily    nicotine  1 patch TransDERmal Daily    melatonin  3 mg Oral Nightly    aspirin  81 mg Oral Daily    cloNIDine  0.3 mg Oral BID    cyanocobalamin  1,000 mcg Oral Daily    fenofibrate  135 mg Oral Daily    metoprolol succinate  50 mg Oral BID     Continuous Infusions:   insulin 0.065 Units/kg/hr (03/19/22 0725)    sodium chloride      dextrose      sodium chloride 100 mL/hr at 03/19/22 0317     PRN Meds:   sodium chloride flush, 5-40 mL, PRN  sodium chloride, 25 mL, PRN  ondansetron, 4 mg, Q8H PRN Or  ondansetron, 4 mg, Q6H PRN  polyethylene glycol, 17 g, Daily PRN  acetaminophen, 650 mg, Q6H PRN   Or  acetaminophen, 650 mg, Q6H PRN  glucose, 15 g, PRN  dextrose, 12.5 g, PRN  glucagon (rDNA), 1 mg, PRN  dextrose, 100 mL/hr, PRN  buprenorphine, 2 mg, PRN  diphenhydrAMINE, 25 mg, Nightly PRN  labetalol, 10 mg, Q6H PRN        VENT SETTINGS (Comprehensive) (if applicable):  Vent Information  SpO2: 90 %  Additional Respiratory  Assessments  Pulse: 101  Resp: 14  SpO2: 90 %    ABGs:   No results for input(s): PH, PCO2, PO2, HCO3, BE, O2SAT in the last 72 hours. Laboratory findings:  Complete Blood Count:   Recent Labs     03/18/22 2334   WBC 20.9*   HGB 10.9*   HCT 32.0*           Last 3 Blood Glucose:   Recent Labs     03/19/22 0300 03/19/22 0357 03/19/22 0452   GLUCOSE 408 311 229  229        PT/INR:    Lab Results   Component Value Date    PROTIME 10.9 09/14/2015    INR 1.0 09/14/2015     PTT:  No results found for: APTT, PTT    Comprehensive Metabolic Profile:   Recent Labs     03/18/22 2334 03/19/22  0115 03/19/22 0300 03/19/22 0357 03/19/22 0452     --   --   --   --    K 5.3*  --   --   --   --    CL 96*  --   --   --   --    CO2 20*  --   --   --   --    BUN 33*  --   --   --   --    CREATININE 1.6*  --   --   --   --    GLUCOSE 450*   < > 408 311 229  229   CALCIUM 9.0  --   --   --   --    PROT 7.2  --   --   --   --    LABALBU 3.6  --   --   --   --    BILITOT 0.2  --   --   --   --    ALKPHOS 179*  --   --   --   --    *  --   --   --   --    *  --   --   --   --     < > = values in this interval not displayed. Magnesium:   Lab Results   Component Value Date    MG 1.9 11/18/2021     Phosphorus:   Lab Results   Component Value Date    PHOS 3.7 11/18/2021     Ionized Calcium:   Lab Results   Component Value Date    CAION 1.30 01/21/2022      Troponin: No results for input(s): TROPONINI in the last 72 hours.       Radiology/Imaging:   CT HEAD WO CONTRAST   Final Result   1. No acute intracranial abnormality. Specifically, no acute intracranial   hemorrhage or mass effect. 2.  Small areas of hypoattenuation in the bilateral basal ganglia may   represent prominent perivascular spaces or prior lacunar-type infarcts. Follow-up brain MRI may be helpful for further evaluation. CT ABDOMEN PELVIS WO CONTRAST Additional Contrast? None   Final Result   Findings suggesting pancreatitis, without evidence of pancreatic ductal   dilatation. Abdominal and pelvic ascites, likely representing reactive   changes. No bowel obstruction, free air, pedis itis or obstructive uropathy. Bilateral pleural effusions with superimposed consolidation, which may   suggest atelectasis and/or superimposed pneumonia. XR CHEST PORTABLE   Final Result   No acute findings. ASSESSMENT  And PLAN:        Neurologic  AMS 2/2 drug overdose vs hyperglycemia, ? DKA - resolved  - pt found unresponsive, improved on 1mg Narcan  - found to be hyperglycemic on the field at ~700, then 450 in the ED  - started on DKA protocol in the ED  - arrived in the unit AAOx3  Plan:  - Monitor mental status    Cardiovascular  Hypertension  -Home meds: Clonidine 0.3 mg twice daily, lisinopril 20 mg daily, metoprolol 1 mg twice daily  -Currently hypertensive  Plan:  -Continue clonidine and metoprolol  -Hold lisinopril due to ALEJANDRO  -PRN labetalol on board  - Monitor BP    Elevated troponin 2/2 CPR 2/2 ?cardiac arrest  -troponin 94 -> 98  -Complains of chest pain likely 2/2 CPR  - EKG consistent when sinus tachycardia  Plan:  - rpt troponin in AM  - Continue to monitor for chest pain    Gastrointestinal  Pancreatitis  - lipase elevated at 907  - CT A/P ahowed findings suggestive of pancreatitis  - no complaints of abdominal pain at the time examination  Plan:  - Continue IVF: on NS 100cc/h  - Pain control as needed  - Obtain triglycerides    Elevated liver enzymes  -   - ,  -> likely 2/2 statin use?   Plan:  - Obtain GGT  - Monitor LFTs daily  - Hold statin for now    Endocrine  Hyperglycemia in the setting of Type 1 diabetes mellitus  -Blood glucose 450 on presentation   -Na 138, K 5.3, CO2 20 on presentation  -HbA1c 11.1% on 11/2021  -Anion gap 22 on presentation  -unlikely DKA  Plan:  -NPO for now  -Hold home insulin and DM meds  -Consult diabetic educator  -Hypoglycemia protocol  -Check A1c  -Bridge to subcutaneous insulin once gap is closed twice  -Low-carb diet when anion gap closes  -Begin subcutaneous insulin once pt is eating and gap closed  -Continue IV insulin to bridge; stop IV insulin in 2 to 4 hours after SC insulin is given  -Discontinue IV insulin if PO intake tolerated and anion gap closed    History of type 1 diabetes  -Home meds: Degludec 55 units nightly and sliding scale  Plan:  -Hold diabetic meds for now due to patient being on insulin drip    Hx of hyperlipidemia  -Home meds: On Lipitor 80 mg daily and fenofibrate 145 mg daily  Plan:  -Hold Lipitor for now due to elevated liver enzymes  -Continue fenofibrate   - Obtain lipid panel    Genitourinary/Renal  ALEJANDRO likely prerenal 2/2 dehydration in the setting of hyperglycemia  - sCr 1.6 (baseline 0.8-0.9)  Plan:  - Obtain urine studies  - Monitor BMP daily  - Continue IVF    High anion gap metabolic acidosis 2/2 lactic acidosis, ketosis, uremia  pH 7.42 (taken after 2 hrs from initial labs)   CO2 20 on presentation  Pt denies methanol, ethanol, ASA ingestion  BUN/Cr 33/1.6  Blood glucose 450; no DKA  Lactic acid 4.6 -> 1.6  Delta-delta 4.1 -> HAGMA + metabolic alkalosis  Plan:  -IVF with NS at 100 cc/hr  -Monitor HCO3  -Check ABG  -Obtain BMP  -Obtain urine studies, check for osmolal gap    Hyperkalemia 2/2 acidosis - improved  K 5.3 -> 4.1, improved with fluids  Plan:  - Monitor BMP daily, treat as needed    Hematology/Oncology  Leukocytosis 2/2 reactive versus infectious  WBC 20.9 -> 17.1  Pt was down, concern for aspiration? No symptoms of infection, given 1 dose of ceftriaxone in the ED  Likely reactive  Plan:  Obtain procal  Monitor CBC daily  Monitor off antibiotics for now    Acute on chronic normocytic anemia  Hgb 10.9 on admission -> 8.5 likely 2/2 fluids? Baseline 10-12  No source of bleeding found  Iron studies obtain in Jan 2022 suggestive of mixed JOSE and ACD  Plan:  Obtain FOBT  Obtain B12, folate  Monitor CBC daily     Psychiatric  Polysubstance abuse  - claims to only use Xanax  - has hx of opiate dependence, on suboxone (gets from Dr. Juan Manuel Bowles)  - UDS + for opiate and fentanyl, denies use of fentanyl  Plan:  - On COWS protocol    WEAN PER PROTOCOL:  [] No   [] Yes  [x] N/A    ICU PROPHYLAXIS:  Stress ulcer:  [] PPI Agent  [] [id number] [] Sucralfate  [] Other:  VTE:   [x] Enoxaparin  [] Unfract. Heparin Subcut  [] EPC Cuffs    NUTRITION:  [x] NPO [] Tube Feeding (Specify: ) [] TPN  [] PO (Diet: Diet NPO)    INSULIN DRIP:   [] No   [x] Yes    CONSULTATION NEEDED:   [x] No   [] Yes    FAMILY UPDATED:    [] No   [x] Yes    TRANSFER OUT OF ICU:   [x] No   [] Yes    Rey Edmondson MD, PGY-1  Attending Physician: Dr. Yang Meyer  3/19/2022, 7:53 [address]  Department of Pulmonary, Critical Care and Sleep Medicine  [address] [facility]  Department of Internal Medicine      During multidisciplinary team rounds Faith Carranza is a 32 y.o. male was seen, examined and discussed. This is confirmation that I have personally seen and examined the patient and that the key elements of the encounter were performed by me (> 85 % time). The medications & laboratory data was discussed and adjusted where necessary. The radiographic images were reviewed or with radiologist or consultant if felt dis-concordant with the exam or history. The above findings were corroborated, plans confirmed and changes made if needed.      Family is updated at the bedside as available. Key issues of the case were discussed among consultants. Critical Care time is documented if appropriate.       Kinza Oliveira DO

## 2022-03-20 VITALS
SYSTOLIC BLOOD PRESSURE: 198 MMHG | OXYGEN SATURATION: 96 % | WEIGHT: 119.49 LBS | BODY MASS INDEX: 21.17 KG/M2 | HEIGHT: 63 IN | DIASTOLIC BLOOD PRESSURE: 123 MMHG | RESPIRATION RATE: 18 BRPM | TEMPERATURE: 98.7 F | HEART RATE: 88 BPM

## 2022-03-20 LAB
ACINETOBACTER CALCOAC BAUMANNII COMPLEX BY PCR: NOT DETECTED
ALBUMIN SERPL-MCNC: 2.9 G/DL (ref 3.5–5.2)
ALP BLD-CCNC: 142 U/L (ref 40–129)
ALT SERPL-CCNC: 68 U/L (ref 0–40)
ANION GAP SERPL CALCULATED.3IONS-SCNC: 11 MMOL/L (ref 7–16)
AST SERPL-CCNC: 57 U/L (ref 0–39)
BACTEROIDES FRAGILIS BY PCR: NOT DETECTED
BASOPHILS ABSOLUTE: 0.06 E9/L (ref 0–0.2)
BASOPHILS RELATIVE PERCENT: 0.5 % (ref 0–2)
BILIRUB SERPL-MCNC: <0.2 MG/DL (ref 0–1.2)
BOTTLE TYPE: ABNORMAL
BUN BLDV-MCNC: 14 MG/DL (ref 6–20)
CALCIUM SERPL-MCNC: 8.7 MG/DL (ref 8.6–10.2)
CANDIDA ALBICANS BY PCR: NOT DETECTED
CANDIDA AURIS BY PCR: NOT DETECTED
CANDIDA GLABRATA BY PCR: NOT DETECTED
CANDIDA KRUSEI BY PCR: NOT DETECTED
CANDIDA PARAPSILOSIS BY PCR: NOT DETECTED
CANDIDA TROPICALIS BY PCR: NOT DETECTED
CHLORIDE BLD-SCNC: 108 MMOL/L (ref 98–107)
CO2: 26 MMOL/L (ref 22–29)
CREAT SERPL-MCNC: 1.2 MG/DL (ref 0.7–1.2)
CRYPTOCOCCUS NEOFORMANS/GATTII BY PCR: NOT DETECTED
ENTEROBACTER CLOACAE COMPLEX BY PCR: NOT DETECTED
ENTEROBACTERALES BY PCR: NOT DETECTED
ENTEROCOCCUS FAECALIS BY PCR: NOT DETECTED
ENTEROCOCCUS FAECIUM BY PCR: NOT DETECTED
EOSINOPHILS ABSOLUTE: 0.22 E9/L (ref 0.05–0.5)
EOSINOPHILS RELATIVE PERCENT: 1.7 % (ref 0–6)
ESCHERICHIA COLI BY PCR: NOT DETECTED
GFR AFRICAN AMERICAN: >60
GFR NON-AFRICAN AMERICAN: >60 ML/MIN/1.73
GLUCOSE BLD-MCNC: 79 MG/DL (ref 74–99)
HAEMOPHILUS INFLUENZAE BY PCR: NOT DETECTED
HCT VFR BLD CALC: 28.1 % (ref 37–54)
HEMOGLOBIN: 9.4 G/DL (ref 12.5–16.5)
IMMATURE GRANULOCYTES #: 0.08 E9/L
IMMATURE GRANULOCYTES %: 0.6 % (ref 0–5)
KLEBSIELLA AEROGENES BY PCR: NOT DETECTED
KLEBSIELLA OXYTOCA BY PCR: NOT DETECTED
KLEBSIELLA PNEUMONIAE GROUP BY PCR: NOT DETECTED
LISTERIA MONOCYTOGENES BY PCR: NOT DETECTED
LYMPHOCYTES ABSOLUTE: 3.19 E9/L (ref 1.5–4)
LYMPHOCYTES RELATIVE PERCENT: 24.4 % (ref 20–42)
MAGNESIUM: 2.3 MG/DL (ref 1.6–2.6)
MCH RBC QN AUTO: 29.6 PG (ref 26–35)
MCHC RBC AUTO-ENTMCNC: 33.5 % (ref 32–34.5)
MCV RBC AUTO: 88.4 FL (ref 80–99.9)
METER GLUCOSE: 119 MG/DL (ref 74–99)
METER GLUCOSE: 122 MG/DL (ref 74–99)
METER GLUCOSE: 142 MG/DL (ref 74–99)
METER GLUCOSE: 40 MG/DL (ref 74–99)
METER GLUCOSE: 69 MG/DL (ref 74–99)
METER GLUCOSE: 91 MG/DL (ref 74–99)
METER GLUCOSE: 95 MG/DL (ref 74–99)
MONOCYTES ABSOLUTE: 0.75 E9/L (ref 0.1–0.95)
MONOCYTES RELATIVE PERCENT: 5.7 % (ref 2–12)
NEISSERIA MENINGITIDIS BY PCR: NOT DETECTED
NEUTROPHILS ABSOLUTE: 8.8 E9/L (ref 1.8–7.3)
NEUTROPHILS RELATIVE PERCENT: 67.1 % (ref 43–80)
ORDER NUMBER: ABNORMAL
OSMOLALITY: 292 MOSM/KG (ref 285–310)
PDW BLD-RTO: 14.2 FL (ref 11.5–15)
PHOSPHORUS: 2.8 MG/DL (ref 2.5–4.5)
PLATELET # BLD: 320 E9/L (ref 130–450)
PMV BLD AUTO: 10.1 FL (ref 7–12)
POTASSIUM SERPL-SCNC: 3.7 MMOL/L (ref 3.5–5)
PROTEUS SPECIES BY PCR: NOT DETECTED
PSEUDOMONAS AERUGINOSA BY PCR: NOT DETECTED
RBC # BLD: 3.18 E12/L (ref 3.8–5.8)
SALMONELLA SPECIES BY PCR: NOT DETECTED
SERRATIA MARCESCENS BY PCR: NOT DETECTED
SODIUM BLD-SCNC: 145 MMOL/L (ref 132–146)
SOURCE OF BLOOD CULTURE: ABNORMAL
STAPHYLOCOCCUS AUREUS BY PCR: NOT DETECTED
STAPHYLOCOCCUS EPIDERMIDIS BY PCR: NOT DETECTED
STAPHYLOCOCCUS LUGDUNENSIS BY PCR: NOT DETECTED
STAPHYLOCOCCUS SPECIES BY PCR: DETECTED
STENOTROPHOMONAS MALTOPHILIA BY PCR: NOT DETECTED
STREPTOCOCCUS AGALACTIAE BY PCR: NOT DETECTED
STREPTOCOCCUS PNEUMONIAE BY PCR: NOT DETECTED
STREPTOCOCCUS PYOGENES  BY PCR: NOT DETECTED
STREPTOCOCCUS SPECIES BY PCR: NOT DETECTED
TOTAL PROTEIN: 6 G/DL (ref 6.4–8.3)
WBC # BLD: 13.1 E9/L (ref 4.5–11.5)

## 2022-03-20 PROCEDURE — 80053 COMPREHEN METABOLIC PANEL: CPT

## 2022-03-20 PROCEDURE — 2580000003 HC RX 258: Performed by: INTERNAL MEDICINE

## 2022-03-20 PROCEDURE — 99291 CRITICAL CARE FIRST HOUR: CPT | Performed by: INTERNAL MEDICINE

## 2022-03-20 PROCEDURE — 2580000003 HC RX 258

## 2022-03-20 PROCEDURE — 83735 ASSAY OF MAGNESIUM: CPT

## 2022-03-20 PROCEDURE — 6360000002 HC RX W HCPCS: Performed by: INTERNAL MEDICINE

## 2022-03-20 PROCEDURE — 6370000000 HC RX 637 (ALT 250 FOR IP): Performed by: INTERNAL MEDICINE

## 2022-03-20 PROCEDURE — 82962 GLUCOSE BLOOD TEST: CPT

## 2022-03-20 PROCEDURE — 84100 ASSAY OF PHOSPHORUS: CPT

## 2022-03-20 PROCEDURE — 85025 COMPLETE CBC W/AUTO DIFF WBC: CPT

## 2022-03-20 PROCEDURE — 6360000002 HC RX W HCPCS

## 2022-03-20 PROCEDURE — 83930 ASSAY OF BLOOD OSMOLALITY: CPT

## 2022-03-20 PROCEDURE — 6370000000 HC RX 637 (ALT 250 FOR IP)

## 2022-03-20 PROCEDURE — 2500000003 HC RX 250 WO HCPCS

## 2022-03-20 RX ORDER — AMLODIPINE BESYLATE 5 MG/1
5 TABLET ORAL DAILY
Qty: 30 TABLET | Refills: 1 | Status: SHIPPED | OUTPATIENT
Start: 2022-03-21 | End: 2022-04-20

## 2022-03-20 RX ORDER — DEXTROSE MONOHYDRATE 50 MG/ML
INJECTION, SOLUTION INTRAVENOUS CONTINUOUS
Status: DISCONTINUED | OUTPATIENT
Start: 2022-03-20 | End: 2022-03-20 | Stop reason: HOSPADM

## 2022-03-20 RX ORDER — AMLODIPINE BESYLATE 5 MG/1
5 TABLET ORAL DAILY
Status: DISCONTINUED | OUTPATIENT
Start: 2022-03-20 | End: 2022-03-20 | Stop reason: HOSPADM

## 2022-03-20 RX ORDER — INSULIN GLARGINE-YFGN 100 [IU]/ML
15 INJECTION, SOLUTION SUBCUTANEOUS DAILY
Status: DISCONTINUED | OUTPATIENT
Start: 2022-03-20 | End: 2022-03-20 | Stop reason: HOSPADM

## 2022-03-20 RX ORDER — LISINOPRIL 20 MG/1
20 TABLET ORAL DAILY
Status: DISCONTINUED | OUTPATIENT
Start: 2022-03-20 | End: 2022-03-20 | Stop reason: HOSPADM

## 2022-03-20 RX ADMIN — AMPICILLIN SODIUM AND SULBACTAM SODIUM 3000 MG: 2; 1 INJECTION, POWDER, FOR SOLUTION INTRAMUSCULAR; INTRAVENOUS at 00:18

## 2022-03-20 RX ADMIN — Medication 12.5 G: at 06:57

## 2022-03-20 RX ADMIN — METOPROLOL SUCCINATE 50 MG: 50 TABLET, EXTENDED RELEASE ORAL at 09:05

## 2022-03-20 RX ADMIN — CLONIDINE HYDROCHLORIDE 0.3 MG: 0.1 TABLET ORAL at 09:04

## 2022-03-20 RX ADMIN — AMPICILLIN SODIUM AND SULBACTAM SODIUM 3000 MG: 2; 1 INJECTION, POWDER, FOR SOLUTION INTRAMUSCULAR; INTRAVENOUS at 06:39

## 2022-03-20 RX ADMIN — DEXTROSE MONOHYDRATE: 50 INJECTION, SOLUTION INTRAVENOUS at 09:17

## 2022-03-20 RX ADMIN — ASPIRIN 81 MG 81 MG: 81 TABLET ORAL at 09:05

## 2022-03-20 RX ADMIN — AMLODIPINE BESYLATE 5 MG: 5 TABLET ORAL at 09:05

## 2022-03-20 RX ADMIN — LISINOPRIL 20 MG: 20 TABLET ORAL at 09:24

## 2022-03-20 RX ADMIN — CYANOCOBALAMIN TAB 1000 MCG 1000 MCG: 1000 TAB at 09:06

## 2022-03-20 RX ADMIN — INSULIN LISPRO 1 UNITS: 100 INJECTION, SOLUTION INTRAVENOUS; SUBCUTANEOUS at 09:16

## 2022-03-20 RX ADMIN — Medication 12.5 G: at 02:04

## 2022-03-20 RX ADMIN — Medication 10 ML: at 09:04

## 2022-03-20 ASSESSMENT — PAIN SCALES - GENERAL
PAINLEVEL_OUTOF10: 0

## 2022-03-20 NOTE — PLAN OF CARE
Problem: Falls - Risk of:  Goal: Will remain free from falls  Description: Will remain free from falls  Outcome: Met This Shift  Goal: Absence of physical injury  Description: Absence of physical injury  Outcome: Met This Shift     Problem: Discharge Planning:  Goal: Discharged to appropriate level of care  Description: Discharged to appropriate level of care  Outcome: Ongoing     Problem: Serum Glucose Level - Abnormal:  Goal: Ability to maintain appropriate glucose levels will improve  Description: Ability to maintain appropriate glucose levels will improve  Outcome: Met This Shift     Problem: Sensory Perception - Impaired:  Goal: Ability to maintain a stable neurologic state will improve  Description: Ability to maintain a stable neurologic state will improve  Outcome: Met This Shift     Problem: Skin Integrity:  Goal: Will show no infection signs and symptoms  Description: Will show no infection signs and symptoms  Outcome: Met This Shift  Goal: Absence of new skin breakdown  Description: Absence of new skin breakdown  Outcome: Met This Shift

## 2022-03-20 NOTE — PLAN OF CARE
Mr. Samia Steiner is leaving the hospital [address] I did discuss with him at length my concern for his labile blood sugars, and positive blood cultures as we are awaiting for speciation. At this time the patient is very adamant that he is leaving the hospital.  I did discuss with him the concern for the risk of death. And he wishes to leave. I extensively counseled him that should he develop fevers, chills, or worsening illness that he should return to his nearest emergency room. The patient verbalized understanding. He is alert oriented and fully competent to make his own medical decisions.   Nicho Thomas, DO

## 2022-03-20 NOTE — PLAN OF CARE
Problem: Falls - Risk of:  Goal: Will remain free from falls  Description: Will remain free from falls  3/20/2022 0938 by Rajani Blackburn RN  Outcome: Met This Shift  3/19/2022 2126 by Omkar Townsend RN  Outcome: Met This Shift  Goal: Absence of physical injury  Description: Absence of physical injury  3/20/2022 0938 by Rajani Blackburn RN  Outcome: Met This Shift  3/19/2022 2126 by Omkar Townsend RN  Outcome: Met This Shift     Problem: Serum Glucose Level - Abnormal:  Goal: Ability to maintain appropriate glucose levels will improve  Description: Ability to maintain appropriate glucose levels will improve  3/20/2022 0938 by Rajani Blackburn RN  Outcome: Met This Shift  3/19/2022 2126 by Omkar Townsend RN  Outcome: Met This Shift     Problem: Sensory Perception - Impaired:  Goal: Ability to maintain a stable neurologic state will improve  Description: Ability to maintain a stable neurologic state will improve  3/19/2022 2126 by Omkar Townsend RN  Outcome: Met This Shift     Problem: Skin Integrity:  Goal: Will show no infection signs and symptoms  Description: Will show no infection signs and symptoms  3/19/2022 2126 by Omkar Townsend RN  Outcome: Met This Shift  Goal: Absence of new skin breakdown  Description: Absence of new skin breakdown  3/19/2022 2126 by Omkar Townsend RN  Outcome: Met This Shift

## 2022-03-20 NOTE — PROGRESS NOTES
Pt's b/s was 60 he drank OJ to bring it up, pt denied any symptoms if hypoglycemia will recheck b/s in 30 mins

## 2022-03-20 NOTE — DISCHARGE SUMMARY
Hospitalist Discharge Summary    Patient ID: Triny Mckeon   Patient : 1990  Patient's PCP: Colleen Roa DO    Admit Date: 3/18/2022   Admitting Physician: Jose Londono DO    Discharge Date:  3/20/2022   Discharge Physician: DARLYN Terry NP   Discharge Condition: Unknown   Discharge Disposition: [facility] course in brief:  (Please refer to daily progress notes for a comprehensive review of the hospitalization by requesting medical records)    Patient admitted for drug OD.     Patient presented to the emergency department after a suspected drug overdose.  EMS was called and found mother doing CPR on the patient. Nicola Weiss was given Narcan by EMS and mentation improved significantly. Chasidy Nelson has a history of opioid dependence and is currently on Suboxone.  When EMS arrived he had agonal breathing and pinpoint pupils. Nicola Weiss is also type I diabetic.  Mother reports his glucose has been high. On arrival he was suspected to be in DKA with an ALEJANDRO and elevated LFTs. He was initiated on an insulin drip and admitted to the ICU. Insulin drip is now off. Endocrinology was consulted given labile BGL. Infectious diseases been consulted for staph bacteremia given history of IV drug use. When going to assess him I was notified by nursing that he left AMA. The ICU team did have an opportunity to discuss the risks of doing so prior to him leaving per their note.       Consults:   IP CONSULT TO INTERNAL MEDICINE  IP CONSULT TO CRITICAL CARE  IP CONSULT TO ENDOCRINOLOGY  IP CONSULT TO DIABETES EDUCATOR  PHARMACY TO DOSE VANCOMYCIN  IP CONSULT TO CRITICAL CARE    Discharge Diagnoses:  Drug overdose  Pancreatitis  Staph bacteremia  Lactic acidosis  HAGMA  Elevated troponin  Leukocytosis  Suspected aspiration pneumonia  Opioid dependence-Suboxone  DM 1  Hyperlipidemia  Anemia  Polysubstance abuse  HTN, uncontrolled  Asthma  Bipolar disorder    Future Appointments   Date Time Provider Department Center   3/23/2022  9:30 AM [facility] NM DOSE RM SEYZ [facility]   3/23/2022 12:30 PM [facility] NM ECAM SE[facility]   [facility]20/2022 10:00 [facility]     Activity: activity as tolerated    Significant labs:  CBC:   Recent Labs     03/18/22  2334 03/19/22  0811 03/20/22  0406   WBC 20.9* 17.1* 13.1*   RBC 3.65* 2.86* 3.18*   HGB 10.9* 8.5* 9.4*   HCT 32.0* 24.7* 28.1*   MCV 87.7 86.4 88.4   RDW 13.9 14.1 14.2    295 320     BMP:   Recent Labs     03/18/22  2334 03/19/22  0811 03/20/22  0406    142 145   K 5.3* 4.1 3.7   CL 96* 105 108*   CO2 20* 22 26   BUN 33* 26* 14   CREATININE 1.6* 1.4* 1.2   MG  --  1.7 2.3   PHOS  --  3.1 2.8     LFT:  Recent Labs     03/18/22 2334 03/19/22  0811 03/20/22  0406   PROT 7.2 5.6* 6.0*   ALKPHOS 179* 133* 142*   * 83* 68*   * 88* 57*   BILITOT 0.2 <0.2 <0.2   LIPASE 907*  --   --      PT/INR: No results for input(s): INR, APTT in the last 72 hours. BNP: No results for input(s): BNP in the last 72 hours.   Hgb A1C:   Lab Results   Component Value Date    LABA1C 11.7 (H) 03/19/2022     Folate and B12:   Lab Results   Component Value Date    BAPAAKRM59 320 03/19/2022   ,   Lab Results   Component Value Date    FOLATE 10.6 03/19/2022     Thyroid Studies:   Lab Results   Component Value Date    TSH 1.890 01/21/2022    M8ZWPRO 151.50 09/16/2017    D5YNBGQ 5.5 09/16/2017       Urinalysis:    Lab Results   Component Value Date    NITRU Negative 03/18/2022    WBCUA NONE 03/18/2022    WBCUA NONE 04/21/2012    BACTERIA FEW 03/18/2022    RBCUA 1-3 03/18/2022    RBCUA NONE 03/20/2014    BLOODU MODERATE 03/18/2022    SPECGRAV 1.015 03/18/2022    GLUCOSEU >=1000 03/18/2022    GLUCOSEU >=1000 04/21/2012       Imaging:  CT ABDOMEN PELVIS WO CONTRAST Additional Contrast? None    Result Date: 3/19/2022  EXAMINATION: CT OF THE ABDOMEN AND PELVIS WITHOUT CONTRAST 3/19/2022 2:42 am TECHNIQUE: CT of the abdomen and pelvis was performed without the administration of intravenous contrast. Multiplanar reformatted images are provided for review. Dose modulation, iterative reconstruction, and/or weight based adjustment of the mA/kV was utilized to reduce the radiation dose to as low as reasonably achievable. COMPARISON: October 28, 2021. HISTORY: ORDERING SYSTEM PROVIDED HISTORY: drug overdose, pancreatitis TECHNOLOGIST PROVIDED HISTORY: Reason for exam:->drug overdose, pancreatitis Additional Contrast?->None Decision Support Exception - unselect if not a suspected or confirmed emergency medical condition->Emergency Medical Condition (MA) What reading provider will be dictating this exam?->CRC FINDINGS: Lower Chest: Bibasilar atelectasis, with small bilateral pleural effusions, greater on the left than right. There is no pericardial effusion. Organs: Liver: The liver is within normal limits of a nonenhanced exam.  No evidence of a mass. Perihepatic ascites is noted. Calcified gallstones, without evidence of acute cholecystitis. No biliary ductal dilatation. The spleen is normal in appearance within the limitations of a nonenhanced exam.  Perisplenic fluid is noted. There is evidence of peripancreatic fluid. No ductal dilatation 1. The pancreas is mildly enlarged and edematous in appearance. The adrenal glands are normal.  The kidneys are within normal limits of a nonenhanced exam.  Punctate nonobstructing calculus within the left lower renal pole. No obstructive uropathy. No perinephric fat stranding. GI/Bowel: No bowel obstruction or free air. The appendix is normal. Pelvis: Bladder wall thickening, with subtle perivesical fat stranding. Small amount of free fluid within the dependent portions of the pelvis. The prostate gland is within normal limits of a nonenhanced exam. Peritoneum/Retroperitoneum: Small amounts of free fluid noted within the abdominal and pelvic peritoneum. No lymphadenopathy or mass.  Bones/Soft Tissues: No lytic or blastic osseous lesions. No soft tissue abnormality. Findings suggesting pancreatitis, without evidence of pancreatic ductal dilatation. Abdominal and pelvic ascites, likely representing reactive changes. No bowel obstruction, free air, pedis itis or obstructive uropathy. Bilateral pleural effusions with superimposed consolidation, which may suggest atelectasis and/or superimposed pneumonia. CT HEAD WO CONTRAST    Result Date: 3/19/2022  EXAMINATION: CT OF THE HEAD WITHOUT CONTRAST  3/19/2022 2:42 am TECHNIQUE: CT of the head was performed without the administration of intravenous contrast. Dose modulation, iterative reconstruction, and/or weight based adjustment of the mA/kV was utilized to reduce the radiation dose to as low as reasonably achievable. COMPARISON: None. HISTORY: ORDERING SYSTEM PROVIDED HISTORY: drug overdose TECHNOLOGIST PROVIDED HISTORY: Reason for exam:->drug overdose Has a \"code stroke\" or \"stroke alert\" been called? ->No Decision Support Exception - unselect if not a suspected or confirmed emergency medical condition->Emergency Medical Condition (MA) What reading provider will be dictating this exam?->CRC FINDINGS: BRAIN/VENTRICLES: There is no acute intracranial hemorrhage, mass effect or midline shift. No abnormal extra-axial fluid collection. The gray-white differentiation is maintained without evidence of an acute infarct. There is no evidence of hydrocephalus. Mild generalized cerebral and cerebellar volume loss. Small ovoid areas of hypoattenuation in the bilateral basal ganglia may represent prominent perivascular spaces or prior lacunar type infarcts. ORBITS: The visualized portion of the orbits demonstrate no acute abnormality. SINUSES: Left maxillary sinus mucous retention cyst.  The other visualized paranasal sinuses and mastoid air cells demonstrate no acute abnormality. SOFT TISSUES/SKULL:  No acute abnormality of the visualized skull or soft tissues.      1.  No acute intracranial abnormality. Specifically, no acute intracranial hemorrhage or mass effect. 2.  Small areas of hypoattenuation in the bilateral basal ganglia may represent prominent perivascular spaces or prior lacunar-type infarcts. Follow-up brain MRI may be helpful for further evaluation. XR CHEST PORTABLE    Result Date: 3/18/2022  EXAMINATION: ONE XRAY VIEW OF THE CHEST 3/18/2022 11:42 pm COMPARISON: October 28, 2021. HISTORY: ORDERING SYSTEM PROVIDED HISTORY: overdose TECHNOLOGIST PROVIDED HISTORY: Reason for exam:->overdose What reading provider will be dictating this exam?->CRC FINDINGS: Cardiomediastinal contours and pulmonary vasculature are within normal limits. No effusion, focal consolidation or pneumothorax is seen. No acute osseous abnormality identified. No acute findings. Time Spent on discharge is more than 45 minutes in the examination, evaluation, counseling and review of medications and discharge plan.    +++++++++++++++++++++++++++++++++++++++++++++++++  Shae Torres, [address]  +++++++++++++++++++++++++++++++++++++++++++++++++  NOTE: This report was transcribed using voice recognition software. Every effort was made to ensure accuracy; however, inadvertent computerized transcription errors may be present.

## 2022-03-20 NOTE — PROGRESS NOTES
[facility] [address] [facility]  Department of Internal Medicine   Internal Medicine Residency   MICU Progress Note    Patient:  Rakesh Briggs 32 y.o. male  MRN: [id number]     Date of Service: 3/20/2022    Allergy: Chlorthalidone, Hctz [hydrochlorothiazide], and Hydralazine    Subjective     Patient seen and examined at bedside this morning, in no distress. No active complaints at this time. Overnight, patient's blood pressure has been elevated with -190s. He also had hypoglyemic episodes as low as 40. He was started on 35u lantus yesterday. Patient has been tolerating oral intake well, has been able to eat most of his meals. Earlier today, patient expressed wanting to leave AMA. ICU team spoke with the patient, counseled him on the risks of leaving without finishing proper treatment, including readmission, infection of his heart, and death. Patient verbalized understanding, still wants to leave and sign AMA, states that one of his conditions would likely kill him anyway. Objective     VS: BP (!) 178/111   Pulse 80   Temp 98.4 °F (36.9 °C) (Axillary)   Resp 12   Ht 5' 3\" (1.6 m)   Wt 118 lb 4 oz (53.6 kg)   SpO2 94%   BMI 20.95 kg/m²           I & O - 24hr:   Intake/Output Summary (Last 24 hours) at 3/20/2022 0059  Last data filed at 3/20/2022 0000  Gross per 24 hour   Intake 3598 ml   Output 1900 ml   Net 1698 ml       Physical Exam:  General Appearance: alert, appears stated age, cooperative and no distress  Neck: no adenopathy, no carotid bruit, no JVD, supple, symmetrical, trachea midline and thyroid not enlarged, symmetric, no tenderness/mass/nodules  Lung: clear to auscultation bilaterally  Heart: regular rate and rhythm, S1, S2 normal, no murmur, click, rub or gallop  Abdomen: soft, non-tender; bowel sounds normal; no masses,  no organomegaly  Extremities:  extremities normal, atraumatic, no cyanosis or edema  Musculoskeletal: No joint swelling, no muscle tenderness.  ROM normal in all joints of extremities. Neurologic: Mental status: Alert, oriented, thought content appropriate    Lines     site day    Art line   None    TLC None    PICC None    Hemoaccess None    Oxygen:     On room air    ABG:     Lab Results   Component Value Date    PH 7.105 06/18/2014    PH 7.27 04/20/2012    PCO2 <15.0 06/18/2014    PO2 148.8 06/18/2014    HCO3 23.1 04/13/2014    BE 0.1 04/13/2014    THB 17.2 06/18/2014    O2SAT 98.6 06/18/2014        Medications     Infusions: (Fluid, Sedation, Vasopressors)  IVF:   None  Vasopressors  None  Sedation  None    Nutrition:   Regular adult diet - 4 carb    ATB:   Antibiotics  Days   Unasyn 2             Patient currently has   DVT prophylaxis/ GI prophylaxis,  Lovenox/on diet    Labs     CBC with Differential:    Lab Results   Component Value Date    WBC 13.1 03/20/2022    RBC 3.18 03/20/2022    HGB 9.4 03/20/2022    HCT 28.1 03/20/2022     03/20/2022    MCV 88.4 03/20/2022    MCH 29.6 03/20/2022    MCHC 33.5 03/20/2022    RDW 14.2 03/20/2022    SEGSPCT 63 03/20/2014    BANDSPCT 2 09/13/2015    LYMPHOPCT 24.4 03/20/2022    MONOPCT 5.7 03/20/2022    BASOPCT 0.5 03/20/2022    MONOSABS 0.75 03/20/2022    LYMPHSABS 3.19 03/20/2022    EOSABS 0.22 03/20/2022    BASOSABS 0.06 03/20/2022     CMP:    Lab Results   Component Value Date     03/20/2022    K 3.7 03/20/2022    K 4.4 11/05/2021     03/20/2022    CO2 26 03/20/2022    BUN 14 03/20/2022    CREATININE 1.2 03/20/2022    GFRAA >60 03/20/2022    LABGLOM >60 03/20/2022    GLUCOSE 79 03/20/2022    GLUCOSE 282 04/22/2012    PROT 6.0 03/20/2022    LABALBU 2.9 03/20/2022    LABALBU 4.9 09/10/2011    CALCIUM 8.7 03/20/2022    BILITOT <0.2 03/20/2022    ALKPHOS 142 03/20/2022    AST 57 03/20/2022    ALT 68 03/20/2022     Magnesium:    Lab Results   Component Value Date    MG 2.3 03/20/2022     Phosphorus:    Lab Results   Component Value Date    PHOS 2.8 03/20/2022       Imaging Studies:  CXR:     3/18/22   No acute findings. CT scan:   CT A/P 3/19/22   Findings suggesting pancreatitis, without evidence of pancreatic ductal   dilatation.  Abdominal and pelvic ascites, likely representing reactive   changes.       No bowel obstruction, free air, pedis itis or obstructive uropathy.       Bilateral pleural effusions with superimposed consolidation, which may   suggest atelectasis and/or superimposed pneumonia. CT head 3/19/22   1.  No acute intracranial abnormality.  Specifically, no acute intracranial   hemorrhage or mass effect.       2.  Small areas of hypoattenuation in the bilateral basal ganglia may   represent prominent perivascular spaces or prior lacunar-type infarcts.       Follow-up brain MRI may be helpful for further evaluation.               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 He was found by his mother in their basement unresponsive, CPR done by mother. Pulse not lost according to EMS. Patient's mother was suspicious of him using drugs. EMS gave 1mg Narcan and patient woke up immediately. He was also noted to be hyperglycemic in the field with BG ~700. He was then brought to the ED. Work-up significant for , AG 22, BHB 2.57, bicarb 20, LA 4.6, BUN 33, Cr 1.6, elevated liver enzymes. There were concerns of DKA, patient was then started on DKA protocol. There were also concerns for pancreatitis as his lipase was elevated at 907 and CT scan findings also suggestive of pancreatitis. Patient received 1 dose of Ceftriaxone in the ED. He is currently being managed for the following:    Neurologic  AMS 2/2 drug overdose vs hyperglycemia, ? DKA - resolved  - pt found unresponsive, improved on 1mg Narcan  - found to be hyperglycemic on the field at ~700, then 450 in the ED  - started on DKA protocol in the ED  - arrived in the unit AAOx3  Plan:  - Monitor mental status    Hx of bipolar disorder  - follows with Dr. Mariola Lujan outpatient  - on bupropion 150mg, not sure if patient is taking  - denies any SI/HI  Plan:  - Follow-up with psych as outpatient     Cardiovascular  Secondary Hypertension in the setting of POLO  -Home meds: Clonidine 0.3 mg twice daily, lisinopril 20 mg daily, metoprolol 1 mg twice daily  -Hypertensive overnight, -180s  Plan:  -Continue clonidine and metoprolol  -Resume lisinopril  -Add amlodipine 5mg daily today  -PRN labetalol on board  -Monitor BP     Elevated troponin 2/2 CPR 2/2 ?cardiac arrest  - troponin 94 -> 98 -> 88  -Complains of chest pain likely 2/2 CPR  -EKG consistent when sinus tachycardia  Plan:  - Continue to monitor for chest pain  - Lidocaine patch for chest pain     Gastrointestinal  Pancreatitis  - lipase elevated at 907  - CT A/P ahowed findings suggestive of pancreatitis  - no complaints of abdominal pain at the time examination  - Triglycerides elevated at 283  Plan:  - Pain control as needed  - Pt tolerating oral intake, will continue to monitor      Elevated liver enzymes - improving   -  -> 133  -  -> 83 -> 68  -  -> 88->57  - GGT 61  - cholestatic pattern  Plan:  - Monitor LFTs daily  - Hold statin for now     Endocrine  Hyperglycemia in the setting of uncontrolled Type 1 diabetes mellitus - improving  -Blood glucose 450 on presentation   -Na 138, K 5.3, CO2 20 on presentation  -HbA1c 11.1% on 11/2021 -> 11.7% this admission  -Anion gap 22 on presentation -> 15  -unlikely DKA  -Home meds: Degludec 55 units nightly and sliding scale  Plan:  - started on 35u lantus yesterday; will hold lantus for now due to hyperglycemia  - Continue low-carb diet  - Hold home insulin and DM meds  - Consult diabetic educator and endocrinology  - Hypoglycemia protocol     Hypoglycemia  - pt had multiple episodes of hypoglycemia overnight, as low as 40  - improves with OJ and D50 but would later on go down again  - diaphoretic this morning  Plan:  - Start D5 at 40cc/hr  - Hold lantus for now  - hypoglycemia protocol in place    Hx of hyperlipidemia  - Home meds: On Lipitor 80 mg daily and fenofibrate 145 mg daily  - elevated triglycerides   Plan:  - Hold Lipitor for now due to elevated liver enzymes  - Continue fenofibrate      Genitourinary/Renal  ALEJANDRO Stage I on CKD Stage II  - sCr 1.6 -> 1.4 -> 1.2, back to baseline  - baseline 1.1-1.2  - FENa 1.4% ->suggests intrinsic renal disease  - significant microalbuminuria back in 11/2021  Plan:  - Monitor BMP daily  - Continue IVF     High anion gap metabolic acidosis 2/2 lactic acidosis, ketosis, uremia - resolved  AG 22 on admission -> 11 today (closed x2)  pH 7.42 (taken after 2 hrs from initial labs)   CO2 20 on presentation -> 26 today  Pt denies methanol, ethanol, ASA ingestion  BUN/Cr 33/1.6  Blood glucose 450; no DKA  Lactic acid 4.6 -> 1.6  Delta-delta 4.1 -> HAGMA + metabolic alkalosis (on admission)     Hyperkalemia 2/2 acidosis - improved  K 5.3 -> 4.1, improved with fluids  Plan:  - Monitor BMP daily, treat as needed    Infectious Disease  Gram positive bacteremia  - BCx grew GPCC and GPR diptheriod-like  - no signs of infection, no fever  - WBC downtrending  Plan:  - Continue unasyn  - Start vancomycin  --ID consult     Hematology/Oncology  Leukocytosis 2/2 reactive versus infectious  WBC 20.9 -> 17.1 ->13.1  Pt was down, concern for aspiration  No symptoms of infection, given 1 dose of ceftriaxone in the ED  Procal elevated at 12.75  Plan:  Continue unasyn (day2)  Monitor CBC daily  Monitor off antibiotics for now     Acute on chronic normocytic anemia  Hgb 10.9 on admission -> 8.5 -> 9.4  Baseline 10-12  No source of bleeding found  Iron studies obtain in Jan 2022 suggestive of mixed JOSE and ACD  Vitamin and B12 WNL  Plan:  Pending FOBT  Monitor CBC daily     Psychiatric  Polysubstance abuse  - claims to only use Xanax  - has hx of opiate dependence, on suboxone (gets from Dr. Inder Hanson)  - UDS + for opiate and fentanyl, denies use of fentanyl  Plan:  - On COWS protocol    Resolved problems:  High anion gap metabolic acidosis 2/2 lactic acidosis, ketosis, uremia; AG 22->111, CO2 down to 26, BUN down to 14    Florence Tamez MD, PGY-1    Attending physician: Dr. Bernadette Akhtar  Department of Pulmonary, Critical Care and [facility]  Department of Internal Medicine      During multidisciplinary team rounds Key Benitez is a 32 y.o. male was seen, examined and discussed. This is confirmation that I have personally seen and examined the patient and that the key elements of the encounter were performed by me (> 85 % time). The medications & laboratory data was discussed and adjusted where necessary. The radiographic images were reviewed or with radiologist or consultant if felt dis-concordant with the exam or history. The above findings were corroborated, plans confirmed and changes made if needed. Family is updated at the bedside as available. Key issues of the case were discussed among consultants. Critical Care time is documented if appropriate.       Critical Care time: 33 minutes    Kinza Oliveira DO

## 2022-03-20 NOTE — CARE COORDINATION
Attending intensivist, on call resident, nursing team and  team ([id number]) discussed in length with the patient about his uncontrolled and labile diabetes, pancreatitis, and positive blood cultures; and that his risk of severe morbidity, or even mortality, due to uncontrolled blood sugars (both hyperglycemia and hypoglycemia) and his potential infection, is high. Patient voices understanding and is still willing to sign AMA. Considered discharging patient on at least oral antibiotics to cover his positive blood cultures, but since patient is not having any fever, leukocytosis is decreasing, and is likely contaminant; infectious disease team's recommendation requested on, who agree with this assessment and to discharge patient off of antibiotics. But regardless, we would still recommend patient to stay in the hospital, continue antibiotics and get re-cultures and rule out active infection. Despite explaining this to the patient, and all the risks involved, patient wants to leave AMA. Discharge order placed.

## 2022-03-20 NOTE — PROGRESS NOTES
States  \"wants to leave AMA, \" knows he needs to [place] and Kev the paper\" Dr. Magy Hilton in room to  him about reasons he should not leave, reasons to return to ED, S/S of infection, nausea and vomiting ect. Peer review also in room to . Continues to want to leave. Very cooperative with staff, listens to advice given, agrees at this time. All ivs removed, hospital arm band removed. Waiting for prescriptions for antibiotics.

## 2022-03-21 ENCOUNTER — TELEPHONE (OUTPATIENT)
Dept: FAMILY MEDICINE CLINIC | Age: 32
End: 2022-03-21

## 2022-03-21 LAB
EKG ATRIAL RATE: 116 BPM
EKG P AXIS: 52 DEGREES
EKG P-R INTERVAL: 152 MS
EKG Q-T INTERVAL: 324 MS
EKG QRS DURATION: 74 MS
EKG QTC CALCULATION (BAZETT): 450 MS
EKG R AXIS: 61 DEGREES
EKG T AXIS: 54 DEGREES
EKG VENTRICULAR RATE: 116 BPM

## 2022-03-21 PROCEDURE — 93010 ELECTROCARDIOGRAM REPORT: CPT | Performed by: INTERNAL MEDICINE

## 2022-03-21 NOTE — TELEPHONE ENCOUNTER
----- Message from Katharine Finney sent at 3/21/2022 12:11 PM EDT -----  Subject: Message to Provider    QUESTIONS  Information for Provider? Pt was returning a call from velma but there   was no answer at the office if Prema Shah can give this pt a call back. ---------------------------------------------------------------------------  --------------  Hay CESPEDES  What is the best way for the office to contact you? OK to leave message on   voicemail  Preferred Call Back Phone Number? [phone]  ---------------------------------------------------------------------------  --------------  SCRIPT ANSWERS  Relationship to Patient?  Self

## 2022-03-21 NOTE — TELEPHONE ENCOUNTER
Patient phoned for outreach to schedule a hospital follow up with Dr. Meena Weathers due to non qualification for TCM, regarding his recent hospital stay. Message left to call Alisia Leslie RN at [facility] at [phone]. Awaiting return call.

## 2022-03-21 NOTE — TELEPHONE ENCOUNTER
Returned patient call to number provided. Patient voiced multiple concerns regarding labs and recent hospitalization.  Appointment made for hospital follow up, not TCM, for this upcoming Friday 3/25/22 @ 09:30am.

## 2022-03-23 ENCOUNTER — HOSPITAL ENCOUNTER (OUTPATIENT)
Dept: NUCLEAR MEDICINE | Age: 32
Discharge: HOME | End: 2022-03-25
Payer: COMMERCIAL

## 2022-03-23 ENCOUNTER — HOSPITAL ENCOUNTER (OUTPATIENT)
Dept: NUCLEAR MEDICINE | Age: 32
Discharge: HOME | End: 2022-03-25

## 2022-03-23 DIAGNOSIS — R74.8 ELEVATED ALKALINE PHOSPHATASE LEVEL: ICD-10-CM

## 2022-03-23 LAB — MYOGLOBIN URINE: <1 MG/L (ref 0–1)

## 2022-03-23 PROCEDURE — 78306 BONE IMAGING WHOLE BODY: CPT

## 2022-03-23 PROCEDURE — A9503 TC99M MEDRONATE: HCPCS | Performed by: RADIOLOGY

## 2022-03-23 PROCEDURE — 3430000000 HC RX DIAGNOSTIC RADIOPHARMACEUTICAL: Performed by: RADIOLOGY

## 2022-03-23 PROCEDURE — 78306 BONE IMAGING WHOLE BODY: CPT | Performed by: RADIOLOGY

## 2022-03-23 RX ORDER — TC 99M MEDRONATE 20 MG/10ML
25 INJECTION, POWDER, LYOPHILIZED, FOR SOLUTION INTRAVENOUS
Status: COMPLETED | OUTPATIENT
Start: 2022-03-23 | End: 2022-03-23

## 2022-03-23 RX ADMIN — TC 99M MEDRONATE 25 MILLICURIE: 20 INJECTION, POWDER, LYOPHILIZED, FOR SOLUTION INTRAVENOUS at 09:19

## 2022-03-24 LAB
BLOOD CULTURE, ROUTINE: ABNORMAL
CULTURE, BLOOD 2: ABNORMAL
ORGANISM: ABNORMAL
ORGANISM: ABNORMAL

## 2022-04-20 ENCOUNTER — OFFICE VISIT (OUTPATIENT)
Dept: FAMILY MEDICINE CLINIC | Age: 32
End: 2022-04-20
Payer: COMMERCIAL

## 2022-04-20 VITALS
OXYGEN SATURATION: 99 % | SYSTOLIC BLOOD PRESSURE: 180 MMHG | DIASTOLIC BLOOD PRESSURE: 100 MMHG | BODY MASS INDEX: 19.7 KG/M2 | TEMPERATURE: 98.9 F | HEART RATE: 136 BPM | WEIGHT: 111.2 LBS

## 2022-04-20 DIAGNOSIS — R94.8 ABNORMAL RADIONUCLIDE BONE SCAN: ICD-10-CM

## 2022-04-20 DIAGNOSIS — R10.12 LEFT UPPER QUADRANT ABDOMINAL PAIN: ICD-10-CM

## 2022-04-20 DIAGNOSIS — R74.8 ELEVATED ALKALINE PHOSPHATASE LEVEL: ICD-10-CM

## 2022-04-20 DIAGNOSIS — I15.0 RENOVASCULAR HYPERTENSION: ICD-10-CM

## 2022-04-20 DIAGNOSIS — R10.12 LEFT UPPER QUADRANT ABDOMINAL PAIN: Primary | ICD-10-CM

## 2022-04-20 LAB
ALBUMIN SERPL-MCNC: 3.9 G/DL (ref 3.5–5.2)
ALP BLD-CCNC: 151 U/L (ref 40–129)
ALT SERPL-CCNC: 9 U/L (ref 0–40)
ANION GAP SERPL CALCULATED.3IONS-SCNC: 13 MMOL/L (ref 7–16)
AST SERPL-CCNC: 17 U/L (ref 0–39)
BASOPHILS ABSOLUTE: 0.14 E9/L (ref 0–0.2)
BASOPHILS RELATIVE PERCENT: 0.7 % (ref 0–2)
BILIRUB SERPL-MCNC: 0.3 MG/DL (ref 0–1.2)
BUN BLDV-MCNC: 22 MG/DL (ref 6–20)
CALCIUM SERPL-MCNC: 9.7 MG/DL (ref 8.6–10.2)
CHLORIDE BLD-SCNC: 98 MMOL/L (ref 98–107)
CO2: 28 MMOL/L (ref 22–29)
CREAT SERPL-MCNC: 1.8 MG/DL (ref 0.7–1.2)
EOSINOPHILS ABSOLUTE: 0.67 E9/L (ref 0.05–0.5)
EOSINOPHILS RELATIVE PERCENT: 3.2 % (ref 0–6)
GFR AFRICAN AMERICAN: 53
GFR NON-AFRICAN AMERICAN: 44 ML/MIN/1.73
GLUCOSE BLD-MCNC: 94 MG/DL (ref 74–99)
HCT VFR BLD CALC: 36.1 % (ref 37–54)
HEMOGLOBIN: 11.7 G/DL (ref 12.5–16.5)
IMMATURE GRANULOCYTES #: 0.12 E9/L
IMMATURE GRANULOCYTES %: 0.6 % (ref 0–5)
LIPASE: 14 U/L (ref 13–60)
LYMPHOCYTES ABSOLUTE: 3.86 E9/L (ref 1.5–4)
LYMPHOCYTES RELATIVE PERCENT: 18.4 % (ref 20–42)
MCH RBC QN AUTO: 29.5 PG (ref 26–35)
MCHC RBC AUTO-ENTMCNC: 32.4 % (ref 32–34.5)
MCV RBC AUTO: 91.2 FL (ref 80–99.9)
MONOCYTES ABSOLUTE: 0.82 E9/L (ref 0.1–0.95)
MONOCYTES RELATIVE PERCENT: 3.9 % (ref 2–12)
NEUTROPHILS ABSOLUTE: 15.36 E9/L (ref 1.8–7.3)
NEUTROPHILS RELATIVE PERCENT: 73.2 % (ref 43–80)
PDW BLD-RTO: 13.9 FL (ref 11.5–15)
PLATELET # BLD: 420 E9/L (ref 130–450)
PMV BLD AUTO: 10.2 FL (ref 7–12)
POTASSIUM SERPL-SCNC: 4.6 MMOL/L (ref 3.5–5)
RBC # BLD: 3.96 E12/L (ref 3.8–5.8)
SODIUM BLD-SCNC: 139 MMOL/L (ref 132–146)
TOTAL PROTEIN: 7.5 G/DL (ref 6.4–8.3)
WBC # BLD: 21 E9/L (ref 4.5–11.5)

## 2022-04-20 PROCEDURE — 99215 OFFICE O/P EST HI 40 MIN: CPT | Performed by: FAMILY MEDICINE

## 2022-04-20 PROCEDURE — G8420 CALC BMI NORM PARAMETERS: HCPCS | Performed by: FAMILY MEDICINE

## 2022-04-20 PROCEDURE — G8427 DOCREV CUR MEDS BY ELIG CLIN: HCPCS | Performed by: FAMILY MEDICINE

## 2022-04-20 PROCEDURE — 3046F HEMOGLOBIN A1C LEVEL >9.0%: CPT | Performed by: FAMILY MEDICINE

## 2022-04-20 PROCEDURE — 2022F DILAT RTA XM EVC RTNOPTHY: CPT | Performed by: FAMILY MEDICINE

## 2022-04-20 PROCEDURE — 4004F PT TOBACCO SCREEN RCVD TLK: CPT | Performed by: FAMILY MEDICINE

## 2022-04-20 RX ORDER — GABAPENTIN 300 MG/1
300 CAPSULE ORAL 2 TIMES DAILY
Qty: 180 CAPSULE | Refills: 0 | Status: SHIPPED | OUTPATIENT
Start: 2022-04-20 | End: 2022-09-28

## 2022-04-20 ASSESSMENT — PATIENT HEALTH QUESTIONNAIRE - PHQ9
SUM OF ALL RESPONSES TO PHQ QUESTIONS 1-9: 7
6. FEELING BAD ABOUT YOURSELF - OR THAT YOU ARE A FAILURE OR HAVE LET YOURSELF OR YOUR FAMILY DOWN: 0
7. TROUBLE CONCENTRATING ON THINGS, SUCH AS READING THE NEWSPAPER OR WATCHING TELEVISION: 1
1. LITTLE INTEREST OR PLEASURE IN DOING THINGS: 0
SUM OF ALL RESPONSES TO PHQ9 QUESTIONS 1 & 2: 0
9. THOUGHTS THAT YOU WOULD BE BETTER OFF DEAD, OR OF HURTING YOURSELF: 0
4. FEELING TIRED OR HAVING LITTLE ENERGY: 3
SUM OF ALL RESPONSES TO PHQ QUESTIONS 1-9: 7
3. TROUBLE FALLING OR STAYING ASLEEP: 3
2. FEELING DOWN, DEPRESSED OR HOPELESS: 0
8. MOVING OR SPEAKING SO SLOWLY THAT OTHER PEOPLE COULD HAVE NOTICED. OR THE OPPOSITE, BEING SO FIGETY OR RESTLESS THAT YOU HAVE BEEN MOVING AROUND A LOT MORE THAN USUAL: 0
SUM OF ALL RESPONSES TO PHQ QUESTIONS 1-9: 7
5. POOR APPETITE OR OVEREATING: 0
SUM OF ALL RESPONSES TO PHQ QUESTIONS 1-9: 7
10. IF YOU CHECKED OFF ANY PROBLEMS, HOW DIFFICULT HAVE THESE PROBLEMS MADE IT FOR YOU TO DO YOUR WORK, TAKE CARE OF THINGS AT HOME, OR GET ALONG WITH OTHER PEOPLE: 1

## 2022-04-20 ASSESSMENT — ENCOUNTER SYMPTOMS
EYE PAIN: 0
WHEEZING: 0
DIARRHEA: 1
NAUSEA: 1
CONSTIPATION: 0
COUGH: 0
ABDOMINAL PAIN: 1
SHORTNESS OF BREATH: 0
VOMITING: 1
BLOOD IN STOOL: 0

## 2022-04-20 NOTE — PROGRESS NOTES
4/20/2022    Mike Reed is a 32 y.o. male here for:    Chief Complaint   Patient presents with    Diabetes    Hypertension    Abdominal Pain    Results     NM bone scan        HPI:  T1DM  - Last HgbA1c= 11.7% on 03/19/2022  - Was referred to Endocrinology. Has not scheduled an appointment due to transportation difficulties. - On atorvastatin 80 mg HS and fenofibrate 145 mg QD.   - On gabapentin 300 mg BID for peripheral neuropathy.   - On Novolog 6-10 units QID. On Tresiba 55 units HS.   - Fasting BG readings are 300s. - Denies hypoglycemic episodes in the morning.   - Following with Ophthalmology, Dr. Mike Oh, for VEG-F injections for diabetic retinopathy and macular edema.      HTN   - On clonidine 0.3 mg BID, lisinopril 20 mg QD, and metoprolol succinate 50 mg BID. Denies side effects of medications. Taking clonidine only if his BP readings at home are high. - Denies chest pain, palpitations, lightheadedness, dizziness, and syncope. - Has not followed up with Nephrology, Dr. Kathi Gama.  - Vitaliy Art BP medications this morning. Abdominal pain   - Started 2 months ago   - Located at [facility]   - Occurs almost daily   - Only occurs after eating   - Describes pain as sharp   - Better with green tea   - Worse with fatty and greasy foods and dairy products   - Pain is 4-8/10 on pain scale   - Admits to accompanying N/V/D. Denies fevers and chills. - CT scan from 03/19/2022 showed perihepatic ascites, calcified gallstones, perisplenic fluid, peripancreatic fluid, and pancreatitis. Lipase was 907. Was bacteremic at that time as well, as he had (+) Staph aureus in blood. Patient signed out of hospital via [facility] at that time. Current Outpatient Medications   Medication Sig Dispense Refill    gabapentin (NEURONTIN) 300 MG capsule Take 1 capsule by mouth 2 times daily for 90 days.  180 capsule 0    aspirin 81 MG chewable tablet Take 1 tablet by mouth daily 90 tablet 0    Insulin Degludec (TRESIBA FLEXTOUCH) 100 UNIT/ML SOPN Inject 55 Units into the skin nightly 5 pen 5    fenofibrate (TRICOR) 145 MG tablet Take 1 tablet by mouth daily 90 tablet 0    insulin aspart (NOVOLOG) 100 UNIT/ML injection vial Inject 6-10 Units into the skin 4 times daily with meals 10 mL 5    albuterol sulfate HFA (VENTOLIN HFA) 108 (90 Base) MCG/ACT inhaler Inhale 2 puffs into the lungs every 4 hours as needed for Wheezing or Shortness of Breath 18 g 0    Blood Glucose Monitoring Suppl (TRUE METRIX METER) w/Device KIT Check blood glucose four times daily (fasting and with each meal). 1 kit 0    cyanocobalamin ([employer]S VITAMIN B12) 1000 MCG tablet Take 1 tablet by mouth daily 90 tablet 0    cloNIDine (CATAPRES) 0.3 MG tablet Take 1 tablet by mouth 2 times daily 180 tablet 0    lisinopril (PRINIVIL;ZESTRIL) 20 MG tablet Take 1 tablet by mouth daily 90 tablet 0    Insulin Pen Needle (PEN NEEDLES) 32G X 5 MM MISC 1 each by Does not apply route daily 30 each 5    metoprolol succinate (TOPROL XL) 50 MG extended release tablet Take 1 tablet by mouth 2 times daily 180 tablet 0    atorvastatin (LIPITOR) 80 MG tablet Take 1 tablet by mouth daily 90 tablet 0    buPROPion (WELLBUTRIN XL) 150 MG extended release tablet       sildenafil (REVATIO) 20 MG tablet Take 60 mg by mouth as needed (BEFORE SEXUAL ACTIVITY)      SUBOXONE 8-2 MG FILM SL film Place 1 Film under the tongue 2 times daily. No current facility-administered medications for this visit.       Allergies   Allergen Reactions    Chlorthalidone      Vomiting     Hctz [Hydrochlorothiazide] Nausea Only    Hydralazine      Nausea, headache       Past Medical & Surgical History:      Diagnosis Date    Asthma     [facility]     COPD (chronic obstructive pulmonary disease) ([facility]     Diabetes mellitus type I ([facility]     Diabetic neuropathy (HCC)     Diabetic, retinopathy, proliferative ([facility]     HTN (hypertension)     due to left renal artery stenosis    Hypercholesteremia     Left renal artery stenosis (HCC) 09/19/2019    Macular edema due to secondary diabetes ([address]     Opioid dependence (HCC)     Proteinuria     Retinal detachment     right eye    Vitreous hemorrhage of right eye due to diabetes mellitus [facility])      Past Surgical History:   Procedure Laterality Date    EYE SURGERY Right 11/20/2020    PARS PLANA VITRECTOMY, MEMBRANE PEEL, ENDOLASER, GAS FLUID EXCHANGE, 25G, MAC, RIGHT EYE performed by Murphy Lechuga MD at [address] [facility] Right 01/08/2021    pars plana vitrectomy with peeling of preretinal tissue and gas fluid exchange    EYE SURGERY Right 1/8/2021    PARS PLANA VITRECTOMY, INTERNAL LIMITING MEMBRANE PEEL, INDOCYANINE GREEN, GAS FLUID EXCHANGE, 25g, MAC, RIGHT EYE performed by Murphy Lechuga MD at [address] [place] [address]       Family History:      Problem Relation Age of Onset    No Known Problems Mother     No Known Problems Father     No Known Problems Brother         Homicide     No Known Problems Daughter     No Known Problems Son     Diabetes type 2  Maternal Grandmother        Social History:  Social History     Tobacco Use    Smoking status: Current Every Day Smoker     Packs/day: 0.50     Years: 15.00     Pack years: 7.50     Types: Cigarettes    Smokeless tobacco: Never Used   Substance Use Topics    Alcohol use: Yes     Comment: not usually        Review of Systems   Constitutional: Negative for chills and fever. Eyes: Positive for visual disturbance. Negative for pain. Respiratory: Negative for cough, shortness of breath and wheezing. Cardiovascular: Negative for chest pain, palpitations and leg swelling. Gastrointestinal: Positive for abdominal pain, diarrhea, nausea and vomiting. Negative for blood in stool and constipation. Neurological: Negative for dizziness, syncope and light-headedness.        BP Readings from Last 3 Encounters:   04/20/22 (!) 180/100   03/20/22 (!) 198/123   01/20/22 (!) 132/100       VS:  BP (!) 180/100 (Site: Right Upper Arm, Position: Sitting, Cuff Size: Medium Adult)   Pulse 136   Temp 98.9 °F (37.2 °C)   Wt 111 lb 3.2 oz (50.4 kg)   SpO2 99%   BMI 19.70 kg/m²     Physical Exam  Vitals reviewed. Constitutional:       General: He is awake. He is not in acute distress. Appearance: He is well-developed, well-groomed and normal weight. He is not toxic-appearing or diaphoretic. Cardiovascular:      Rate and Rhythm: Regular rhythm. Tachycardia present. Heart sounds: S1 normal and S2 normal. No murmur heard. Pulmonary:      Breath sounds: No decreased breath sounds, wheezing, rhonchi or rales. Abdominal:      General: Bowel sounds are normal. There is no distension. Palpations: Abdomen is soft. Tenderness: There is abdominal tenderness in the right upper quadrant and left upper quadrant. There is guarding. There is no rebound. Skin:     General: Skin is warm and dry. Neurological:      General: No focal deficit present. Mental Status: He is alert. Psychiatric:         Attention and Perception: Attention normal.         Mood and Affect: Mood normal.         Speech: Speech normal.         Behavior: Behavior normal. Behavior is cooperative. Thought Content: Thought content normal.         Cognition and Memory: Cognition normal.         Judgment: Judgment normal.         Assessment/Plan:  1. Uncontrolled type 1 diabetes mellitus with complication, with long-term current use of insulin (HCC)  Uncontrolled. Patient has been referred to Endocrinology on 4 occasions but has not scheduled an appointment. His last HgbA1c= 11.7%. I stressed the importance of seeing Endocrinology, as he would benefit from an insulin pump. Referral to Endocrinology placed again today. Gabapentin 300 mg BID refilled for his neuropathy. Continue Novolog 6-10 units QID and Tresiba 55 units HS.   - gabapentin (NEURONTIN) 300 MG capsule;  Take 1 capsule by mouth 2 times daily for 90 days. Dispense: 180 capsule; Refill: 0  - Fito Miranda MD, Endocrinology, [facility], [address]    2. Renovascular hypertension  Uncontrolled. Patient has been non-compliant with Nephrology for years. I am dubious of his compliance with his BP medications. Will continue clonidine 0.3 mg BID, lisinopril 20 mg QD, and metoprolol succinate 50 mg BID. 3. Left upper quadrant abdominal pain  New problem. Due to recent CT abdomen from 03/19/2022 showing pancreatitis, patient's abdominal pain may be from recurrent pancreatitis, as patient signed himself out of the hospital AMA and did not complete treatment. Additional DDx includes DKA, infection, acute cholecystitis, and perforation. I recommended patient go to the ED from my office today for further evaluation, but patient refused. As such, stat blood work was ordered and a stat CT abdomen was ordered. Patient stated that he was not able to get CT abdomen done today, so he will try to go tomorrow. Patient was educated on risks of not complying with treatment plan. - CBC with Auto Differential; Future  - Comprehensive Metabolic Panel; Future  - CT ABDOMEN PELVIS W IV CONTRAST Additional Contrast? Oral; Future  - Culture, Blood 2; Future  - Culture, Blood 3; Future  - Lipase; Future    4. Elevated alkaline phosphatase level  Reviewed patient's recent NM bone scan with him today that was ordered for elevated alkaline phosphatase. NM bone scan showed abnormal tracer uptake in left ribs. Radiologist commented that malignancy cannot be ruled-out. His NM bone scan from 2019 was unremarkable. Thus, will check SPEP, UPEP, and kappa/lambda light chains. Will refer to Oncology, Dr. Brooke Vazquez, for further evaluation.   - Pratik Salazar MD, Medical Oncology, [facility]    5. Abnormal radionuclide bone scan  Reviewed patient's recent NM bone scan with him today that was ordered for elevated alkaline phosphatase.  NM bone scan showed abnormal tracer uptake in left ribs. Radiologist commented that malignancy cannot be ruled-out. His NM bone scan from 2019 was unremarkable. Thus, will check SPEP, UPEP, and kappa/lambda light chains. Will refer to Oncology, Dr. Zuly Vanegas, for further evaluation.   - Julian Morillo MD, Medical Oncology, [place]  - Electrophoresis Protein, Serum; Future  - PROTEIN ELECTRO FRACT/QUANT UR; Future  - Kappa/Lambda Quantitative Free Light Chains, Serum;  Future      Davide Eli, DO  Family Medicine

## 2022-04-21 ENCOUNTER — HOSPITAL ENCOUNTER (OUTPATIENT)
Dept: CT IMAGING | Age: 32
Discharge: HOME | End: 2022-04-23
Payer: COMMERCIAL

## 2022-04-21 DIAGNOSIS — R10.12 LUQ PAIN: Primary | ICD-10-CM

## 2022-04-21 DIAGNOSIS — R10.12 LUQ PAIN: ICD-10-CM

## 2022-04-21 LAB
ALBUMIN SERPL-MCNC: 3.2 G/DL (ref 3.5–4.7)
ALPHA-1-GLOBULIN: 0.3 G/DL (ref 0.2–0.4)
ALPHA-2-GLOBULIN: 1.5 G/DL (ref 0.5–1)
BETA GLOBULIN: 1.3 G/DL (ref 0.8–1.3)
ELECTROPHORESIS: ABNORMAL
GAMMA GLOBULIN: 1.1 G/DL (ref 0.7–1.6)
TOTAL PROTEIN: 7.4 G/DL (ref 6.4–8.3)

## 2022-04-21 PROCEDURE — 6360000004 HC RX CONTRAST MEDICATION: Performed by: RADIOLOGY

## 2022-04-21 PROCEDURE — 74177 CT ABD & PELVIS W/CONTRAST: CPT

## 2022-04-21 RX ADMIN — IOPAMIDOL 90 ML: 755 INJECTION, SOLUTION INTRAVENOUS at 16:18

## 2022-04-21 RX ADMIN — IOHEXOL 50 ML: 240 INJECTION, SOLUTION INTRATHECAL; INTRAVASCULAR; INTRAVENOUS; ORAL at 16:18

## 2022-04-23 LAB
KAPPA FREE LIGHT CHAINS QNT: 44.31 MG/L (ref 3.3–19.4)
KAPPA/LAMBDA FREE LIGHT CHAIN RATIO: 1.12 (ref 0.26–1.65)
LAMBDA FREE LIGHT CHAINS QNT: 39.42 MG/L (ref 5.71–26.3)

## 2022-04-25 DIAGNOSIS — R94.8 ABNORMAL RADIONUCLIDE BONE SCAN: ICD-10-CM

## 2022-04-25 LAB
CULTURE, BLOOD 2: NORMAL
CULTURE, BLOOD 3: NORMAL

## 2022-04-26 DIAGNOSIS — R79.89 ELEVATED SERUM CREATININE: Primary | ICD-10-CM

## 2022-04-26 DIAGNOSIS — R10.84 GENERALIZED ABDOMINAL PAIN: ICD-10-CM

## 2022-04-27 ENCOUNTER — HOSPITAL ENCOUNTER (OUTPATIENT)
Age: 32
Discharge: HOME | End: 2022-04-27
Payer: COMMERCIAL

## 2022-04-27 DIAGNOSIS — R79.89 ELEVATED SERUM CREATININE: ICD-10-CM

## 2022-04-27 LAB
ALBUMIN SERPL-MCNC: 4.2 G/DL (ref 3.5–5.2)
ALP BLD-CCNC: 145 U/L (ref 40–129)
ALT SERPL-CCNC: 8 U/L (ref 0–40)
ANION GAP SERPL CALCULATED.3IONS-SCNC: 15 MMOL/L (ref 7–16)
AST SERPL-CCNC: 10 U/L (ref 0–39)
BILIRUB SERPL-MCNC: <0.2 MG/DL (ref 0–1.2)
BUN BLDV-MCNC: 29 MG/DL (ref 6–20)
CALCIUM SERPL-MCNC: 9.9 MG/DL (ref 8.6–10.2)
CHLORIDE BLD-SCNC: 97 MMOL/L (ref 98–107)
CO2: 30 MMOL/L (ref 22–29)
CREAT SERPL-MCNC: 2.1 MG/DL (ref 0.7–1.2)
GFR AFRICAN AMERICAN: 45
GFR NON-AFRICAN AMERICAN: 37 ML/MIN/1.73
GLUCOSE BLD-MCNC: 74 MG/DL (ref 74–99)
POTASSIUM SERPL-SCNC: 3.6 MMOL/L (ref 3.5–5)
SODIUM BLD-SCNC: 142 MMOL/L (ref 132–146)
TOTAL PROTEIN: 7.8 G/DL (ref 6.4–8.3)

## 2022-04-27 PROCEDURE — 80053 COMPREHEN METABOLIC PANEL: CPT

## 2022-04-27 PROCEDURE — 36415 COLL VENOUS BLD VENIPUNCTURE: CPT

## 2022-04-29 ENCOUNTER — PATIENT MESSAGE (OUTPATIENT)
Dept: FAMILY MEDICINE CLINIC | Age: 32
End: 2022-04-29

## 2022-04-29 LAB — ADDENDUM ELECTROPHORESIS URINE RANDOM: NORMAL

## 2022-04-29 NOTE — TELEPHONE ENCOUNTER
From: Marcela Dumas  To: Dr. Ines Mckinley  Sent: 4/29/2022 9:31 AM EDT  Subject: Test Results Question    Dr. Veronica Lake recently had me bring home a 24 hour urine collection container on my last visit with her on 4/20, so I started it the next day on 4/21 and used it for the 24 hours thst they told me to use it, then I turned it in to the lab that is in the same building as Dr. Camryn Alva office on 4/22. But since today is 4/29, I figured I would write and ask if you guys know why it's still not listed under my test results even though it has been a week already, I'm just curious because I always try to keep up with them all and try looking into any kind of testing I get done, wether it is blood work, urine screenings or pretty much just any kind of testing that Dr. Veronica Lake has me get done. If you can write me back and let me know why it is still not available, I would appreciate it alot, thank you!

## 2022-04-29 NOTE — TELEPHONE ENCOUNTER
Urine electrophoresis is still pending. It takes sometimes up to 2 weeks for results to come back. Also, did patient go to ED like I advised him? His kidney function is significantly diminished. If not, then he really needs to go ASAP!

## 2022-05-18 ENCOUNTER — HOSPITAL ENCOUNTER (OUTPATIENT)
Dept: INFUSION THERAPY | Age: 32
Discharge: HOME | End: 2022-05-18

## 2022-05-18 ENCOUNTER — OFFICE VISIT (OUTPATIENT)
Dept: ONCOLOGY | Age: 32
End: 2022-05-18
Payer: COMMERCIAL

## 2022-05-18 VITALS
TEMPERATURE: 97.5 F | HEIGHT: 63 IN | BODY MASS INDEX: 19.84 KG/M2 | HEART RATE: 110 BPM | OXYGEN SATURATION: 100 % | SYSTOLIC BLOOD PRESSURE: 168 MMHG | WEIGHT: 112 LBS | DIASTOLIC BLOOD PRESSURE: 123 MMHG

## 2022-05-18 DIAGNOSIS — D72.828 OTHER ELEVATED WHITE BLOOD CELL (WBC) COUNT: Primary | ICD-10-CM

## 2022-05-18 DIAGNOSIS — R94.8 ABNORMAL RADIONUCLIDE BONE SCAN: ICD-10-CM

## 2022-05-18 PROCEDURE — G8427 DOCREV CUR MEDS BY ELIG CLIN: HCPCS | Performed by: INTERNAL MEDICINE

## 2022-05-18 PROCEDURE — 4004F PT TOBACCO SCREEN RCVD TLK: CPT | Performed by: INTERNAL MEDICINE

## 2022-05-18 PROCEDURE — G8420 CALC BMI NORM PARAMETERS: HCPCS | Performed by: INTERNAL MEDICINE

## 2022-05-18 PROCEDURE — 99205 OFFICE O/P NEW HI 60 MIN: CPT | Performed by: INTERNAL MEDICINE

## 2022-05-18 PROCEDURE — 99214 OFFICE O/P EST MOD 30 MIN: CPT

## 2022-05-18 PROCEDURE — 99214 OFFICE O/P EST MOD 30 MIN: CPT | Performed by: INTERNAL MEDICINE

## 2022-05-18 NOTE — PROGRESS NOTES
Sigrid Noss  1990 32 y.o. Referring Physician:     PCP: Jaciel Meeks,     Vitals:    22 1408   BP: (!) 168/123   Pulse: 110   Temp: 97.5 °F (36.4 °C)   SpO2: 100%        Wt Readings from Last 3 Encounters:   22 112 lb (50.8 kg)   22 111 lb 3.2 oz (50.4 kg)   22 119 lb 7.8 oz (54.2 kg)        Body mass index is 19.84 kg/m². Chief Complaint:   Chief Complaint   Patient presents with    New Patient     elevated alkaline phosphate level, abnormal NM scane          Cancer Staging  No matching staging information was found for the patient. Prior Radiation Therapy? NO    Concurrent Chemo/radiation? NO    Prior Chemotherapy? NO    Prior Hormonal Therapy? NO    Head and Neck Cancer? No, patient does NOT have HN cancer. LMP:     Age at first Menses:     :     Para:           Current Outpatient Medications:     gabapentin (NEURONTIN) 300 MG capsule, Take 1 capsule by mouth 2 times daily for 90 days. , Disp: 180 capsule, Rfl: 0    aspirin 81 MG chewable tablet, Take 1 tablet by mouth daily, Disp: 90 tablet, Rfl: 0    Insulin Degludec (TRESIBA FLEXTOUCH) 100 UNIT/ML SOPN, Inject 55 Units into the skin nightly, Disp: 5 pen, Rfl: 5    fenofibrate (TRICOR) 145 MG tablet, Take 1 tablet by mouth daily, Disp: 90 tablet, Rfl: 0    insulin aspart (NOVOLOG) 100 UNIT/ML injection vial, Inject 6-10 Units into the skin 4 times daily with meals, Disp: 10 mL, Rfl: 5    albuterol sulfate HFA (VENTOLIN HFA) 108 (90 Base) MCG/ACT inhaler, Inhale 2 puffs into the lungs every 4 hours as needed for Wheezing or Shortness of Breath, Disp: 18 g, Rfl: 0    Blood Glucose Monitoring Suppl (TRUE METRIX METER) w/Device KIT, Check blood glucose four times daily (fasting and with each meal). , Disp: 1 kit, Rfl: 0    cyanocobalamin (CVS VITAMIN B12) 1000 MCG tablet, Take 1 tablet by mouth daily, Disp: 90 tablet, Rfl: 0    cloNIDine (CATAPRES) 0.3 MG tablet, Take 1 tablet by mouth 2 times daily, Disp: 180 tablet, Rfl: 0    lisinopril (PRINIVIL;ZESTRIL) 20 MG tablet, Take 1 tablet by mouth daily, Disp: 90 tablet, Rfl: 0    Insulin Pen Needle (PEN NEEDLES) 32G X 5 MM MISC, 1 each by Does not apply route daily, Disp: 30 each, Rfl: 5    metoprolol succinate (TOPROL XL) 50 MG extended release tablet, Take 1 tablet by mouth 2 times daily, Disp: 180 tablet, Rfl: 0    atorvastatin (LIPITOR) 80 MG tablet, Take 1 tablet by mouth daily, Disp: 90 tablet, Rfl: 0    SUBOXONE 8-2 MG FILM SL film, Place 1 Film under the tongue 2 times daily.  , Disp: , Rfl:     buPROPion (WELLBUTRIN XL) 150 MG extended release tablet, , Disp: , Rfl:     sildenafil (REVATIO) 20 MG tablet, Take 60 mg by mouth as needed (BEFORE SEXUAL ACTIVITY) (Patient not taking: Reported on 5/18/2022), Disp: , Rfl:        Past Medical History:   Diagnosis Date    Asthma     Bipolar affective ([address]     COPD (chronic obstructive pulmonary disease) ([address]     Diabetes mellitus type I ([address]     Diabetic neuropathy ([address]     Diabetic, retinopathy, proliferative ([address]     HTN (hypertension)     due to left renal artery stenosis    Hypercholesteremia     Left renal artery stenosis ([address] 09/19/2019    Macular edema due to secondary diabetes ([address]     Opioid dependence ([address]     Proteinuria     Retinal detachment     right eye    Vitreous hemorrhage of right eye due to diabetes mellitus ([address]        Past Surgical History:   Procedure Laterality Date    EYE SURGERY Right 11/20/2020    PARS PLANA VITRECTOMY, MEMBRANE PEEL, ENDOLASER, GAS FLUID EXCHANGE, 25G, MAC, RIGHT EYE performed by Miki Thakur MD at [address] [place] [address] Right 01/08/2021    pars plana vitrectomy with peeling of preretinal tissue and gas fluid exchange    EYE SURGERY Right 1/8/2021    PARS PLANA VITRECTOMY, INTERNAL LIMITING MEMBRANE PEEL, INDOCYANINE GREEN, GAS FLUID EXCHANGE, 25g, MAC, RIGHT EYE performed by Miki Thakur MD at [place] [address] OR       Family History   Problem Relation Age of Onset    No Known Problems Mother     No Known Problems Father     No Known Problems Brother         Homicide     No Known Problems Daughter     No Known Problems Son     Diabetes type 2  Maternal Grandmother        Social History     Socioeconomic History    Marital status:      Spouse name: Not on file    Number of children: 2    Years of education: Not on file    Highest education level: Not on file   Occupational History    Occupation: Unemployed     Comment: Last job [employer]   Tobacco Use    Smoking status: Current Every Day Smoker     Packs/day: 0.50     Years: 15.00     Pack years: 7.50     Types: Cigarettes    Smokeless tobacco: Never Used   Vaping Use    Vaping Use: Never used   Substance and Sexual Activity    Alcohol use: Yes     Comment: not usually     Drug use: Not Currently     Types: Opiates      Comment: 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 10/2017, now takes Suboxone    Sexual activity: Not on file   Other Topics Concern    Not on file   Social History Narrative    Not on file     Social Determinants of Health     Financial Resource Strain: High Risk    Difficulty of Paying Living Expenses: Hard   Food Insecurity: No Food Insecurity    Worried About Running Out of Food in the Last Year: Never true    Kim of Food in the Last Year: Never true   Transportation Needs: Unmet Transportation Needs    Lack of Transportation (Medical):  Yes    Lack of Transportation (Non-Medical): Yes   Physical Activity:     Days of Exercise per Week: Not on file    Minutes of Exercise per Session: Not on file   Stress:     Feeling of Stress : Not on file   Social Connections:     Frequency of Communication with Friends and Family: Not on file    Frequency of Social Gatherings with Friends and Family: Not on file    Attends Quaker Services: Not on file    Active Member of Clubs or Organizations: Not on file    Attends Club or Organization Meetings: Not on file    Marital Status: Not on file   Intimate Partner Violence:     Fear of Current or Ex-Partner: Not on file    Emotionally Abused: Not on file    Physically Abused: Not on file    Sexually Abused: Not on file   Housing Stability: [address] Unable to Pay for Housing in the Last Year: No    Number of [place] in the Last Year: 2    Unstable Housing in the Last Year: No           Occupation: disability  Retired:  NO          REVIEW OF SYSTEMS:    Pacemaker/Defibulator/ICD:  No    Mediport: No           FALLS RISK SCREENING ASSESSMENT    Instructions:  Assess the patient and [place] the appropriate indicators that are present for fall risk identification. Total the numbers circled and assign a fall risk score from Table 2.  Reassess patient at a minimum every 12 weeks or with status change. Assessment   Date  5/18/2022     1. Mental Ability: confusion/cognitively impaired No - 0       2. Elimination Issues: incontinence, frequency No - 0       3. Ambulatory: use of assistive devices (walker, cane, off-loading devices), attached to equipment (IV pole, oxygen) No - 0     4. Sensory Limitations: dizziness, vertigo, impaired vision Yes - 3       5. Age Less than 65 years - 0       6. Medication: diuretics, strong analgesics, hypnotics, sedatives, antihypertensive agents   Yes - 3   7. Falls:  recent history of falls within the last 3 months (not to include slipping or tripping)   No - 0   TOTAL 6    If score of 4 or greater was education given? Yes       TABLE 2   Risk Score Risk Level Plan of Care   0-3 Little or  No Risk 1. Provide assistance as indicated for ambulation activities  2. Reorient confused/cognitively impaired patient  3. Call-light/bell within patient's reach  4. Chair/bed in low position, stretcher/bed with siderails up except when performing patient care activities  5.   Educate patient/family/caregiver on falls prevention  6.  Reassess in 15 weeks or with any noted change in patient condition which places them at a risk for a fall   4-6 Moderate Risk 1. Provide assistance as indicated for ambulation activities  2. Reorient confused/cognitively impaired patient  3. Call-light/bell within patient's reach  4. Chair/bed in low position, stretcher/bed with siderails up except when performing patient care activities  5. Educate patient/family/caregiver on falls prevention  6. Falls risk precaution (Yellow sticker Level II) placed on patient chart   7 or   Higher High Risk 1. Place patient in easily observable treatment room  2. Patient attended at all times by family member or staff  3. Provide assistance as indicated for ambulation activities  4. Reorient confused/cognitively impaired patient  5. Call-light/bell within patient's reach  6. Chair/bed in low position, stretcher/bed with siderails up except when performing patient care activities  7. Educate patient/family/caregiver on falls prevention  8. Falls risk precaution (Yellow sticker Level III) placed on patient chart           MALNUTRITION RISK SCREENING ASSESSMENT    Instructions:  Assess the patient and enter the appropriate indicators that are present for nutrition risk identification. Total the numbers entered and assign a risk score. Follow the appropriate action for total score listed below. Assessment   Date  5/18/2022     1. Have you lost weight without trying? 0- No     2. Have you been eating poorly because of a decreased appetite? 0- No   3. Do you have a diagnosis of head and neck cancer?       0- No                                                                                    TOTAL 0        Score of 0-1: No action  Score 2 or greater:  · For Non-Diabetic Patient: Recommend adding Ensure Enlive 2 x daily and provide patient with Ensure wellness bag with coupons  · For Diabetic Patient: Recommend adding Glucerna Shake 2 x daily and provide patient with Glucerna Wellness bag with coupons  · Route to the dietitian via Rose Barnes RN

## 2022-05-18 NOTE — PROGRESS NOTES
[address] [facility] MEDICAL ONCOLOGY  [address] [place] [id number]  Dept: [phone]  Loc: [phone]  Attending Consult Note      Reason for Visit:   Leukocytosis, abnormal bone scan. Referring Physician:  Tabitha Lewis DO    PCP:  Tabitha Lewis DO    History of Present Illness:     Mr. Lucero Roman is a 72-year-old gentleman with a past medical history significant for type I DM, diabetic retinopathy, neuropathy, CKD, hyperlipidemia, tobacco use disorder, bipolar disease, and hypertension, who was referred to the hematology office for elevation of persistent leukocytosis as well as an abnormal bone scan. The patient has a history of intermittent leukocytosis, recently the leukocytosis has been persistent, CBCD from 4/20/2022 was remarkable for a white count of 21, ANC 33095, hemoglobin 11.7, hematocrit 36.1, platelet count 408Q, the patient was noted to have elevated alk phos, bone scan was done on 3/23/2022, which had revealed tracer uptake compatible with degenerative changes, tracer uptake throughout the ribs anteriorly, most significant involving the left third fifth and seventh rib, metastatic disease is not excluded. Tracer uptake at the level of the right left hip, likely degenerative. Tracer uptake at the level of the mandible and maxilla. CT scan of the abdomen the pelvis from 4/21/2020 was negative for malignancy. SPEP was done on 4/20/2022, was negative for monoclonal proteins, kappa is elevated at 44.31, lambda 13.32, with a normal kappa to lambda ratio, 1.22. Creatinine 2.1, calcium 9.9. The patient denies MVAs, falls, or trauma to the chest    Review of Systems;  CONSTITUTIONAL: No fever, chills. Good appetite and energy level. ENMT: Eyes: No diplopia; positive for decreased vision. Nose: No epistaxis. Mouth: No sore throat. RESPIRATORY: No hemoptysis, shortness of breath, cough.    CARDIOVASCULAR: No chest pain, palpitations. GASTROINTESTINAL: Positive for nausea and abdominal pain. GENITOURINARY: No dysuria, urinary frequency, hematuria. NEURO: No syncope, presyncope, headache.   Remainder:  ROS NEGATIVE    Past Medical History:      Diagnosis Date    Asthma     Bipolar affective ([address]     COPD (chronic obstructive pulmonary disease) (HCC)     Diabetes mellitus type I ([address]     Diabetic neuropathy (HCC)     Diabetic, retinopathy, proliferative ([address]     HTN (hypertension)     due to left renal artery stenosis    Hypercholesteremia     Left renal artery stenosis ([address] 09/19/2019    Macular edema due to secondary diabetes ([address]     Opioid dependence (HCC)     Proteinuria     Retinal detachment     right eye    Vitreous hemorrhage of right eye due to diabetes mellitus ([address]      Patient Active Problem List   Diagnosis    Hyperlipidemia    Elevated liver enzymes    HTN (hypertension)    Anxiety    Vitamin D insufficiency    Acquired hypothyroidism    Uncontrolled type 1 diabetes mellitus with hyperglycemia (HCC)    Chest pain    Tobacco abuse    Opioid dependence ([address]    Bipolar affective ([address]    Other microscopic hematuria    Elevated alkaline phosphatase level    Lactic acidosis    Proliferative diabetic retinopathy of both eyes with macular edema associated with type 1 diabetes mellitus (HCC)    Moderate persistent asthma without complication    Left renal artery stenosis (HCC)    DKA, type 1, not at goal [facility])    Hyperglycemia        Past Surgical History:      Procedure Laterality Date    EYE SURGERY Right 11/20/2020    PARS PLANA VITRECTOMY, MEMBRANE PEEL, ENDOLASER, GAS FLUID EXCHANGE, 25G, MAC, RIGHT EYE performed by Best aDlal MD at [facility] Right 01/08/2021    pars plana vitrectomy with peeling of preretinal tissue and gas fluid exchange    EYE SURGERY Right 1/8/2021    PARS PLANA VITRECTOMY, INTERNAL LIMITING MEMBRANE PEEL, INDOCYANINE GREEN, GAS Family: Not on file    Frequency of Social Gatherings with Friends and Family: Not on file    Attends Christianity Services: Not on file    Active Member of Clubs or Organizations: Not on file    Attends Club or Organization Meetings: Not on file    Marital Status: Not on file   Intimate Partner Violence:     Fear of Current or Ex-Partner: Not on file    Emotionally Abused: Not on file    Physically Abused: Not on file    Sexually Abused: Not on file   Housing Stability: [address] Unable to Pay for Housing in the Last Year: No    Number of [place] in the Last Year: 2    Unstable Housing in the Last Year: No       Allergies: Allergies   Allergen Reactions    Chlorthalidone      Vomiting     Hctz [Hydrochlorothiazide] Nausea Only    Hydralazine      Nausea, headache       Physical Exam:  BP (!) 168/123   Pulse 110   Temp 97.5 °F (36.4 °C)   Ht 5' 3\" (1.6 m)   Wt 112 lb (50.8 kg)   SpO2 100%   BMI 19.84 kg/m²   GENERAL: Alert, oriented x 3, not in acute distress. HEENT: PERRLA; EOMI. Oropharynx clear. Poor dental hygiene  NECK: Supple. No palpable cervical or supraclavicular lymphadenopathy. LUNGS: Good air entry bilaterally. No wheezing, crackles or rhonchi. CARDIOVASCULAR: Regular rate, tachycardic. ABDOMEN: Soft. Non-tender, non-distended. Positive bowel sounds. EXTREMITIES: Without clubbing, cyanosis, or edema. NEUROLOGIC: No focal deficits. ECOG PS 0     Impression/Plan:       Mr. Evette Aleman is a 60-year-old gentleman with a past medical history significant for type I DM, diabetic retinopathy, neuropathy, CKD, hyperlipidemia, tobacco use disorder, bipolar disease, and hypertension, who was referred to the hematology office for elevation of persistent leukocytosis as well as an abnormal bone scan.   The patient has a history of intermittent leukocytosis, recently the leukocytosis has been persistent, CBCD from 4/20/2022 was remarkable for a white count of 21, ANC 64598, hemoglobin 11.7, hematocrit 36.1, platelet count 465P, the patient was noted to have elevated alk phos, bone scan was done on 3/23/2022, which had revealed tracer uptake compatible with degenerative changes, tracer uptake throughout the ribs anteriorly, most significant involving the left third fifth and seventh rib, metastatic disease is not excluded. Tracer uptake at the level of the right left hip, likely degenerative. Tracer uptake at the level of the mandible and maxilla. CT scan of the abdomen the pelvis from 4/21/2020 was negative for malignancy. SPEP was done on 4/20/2022, was negative for monoclonal proteins, kappa is elevated at 44.31, lambda 13.32, with a normal kappa to lambda ratio, 1.22. Creatinine 2.1, calcium 9.9. The patient has leukocytosis with neutrophilia, likely secondary to inflammation, stress, and smoking cigarettes, will order a work-up to rule out a myeloproliferative neoplasm, the patient will have, PS, BCR-ABL FISH, JAK2, YUE R and MPL mutations done, will also order a flow cytometry. Bone scan is revealing abnormal uptake in the ribs, metastatic disease could not be excluded, no history of trauma, CT scan of the abdomen the pelvis was negative for primary malignancy, chest CT scan was ordered, will review the bone scan with radiology. SPEP is negative for monoclonal proteins, both kappa and lambda are both elevated, likely secondary to inflammation and CKD, the ratio is normal, he does not have plasma cell dyscrasia. RTC in 1 month. Thank you for allowing us to participate in the care of Mr. Helio Morales.     Kait Orellana MD   HEMATOLOGY/MEDICAL [address] MEDICAL ONCOLOGY  [address] [place] [id number]  Dept: [phone] [place]: [phone]

## 2022-05-19 LAB — DIABETIC RETINOPATHY: POSITIVE

## 2022-06-01 DIAGNOSIS — E78.2 MIXED HYPERLIPIDEMIA: ICD-10-CM

## 2022-06-01 DIAGNOSIS — E10.65 TYPE 1 DIABETES MELLITUS WITH HYPERGLYCEMIA (HCC): ICD-10-CM

## 2022-06-01 DIAGNOSIS — I15.9 SECONDARY HYPERTENSION: ICD-10-CM

## 2022-06-01 DIAGNOSIS — I15.0 RENOVASCULAR HYPERTENSION: ICD-10-CM

## 2022-06-01 RX ORDER — METOPROLOL SUCCINATE 50 MG/1
50 TABLET, EXTENDED RELEASE ORAL 2 TIMES DAILY
Qty: 180 TABLET | Refills: 0 | Status: SHIPPED | OUTPATIENT
Start: 2022-06-01 | End: 2022-09-28

## 2022-06-01 RX ORDER — GABAPENTIN 300 MG/1
300 CAPSULE ORAL 2 TIMES DAILY
Qty: 180 CAPSULE | Refills: 0 | OUTPATIENT
Start: 2022-06-01 | End: 2022-08-30

## 2022-06-01 RX ORDER — CLONIDINE HYDROCHLORIDE 0.3 MG/1
0.3 TABLET ORAL 2 TIMES DAILY
Qty: 180 TABLET | Refills: 0 | Status: SHIPPED | OUTPATIENT
Start: 2022-06-01 | End: 2022-09-28

## 2022-06-01 RX ORDER — ASPIRIN 81 MG/1
81 TABLET, CHEWABLE ORAL DAILY
Qty: 90 TABLET | Refills: 0 | Status: SHIPPED | OUTPATIENT
Start: 2022-06-01

## 2022-06-01 RX ORDER — FENOFIBRATE 145 MG/1
145 TABLET, COATED ORAL DAILY
Qty: 90 TABLET | Refills: 0 | Status: SHIPPED
Start: 2022-06-01 | End: 2022-08-10

## 2022-06-01 RX ORDER — BLOOD SUGAR DIAGNOSTIC
1 STRIP MISCELLANEOUS DAILY
Qty: 30 EACH | Refills: 5 | Status: SHIPPED | OUTPATIENT
Start: 2022-06-01

## 2022-06-01 RX ORDER — INSULIN DEGLUDEC INJECTION 100 U/ML
55 INJECTION, SOLUTION SUBCUTANEOUS NIGHTLY
Qty: 5 PEN | Refills: 5 | Status: SHIPPED
Start: 2022-06-01 | End: 2022-10-20

## 2022-06-01 RX ORDER — LISINOPRIL 20 MG/1
20 TABLET ORAL DAILY
Qty: 90 TABLET | Refills: 0 | Status: SHIPPED | OUTPATIENT
Start: 2022-06-01

## 2022-06-01 RX ORDER — ATORVASTATIN CALCIUM 80 MG/1
80 TABLET, FILM COATED ORAL DAILY
Qty: 90 TABLET | Refills: 0 | Status: SHIPPED | OUTPATIENT
Start: 2022-06-01

## 2022-06-01 NOTE — TELEPHONE ENCOUNTER
Medication Refill Request    LOV 4/20/2022  NOV Visit date not found    Lab Results   Component Value Date    CREATININE 2.1 (H) 04/27/2022

## 2022-06-03 ENCOUNTER — PATIENT MESSAGE (OUTPATIENT)
Dept: FAMILY MEDICINE CLINIC | Age: 32
End: 2022-06-03

## 2022-06-03 DIAGNOSIS — E10.65 TYPE 1 DIABETES MELLITUS WITH HYPERGLYCEMIA (HCC): Primary | ICD-10-CM

## 2022-06-03 DIAGNOSIS — I15.0 RENOVASCULAR HYPERTENSION: ICD-10-CM

## 2022-06-23 ENCOUNTER — HOSPITAL ENCOUNTER (OUTPATIENT)
Age: 32
Discharge: HOME | End: 2022-06-23
Payer: COMMERCIAL

## 2022-06-23 ENCOUNTER — HOSPITAL ENCOUNTER (OUTPATIENT)
Dept: CT IMAGING | Age: 32
Discharge: HOME | End: 2022-06-25
Payer: COMMERCIAL

## 2022-06-23 DIAGNOSIS — I15.0 RENOVASCULAR HYPERTENSION: ICD-10-CM

## 2022-06-23 DIAGNOSIS — R94.8 ABNORMAL RADIONUCLIDE BONE SCAN: ICD-10-CM

## 2022-06-23 DIAGNOSIS — E10.65 TYPE 1 DIABETES MELLITUS WITH HYPERGLYCEMIA (HCC): ICD-10-CM

## 2022-06-23 DIAGNOSIS — D72.828 OTHER ELEVATED WHITE BLOOD CELL (WBC) COUNT: ICD-10-CM

## 2022-06-23 LAB
ALBUMIN SERPL-MCNC: 3.8 G/DL (ref 3.5–5.2)
ALP BLD-CCNC: 162 U/L (ref 40–129)
ALT SERPL-CCNC: 11 U/L (ref 0–40)
ANION GAP SERPL CALCULATED.3IONS-SCNC: 14 MMOL/L (ref 7–16)
AST SERPL-CCNC: 20 U/L (ref 0–39)
BASOPHILS ABSOLUTE: 0.11 E9/L (ref 0–0.2)
BASOPHILS RELATIVE PERCENT: 0.6 % (ref 0–2)
BILIRUB SERPL-MCNC: <0.2 MG/DL (ref 0–1.2)
BUN BLDV-MCNC: 24 MG/DL (ref 6–20)
CALCIUM SERPL-MCNC: 9.5 MG/DL (ref 8.6–10.2)
CHLORIDE BLD-SCNC: 103 MMOL/L (ref 98–107)
CO2: 20 MMOL/L (ref 22–29)
CREAT SERPL-MCNC: 2 MG/DL (ref 0.7–1.2)
CREATININE URINE: 119 MG/DL (ref 40–278)
EOSINOPHILS ABSOLUTE: 0.33 E9/L (ref 0.05–0.5)
EOSINOPHILS RELATIVE PERCENT: 1.8 % (ref 0–6)
GFR AFRICAN AMERICAN: 47
GFR NON-AFRICAN AMERICAN: 39 ML/MIN/1.73
GLUCOSE BLD-MCNC: 47 MG/DL (ref 74–99)
HBA1C MFR BLD: 9.6 % (ref 4–5.6)
HCT VFR BLD CALC: 34 % (ref 37–54)
HEMOGLOBIN: 11.4 G/DL (ref 12.5–16.5)
IMMATURE GRANULOCYTES #: 0.08 E9/L
IMMATURE GRANULOCYTES %: 0.4 % (ref 0–5)
LYMPHOCYTES ABSOLUTE: 3.53 E9/L (ref 1.5–4)
LYMPHOCYTES RELATIVE PERCENT: 18.9 % (ref 20–42)
MCH RBC QN AUTO: 29.5 PG (ref 26–35)
MCHC RBC AUTO-ENTMCNC: 33.5 % (ref 32–34.5)
MCV RBC AUTO: 87.9 FL (ref 80–99.9)
MICROALBUMIN UR-MCNC: 3557.6 MG/L
MICROALBUMIN/CREAT UR-RTO: 2989.6 (ref 0–30)
MONOCYTES ABSOLUTE: 0.53 E9/L (ref 0.1–0.95)
MONOCYTES RELATIVE PERCENT: 2.8 % (ref 2–12)
NEUTROPHILS ABSOLUTE: 14.13 E9/L (ref 1.8–7.3)
NEUTROPHILS RELATIVE PERCENT: 75.5 % (ref 43–80)
PDW BLD-RTO: 13.7 FL (ref 11.5–15)
PLATELET # BLD: 419 E9/L (ref 130–450)
PMV BLD AUTO: 9.8 FL (ref 7–12)
POTASSIUM SERPL-SCNC: 4.8 MMOL/L (ref 3.5–5)
RBC # BLD: 3.87 E12/L (ref 3.8–5.8)
SODIUM BLD-SCNC: 137 MMOL/L (ref 132–146)
TOTAL PROTEIN: 7.5 G/DL (ref 6.4–8.3)
WBC # BLD: 18.7 E9/L (ref 4.5–11.5)

## 2022-06-23 PROCEDURE — 83036 HEMOGLOBIN GLYCOSYLATED A1C: CPT

## 2022-06-23 PROCEDURE — 81338 MPL GENE COMMON VARIANTS: CPT

## 2022-06-23 PROCEDURE — 80053 COMPREHEN METABOLIC PANEL: CPT

## 2022-06-23 PROCEDURE — 36415 COLL VENOUS BLD VENIPUNCTURE: CPT

## 2022-06-23 PROCEDURE — 85025 COMPLETE CBC W/AUTO DIFF WBC: CPT

## 2022-06-23 PROCEDURE — 81270 JAK2 GENE: CPT

## 2022-06-23 PROCEDURE — 82570 ASSAY OF URINE CREATININE: CPT

## 2022-06-23 PROCEDURE — 81219 CALR GENE COM VARIANTS: CPT

## 2022-06-23 PROCEDURE — 82044 UR ALBUMIN SEMIQUANTITATIVE: CPT

## 2022-06-23 PROCEDURE — 71250 CT THORAX DX C-: CPT

## 2022-06-24 LAB — PATHOLOGIST REVIEW: NORMAL

## 2022-06-29 ENCOUNTER — HOSPITAL ENCOUNTER (OUTPATIENT)
Dept: INFUSION THERAPY | Age: 32
Discharge: HOME | End: 2022-06-29
Payer: COMMERCIAL

## 2022-06-29 ENCOUNTER — OFFICE VISIT (OUTPATIENT)
Dept: ONCOLOGY | Age: 32
End: 2022-06-29
Payer: COMMERCIAL

## 2022-06-29 VITALS
SYSTOLIC BLOOD PRESSURE: 159 MMHG | DIASTOLIC BLOOD PRESSURE: 112 MMHG | TEMPERATURE: 97.9 F | HEART RATE: 114 BPM | WEIGHT: 119 LBS | OXYGEN SATURATION: 100 % | BODY MASS INDEX: 21.09 KG/M2 | HEIGHT: 63 IN

## 2022-06-29 DIAGNOSIS — D72.828 OTHER ELEVATED WHITE BLOOD CELL (WBC) COUNT: Primary | ICD-10-CM

## 2022-06-29 LAB
Lab: NORMAL
REPORT: NORMAL
THIS TEST SENT TO: NORMAL

## 2022-06-29 PROCEDURE — G8427 DOCREV CUR MEDS BY ELIG CLIN: HCPCS | Performed by: INTERNAL MEDICINE

## 2022-06-29 PROCEDURE — G8420 CALC BMI NORM PARAMETERS: HCPCS | Performed by: INTERNAL MEDICINE

## 2022-06-29 PROCEDURE — 99214 OFFICE O/P EST MOD 30 MIN: CPT | Performed by: INTERNAL MEDICINE

## 2022-06-29 PROCEDURE — 4004F PT TOBACCO SCREEN RCVD TLK: CPT | Performed by: INTERNAL MEDICINE

## 2022-06-29 PROCEDURE — 99212 OFFICE O/P EST SF 10 MIN: CPT

## 2022-06-29 NOTE — PROGRESS NOTES
[address] [facility] MEDICAL ONCOLOGY  [address] [place] [address]  [place] [address]  Dept: [phone]  Loc: [phone]  Attending Consult Note      Reason for Visit:   Leukocytosis, abnormal bone scan. Referring Physician:  Denise Olsen DO    PCP:  Denise Olsen DO    History of Present Illness:     Mr. Chance Camacho is a 24-year-old gentleman with a past medical history significant for type I DM, diabetic retinopathy, neuropathy, CKD, hyperlipidemia, tobacco use disorder, bipolar disease, and hypertension, who was referred to the hematology office for elevation of persistent leukocytosis as well as an abnormal bone scan. The patient has a history of intermittent leukocytosis, recently the leukocytosis has been persistent, CBCD from 4/20/2022 was remarkable for a white count of 21, ANC 44176, hemoglobin 11.7, hematocrit 36.1, platelet count 536U, the patient was noted to have elevated alk phos, bone scan was done on 3/23/2022, which had revealed tracer uptake compatible with degenerative changes, tracer uptake throughout the ribs anteriorly, most significant involving the left third fifth and seventh rib, metastatic disease is not excluded. Tracer uptake at the level of the right left hip, likely degenerative. Tracer uptake at the level of the mandible and maxilla. CT scan of the abdomen the pelvis from 4/21/2020 was negative for malignancy. SPEP was done on 4/20/2022, was negative for monoclonal proteins, kappa is elevated at 44.31, lambda 13.32, with a normal kappa to lambda ratio, 1.22. Creatinine 2.1, calcium 9.9. The patient returns for a follow-up visit, he is doing well overall, has no complaints, his hemoglobin A1c is improving    Review of Systems;  CONSTITUTIONAL: No fever, chills. Good appetite and energy level. ENMT: Eyes: No diplopia; positive for decreased vision. Nose: No epistaxis. Mouth: No sore throat.   RESPIRATORY: No hemoptysis, shortness of breath, cough. CARDIOVASCULAR: No chest pain, palpitations. GASTROINTESTINAL: Positive for nausea and abdominal pain. GENITOURINARY: No dysuria, urinary frequency, hematuria. NEURO: No syncope, presyncope, headache.   Remainder:  ROS NEGATIVE    Past Medical History:      Diagnosis Date    Asthma     Bipolar affective ([address]     COPD (chronic obstructive pulmonary disease) (HCC)     Diabetes mellitus type I ([address]     Diabetic neuropathy (HCC)     Diabetic, retinopathy, proliferative ([address]     HTN (hypertension)     due to left renal artery stenosis    Hypercholesteremia     Left renal artery stenosis ([address] 09/19/2019    Macular edema due to secondary diabetes ([address]     Opioid dependence ([facility])     Proteinuria     Retinal detachment     right eye    Vitreous hemorrhage of right eye due to diabetes mellitus ([address]      Patient Active Problem List   Diagnosis    Hyperlipidemia    Elevated liver enzymes    HTN (hypertension)    Anxiety    Vitamin D insufficiency    Acquired hypothyroidism    Uncontrolled type 1 diabetes mellitus with hyperglycemia (HCC)    Chest pain    Tobacco abuse    Opioid dependence ([address]    Bipolar affective ([address]    Other microscopic hematuria    Elevated alkaline phosphatase level    Lactic acidosis    Proliferative diabetic retinopathy of both eyes with macular edema associated with type 1 diabetes mellitus (HCC)    Moderate persistent asthma without complication    Left renal artery stenosis (HCC)    DKA, type 1, not at goal ([address]    Hyperglycemia        Past Surgical History:      Procedure Laterality Date    EYE SURGERY Right 11/20/2020    PARS PLANA VITRECTOMY, MEMBRANE PEEL, ENDOLASER, GAS FLUID EXCHANGE, 25G, MAC, RIGHT EYE performed by Denisse Nicolas MD at [facility] Right 01/08/2021    pars plana vitrectomy with peeling of preretinal tissue and gas fluid exchange    EYE SURGERY Right 1/8/2021    PARS PLANA VITRECTOMY, INTERNAL LIMITING MEMBRANE PEEL, INDOCYANINE GREEN, GAS FLUID EXCHANGE, 25g, MAC, RIGHT EYE performed by Que Bro MD at [address] Right 03/2021    macular peel-with q 4 week injections       Family History:  Family History   Problem Relation Age of Onset    No Known Problems Mother     No Known Problems Father     No Known Problems Brother         Homicide     No Known Problems Daughter     No Known Problems Son     Diabetes type 2  Maternal Grandmother        Medications:  Reviewed and reconciled. Social History:  Social History     Socioeconomic History    Marital status:      Spouse name: Not on file    Number of children: 2    Years of education: Not on file    Highest education level: Not on file   Occupational History    Occupation: Unemployed     Comment: Last job [employer]   Tobacco Use    Smoking status: Current Every Day Smoker     Packs/day: 0.50     Years: 15.00     Pack years: 7.50     Types: Cigarettes    Smokeless tobacco: Never Used   Vaping Use    Vaping Use: Never used   Substance and Sexual Activity    Alcohol use: Never    Drug use: Not Currently     Types: Opiates      Comment: 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 10/2017, now takes Suboxone    Sexual activity: Not Currently   Other Topics Concern    Not on file   Social History Narrative    Not on file     Social Determinants of Health     Financial Resource Strain: High Risk    Difficulty of Paying Living Expenses: Hard   Food Insecurity: No Food Insecurity    Worried About Running Out of Food in the Last Year: Never true    Kim of Food in the Last Year: Never true   Transportation Needs: Unmet Transportation Needs    Lack of Transportation (Medical):  Yes    Lack of Transportation (Non-Medical): Yes   Physical Activity:     Days of Exercise per Week: Not on file    Minutes of Exercise per Session: Not on file   Stress:     Feeling of Stress : Not on file Social Connections:     Frequency of Communication with Friends and Family: Not on file    Frequency of Social Gatherings with Friends and Family: Not on file    Attends Latter day Services: Not on file    Active Member of Clubs or Organizations: Not on file    Attends Club or Organization Meetings: Not on file    Marital Status: Not on file   Intimate Partner Violence:     Fear of Current or Ex-Partner: Not on file    Emotionally Abused: Not on file    Physically Abused: Not on file    Sexually Abused: Not on file   Housing Stability: [address] Unable to Pay for Housing in the Last Year: No    Number of [place] in the Last Year: 2    Unstable Housing in the Last Year: No       Allergies: Allergies   Allergen Reactions    Chlorthalidone      Vomiting     Hctz [Hydrochlorothiazide] Nausea Only    Hydralazine      Nausea, headache       Physical Exam:  BP (!) 159/112   Pulse (!) 114   Temp 97.9 °F (36.6 °C)   Ht 5' 3\" (1.6 m)   Wt 119 lb (54 kg)   SpO2 100%   BMI 21.08 kg/m²   GENERAL: Alert, oriented x 3, not in acute distress. HEENT: PERRLA; EOMI. Oropharynx clear. Poor dental hygiene  NECK: Supple. No palpable cervical or supraclavicular lymphadenopathy. LUNGS: Good air entry bilaterally. No wheezing, crackles or rhonchi. CARDIOVASCULAR: Regular rate, tachycardic. ABDOMEN: Soft. Non-tender, non-distended. Positive bowel sounds. EXTREMITIES: Without clubbing, cyanosis, or edema. NEUROLOGIC: No focal deficits. ECOG PS 0     Impression/Plan:       Mr. Juanita Jade is a 35-year-old gentleman with a past medical history significant for type I DM, diabetic retinopathy, neuropathy, CKD, hyperlipidemia, tobacco use disorder, bipolar disease, and hypertension, who was referred to the hematology office for elevation of persistent leukocytosis as well as an abnormal bone scan.   The patient has a history of intermittent leukocytosis, recently the leukocytosis has been persistent, CBCD from 4/20/2022 was remarkable for a white count of 21, ANC 16627, hemoglobin 11.7, hematocrit 36.1, platelet count 726S, the patient was noted to have elevated alk phos, bone scan was done on 3/23/2022, which had revealed tracer uptake compatible with degenerative changes, tracer uptake throughout the ribs anteriorly, most significant involving the left third fifth and seventh rib, metastatic disease is not excluded. Tracer uptake at the level of the right left hip, likely degenerative. Tracer uptake at the level of the mandible and maxilla. CT scan of the abdomen the pelvis from 4/21/2020 was negative for malignancy. SPEP was done on 4/20/2022, was negative for monoclonal proteins, kappa is elevated at 44.31, lambda 13.32, with a normal kappa to lambda ratio, 1.22. Creatinine 2.1, calcium 9.9. The patient has leukocytosis with neutrophilia, likely secondary to inflammation, stress, and smoking cigarettes, a work-up was ordered to rule out a myeloproliferative neoplasm,BCR-ABL FISH, JAK2, YUE R and MPL mutations and flow are pending. Bone scan is revealing abnormal uptake in the ribs, metastatic disease could not be excluded, no history of trauma, CT scan of the abdomen the pelvis was negative for primary malignancy, chest CT scan was done and is negative for malignancy, he does have rib fractures, which is causing the uptake on bone scan. SPEP is negative for monoclonal proteins, both kappa and lambda are both elevated, likely secondary to inflammation and CKD, the ratio is normal, he does not have plasma cell dyscrasia. Recommend continue monitor his counts. If his hemoglobin falls to below 10 TAM's would be beneficial.    RTC in 3 months. Thank you for allowing us to participate in the care of Mr. Justina Ryan.     Deisy Brown MD   HEMATOLOGY/MEDICAL ONCOLOGY  [facility] [address] [facility] MEDICAL ONCOLOGY  [address] [place] [address]  Dept: [phone] [place]: [phone]

## 2022-06-30 DIAGNOSIS — R74.8 ELEVATED ALKALINE PHOSPHATASE LEVEL: Primary | ICD-10-CM

## 2022-07-03 LAB
Lab: NORMAL
REPORT: NORMAL
THIS TEST SENT TO: NORMAL

## 2022-07-05 LAB
Lab: NORMAL
REPORT: NORMAL
THIS TEST SENT TO: NORMAL

## 2022-07-27 LAB — DIABETIC RETINOPATHY: POSITIVE

## 2022-08-10 ENCOUNTER — APPOINTMENT (OUTPATIENT)
Dept: CT IMAGING | Age: 32
End: 2022-08-10
Payer: COMMERCIAL

## 2022-08-10 ENCOUNTER — HOSPITAL ENCOUNTER (EMERGENCY)
Age: 32
Discharge: HOME | End: 2022-08-10
Attending: EMERGENCY MEDICINE
Payer: COMMERCIAL

## 2022-08-10 VITALS
RESPIRATION RATE: 18 BRPM | OXYGEN SATURATION: 98 % | SYSTOLIC BLOOD PRESSURE: 157 MMHG | HEART RATE: 92 BPM | WEIGHT: 118 LBS | HEIGHT: 63 IN | DIASTOLIC BLOOD PRESSURE: 99 MMHG | BODY MASS INDEX: 20.91 KG/M2 | TEMPERATURE: 97.7 F

## 2022-08-10 DIAGNOSIS — H54.62 VISION LOSS OF LEFT EYE: Primary | ICD-10-CM

## 2022-08-10 PROCEDURE — 99284 EMERGENCY DEPT VISIT MOD MDM: CPT

## 2022-08-10 PROCEDURE — 70480 CT ORBIT/EAR/FOSSA W/O DYE: CPT

## 2022-08-10 ASSESSMENT — ENCOUNTER SYMPTOMS
ABDOMINAL PAIN: 0
COLOR CHANGE: 0
RHINORRHEA: 0
SHORTNESS OF BREATH: 0
BLOOD IN STOOL: 0
EYE DISCHARGE: 1
COUGH: 0
EYE PAIN: 0
NAUSEA: 0
PHOTOPHOBIA: 0
EYE REDNESS: 1
DIARRHEA: 0
TROUBLE SWALLOWING: 0
VOMITING: 0

## 2022-08-10 ASSESSMENT — PAIN - FUNCTIONAL ASSESSMENT: PAIN_FUNCTIONAL_ASSESSMENT: NONE - DENIES PAIN

## 2022-08-11 NOTE — ED PROVIDER NOTES
stiffness. Skin:  Negative for color change.    Neurological:  Negative for dizziness, syncope, weakness, light-headedness, numbness and headaches.       --------------------------------------------- PAST HISTORY ---------------------------------------------  Past Medical History:   Past Medical History:   Diagnosis Date    Asthma     Bipolar affective ([facility]     COPD (chronic obstructive pulmonary disease) ([facility]     Diabetes mellitus type I ([facility]     Diabetic neuropathy ([facility]     Diabetic, retinopathy, proliferative ([facility]     HTN (hypertension)     due to left renal artery stenosis    Hypercholesteremia     Left renal artery stenosis ([facility] 09/19/2019    Macular edema due to secondary diabetes ([facility]     Opioid dependence (HCC)     Proteinuria     Retinal detachment     right eye    Vitreous hemorrhage of right eye due to diabetes mellitus [facility])        Past Surgical History:   Past Surgical History:   Procedure Laterality Date    EYE SURGERY Right 11/20/2020    PARS PLANA VITRECTOMY, MEMBRANE PEEL, ENDOLASER, GAS FLUID EXCHANGE, 25G, MAC, RIGHT EYE performed by Jami Shah MD at [address] Right 01/08/2021    pars plana vitrectomy with peeling of preretinal tissue and gas fluid exchange    EYE SURGERY Right 1/8/2021    PARS PLANA VITRECTOMY, INTERNAL LIMITING MEMBRANE PEEL, INDOCYANINE GREEN, GAS FLUID EXCHANGE, 25g, MAC, RIGHT EYE performed by Jami Shah MD at [address] Right 03/2021    macular peel-with q 4 week injections       Social History:   Social History     Socioeconomic History    Marital status:      Spouse name: None    Number of children: 2    Years of education: None    Highest education level: None   Occupational History    Occupation: Unemployed     Comment: Last job [employer]   Tobacco Use    Smoking status: Every Day     Packs/day: 0.50     Years: 15.00     Pack years: 7.50     Types: Cigarettes    Smokeless tobacco: Never   Vaping Use Vaping Use: Never used   Substance and Sexual Activity    Alcohol use: Never    Drug use: Not Currently     Types: Opiates      Comment: 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 10/2017, now takes Suboxone    Sexual activity: Not Currently     Social Determinants of Health     Financial Resource Strain: High Risk    Difficulty of Paying Living Expenses: Hard   Food Insecurity: No Food Insecurity    Worried About Running Out of Food in the Last Year: Never true    Ran Out of Food in the Last Year: Never true   Transportation Needs: Unmet Transportation Needs    Lack of Transportation (Medical): Yes    Lack of Transportation (Non-Medical): Yes   Housing Stability: Low Risk     Unable to Pay for Housing in the Last Year: No    Number of Places Lived in the Last Year: 2    Unstable Housing in the Last Year: No       Family History:   Family History   Problem Relation Age of Onset    No Known Problems Mother     No Known Problems Father     No Known Problems Brother         Homicide     No Known Problems Daughter     No Known Problems Son     Diabetes type 2  Maternal Grandmother        The patients home medications have been reviewed. Allergies: Allergies   Allergen Reactions    Chlorthalidone      Vomiting     Hctz [Hydrochlorothiazide] Nausea Only    Hydralazine      Nausea, headache           ---------------------------------------------------PHYSICAL EXAM--------------------------------------    BP (!) 157/99   Pulse 92   Temp 97.7 °F (36.5 °C) (Temporal)   Resp 18   Ht 5' 3\" (1.6 m)   Wt 118 lb (53.5 kg)   SpO2 98%   BMI 20.90 kg/m²     Physical Exam  Vitals and nursing note reviewed. Constitutional:       General: He is not in acute distress. Appearance: He is not toxic-appearing. HENT:      Mouth/Throat:      Mouth: Mucous membranes are moist.   Eyes:      Extraocular Movements: Extraocular movements intact. Pupils: Pupils are equal, round, and reactive to light.       Comments: OS conj injection. No active drainage. Mild scattered fluorescein uptake lateral aspect of cornea. Siedel negative. IOP 18mmHg. No periorbital erythema/induration/warmth/crepitus/fluctuance. Bedside US shows heterogeneous material in the posterior chamber. Lens appears to be slightly displaced medially. No obvious retinal detachment visualized. Cardiovascular:      Rate and Rhythm: Normal rate and regular rhythm. Pulses: Normal pulses. Heart sounds: Normal heart sounds. No murmur heard. Pulmonary:      Effort: Pulmonary effort is normal. No respiratory distress. Breath sounds: Normal breath sounds. No wheezing or rales. Abdominal:      General: There is no distension. Palpations: Abdomen is soft. Tenderness: There is no abdominal tenderness. There is no guarding or rebound. Musculoskeletal:         General: No swelling or tenderness. Normal range of motion. Cervical back: Normal range of motion and neck supple. No rigidity. No muscular tenderness. Skin:     General: Skin is warm and dry. Neurological:      Mental Status: He is alert and oriented to person, place, and time. Comments: Moves all extremities with appropriate strength. Sensation grossly intact in all extremities. ------------------------- NURSING NOTES AND VITALS REVIEWED ---------------------------   The nursing notes within the ED encounter and vital signs as below have been reviewed by myself. BP (!) 157/99   Pulse 92   Temp 97.7 °F (36.5 °C) (Temporal)   Resp 18   Ht 5' 3\" (1.6 m)   Wt 118 lb (53.5 kg)   SpO2 98%   BMI 20.90 kg/m²   Oxygen Saturation Interpretation: Normal    The patients available past medical records and past encounters were reviewed. ------------------------------ RESULTS --------------------------------------------------------------------    Imaging:  CT ORBITS WO CONTRAST   Final Result   No CT evidence of hemorrhage within the globes. Extensive dental disease. The partially imaged brain demonstrates parenchymal volume loss which is   advanced for the patient's age, and old lacunar infarcts.               ------------------------------ ED COURSE/MEDICAL DECISION MAKING----------------------      Consultations:             Ophthalmology    Counseling: The emergency provider has spoken with the patient and discussed todays results, in addition to providing specific details for the plan of care and counseling regarding the diagnosis and prognosis. Questions are answered at this time and they are agreeable with the plan. ED Course/Medical Decision Makin y.o. male here with acute left eye vision loss. In setting of diabetic retinopathy and vitreous hemorrhage, recent avastin intraocular injection 2 weeks ago. No associated pain, chemosis, or drainage to suggest acute iatrogenic infection such as endophthalmitis. ED workup unrevealing overall. Bedside US shows heterogenous material in the posterior chamber which I suspect is blood products from vitreous hemorrhage. IOP wnl. Discussed w/ on call Ophthalmology Dr. Cleve Valerio who recommends reaching out to the patient's retina specialist directly. Discussed w/ Tacos CANTOR with [facility], he recommends patient go to their [place] location at 9:00 AM tomorrow morning as Dr. Gianni Jiménez will be there.         --------------------------------- IMPRESSION AND DISPOSITION ---------------------------------    IMPRESSION  1. Vision loss of left eye        DISPOSITION  Disposition: Discharge to home  Patient condition is stable    NOTE: This report was transcribed using voice recognition software.  Every effort was made to ensure accuracy; however, inadvertent computerized transcription errors may be present    Uziel Ge MD  Attending Emergency Physician         Uziel Ge MD  [phone]

## 2022-08-13 ENCOUNTER — TELEPHONE (OUTPATIENT)
Dept: FAMILY MEDICINE CLINIC | Age: 32
End: 2022-08-13

## 2022-08-13 NOTE — TELEPHONE ENCOUNTER
Patient called. Reviewed recent ER notes. He is having worsening vision and is now unable to see well enough to draw up his insulin and check his glucometer. He had an appointment with Dr. Ileana Valdez yesterday and was told that his vision would likely worsen and there was nothing to do but wait for his appointment Tuesday with cornea specialist.  His mother gets home from work at [address]  He believes he has a glucometer that has audio and can say his readings out loud. My advice was go to ER since his vision was worsening. He stated they told him last time hr was there that there was nothing else he could do. If he is not going there, my advice was to check his glucose with the audio monitor. If over 350 go to ER. Otherwise wait for his mom to get home and follow sliding scale/lantus instructions appropriately. He did take his 55 units of lantus last evening and has eaten minimally today. I encouraged him strongly to avoid carbs and hydrate well with water. He agreed. I advised against administering insulin if he is unable to see how much he is giving himself and unable to check his glucose. His mom will be home in about 5 hours. If feeling like it is high he is to go to ER. He was comfortable and in agreement with the plan. Needs a quick follow up appt with PCP.  Forwarding message to her

## 2022-08-18 NOTE — TELEPHONE ENCOUNTER
Spoke doctor and ok for pt to see Gil Khoury. Spoke to pt and he made an apt for next week with Gil Khoury.

## 2022-09-07 ENCOUNTER — OFFICE VISIT (OUTPATIENT)
Dept: ENDOCRINOLOGY | Age: 32
End: 2022-09-07
Payer: COMMERCIAL

## 2022-09-07 VITALS
SYSTOLIC BLOOD PRESSURE: 195 MMHG | OXYGEN SATURATION: 100 % | DIASTOLIC BLOOD PRESSURE: 117 MMHG | HEIGHT: 63 IN | WEIGHT: 112 LBS | BODY MASS INDEX: 19.84 KG/M2 | HEART RATE: 100 BPM | RESPIRATION RATE: 16 BRPM

## 2022-09-07 DIAGNOSIS — I10 HYPERTENSION, UNSPECIFIED TYPE: ICD-10-CM

## 2022-09-07 DIAGNOSIS — E10.65 UNCONTROLLED TYPE 1 DIABETES MELLITUS WITH HYPERGLYCEMIA (HCC): Primary | ICD-10-CM

## 2022-09-07 LAB — HBA1C MFR BLD: 9.6 %

## 2022-09-07 PROCEDURE — 4004F PT TOBACCO SCREEN RCVD TLK: CPT | Performed by: INTERNAL MEDICINE

## 2022-09-07 PROCEDURE — G8427 DOCREV CUR MEDS BY ELIG CLIN: HCPCS | Performed by: INTERNAL MEDICINE

## 2022-09-07 PROCEDURE — 3046F HEMOGLOBIN A1C LEVEL >9.0%: CPT | Performed by: INTERNAL MEDICINE

## 2022-09-07 PROCEDURE — 83036 HEMOGLOBIN GLYCOSYLATED A1C: CPT | Performed by: INTERNAL MEDICINE

## 2022-09-07 PROCEDURE — 99214 OFFICE O/P EST MOD 30 MIN: CPT | Performed by: INTERNAL MEDICINE

## 2022-09-07 PROCEDURE — G8420 CALC BMI NORM PARAMETERS: HCPCS | Performed by: INTERNAL MEDICINE

## 2022-09-07 PROCEDURE — 2022F DILAT RTA XM EVC RTNOPTHY: CPT | Performed by: INTERNAL MEDICINE

## 2022-09-07 NOTE — PROGRESS NOTES
[address] [facility] Department of Endocrinology Diabetes and Metabolism   [address] [facility] [address]   Phone: [phone]  Fax: [phone]    Date of Service: 9/7/2022  Primary Care Physician: Corrie Adams DO  Referring physician: Natasha Lake DO  Provider: Stephanie Davila MD    Reason for the visit:  DM type 1     History of Present Illness: The history is provided by the patient. No  was used. Accuracy of the patient data is excellent.   Gustavo Johnson is a very pleasant 28 y.o. male seen today for diabetes management     Gustavo Johnson was diagnosed with diabetes at age 5 and currently on Tres[place] 60u daily at night, Novolog 1:12g + ss 2:50>150   The patient has been checking blood sugar 4 times/day and readings are highly variable   Most recent A1c results summarized below  Lab Results   Component Value Date/Time    LABA1C 9.6 09/07/2022 08:58 AM    LABA1C 9.6 06/23/2022 03:13 PM    LABA1C 11.7 03/19/2022 08:11 AM     Patient has had no hypoglycemic episodes   The patient hasn't been mindful of what has been eating and wasn't following diabetes diet    I reviewed current medications and the patient has no issues with diabetes medications  Gustavo Johnson is up to date with eye exam. + diabetic retinopathy , receiving injection both sides   The patient performs his own feet care  Microvascular complications:  No Retinopathy, Nephropathy or Neuropathy   Macrovascular complications: no CAD, PVD, or Stroke    PAST MEDICAL HISTORY   Past Medical History:   Diagnosis Date    Asthma     Bipolar affective ([address]     COPD (chronic obstructive pulmonary disease) ([address]     Diabetes mellitus type I ([address]     Diabetic neuropathy ([address]     Diabetic, retinopathy, proliferative ([address]     HTN (hypertension)     due to left renal artery stenosis    Hypercholesteremia     Left renal artery stenosis ([address] 09/19/2019    Macular edema due to secondary diabetes ([facility]     Opioid dependence (HCC)     Proteinuria     Retinal detachment     right eye    Vitreous hemorrhage of right eye due to diabetes mellitus ([facility]        PAST SURGICAL HISTORY   Past Surgical History:   Procedure Laterality Date    EYE SURGERY Right 11/20/2020    PARS PLANA VITRECTOMY, MEMBRANE PEEL, ENDOLASER, GAS FLUID EXCHANGE, 25G, MAC, RIGHT EYE performed by Jose Luis Martinez MD at [address] Right 01/08/2021    pars plana vitrectomy with peeling of preretinal tissue and gas fluid exchange    EYE SURGERY Right 1/8/2021    PARS PLANA VITRECTOMY, INTERNAL LIMITING MEMBRANE PEEL, INDOCYANINE GREEN, GAS FLUID EXCHANGE, 25g, MAC, RIGHT EYE performed by Jose Luis Martinez MD at [address] Right 03/2021    macular peel-with q 4 week injections       SOCIAL HISTORY   Tobacco:   reports that he has been smoking cigarettes. He has a 7.50 pack-year smoking history. He has never used smokeless tobacco.  Alcohol:   reports no history of alcohol use. Drugs:   reports that he does not currently use drugs after having used the following drugs: Opiates .     FAMILY HISTORY   Family History   Problem Relation Age of Onset    No Known Problems Mother     No Known Problems Father     No Known Problems Brother         Homicide     No Known Problems Daughter     No Known Problems Son     Diabetes type 2  Maternal Grandmother        ALLERGIES AND DRUG REACTIONS   Allergies   Allergen Reactions    Chlorthalidone      Vomiting     Hctz [Hydrochlorothiazide] Nausea Only    Hydralazine      Nausea, headache       CURRENT MEDICATIONS   Current Outpatient Medications   Medication Sig Dispense Refill    aspirin 81 MG chewable tablet Take 1 tablet by mouth daily 90 tablet 0    Insulin Degludec (TRESIBA FLEXTOUCH) 100 UNIT/ML SOPN Inject 55 Units into the skin nightly 5 pen 5    insulin aspart (NOVOLOG) 100 UNIT/ML injection vial Inject 6-10 Units into the skin 4 times daily with meals BP Readings from Last 4 Encounters:   09/07/22 (!) 195/117   08/10/22 (!) 157/99   06/29/22 (!) 159/112   05/18/22 (!) 168/123     Wt Readings from Last 6 Encounters:   09/07/22 112 lb (50.8 kg)   08/10/22 118 lb (53.5 kg)   06/29/22 119 lb (54 kg)   05/18/22 112 lb (50.8 kg)   04/20/22 111 lb 3.2 oz (50.4 kg)   03/20/22 119 lb 7.8 oz (54.2 kg)       Physical examination:  General: awake alert, oriented x3, no abnormal position or movements. HEENT: normocephalic non-traumatic, no exophthalmos   Neck: supple, no LN enlargement, no thyromegaly, no thyroid tenderness, no JVD. Pulm: Clear equal air entry no added sounds, no wheezing or rhonchi    CVS: S1 + S2, no murmur, no heave. Dorsalis pedis pulse palpable   Abd: soft lax, no tenderness, no organomegaly, audible bowel sounds. Skin: warm, no lesions, no rash.  No callus, no Ulcers, No acanthosis nigricans  Musculoskeletal: No back tenderness, no kyphosis/scoliosis    Neuro: CN intact, Monofilament sensation decreased bilateral , muscle power normal  Psych: normal mood, and affect    Review of Laboratory Data:  I personally reviewed the following lab:  Lab Results   Component Value Date/Time    WBC 18.7 (H) 06/23/2022 03:13 PM    RBC 3.87 06/23/2022 03:13 PM    HGB 11.4 (L) 06/23/2022 03:13 PM    HCT 34.0 (L) 06/23/2022 03:13 PM    MCV 87.9 06/23/2022 03:13 PM    MCH 29.5 06/23/2022 03:13 PM    MCHC 33.5 06/23/2022 03:13 PM    RDW 13.7 06/23/2022 03:13 PM     06/23/2022 03:13 PM    MPV 9.8 06/23/2022 03:13 PM      Lab Results   Component Value Date/Time     06/23/2022 03:13 PM    K 4.8 06/23/2022 03:13 PM    K 4.4 11/05/2021 08:15 AM    CO2 20 (L) 06/23/2022 03:13 PM    BUN 24 (H) 06/23/2022 03:13 PM    CREATININE 2.0 (H) 06/23/2022 03:13 PM    CALCIUM 9.5 06/23/2022 03:13 PM    LABGLOM 39 06/23/2022 03:13 PM    GFRAA 47 06/23/2022 03:13 PM      Lab Results   Component Value Date/Time    TSH 1.890 01/21/2022 01:53 PM    T4FREE 1.04 09/16/2017 10:46 AM    P2OTWME 5.5 09/16/2017 10:46 AM    FT3 3.7 09/16/2017 10:46 AM    U7MXKIY 151.50 09/16/2017 10:46 AM     Lab Results   Component Value Date/Time    LABA1C 9.6 09/07/2022 08:58 AM    GLUCOSE 47 06/23/2022 03:13 PM    GLUCOSE 282 04/22/2012 02:49 AM    MALBCR 2989.6 06/23/2022 04:00 PM    LABMICR 3557.6 06/23/2022 04:00 PM    LABCREA 119 06/23/2022 04:00 PM     Lab Results   Component Value Date/Time    LABA1C 9.6 09/07/2022 08:58 AM    LABA1C 9.6 06/23/2022 03:13 PM    LABA1C 11.7 03/19/2022 08:11 AM     Lab Results   Component Value Date/Time    TRIG 283 03/19/2022 08:11 AM    HDL 40 03/19/2022 08:11 AM    LDLCALC 76 03/19/2022 08:11 AM    CHOL 173 03/19/2022 08:11 AM     Lab Results   Component Value Date/Time    VITD25 15 09/08/2020 12:06 PM    VITD25 24 08/20/2015 06:30 PM       ASSESSMENT & RECOMMENDATIONS   Antonette Irizarry, a 28 y.o.-old male seen in for the following issues     Diabetes Mellitus Type 1    Patient's diabetes is uncontrol , A1c 9.6%   Plan to get him insulin pump and CGM ASAP   For now will change DM regimen to  [place] 55u daily at night, Novolog 1:12g + ss 2:50>150    Continue checking blood sugars 4 times a day before meals and at bedtime and send BS readings to our office in a week. Discussed with patient A1c and blood sugar goals   Patient will need routine diabetes maintenance and prevention    Dietary noncompliance  Discussed with patient the importance of eating consistent carbohydrate meals, avoiding high glycemic index food.  Also, discussed with patient the risk and negative consequences of dietary noncompliance on blood glucose control, blood pressure and weight    Hypertension   BP was very high today  Pt follow with PCP and instructed to take his medication and call his PCP   Will also check Renin, Aldosterone, plasma metanephrine (pt on Lisinopril and will keep this in mid while interpreting his result)      I personally reviewed external notes from PCP and other patient's care team providers, and personally interpreted labs associated with the above diagnosis. I also ordered labs to further assess and manage the above addressed medical conditions. Return in about 6 weeks (around 10/19/2022) for DM type 1, VitD deficiency. The above issues were reviewed with the patient who understood and agreed with the plan. Thank you for allowing us to participate in the care of this patient. Please do not hesitate to contact us with any additional questions. Diagnosis Orders   1. Uncontrolled type 1 diabetes mellitus with hyperglycemia (HCC)  POCT glycosylated hemoglobin (Hb A1C)      2. Hypertension, unspecified type  Aldosterone & Renin, Direct with Ratio    Metanephrines Plasma Free        Marlen Patel MD  Endocrinologist, [facility] Endocrinology   [address] [place] [address], [address] [place] [address],[facility] [id number]   Phone: [phone]  Fax: [phone]  --------------------------------------------  An electronic signature was used to authenticate this note.  Brendan Mai MD on 9/7/2022 at 9:00 AM

## 2022-09-28 ENCOUNTER — HOSPITAL ENCOUNTER (OUTPATIENT)
Dept: INFUSION THERAPY | Age: 32
Discharge: HOME | End: 2022-09-28
Payer: COMMERCIAL

## 2022-09-28 ENCOUNTER — OFFICE VISIT (OUTPATIENT)
Dept: ONCOLOGY | Age: 32
End: 2022-09-28
Payer: COMMERCIAL

## 2022-09-28 VITALS
HEART RATE: 107 BPM | BODY MASS INDEX: 20.16 KG/M2 | DIASTOLIC BLOOD PRESSURE: 115 MMHG | OXYGEN SATURATION: 100 % | SYSTOLIC BLOOD PRESSURE: 174 MMHG | TEMPERATURE: 97.7 F | WEIGHT: 113.8 LBS | HEIGHT: 63 IN

## 2022-09-28 DIAGNOSIS — R94.8 ABNORMAL RADIONUCLIDE BONE SCAN: Primary | ICD-10-CM

## 2022-09-28 DIAGNOSIS — D72.828 OTHER ELEVATED WHITE BLOOD CELL (WBC) COUNT: ICD-10-CM

## 2022-09-28 DIAGNOSIS — R74.8 ELEVATED ALKALINE PHOSPHATASE LEVEL: ICD-10-CM

## 2022-09-28 LAB
BASOPHILS ABSOLUTE: 0.11 E9/L (ref 0–0.2)
BASOPHILS RELATIVE PERCENT: 0.8 % (ref 0–2)
EOSINOPHILS ABSOLUTE: 0.44 E9/L (ref 0.05–0.5)
EOSINOPHILS RELATIVE PERCENT: 3.4 % (ref 0–6)
HCT VFR BLD CALC: 37.8 % (ref 37–54)
HEMOGLOBIN: 12.5 G/DL (ref 12.5–16.5)
IMMATURE GRANULOCYTES #: 0.03 E9/L
IMMATURE GRANULOCYTES %: 0.2 % (ref 0–5)
LYMPHOCYTES ABSOLUTE: 2.46 E9/L (ref 1.5–4)
LYMPHOCYTES RELATIVE PERCENT: 18.9 % (ref 20–42)
MCH RBC QN AUTO: 28.8 PG (ref 26–35)
MCHC RBC AUTO-ENTMCNC: 33.1 % (ref 32–34.5)
MCV RBC AUTO: 87.1 FL (ref 80–99.9)
MONOCYTES ABSOLUTE: 0.48 E9/L (ref 0.1–0.95)
MONOCYTES RELATIVE PERCENT: 3.7 % (ref 2–12)
NEUTROPHILS ABSOLUTE: 9.51 E9/L (ref 1.8–7.3)
NEUTROPHILS RELATIVE PERCENT: 73 % (ref 43–80)
PDW BLD-RTO: 12.7 FL (ref 11.5–15)
PLATELET # BLD: 467 E9/L (ref 130–450)
PMV BLD AUTO: 10.3 FL (ref 7–12)
RBC # BLD: 4.34 E12/L (ref 3.8–5.8)
WBC # BLD: 13 E9/L (ref 4.5–11.5)

## 2022-09-28 PROCEDURE — 36415 COLL VENOUS BLD VENIPUNCTURE: CPT

## 2022-09-28 PROCEDURE — 99214 OFFICE O/P EST MOD 30 MIN: CPT | Performed by: INTERNAL MEDICINE

## 2022-09-28 PROCEDURE — G8420 CALC BMI NORM PARAMETERS: HCPCS | Performed by: INTERNAL MEDICINE

## 2022-09-28 PROCEDURE — 4004F PT TOBACCO SCREEN RCVD TLK: CPT | Performed by: INTERNAL MEDICINE

## 2022-09-28 PROCEDURE — G8427 DOCREV CUR MEDS BY ELIG CLIN: HCPCS | Performed by: INTERNAL MEDICINE

## 2022-09-28 PROCEDURE — 85025 COMPLETE CBC W/AUTO DIFF WBC: CPT

## 2022-09-28 NOTE — PROGRESS NOTES
[address] [facility] MEDICAL ONCOLOGY  [address] [place] [address]  [place] [address]  Dept: [phone]  Loc: [phone]  Attending Progress Note      Reason for Visit:   Leukocytosis, abnormal bone scan. Referring Physician:  Gillian Eldridge DO    PCP:  Gillian Eldridge DO    History of Present Illness:     Mr. Kinza Salgado is a 70-year-old gentleman with a past medical history significant for type I DM, diabetic retinopathy, neuropathy, CKD, hyperlipidemia, tobacco use disorder, bipolar disease, and hypertension, who was referred to the hematology office for elevation of persistent leukocytosis as well as an abnormal bone scan. The patient has a history of intermittent leukocytosis, recently the leukocytosis has been persistent, CBCD from 4/20/2022 was remarkable for a white count of 21, ANC 33550, hemoglobin 11.7, hematocrit 36.1, platelet count 935E, the patient was noted to have elevated alk phos, bone scan was done on 3/23/2022, which had revealed tracer uptake compatible with degenerative changes, tracer uptake throughout the ribs anteriorly, most significant involving the left third fifth and seventh rib, metastatic disease is not excluded. Tracer uptake at the level of the right left hip, likely degenerative. Tracer uptake at the level of the mandible and maxilla. CT scan of the abdomen the pelvis from 4/21/2020 was negative for malignancy. SPEP was done on 4/20/2022, was negative for monoclonal proteins, kappa is elevated at 44.31, lambda 13.32, with a normal kappa to lambda ratio, 1.22. Creatinine 2.1, calcium 9.9. The patient returns for a follow-up visit, he had an infection of the eye, was following with ophthalmology, no other infections. He will get the [employer] pump today. Is following with endocrine    Review of Systems;  CONSTITUTIONAL: No fever, chills. Good appetite and energy level.    ENMT: Eyes: No diplopia; positive for decreased vision. Nose: No epistaxis. Mouth: No sore throat. RESPIRATORY: No hemoptysis, shortness of breath, cough. CARDIOVASCULAR: No chest pain, palpitations. GASTROINTESTINAL: Positive for nausea and abdominal pain. GENITOURINARY: No dysuria, urinary frequency, hematuria. NEURO: No syncope, presyncope, headache.   Remainder:  ROS NEGATIVE    Past Medical History:      Diagnosis Date    Asthma     Bipolar affective ([address]     COPD (chronic obstructive pulmonary disease) (HCC)     Diabetes mellitus type I ([address]     Diabetic neuropathy ([address]     Diabetic, retinopathy, proliferative ([address]     HTN (hypertension)     due to left renal artery stenosis    Hypercholesteremia     Left renal artery stenosis ([address] 09/19/2019    Macular edema due to secondary diabetes ([address]     Opioid dependence (HCC)     Proteinuria     Retinal detachment     right eye    Vitreous hemorrhage of right eye due to diabetes mellitus [facility])      Patient Active Problem List   Diagnosis    Hyperlipidemia    Elevated liver enzymes    HTN (hypertension)    Anxiety    Vitamin D insufficiency    Acquired hypothyroidism    Uncontrolled type 1 diabetes mellitus with hyperglycemia (HCC)    Chest pain    Tobacco abuse    Opioid dependence ([address]    Bipolar affective ([address]    Other microscopic hematuria    Elevated alkaline phosphatase level    Lactic acidosis    Proliferative diabetic retinopathy of both eyes with macular edema associated with type 1 diabetes mellitus (HCC)    Moderate persistent asthma without complication    Left renal artery stenosis (HCC)    DKA, type 1, not at goal [facility])    Hyperglycemia        Past Surgical History:      Procedure Laterality Date    EYE SURGERY Right 11/20/2020    PARS PLANA VITRECTOMY, MEMBRANE PEEL, ENDOLASER, GAS FLUID EXCHANGE, 25G, MAC, RIGHT EYE performed by Carie Campbell MD at [address] Right 01/08/2021    pars plana vitrectomy with peeling of preretinal tissue and gas fluid exchange    EYE SURGERY Right 1/8/2021    PARS PLANA VITRECTOMY, INTERNAL LIMITING MEMBRANE PEEL, INDOCYANINE GREEN, GAS FLUID EXCHANGE, 25g, MAC, RIGHT EYE performed by Jordan Avina MD at [address] Right 03/2021    macular peel-with q 4 week injections       Family History:  Family History   Problem Relation Age of Onset    No Known Problems Mother     No Known Problems Father     No Known Problems Brother         Homicide     No Known Problems Daughter     No Known Problems Son     Diabetes type 2  Maternal Grandmother        Medications:  Reviewed and reconciled. Social History:  Social History     Socioeconomic History    Marital status:      Spouse name: Not on file    Number of children: 2    Years of education: Not on file    Highest education level: Not on file   Occupational History    Occupation: Unemployed     Comment: Last job [employer]   Tobacco Use    Smoking status: Every Day     Packs/day: 0.50     Years: 15.00     Pack years: 7.50     Types: Cigarettes    Smokeless tobacco: Never   Vaping Use    Vaping Use: Never used   Substance and Sexual Activity    Alcohol use: Never    Drug use: Not Currently     Types: Opiates      Comment: 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 10/2017, now takes Suboxone    Sexual activity: Not Currently   Other Topics Concern    Not on file   Social History Narrative    Not on file     Social Determinants of Health     Financial Resource Strain: Not on file   Food Insecurity: Not on file   Transportation Needs: Not on file   Physical Activity: Not on file   Stress: Not on file   Social Connections: Not on file   Intimate Partner Violence: Not on file   Housing Stability: Not on file       Allergies:   Allergies   Allergen Reactions    Chlorthalidone      Vomiting     Hctz [Hydrochlorothiazide] Nausea Only    Hydralazine      Nausea, headache       Physical Exam:  BP (!) 174/115   Pulse (!) 107   Temp 97.7 °F (36.5 °C)   Ht 5' 3\" (1.6 m) a myeloproliferative neoplasm,BCR-ABL FISH, JAK2, YUE R and MPL mutations negative, flow cytometry is negative for B/T-cell neoplasm, negative for blasts. Bone scan is revealing abnormal uptake in the ribs, metastatic disease could not be excluded, no history of trauma, CT scan of the abdomen the pelvis was negative for primary malignancy, chest CT scan was done and is negative for malignancy, he does have rib fractures, which is causing the uptake on bone scan. SPEP is negative for monoclonal proteins, both kappa and lambda are both elevated, likely secondary to inflammation and CKD, the ratio is normal, he does not have plasma cell dyscrasia. Recommend continue monitor his counts. If his hemoglobin falls to below 10 TAM's would be beneficial.  The leukocytosis is improving, white count had decreased from 18.7-to 3, hemoglobin had normalized, platelets slightly increased at 467K, likely reactive. We will continue to monitor his CBCD. RTC in 3 months. Thank you for allowing us to participate in the care of Mr. Fernando Lopez.     Jairo Cardenas MD   HEMATOLOGY/MEDICAL [address] MEDICAL ONCOLOGY  [address] [place] [id number]  Dept: [phone] John Marcell: [phone]

## 2022-10-05 DIAGNOSIS — E10.65 UNCONTROLLED TYPE 1 DIABETES MELLITUS WITH HYPERGLYCEMIA (HCC): Primary | ICD-10-CM

## 2022-10-05 RX ORDER — BLOOD SUGAR DIAGNOSTIC
STRIP MISCELLANEOUS
Qty: 200 EACH | Refills: 6 | Status: SHIPPED | OUTPATIENT
Start: 2022-10-05

## 2022-10-05 RX ORDER — INSULIN ASPART 100 [IU]/ML
INJECTION, SOLUTION INTRAVENOUS; SUBCUTANEOUS
Qty: 20 ML | Refills: 6 | Status: SHIPPED
Start: 2022-10-05 | End: 2022-10-20 | Stop reason: SDUPTHER

## 2022-10-05 RX ORDER — LANCETS
EACH MISCELLANEOUS
Qty: 204 EACH | Refills: 6 | Status: SHIPPED | OUTPATIENT
Start: 2022-10-05

## 2022-10-20 ENCOUNTER — OFFICE VISIT (OUTPATIENT)
Dept: ENDOCRINOLOGY | Age: 32
End: 2022-10-20
Payer: COMMERCIAL

## 2022-10-20 VITALS
HEART RATE: 92 BPM | RESPIRATION RATE: 18 BRPM | BODY MASS INDEX: 20.55 KG/M2 | OXYGEN SATURATION: 99 % | DIASTOLIC BLOOD PRESSURE: 92 MMHG | HEIGHT: 63 IN | WEIGHT: 116 LBS | SYSTOLIC BLOOD PRESSURE: 156 MMHG

## 2022-10-20 DIAGNOSIS — E55.9 VITAMIN D DEFICIENCY: ICD-10-CM

## 2022-10-20 DIAGNOSIS — E10.65 UNCONTROLLED TYPE 1 DIABETES MELLITUS WITH HYPERGLYCEMIA (HCC): Primary | ICD-10-CM

## 2022-10-20 PROCEDURE — G8484 FLU IMMUNIZE NO ADMIN: HCPCS | Performed by: INTERNAL MEDICINE

## 2022-10-20 PROCEDURE — G8428 CUR MEDS NOT DOCUMENT: HCPCS | Performed by: INTERNAL MEDICINE

## 2022-10-20 PROCEDURE — 4004F PT TOBACCO SCREEN RCVD TLK: CPT | Performed by: INTERNAL MEDICINE

## 2022-10-20 PROCEDURE — 3046F HEMOGLOBIN A1C LEVEL >9.0%: CPT | Performed by: INTERNAL MEDICINE

## 2022-10-20 PROCEDURE — G8420 CALC BMI NORM PARAMETERS: HCPCS | Performed by: INTERNAL MEDICINE

## 2022-10-20 PROCEDURE — 2022F DILAT RTA XM EVC RTNOPTHY: CPT | Performed by: INTERNAL MEDICINE

## 2022-10-20 PROCEDURE — 99214 OFFICE O/P EST MOD 30 MIN: CPT | Performed by: INTERNAL MEDICINE

## 2022-10-20 RX ORDER — INSULIN ASPART 100 [IU]/ML
INJECTION, SOLUTION INTRAVENOUS; SUBCUTANEOUS
Qty: 10 ML | Refills: 11 | Status: SHIPPED | OUTPATIENT
Start: 2022-10-20

## 2022-10-20 NOTE — PROGRESS NOTES
[address] [facility] Department of Endocrinology Diabetes and Metabolism   [address] [facility] [address]   Phone: [phone]  Fax: [phone]    Date of Service: 10/20/2022  Primary Care Physician: Raya Archer DO  Referring physician: No ref. provider found  Provider: Yadira Richard MD    Reason for the visit:  DM type 1     History of Present Illness: The history is provided by the patient. No  was used. Accuracy of the patient data is excellent. Sarah Howell is a very pleasant 28 y.o. male seen today for diabetes management     Sarah Howell was diagnosed with diabetes at age 5 and currently on basal rate 0.5, CR 15, ISF 70, goal 100-150, active insulin time 2:30hr   Pt was just started on insulin pump.  To start training on auto-mode soon   Uses CGM   Most recent A1c results summarized below  Lab Results   Component Value Date/Time    LABA1C 9.6 09/07/2022 08:58 AM    LABA1C 9.6 06/23/2022 03:13 PM    LABA1C 11.7 03/19/2022 08:11 AM     Patient has had no hypoglycemic episodes   The patient hasn't been mindful of what has been eating and wasn't following diabetes diet    I reviewed current medications and the patient has no issues with diabetes medications  Sarah Howell is up to date with eye exam. + diabetic retinopathy , receiving injection both sides   The patient performs his own feet care  Microvascular complications:  No Retinopathy, Nephropathy or Neuropathy   Macrovascular complications: no CAD, PVD, or Stroke    PAST MEDICAL HISTORY   Past Medical History:   Diagnosis Date    Asthma     Bipolar affective ([address]     COPD (chronic obstructive pulmonary disease) ([address]     Diabetes mellitus type I ([address]     Diabetic neuropathy ([address]     Diabetic, retinopathy, proliferative ([address]     HTN (hypertension)     due to left renal artery stenosis    Hypercholesteremia     Left renal artery stenosis ([address] 09/19/2019    Macular edema due to secondary diabetes ([address]     Opioid dependence (HCC)     Proteinuria     Retinal detachment     right eye    Vitreous hemorrhage of right eye due to diabetes mellitus ([address]        PAST SURGICAL HISTORY   Past Surgical History:   Procedure Laterality Date    EYE SURGERY Right 11/20/2020    PARS PLANA VITRECTOMY, MEMBRANE PEEL, ENDOLASER, GAS FLUID EXCHANGE, 25G, MAC, RIGHT EYE performed by Nelly Fried MD at [address] Right 01/08/2021    pars plana vitrectomy with peeling of preretinal tissue and gas fluid exchange    EYE SURGERY Right 1/8/2021    PARS PLANA VITRECTOMY, INTERNAL LIMITING MEMBRANE PEEL, INDOCYANINE GREEN, GAS FLUID EXCHANGE, 25g, MAC, RIGHT EYE performed by Nelly Fried MD at [address] Right 03/2021    macular peel-with q 4 week injections       SOCIAL HISTORY   Tobacco:   reports that he has been smoking cigarettes. He has a 7.50 pack-year smoking history. He has never used smokeless tobacco.  Alcohol:   reports no history of alcohol use. Drugs:   reports that he does not currently use drugs after having used the following drugs: Opiates .     FAMILY HISTORY   Family History   Problem Relation Age of Onset    No Known Problems Mother     No Known Problems Father     No Known Problems Brother         Homicide     No Known Problems Daughter     No Known Problems Son     Diabetes type 2  Maternal Grandmother        ALLERGIES AND DRUG REACTIONS   Allergies   Allergen Reactions    Chlorthalidone      Vomiting     Hctz [Hydrochlorothiazide] Nausea Only    Hydralazine      Nausea, headache       CURRENT MEDICATIONS   Current Outpatient Medications   Medication Sig Dispense Refill    insulin aspart (NOVOLOG) 100 UNIT/ML injection vial Use via insulin pump Max dose 50 units daily 20 mL 6    blood glucose test strips (ACCU-CHEK GUIDE) strip Check blood sugars 4x daily and recheck for hypo/hyperglycemic episodes 200 each 6    Accu-Chek FastClix Lancets MISC Check blood sugars 4x daily and recheck for hypo/hyperglycemic episodes 204 each 6    aspirin 81 MG chewable tablet Take 1 tablet by mouth daily 90 tablet 0    Blood Glucose Monitoring Suppl (TRUE METRIX METER) w/Device KIT Check blood glucose four times daily (fasting and with each meal). 1 kit 0    lisinopril (PRINIVIL;ZESTRIL) 20 MG tablet Take 1 tablet by mouth daily 90 tablet 0    Insulin Pen Needle (PEN NEEDLES) 32G X 5 MM MISC 1 each by Does not apply route daily 30 each 5    atorvastatin (LIPITOR) 80 MG tablet Take 1 tablet by mouth daily 90 tablet 0    albuterol sulfate HFA (VENTOLIN HFA) 108 (90 Base) MCG/ACT inhaler Inhale 2 puffs into the lungs every 4 hours as needed for Wheezing or Shortness of Breath 18 g 0    SUBOXONE 8-2 MG FILM SL film Place 1 Film under the tongue 2 times daily. cloNIDine (CATAPRES) 0.3 MG tablet Take 1 tablet by mouth 2 times daily 180 tablet 0    metoprolol succinate (TOPROL XL) 50 MG extended release tablet Take 1 tablet by mouth 2 times daily 180 tablet 0    gabapentin (NEURONTIN) 300 MG capsule Take 1 capsule by mouth 2 times daily for 90 days. 180 capsule 0     No current facility-administered medications for this visit. Review of Systems  Constitutional: No fever, no chills, no diaphoresis, no generalized weakness. HEENT: No blurred vision, No sore throat, no ear pain, no hair loss  Neck: denied any neck swelling, difficulty swallowing,   Cardio-pulmonary: No CP, SOB or palpitation, No orthopnea or PND. No cough or wheezing. GI: No N/V/D, no constipation, No abdominal pain, no melena or hematochezia   : Denied any dysuria, hematuria, flank pain, discharge, or incontinence. Skin: denied any rash, ulcer, Hirsute, or hyperpigmentation. MSK: denied any joint deformity, joint pain/swelling, muscle pain, or back pain.   Neuro: no numbness, no tingling, no weakness, _    OBJECTIVE    BP (!) 156/92   Pulse 92   Resp 18   Ht 5' 3\" (1.6 m)   Wt 116 lb (52.6 kg) SpO2 99%   BMI 20.55 kg/m²   BP Readings from Last 4 Encounters:   10/20/22 (!) 156/92   09/28/22 (!) 174/115   09/07/22 (!) 195/117   08/10/22 (!) 157/99     Wt Readings from Last 6 Encounters:   10/20/22 116 lb (52.6 kg)   09/28/22 113 lb 12.8 oz (51.6 kg)   09/07/22 112 lb (50.8 kg)   08/10/22 118 lb (53.5 kg)   06/29/22 119 lb (54 kg)   05/18/22 112 lb (50.8 kg)       Physical examination:  General: awake alert, oriented x3, no abnormal position or movements. HEENT: normocephalic non-traumatic, no exophthalmos   Neck: supple, no LN enlargement, no thyromegaly, no thyroid tenderness, no JVD. Pulm: Clear equal air entry no added sounds, no wheezing or rhonchi    CVS: S1 + S2, no murmur, no heave. Dorsalis pedis pulse palpable   Abd: soft lax, no tenderness, no organomegaly, audible bowel sounds. Skin: warm, no lesions, no rash.  No callus, no Ulcers, No acanthosis nigricans  Musculoskeletal: No back tenderness, no kyphosis/scoliosis    Neuro: CN intact, Monofilament sensation decreased bilateral , muscle power normal  Psych: normal mood, and affect    Review of Laboratory Data:  I personally reviewed the following lab:  Lab Results   Component Value Date/Time    WBC 13.0 (H) 09/28/2022 01:44 PM    RBC 4.34 09/28/2022 01:44 PM    HGB 12.5 09/28/2022 01:44 PM    HCT 37.8 09/28/2022 01:44 PM    MCV 87.1 09/28/2022 01:44 PM    MCH 28.8 09/28/2022 01:44 PM    MCHC 33.1 09/28/2022 01:44 PM    RDW 12.7 09/28/2022 01:44 PM     (H) 09/28/2022 01:44 PM    MPV 10.3 09/28/2022 01:44 PM      Lab Results   Component Value Date/Time     06/23/2022 03:13 PM    K 4.8 06/23/2022 03:13 PM    K 4.4 11/05/2021 08:15 AM    CO2 20 (L) 06/23/2022 03:13 PM    BUN 24 (H) 06/23/2022 03:13 PM    CREATININE 2.0 (H) 06/23/2022 03:13 PM    CALCIUM 9.5 06/23/2022 03:13 PM    LABGLOM 39 06/23/2022 03:13 PM    GFRAA 47 06/23/2022 03:13 PM      Lab Results   Component Value Date/Time    TSH 1.890 01/21/2022 01:53 PM    [facility] understood and agreed with the plan. Thank you for allowing us to participate in the care of this patient. Please do not hesitate to contact us with any additional questions. Diagnosis Orders   1. Uncontrolled type 1 diabetes mellitus with hyperglycemia (HCC)  insulin aspart (NOVOLOG) 100 UNIT/ML injection vial    Hemoglobin A1C      2. Vitamin D deficiency            Ezio Roman MD  Endocrinologist, [facility] and Endocrinology   [address] [place], [place] [address]   Phone: [phone]  Fax: [phone]  --------------------------------------------  An electronic signature was used to authenticate this note.  Masood Gunter MD on 10/20/2022 at 12:57 PM

## 2022-11-14 ENCOUNTER — APPOINTMENT (OUTPATIENT)
Dept: GENERAL RADIOLOGY | Age: 32
DRG: 420 | End: 2022-11-14
Payer: COMMERCIAL

## 2022-11-14 ENCOUNTER — HOSPITAL ENCOUNTER (INPATIENT)
Age: 32
LOS: 1 days | Discharge: LEFT BEFORE DISCHARGE | DRG: 420 | End: 2022-11-15
Attending: EMERGENCY MEDICINE | Admitting: STUDENT IN AN ORGANIZED HEALTH CARE EDUCATION/TRAINING PROGRAM
Payer: COMMERCIAL

## 2022-11-14 DIAGNOSIS — E10.10 DIABETIC KETOACIDOSIS WITHOUT COMA ASSOCIATED WITH TYPE 1 DIABETES MELLITUS (HCC): Primary | ICD-10-CM

## 2022-11-14 LAB
ALBUMIN SERPL-MCNC: 4.3 G/DL (ref 3.5–5.2)
ALP BLD-CCNC: 99 U/L (ref 40–129)
ALT SERPL-CCNC: 10 U/L (ref 0–40)
ANION GAP SERPL CALCULATED.3IONS-SCNC: 21 MMOL/L (ref 7–16)
ANION GAP SERPL CALCULATED.3IONS-SCNC: 22 MMOL/L (ref 7–16)
ANION GAP SERPL CALCULATED.3IONS-SCNC: 27 MMOL/L (ref 7–16)
AST SERPL-CCNC: 17 U/L (ref 0–39)
BACTERIA: ABNORMAL /HPF
BASOPHILS ABSOLUTE: 0.08 E9/L (ref 0–0.2)
BASOPHILS RELATIVE PERCENT: 0.5 % (ref 0–2)
BETA-HYDROXYBUTYRATE: >4.5 MMOL/L (ref 0.02–0.27)
BILIRUB SERPL-MCNC: 0.3 MG/DL (ref 0–1.2)
BILIRUBIN URINE: ABNORMAL
BLOOD, URINE: ABNORMAL
BUN BLDV-MCNC: 31 MG/DL (ref 6–20)
BUN BLDV-MCNC: 31 MG/DL (ref 6–20)
BUN BLDV-MCNC: 35 MG/DL (ref 6–20)
CALCIUM SERPL-MCNC: 8.3 MG/DL (ref 8.6–10.2)
CALCIUM SERPL-MCNC: 8.4 MG/DL (ref 8.6–10.2)
CALCIUM SERPL-MCNC: 9.2 MG/DL (ref 8.6–10.2)
CHLORIDE BLD-SCNC: 101 MMOL/L (ref 98–107)
CHLORIDE BLD-SCNC: 103 MMOL/L (ref 98–107)
CHLORIDE BLD-SCNC: 96 MMOL/L (ref 98–107)
CHP ED QC CHECK: NORMAL
CLARITY: CLEAR
CO2: 21 MMOL/L (ref 22–29)
CO2: 22 MMOL/L (ref 22–29)
CO2: 28 MMOL/L (ref 22–29)
COLOR: YELLOW
CREAT SERPL-MCNC: 2.1 MG/DL (ref 0.7–1.2)
CREAT SERPL-MCNC: 2.1 MG/DL (ref 0.7–1.2)
CREAT SERPL-MCNC: 2.4 MG/DL (ref 0.7–1.2)
EKG ATRIAL RATE: 99 BPM
EKG P AXIS: 66 DEGREES
EKG P-R INTERVAL: 130 MS
EKG Q-T INTERVAL: 378 MS
EKG QRS DURATION: 84 MS
EKG QTC CALCULATION (BAZETT): 485 MS
EKG R AXIS: 61 DEGREES
EKG T AXIS: 47 DEGREES
EKG VENTRICULAR RATE: 99 BPM
EOSINOPHILS ABSOLUTE: 0.01 E9/L (ref 0.05–0.5)
EOSINOPHILS RELATIVE PERCENT: 0.1 % (ref 0–6)
GFR SERPL CREATININE-BSD FRML MDRD: 36 ML/MIN/1.73
GFR SERPL CREATININE-BSD FRML MDRD: 42 ML/MIN/1.73
GFR SERPL CREATININE-BSD FRML MDRD: 42 ML/MIN/1.73
GLUCOSE BLD-MCNC: 162 MG/DL (ref 74–99)
GLUCOSE BLD-MCNC: 163 MG/DL (ref 74–99)
GLUCOSE BLD-MCNC: 174 MG/DL
GLUCOSE BLD-MCNC: 249 MG/DL (ref 74–99)
GLUCOSE URINE: NEGATIVE MG/DL
HCT VFR BLD CALC: 39.5 % (ref 37–54)
HEMOGLOBIN: 12.7 G/DL (ref 12.5–16.5)
IMMATURE GRANULOCYTES #: 0.11 E9/L
IMMATURE GRANULOCYTES %: 0.6 % (ref 0–5)
KETONES, URINE: 40 MG/DL
LACTIC ACID, SEPSIS: 2.2 MMOL/L (ref 0.5–1.9)
LACTIC ACID, SEPSIS: 3.6 MMOL/L (ref 0.5–1.9)
LEUKOCYTE ESTERASE, URINE: NEGATIVE
LYMPHOCYTES ABSOLUTE: 1.01 E9/L (ref 1.5–4)
LYMPHOCYTES RELATIVE PERCENT: 5.8 % (ref 20–42)
MAGNESIUM: 1.6 MG/DL (ref 1.6–2.6)
MAGNESIUM: 2.4 MG/DL (ref 1.6–2.6)
MCH RBC QN AUTO: 28 PG (ref 26–35)
MCHC RBC AUTO-ENTMCNC: 32.2 % (ref 32–34.5)
MCV RBC AUTO: 87 FL (ref 80–99.9)
METER GLUCOSE: 104 MG/DL (ref 74–99)
METER GLUCOSE: 118 MG/DL (ref 74–99)
METER GLUCOSE: 148 MG/DL (ref 74–99)
METER GLUCOSE: 149 MG/DL (ref 74–99)
METER GLUCOSE: 174 MG/DL (ref 74–99)
METER GLUCOSE: 175 MG/DL (ref 74–99)
METER GLUCOSE: 207 MG/DL (ref 74–99)
METER GLUCOSE: 221 MG/DL (ref 74–99)
METER GLUCOSE: 223 MG/DL (ref 74–99)
METER GLUCOSE: 231 MG/DL (ref 74–99)
METER GLUCOSE: 249 MG/DL (ref 74–99)
MONOCYTES ABSOLUTE: 0.38 E9/L (ref 0.1–0.95)
MONOCYTES RELATIVE PERCENT: 2.2 % (ref 2–12)
NEUTROPHILS ABSOLUTE: 15.94 E9/L (ref 1.8–7.3)
NEUTROPHILS RELATIVE PERCENT: 90.8 % (ref 43–80)
NITRITE, URINE: NEGATIVE
PDW BLD-RTO: 12.4 FL (ref 11.5–15)
PH UA: 6 (ref 5–9)
PH VENOUS: 7.37 (ref 7.35–7.45)
PHOSPHORUS: 2 MG/DL (ref 2.5–4.5)
PHOSPHORUS: 2.7 MG/DL (ref 2.5–4.5)
PLATELET # BLD: 437 E9/L (ref 130–450)
PMV BLD AUTO: 9.9 FL (ref 7–12)
POTASSIUM SERPL-SCNC: 3.3 MMOL/L (ref 3.5–5)
POTASSIUM SERPL-SCNC: 3.6 MMOL/L (ref 3.5–5)
POTASSIUM SERPL-SCNC: 3.8 MMOL/L (ref 3.5–5)
PROTEIN UA: >=300 MG/DL
RBC # BLD: 4.54 E12/L (ref 3.8–5.8)
RBC UA: ABNORMAL /HPF (ref 0–2)
SARS-COV-2, NAAT: NOT DETECTED
SODIUM BLD-SCNC: 144 MMOL/L (ref 132–146)
SODIUM BLD-SCNC: 146 MMOL/L (ref 132–146)
SODIUM BLD-SCNC: 151 MMOL/L (ref 132–146)
SPECIFIC GRAVITY UA: >=1.03 (ref 1–1.03)
TOTAL PROTEIN: 7.9 G/DL (ref 6.4–8.3)
TROPONIN, HIGH SENSITIVITY: 39 NG/L (ref 0–11)
UROBILINOGEN, URINE: 0.2 E.U./DL
WBC # BLD: 17.5 E9/L (ref 4.5–11.5)
WBC UA: ABNORMAL /HPF (ref 0–5)

## 2022-11-14 PROCEDURE — 82962 GLUCOSE BLOOD TEST: CPT

## 2022-11-14 PROCEDURE — 6370000000 HC RX 637 (ALT 250 FOR IP): Performed by: INTERNAL MEDICINE

## 2022-11-14 PROCEDURE — 02HV33Z INSERTION OF INFUSION DEVICE INTO SUPERIOR VENA CAVA, PERCUTANEOUS APPROACH: ICD-10-PCS | Performed by: STUDENT IN AN ORGANIZED HEALTH CARE EDUCATION/TRAINING PROGRAM

## 2022-11-14 PROCEDURE — 99285 EMERGENCY DEPT VISIT HI MDM: CPT

## 2022-11-14 PROCEDURE — 6360000002 HC RX W HCPCS: Performed by: EMERGENCY MEDICINE

## 2022-11-14 PROCEDURE — 80048 BASIC METABOLIC PNL TOTAL CA: CPT

## 2022-11-14 PROCEDURE — 6370000000 HC RX 637 (ALT 250 FOR IP): Performed by: EMERGENCY MEDICINE

## 2022-11-14 PROCEDURE — 83735 ASSAY OF MAGNESIUM: CPT

## 2022-11-14 PROCEDURE — 6360000002 HC RX W HCPCS: Performed by: INTERNAL MEDICINE

## 2022-11-14 PROCEDURE — 2000000000 HC ICU R&B

## 2022-11-14 PROCEDURE — 93005 ELECTROCARDIOGRAM TRACING: CPT | Performed by: EMERGENCY MEDICINE

## 2022-11-14 PROCEDURE — 71045 X-RAY EXAM CHEST 1 VIEW: CPT

## 2022-11-14 PROCEDURE — 82800 BLOOD PH: CPT

## 2022-11-14 PROCEDURE — 2580000003 HC RX 258: Performed by: STUDENT IN AN ORGANIZED HEALTH CARE EDUCATION/TRAINING PROGRAM

## 2022-11-14 PROCEDURE — 82010 KETONE BODYS QUAN: CPT

## 2022-11-14 PROCEDURE — 87040 BLOOD CULTURE FOR BACTERIA: CPT

## 2022-11-14 PROCEDURE — 85025 COMPLETE CBC W/AUTO DIFF WBC: CPT

## 2022-11-14 PROCEDURE — 81001 URINALYSIS AUTO W/SCOPE: CPT

## 2022-11-14 PROCEDURE — 36415 COLL VENOUS BLD VENIPUNCTURE: CPT

## 2022-11-14 PROCEDURE — 87635 SARS-COV-2 COVID-19 AMP PRB: CPT

## 2022-11-14 PROCEDURE — 96375 TX/PRO/DX INJ NEW DRUG ADDON: CPT

## 2022-11-14 PROCEDURE — 2580000003 HC RX 258: Performed by: EMERGENCY MEDICINE

## 2022-11-14 PROCEDURE — 2500000003 HC RX 250 WO HCPCS: Performed by: EMERGENCY MEDICINE

## 2022-11-14 PROCEDURE — 80053 COMPREHEN METABOLIC PANEL: CPT

## 2022-11-14 PROCEDURE — 6360000002 HC RX W HCPCS: Performed by: STUDENT IN AN ORGANIZED HEALTH CARE EDUCATION/TRAINING PROGRAM

## 2022-11-14 PROCEDURE — 6370000000 HC RX 637 (ALT 250 FOR IP): Performed by: STUDENT IN AN ORGANIZED HEALTH CARE EDUCATION/TRAINING PROGRAM

## 2022-11-14 PROCEDURE — 36592 COLLECT BLOOD FROM PICC: CPT

## 2022-11-14 PROCEDURE — 83605 ASSAY OF LACTIC ACID: CPT

## 2022-11-14 PROCEDURE — 84100 ASSAY OF PHOSPHORUS: CPT

## 2022-11-14 PROCEDURE — 96374 THER/PROPH/DIAG INJ IV PUSH: CPT

## 2022-11-14 PROCEDURE — 84484 ASSAY OF TROPONIN QUANT: CPT

## 2022-11-14 PROCEDURE — 87150 DNA/RNA AMPLIFIED PROBE: CPT

## 2022-11-14 PROCEDURE — 36556 INSERT NON-TUNNEL CV CATH: CPT

## 2022-11-14 PROCEDURE — 87081 CULTURE SCREEN ONLY: CPT

## 2022-11-14 RX ORDER — SODIUM CHLORIDE 450 MG/100ML
INJECTION, SOLUTION INTRAVENOUS CONTINUOUS
Status: DISCONTINUED | OUTPATIENT
Start: 2022-11-14 | End: 2022-11-15 | Stop reason: HOSPADM

## 2022-11-14 RX ORDER — METOPROLOL SUCCINATE 50 MG/1
50 TABLET, EXTENDED RELEASE ORAL 2 TIMES DAILY
COMMUNITY
End: 2022-11-17 | Stop reason: SDUPTHER

## 2022-11-14 RX ORDER — GABAPENTIN 300 MG/1
300 CAPSULE ORAL 2 TIMES DAILY
COMMUNITY
End: 2022-11-17 | Stop reason: SDUPTHER

## 2022-11-14 RX ORDER — ALBUTEROL SULFATE 90 UG/1
2 AEROSOL, METERED RESPIRATORY (INHALATION) EVERY 4 HOURS PRN
Status: DISCONTINUED | OUTPATIENT
Start: 2022-11-14 | End: 2022-11-14 | Stop reason: CLARIF

## 2022-11-14 RX ORDER — SODIUM CHLORIDE 9 MG/ML
INJECTION, SOLUTION INTRAVENOUS PRN
Status: DISCONTINUED | OUTPATIENT
Start: 2022-11-14 | End: 2022-11-15 | Stop reason: HOSPADM

## 2022-11-14 RX ORDER — 0.9 % SODIUM CHLORIDE 0.9 %
2000 INTRAVENOUS SOLUTION INTRAVENOUS ONCE
Status: COMPLETED | OUTPATIENT
Start: 2022-11-14 | End: 2022-11-14

## 2022-11-14 RX ORDER — MAGNESIUM SULFATE 1 G/100ML
1000 INJECTION INTRAVENOUS PRN
Status: DISCONTINUED | OUTPATIENT
Start: 2022-11-14 | End: 2022-11-15 | Stop reason: HOSPADM

## 2022-11-14 RX ORDER — SODIUM CHLORIDE 0.9 % (FLUSH) 0.9 %
5-40 SYRINGE (ML) INJECTION PRN
Status: DISCONTINUED | OUTPATIENT
Start: 2022-11-14 | End: 2022-11-15 | Stop reason: HOSPADM

## 2022-11-14 RX ORDER — ENOXAPARIN SODIUM 100 MG/ML
40 INJECTION SUBCUTANEOUS DAILY
Status: DISCONTINUED | OUTPATIENT
Start: 2022-11-14 | End: 2022-11-15

## 2022-11-14 RX ORDER — METOPROLOL SUCCINATE 50 MG/1
50 TABLET, EXTENDED RELEASE ORAL 2 TIMES DAILY
Status: DISCONTINUED | OUTPATIENT
Start: 2022-11-14 | End: 2022-11-15 | Stop reason: HOSPADM

## 2022-11-14 RX ORDER — ONDANSETRON 2 MG/ML
4 INJECTION INTRAMUSCULAR; INTRAVENOUS ONCE
Status: COMPLETED | OUTPATIENT
Start: 2022-11-14 | End: 2022-11-14

## 2022-11-14 RX ORDER — ALBUTEROL SULFATE 2.5 MG/3ML
2.5 SOLUTION RESPIRATORY (INHALATION) EVERY 4 HOURS PRN
Status: DISCONTINUED | OUTPATIENT
Start: 2022-11-14 | End: 2022-11-15 | Stop reason: HOSPADM

## 2022-11-14 RX ORDER — ACETAMINOPHEN 325 MG/1
650 TABLET ORAL EVERY 6 HOURS PRN
Status: DISCONTINUED | OUTPATIENT
Start: 2022-11-14 | End: 2022-11-15 | Stop reason: HOSPADM

## 2022-11-14 RX ORDER — LORAZEPAM 2 MG/ML
1 INJECTION INTRAMUSCULAR ONCE
Status: COMPLETED | OUTPATIENT
Start: 2022-11-14 | End: 2022-11-14

## 2022-11-14 RX ORDER — ACETAMINOPHEN 650 MG/1
650 SUPPOSITORY RECTAL EVERY 6 HOURS PRN
Status: DISCONTINUED | OUTPATIENT
Start: 2022-11-14 | End: 2022-11-15 | Stop reason: HOSPADM

## 2022-11-14 RX ORDER — BUPRENORPHINE HYDROCHLORIDE AND NALOXONE HYDROCHLORIDE DIHYDRATE 8; 2 MG/1; MG/1
1 TABLET SUBLINGUAL 2 TIMES DAILY
Status: DISCONTINUED | OUTPATIENT
Start: 2022-11-14 | End: 2022-11-15 | Stop reason: HOSPADM

## 2022-11-14 RX ORDER — FENOFIBRATE 145 MG/1
145 TABLET, COATED ORAL DAILY
COMMUNITY
End: 2022-11-17 | Stop reason: SDUPTHER

## 2022-11-14 RX ORDER — LISINOPRIL 20 MG/1
20 TABLET ORAL DAILY
Status: DISCONTINUED | OUTPATIENT
Start: 2022-11-14 | End: 2022-11-15 | Stop reason: HOSPADM

## 2022-11-14 RX ORDER — METOCLOPRAMIDE HYDROCHLORIDE 5 MG/ML
10 INJECTION INTRAMUSCULAR; INTRAVENOUS ONCE
Status: COMPLETED | OUTPATIENT
Start: 2022-11-14 | End: 2022-11-14

## 2022-11-14 RX ORDER — POTASSIUM CHLORIDE 7.45 MG/ML
10 INJECTION INTRAVENOUS PRN
Status: DISCONTINUED | OUTPATIENT
Start: 2022-11-14 | End: 2022-11-14

## 2022-11-14 RX ORDER — ONDANSETRON 4 MG/1
4 TABLET, ORALLY DISINTEGRATING ORAL EVERY 8 HOURS PRN
Status: DISCONTINUED | OUTPATIENT
Start: 2022-11-14 | End: 2022-11-15 | Stop reason: HOSPADM

## 2022-11-14 RX ORDER — ATORVASTATIN CALCIUM 40 MG/1
80 TABLET, FILM COATED ORAL DAILY
Status: DISCONTINUED | OUTPATIENT
Start: 2022-11-14 | End: 2022-11-15 | Stop reason: HOSPADM

## 2022-11-14 RX ORDER — DEXTROSE AND SODIUM CHLORIDE 5; .45 G/100ML; G/100ML
INJECTION, SOLUTION INTRAVENOUS CONTINUOUS PRN
Status: DISCONTINUED | OUTPATIENT
Start: 2022-11-14 | End: 2022-11-15 | Stop reason: HOSPADM

## 2022-11-14 RX ORDER — ONDANSETRON 2 MG/ML
4 INJECTION INTRAMUSCULAR; INTRAVENOUS EVERY 6 HOURS PRN
Status: DISCONTINUED | OUTPATIENT
Start: 2022-11-14 | End: 2022-11-15 | Stop reason: HOSPADM

## 2022-11-14 RX ORDER — GABAPENTIN 300 MG/1
300 CAPSULE ORAL 2 TIMES DAILY
Status: DISCONTINUED | OUTPATIENT
Start: 2022-11-14 | End: 2022-11-15 | Stop reason: HOSPADM

## 2022-11-14 RX ORDER — LABETALOL HYDROCHLORIDE 5 MG/ML
10 INJECTION, SOLUTION INTRAVENOUS EVERY 4 HOURS PRN
Status: DISCONTINUED | OUTPATIENT
Start: 2022-11-14 | End: 2022-11-15 | Stop reason: HOSPADM

## 2022-11-14 RX ORDER — ASPIRIN 81 MG/1
81 TABLET, CHEWABLE ORAL DAILY
Status: DISCONTINUED | OUTPATIENT
Start: 2022-11-14 | End: 2022-11-15 | Stop reason: HOSPADM

## 2022-11-14 RX ORDER — CLONIDINE HYDROCHLORIDE 0.3 MG/1
0.3 TABLET ORAL 2 TIMES DAILY
COMMUNITY
End: 2022-11-17 | Stop reason: SDUPTHER

## 2022-11-14 RX ORDER — SODIUM CHLORIDE 0.9 % (FLUSH) 0.9 %
5-40 SYRINGE (ML) INJECTION EVERY 12 HOURS SCHEDULED
Status: DISCONTINUED | OUTPATIENT
Start: 2022-11-14 | End: 2022-11-15 | Stop reason: HOSPADM

## 2022-11-14 RX ORDER — POTASSIUM CHLORIDE 29.8 MG/ML
20 INJECTION INTRAVENOUS PRN
Status: DISCONTINUED | OUTPATIENT
Start: 2022-11-14 | End: 2022-11-15 | Stop reason: HOSPADM

## 2022-11-14 RX ORDER — 0.9 % SODIUM CHLORIDE 0.9 %
15 INTRAVENOUS SOLUTION INTRAVENOUS ONCE
Status: COMPLETED | OUTPATIENT
Start: 2022-11-14 | End: 2022-11-14

## 2022-11-14 RX ORDER — POLYETHYLENE GLYCOL 3350 17 G/17G
17 POWDER, FOR SOLUTION ORAL DAILY PRN
Status: DISCONTINUED | OUTPATIENT
Start: 2022-11-14 | End: 2022-11-15 | Stop reason: HOSPADM

## 2022-11-14 RX ADMIN — BUPRENORPHINE HYDROCHLORIDE AND NALOXONE HYDROCHLORIDE DIHYDRATE 1 TABLET: 8; 2 TABLET SUBLINGUAL at 20:03

## 2022-11-14 RX ADMIN — POTASSIUM CHLORIDE 20 MEQ: 29.8 INJECTION, SOLUTION INTRAVENOUS at 14:33

## 2022-11-14 RX ADMIN — SODIUM CHLORIDE 1.12 UNITS/HR: 9 INJECTION, SOLUTION INTRAVENOUS at 13:41

## 2022-11-14 RX ADMIN — METOCLOPRAMIDE 10 MG: 5 INJECTION, SOLUTION INTRAMUSCULAR; INTRAVENOUS at 12:19

## 2022-11-14 RX ADMIN — CLONIDINE HYDROCHLORIDE 0.3 MG: 0.2 TABLET ORAL at 20:03

## 2022-11-14 RX ADMIN — POTASSIUM CHLORIDE 20 MEQ: 29.8 INJECTION, SOLUTION INTRAVENOUS at 23:20

## 2022-11-14 RX ADMIN — SODIUM CHLORIDE 789 ML: 9 INJECTION, SOLUTION INTRAVENOUS at 12:59

## 2022-11-14 RX ADMIN — LORAZEPAM 1 MG: 2 INJECTION INTRAMUSCULAR; INTRAVENOUS at 15:43

## 2022-11-14 RX ADMIN — MAGNESIUM SULFATE HEPTAHYDRATE 1000 MG: 1 INJECTION, SOLUTION INTRAVENOUS at 19:56

## 2022-11-14 RX ADMIN — ONDANSETRON 4 MG: 2 INJECTION INTRAMUSCULAR; INTRAVENOUS at 16:53

## 2022-11-14 RX ADMIN — SODIUM CHLORIDE, PRESERVATIVE FREE 10 ML: 5 INJECTION INTRAVENOUS at 20:09

## 2022-11-14 RX ADMIN — DEXTROSE AND SODIUM CHLORIDE: 5; 450 INJECTION, SOLUTION INTRAVENOUS at 14:36

## 2022-11-14 RX ADMIN — METOPROLOL SUCCINATE 50 MG: 50 TABLET, EXTENDED RELEASE ORAL at 20:03

## 2022-11-14 RX ADMIN — SODIUM CHLORIDE 2000 ML: 9 INJECTION, SOLUTION INTRAVENOUS at 10:45

## 2022-11-14 RX ADMIN — GABAPENTIN 300 MG: 300 CAPSULE ORAL at 20:03

## 2022-11-14 RX ADMIN — SODIUM PHOSPHATE, MONOBASIC, MONOHYDRATE AND SODIUM PHOSPHATE, DIBASIC, ANHYDROUS 10 MMOL: 276; 142 INJECTION, SOLUTION INTRAVENOUS at 20:01

## 2022-11-14 RX ADMIN — POTASSIUM CHLORIDE 20 MEQ: 29.8 INJECTION, SOLUTION INTRAVENOUS at 22:19

## 2022-11-14 RX ADMIN — ONDANSETRON 4 MG: 2 INJECTION INTRAMUSCULAR; INTRAVENOUS at 10:42

## 2022-11-14 RX ADMIN — MAGNESIUM SULFATE HEPTAHYDRATE 1000 MG: 1 INJECTION, SOLUTION INTRAVENOUS at 18:57

## 2022-11-14 RX ADMIN — POTASSIUM CHLORIDE 20 MEQ: 29.8 INJECTION, SOLUTION INTRAVENOUS at 12:58

## 2022-11-14 ASSESSMENT — ENCOUNTER SYMPTOMS
SHORTNESS OF BREATH: 0
CHEST TIGHTNESS: 0
VOMITING: 1
NAUSEA: 1

## 2022-11-14 ASSESSMENT — PAIN - FUNCTIONAL ASSESSMENT: PAIN_FUNCTIONAL_ASSESSMENT: NONE - DENIES PAIN

## 2022-11-14 ASSESSMENT — PAIN SCALES - GENERAL: PAINLEVEL_OUTOF10: 0

## 2022-11-14 NOTE — ED PROVIDER NOTES
42-year-old male presenting with concern about throwing up, weakness, 3 days of this. He has a history of diabetes as his history of DKA as well. Upon arrival he is hypertensive, he is not significantly tachycardic at 97. He is thin and appears ill at this time. He wakes up on a talk with him and answers questions appropriately. This is a new problem, persistent, ongoing for 3 days, associate with his diabetes, he does have an insulin pump with a sugar of 188 when I checked on it. Family History   Problem Relation Age of Onset    No Known Problems Mother     No Known Problems Father     No Known Problems Brother         Homicide     No Known Problems Daughter     No Known Problems Son     Diabetes type 2  Maternal Grandmother      Past Surgical History:   Procedure Laterality Date    EYE SURGERY Right 11/20/2020    PARS PLANA VITRECTOMY, MEMBRANE PEEL, ENDOLASER, GAS FLUID EXCHANGE, 25G, MAC, RIGHT EYE performed by Andie Vergara MD at [address] Right 01/08/2021    pars plana vitrectomy with peeling of preretinal tissue and gas fluid exchange    EYE SURGERY Right 1/8/2021    PARS PLANA VITRECTOMY, INTERNAL LIMITING MEMBRANE PEEL, INDOCYANINE GREEN, GAS FLUID EXCHANGE, 25g, MAC, RIGHT EYE performed by Andie Vergara MD at [address] Right 03/2021    macular peel-with q 4 week injections       Review of Systems   Constitutional:  Positive for chills and fatigue. Negative for fever. Respiratory:  Negative for chest tightness and shortness of breath. Cardiovascular:  Negative for chest pain. Gastrointestinal:  Positive for nausea and vomiting. Neurological:  Positive for weakness. All other systems reviewed and are negative. Physical Exam  Constitutional:       General: He is in acute distress. Appearance: He is well-developed. He is ill-appearing. HENT:      Head: Normocephalic and atraumatic.    Eyes:      Pupils: Pupils are equal, round, and reactive to light. Neck:      Thyroid: No thyromegaly. Cardiovascular:      Rate and Rhythm: Normal rate and regular rhythm. Pulmonary:      Effort: Pulmonary effort is normal. No respiratory distress. Breath sounds: Normal breath sounds. No wheezing. Abdominal:      General: There is no distension. Palpations: Abdomen is soft. There is no mass. Tenderness: There is no abdominal tenderness. There is no guarding or rebound. Musculoskeletal:         General: No tenderness. Normal range of motion. Cervical back: Normal range of motion and neck supple. Skin:     General: Skin is warm and dry. Findings: No erythema. Neurological:      Mental Status: He is alert and oriented to person, place, and time. Cranial Nerves: No cranial nerve deficit. Psychiatric:         Mood and Affect: Mood normal.        Procedures     MDM     Central Line Placement Procedure Note    Indication: vascular access, poor peripheral access, hypovolemia, and long term access    Consent: The patient provided verbal consent for this procedure. Procedure: The patient was positioned appropriately and the skin over the right femoral vein was prepped with Chloraprep and draped in a sterile fashion. Local anesthesia was obtained by infiltration using 1% Lidocaine without epinephrine. A large bore needle was used to identify the vein. A guide wire was then inserted into the vein through the needle. A triple lumen catheter was then inserted into the vessel over the guide wire using the Seldinger technique. All ports showed good, free flowing blood return and were flushed with saline solution. The catheter was then securely fastened to the skin with suture at the hub. Two sutures were placed into the proximal eyelets. An antibiotic disk was placed and the site was then covered with a sterile dressing. A post procedure X-ray was not indicated.     The patient tolerated the procedure well.    Complications: None      ED Course as of 11/14/22 1951 Mon Nov 14, 2022   1034 Patient already receiving 1 L via EMS, 2 additional liters of normal saline have been ordered. His insulin pump shows a sugar of 188 at the moment. [SO]   1153 Central line placed in the femoral.  Patient receiving a second liter of IV fluids now and is almost complete at this time. [SO]   32 61 16 Patient repeatedly threatening to leave AMA. There is able to help convince him he is sick enough to stay in the hospital.  When I spoke with him he is asking for Ativan to help him sleep. [SO]      ED Course User Index  [SO] Nyasia Felder, DO      ED Course as of 11/14/22 1951 Mon Nov 14, 2022   1034 Patient already receiving 1 L via EMS, 2 additional liters of normal saline have been ordered. His insulin pump shows a sugar of 188 at the moment. [SO]   1153 Central line placed in the femoral.  Patient receiving a second liter of IV fluids now and is almost complete at this time. [SO]   32 61 16 Patient repeatedly threatening to leave AMA. There is able to help convince him he is sick enough to stay in the hospital.  When I spoke with him he is asking for Ativan to help him sleep. [SO]      ED Course User Index  [SO] Nyasia Felder DO     EKG:  Interpreted by me  Sinus rhythm, rate of 99, normal axis, no significant ST elevations or depressions and no significant T wave abnormalities  --------------------------------------------- PAST HISTORY ---------------------------------------------  Past Medical History:  has a past medical history of Asthma, Bipolar affective ([facility] COPD (chronic obstructive pulmonary disease) ([facility] Diabetes mellitus type I ([facility] Diabetic neuropathy ([facility] Diabetic, retinopathy, proliferative ([facility] HTN (hypertension), Hypercholesteremia, Left renal artery stenosis ([address] Macular edema due to secondary diabetes ([address] Opioid dependence ([address] Proteinuria, Retinal detachment, and Vitreous hemorrhage of right eye due to diabetes mellitus ([facility] Past Surgical History:  has a past surgical history that includes Eye surgery (Right, 11/20/2020); Eye surgery (Right, 01/08/2021); Eye surgery (Right, 1/8/2021); and eye surgery (Right, 03/2021). Social History:  reports that he has been smoking cigarettes. He has a 7.50 pack-year smoking history. He has never used smokeless tobacco. He reports that he does not currently use drugs after having used the following drugs: Opiates . He reports that he does not drink alcohol. Family History: family history includes Diabetes type 2  in his maternal grandmother; No Known Problems in his brother, daughter, father, mother, and son. The patients home medications have been reviewed.     Allergies: Chlorthalidone, Hctz [hydrochlorothiazide], and Hydralazine    -------------------------------------------------- RESULTS -------------------------------------------------    Lab  Results for orders placed or performed during the hospital encounter of 11/14/22   COVID-19, Rapid    Specimen: Nasopharyngeal Swab   Result Value Ref Range    SARS-CoV-2, NAAT Not Detected Not Detected   CBC with Auto Differential   Result Value Ref Range    WBC 17.5 (H) 4.5 - 11.5 E9/L    RBC 4.54 3.80 - 5.80 E12/L    Hemoglobin 12.7 12.5 - 16.5 g/dL    Hematocrit 39.5 37.0 - 54.0 %    MCV 87.0 80.0 - 99.9 fL    MCH 28.0 26.0 - 35.0 pg    MCHC 32.2 32.0 - 34.5 %    RDW 12.4 11.5 - 15.0 fL    Platelets 830 532 - 726 E9/L    MPV 9.9 7.0 - 12.0 fL    Neutrophils % 90.8 (H) 43.0 - 80.0 %    Immature Granulocytes % 0.6 0.0 - 5.0 %    Lymphocytes % 5.8 (L) 20.0 - 42.0 %    Monocytes % 2.2 2.0 - 12.0 %    Eosinophils % 0.1 0.0 - 6.0 %    Basophils % 0.5 0.0 - 2.0 %    Neutrophils Absolute 15.94 (H) 1.80 - 7.30 E9/L    Immature Granulocytes # 0.11 E9/L    Lymphocytes Absolute 1.01 (L) 1.50 - 4.00 E9/L    Monocytes Absolute 0.38 0.10 - 0.95 E9/L    Eosinophils Absolute 0.01 (L) 0.05 - 0.50 E9/L    Basophils Absolute 0. 08 0.00 - 0.20 E9/L   Comprehensive Metabolic Panel   Result Value Ref Range    Sodium 151 (H) 132 - 146 mmol/L    Potassium 3.6 3.5 - 5.0 mmol/L    Chloride 96 (L) 98 - 107 mmol/L    CO2 28 22 - 29 mmol/L    Anion Gap 27 (H) 7 - 16 mmol/L    Glucose 163 (H) 74 - 99 mg/dL    BUN 35 (H) 6 - 20 mg/dL    Creatinine 2.4 (H) 0.7 - 1.2 mg/dL    Est, Glom Filt Rate 36 >=60 mL/min/1.73    Calcium 9.2 8.6 - 10.2 mg/dL    Total Protein 7.9 6.4 - 8.3 g/dL    Albumin 4.3 3.5 - 5.2 g/dL    Total Bilirubin 0.3 0.0 - 1.2 mg/dL    Alkaline Phosphatase 99 40 - 129 U/L    ALT 10 0 - 40 U/L    AST 17 0 - 39 U/L   Beta-Hydroxybutyrate   Result Value Ref Range    Beta-Hydroxybutyrate >4.50 (H) 0.02 - 0.27 mmol/L   Lactate, Sepsis   Result Value Ref Range    Lactic Acid, Sepsis 3.6 (HH) 0.5 - 1.9 mmol/L   Lactate, Sepsis   Result Value Ref Range    Lactic Acid, Sepsis 2.2 (H) 0.5 - 1.9 mmol/L   Troponin   Result Value Ref Range    Troponin, High Sensitivity 39 (H) 0 - 11 ng/L   PH, VENOUS   Result Value Ref Range    pH, Luis 7.37 7.35 - 4.81   Basic Metabolic Panel   Result Value Ref Range    Sodium 146 132 - 146 mmol/L    Potassium 3.8 3.5 - 5.0 mmol/L    Chloride 103 98 - 107 mmol/L    CO2 22 22 - 29 mmol/L    Anion Gap 21 (H) 7 - 16 mmol/L    Glucose 162 (H) 74 - 99 mg/dL    BUN 31 (H) 6 - 20 mg/dL    Creatinine 2.1 (H) 0.7 - 1.2 mg/dL    Est, Glom Filt Rate 42 >=60 mL/min/1.73    Calcium 8.3 (L) 8.6 - 10.2 mg/dL   Magnesium   Result Value Ref Range    Magnesium 1.6 1.6 - 2.6 mg/dL   Phosphorus   Result Value Ref Range    Phosphorus 2.7 2.5 - 4.5 mg/dL   Urinalysis with Microscopic   Result Value Ref Range    Color, UA Yellow Straw/Yellow    Clarity, UA Clear Clear    Glucose, Ur Negative Negative mg/dL    Bilirubin Urine MODERATE (A) Negative    Ketones, Urine 40 (A) Negative mg/dL    Specific Gravity, UA >=1.030 1.005 - 1.030    Blood, Urine SMALL (A) Negative    pH, UA 6.0 5.0 - 9.0    Protein, UA >=300 (A) Negative mg/dL --   11/14/22 1603 (!) 184/96 98.3 °F (36.8 °C) -- (!) 119 14 96 % -- --   11/14/22 1358 (!) 182/107 98.3 °F (36.8 °C) Oral (!) 118 16 97 % -- --   11/14/22 1215 -- -- -- -- -- -- -- 116 lb (52.6 kg)   11/14/22 1152 (!) 173/97 -- -- 92 15 94 % -- --   11/14/22 1025 (!) 190/112 98.2 °F (36.8 °C) -- 97 18 93 % -- --       Oxygen Saturation Interpretation: Normal      ------------------------------------------ PROGRESS NOTES ------------------------------------------  Re-evaluation(s):   Patients symptoms are improving  Repeat physical examination is improved    Patients symptoms are improving  Repeat physical examination is improved    I have spoken with the patient and discussed todays results, in addition to providing specific details for the plan of care and counseling regarding the diagnosis and prognosis. Their questions are answered at this time and they are agreeable with the plan.      --------------------------------- ADDITIONAL PROVIDER NOTES ---------------------------------  Consultations:  Spoke with Dr. Missy Rodriguez and Dr. Lazaro Gupta,  They will admit this patient and will provide consultation. This patient's ED course included: a personal history and physicial examination, re-evaluation prior to disposition, multiple bedside re-evaluations, IV medications, cardiac monitoring, continuous pulse oximetry, and complex medical decision making and emergency management    This patient has remained hemodynamically stable, improved, and been closely monitored during their ED course. Please note that the withdrawal or failure to initiate urgent interventions for this patient would likely result in a life threatening deterioration or permanent disability. Accordingly this patient received 45 minutes of critical care time, excluding separately billable procedures. Systems at risk for deterioration include: neurologic, cardiovascular, renal, pulmonary. Clinical Impression  1.  Diabetic ketoacidosis without coma associated with type 1 diabetes mellitus ([facility]          Disposition  Patient's disposition: Admit to CCU/ICU  Patient's condition is stable.        Estella Ga, DO  11/14/22 65 [facility], DO  12/19/22 [id number]

## 2022-11-14 NOTE — H&P
Hospital Medicine History & Physical      PCP: Xena Molina DO    Date of Admission: 11/14/2022    Date of Service: Pt seen/examined on 11/14/22 and Admitted to Inpatient with expected LOS greater than two midnights due to medical therapy. Chief Complaint:  DKA      History Of Present Illness:      28 y.o. male who presented to [facility] W Replaced by [facility] with History of type 1 diabetes mellitus on insulin pump with complaints of vomiting, nausea, weakness which has been going on over the last 3 days. Patient also appears to be lethargic. Does not complain of any fevers but does mention chills, no cough, SOB. No burning urination.  No episodes of vomiting since this AM.    ER course  -3L Bolus ordered in the ER, Anion gap -27, betahydroxybutyrates elevated, insulin drip started, ICU consulted    Past Medical History:          Diagnosis Date    Asthma     Bipolar affective ([address]     COPD (chronic obstructive pulmonary disease) ([address]     Diabetes mellitus type I ([address]     Diabetic neuropathy ([address]     Diabetic, retinopathy, proliferative ([address]     HTN (hypertension)     due to left renal artery stenosis    Hypercholesteremia     Left renal artery stenosis ([address] 09/19/2019    Macular edema due to secondary diabetes ([address]     Opioid dependence ([address]     Proteinuria     Retinal detachment     right eye    Vitreous hemorrhage of right eye due to diabetes mellitus [facility])        Past Surgical History:          Procedure Laterality Date    EYE SURGERY Right 11/20/2020    PARS PLANA VITRECTOMY, MEMBRANE PEEL, ENDOLASER, GAS FLUID EXCHANGE, 25G, MAC, RIGHT EYE performed by Carroll Perea MD at [address] [address] Right 01/08/2021    pars plana vitrectomy with peeling of preretinal tissue and gas fluid exchange    EYE SURGERY Right 1/8/2021    PARS PLANA VITRECTOMY, INTERNAL LIMITING MEMBRANE PEEL, INDOCYANINE GREEN, GAS FLUID EXCHANGE, 25g, MAC, RIGHT EYE performed by Carroll Perea MD at [place] [address] OR    EYE SURGERY Right 03/2021    macular peel-with q 4 week injections       Medications Prior to Admission:      Prior to Admission medications    Medication Sig Start Date End Date Taking? Authorizing Provider   insulin aspart (NOVOLOG) 100 UNIT/ML injection vial Use via insulin pump Max dose 50 units daily 10/20/22   Shilpa Mckeon MD   blood glucose test strips (ACCU-CHEK GUIDE) strip Check blood sugars 4x daily and recheck for hypo/hyperglycemic episodes 10/5/22   Shilpa Mckeon MD   Accu-Chek FastClix Lancets MISC Check blood sugars 4x daily and recheck for hypo/hyperglycemic episodes 10/5/22   Shilpa Mckeon MD   aspirin 81 MG chewable tablet Take 1 tablet by mouth daily 6/1/22   ErrolChesterfield DO Sha[place]   Blood Glucose Monitoring Suppl (TRUE METRIX METER) w/Device KIT Check blood glucose four times daily (fasting and with each meal). 6/1/22   Tori Choudhury DO   cloNIDine (CATAPRES) 0.3 MG tablet Take 1 tablet by mouth 2 times daily 6/1/22 9/28/22  Sergio Corbin DO   lisinopril (PRINIVIL;ZESTRIL) 20 MG tablet Take 1 tablet by mouth daily 6/1/22   Tori Choudhury DO   Insulin Pen Needle (PEN NEEDLES) 32G X 5 MM MISC 1 each by Does not apply route daily 6/1/22   Tori Choudhury DO   metoprolol succinate (TOPROL XL) 50 MG extended release tablet Take 1 tablet by mouth 2 times daily 6/1/22 9/28/22  Sergio Corbin DO   atorvastatin (LIPITOR) 80 MG tablet Take 1 tablet by mouth daily 6/1/22   Sergio Corbin DO   gabapentin (NEURONTIN) 300 MG capsule Take 1 capsule by mouth 2 times daily for 90 days. 4/20/22 9/28/22  Sergio Corbin DO   albuterol sulfate HFA (VENTOLIN HFA) 108 (90 Base) MCG/ACT inhaler Inhale 2 puffs into the lungs every 4 hours as needed for Wheezing or Shortness of Breath 3/14/22   Sergio Corbin DO   SUBOXONE 8-2 MG FILM SL film Place 1 Film under the tongue 2 times daily.   9/14/17   Historical Provider, MD       Allergies:  Chlorthalidone, Hctz [hydrochlorothiazide], and Hydralazine    Social History:        TOBACCO:   reports that he has been smoking cigarettes. He has a 7.50 pack-year smoking history. He has never used smokeless tobacco.  ETOH:   reports no history of alcohol use. Family History:       Reviewed in detail and negative for DM, CAD, Cancer, CVA. Positive as follows:        Problem Relation Age of Onset    No Known Problems Mother     No Known Problems Father     No Known Problems Brother         Homicide     No Known Problems Daughter     No Known Problems Son     Diabetes type 2  Maternal Grandmother        REVIEW OF SYSTEMS:   Pertinent positives as noted in the HPI. All other systems reviewed and negative. PHYSICAL EXAM:    BP (!) 173/97   Pulse 92   Temp 98.2 °F (36.8 °C)   Resp 15   Wt 116 lb (52.6 kg)   SpO2 94%   BMI 20.55 kg/m²     General appearance:  Malnourished, appears stated age and cooperative. HEENT:  Normal cephalic, atraumatic without obvious deformity. Pupils equal, round, and reactive to light. Extra ocular muscles intact. Conjunctivae/corneas clear. Neck: Supple, with full range of motion. No jugular venous distention. Trachea midline. Respiratory:  Normal respiratory effort. Clear to auscultation, bilaterally without Rales/Wheezes/Rhonchi. Cardiovascular:  Regular rate and rhythm with normal S1/S2 without murmurs, rubs or gallops. Abdomen: Soft, non-tender, non-distended with normal bowel sounds. Musculoskeletal:  No clubbing, cyanosis or edema bilaterally. Full range of motion without deformity. Skin: Skin color, texture, turgor normal.  No rashes or lesions.   Neurologic:  lethargic, sensations intact,  Psychiatric:  Alert and oriented, thought content appropriate, normal insight    Labs:     Recent Labs     11/14/22  1056   WBC 17.5*   HGB 12.7   HCT 39.5        Recent Labs     11/14/22  1056   *   K 3.6   CL 96*   CO2 28   BUN 35*   CREATININE 2.4*   CALCIUM 9.2     Recent

## 2022-11-14 NOTE — CONSULTS
[facility]  Internal Medicine Residency Program  History and Physical  MICU    Patient:  Hilda Kirk 28 y.o. male MRN: [id number]     Date of Service: 11/14/2022    Hospital Day: 1      Chief complaint: Nausea and vomiting for 3 days  History of Present Illness   The patient is a 28 y.o. male with past medical history of Type 1 DKA presented with concern of throwing up, not feeling well, and mild abdominal pain for 3 days. Patient states he was on good health before he started throwing up. He denies any sick contact or food poisoning. His bowel habit is good, no recent diarrhea. It was not associated with chest pain, SOB, no severe abdominal pain. He has no concern regarding UTI. He denies any weakness, numbness. He was diagnosed with type 1 DM since age of 5 and follows with Dr. Chelsie Hill. Recently, he started insulin pump, he claims he is using it on a correct way. He has history of hospitalization due to DKA for couple of times. Patient smokes one pack of cigarette per day. Denies any alcohol or drub abuse. But he was hospitalized on 03/22 due to opioid overdose, and on Suboxone currently. ED Course: In the ED, patient was hypertensive /112, tachycardia 118, Saturation 93%. His labs were significant for AG 27, Blood sugar 223, Beta hydroxybutyrate >4.5 , urine ketones 40. LA 2.2, Leukocytosis 17.5. CXR and EKG no acute events. Cr was elevated 2.4. ED Meds: Patient was started on DKA protocol, Received Lisinopril 20 mg.      Past Medical History:      Diagnosis Date    Asthma     Bipolar affective ([address]     COPD (chronic obstructive pulmonary disease) (HCC)     Diabetes mellitus type I ([address]     Diabetic neuropathy ([address]     Diabetic, retinopathy, proliferative ([address]     HTN (hypertension)     due to left renal artery stenosis    Hypercholesteremia     Left renal artery stenosis ([address] 09/19/2019    Macular edema due to secondary diabetes ([address]     Opioid dependence ([address]     Proteinuria     Retinal detachment     right eye    Vitreous hemorrhage of right eye due to diabetes mellitus [facility])        Past Surgical History:        Procedure Laterality Date    EYE SURGERY Right 11/20/2020    PARS PLANA VITRECTOMY, MEMBRANE PEEL, ENDOLASER, GAS FLUID EXCHANGE, 25G, MAC, RIGHT EYE performed by Jordan Avina MD at [address] Right 01/08/2021    pars plana vitrectomy with peeling of preretinal tissue and gas fluid exchange    EYE SURGERY Right 1/8/2021    PARS PLANA VITRECTOMY, INTERNAL LIMITING MEMBRANE PEEL, INDOCYANINE GREEN, GAS FLUID EXCHANGE, 25g, MAC, RIGHT EYE performed by Jordan Avina MD at [address] Right 03/2021    macular peel-with q 4 week injections       Medications Prior to Admission:    Prior to Admission medications    Medication Sig Start Date End Date Taking? Authorizing Provider   cloNIDine (CATAPRES) 0.3 MG tablet Take 0.3 mg by mouth 2 times daily   Yes Historical Provider, MD   fenofibrate (TRICOR) 145 MG tablet Take 145 mg by mouth daily  Patient not taking: Reported on 11/14/2022   Yes Historical Provider, MD   gabapentin (NEURONTIN) 300 MG capsule Take 300 mg by mouth in the morning and at bedtime.    Yes Historical Provider, MD   metoprolol succinate (TOPROL XL) 50 MG extended release tablet Take 50 mg by mouth in the morning and at bedtime   Yes Historical Provider, MD   insulin aspart (NOVOLOG) 100 UNIT/ML injection vial Use via insulin pump Max dose 50 units daily 10/20/22   Shilpa Mckeon MD   aspirin 81 MG chewable tablet Take 1 tablet by mouth daily 6/1/22   Sergio Corbin DO   lisinopril (PRINIVIL;ZESTRIL) 20 MG tablet Take 1 tablet by mouth daily 6/1/22   Tori Choudhury DO   atorvastatin (LIPITOR) 80 MG tablet Take 1 tablet by mouth daily  Patient not taking: Reported on 11/14/2022 6/1/22   Sergio Corbin DO   albuterol sulfate HFA (VENTOLIN HFA) 108 (90 Base) MCG/ACT inhaler Inhale 2 puffs into the lungs every 4 hours as needed for Wheezing or Shortness of Breath  Patient not taking: Reported on 11/14/2022 3/14/22   Erroleric SherlyDO peter   SUBOXONE 8-2 MG FILM SL film Place 1 Film under the tongue 2 times daily. 9/14/17   Historical Provider, MD       Allergies:  Chlorthalidone, Hctz [hydrochlorothiazide], and Hydralazine    Social History:   TOBACCO:   reports that he has been smoking cigarettes. He has a 7.50 pack-year smoking history. He has never used smokeless tobacco.  ETOH:   reports no history of alcohol use. OCCUPATION:      Family History:       Problem Relation Age of Onset    No Known Problems Mother     No Known Problems Father     No Known Problems Brother         Homicide     No Known Problems Daughter     No Known Problems Son     Diabetes type 2  Maternal Grandmother        REVIEW OF SYSTEMS:    Constitutional: No fever, no chills, no change in weight; good appetite  HEENT: No blurred vision, no ear problems, no sore throat, no rhinorrhea. Respiratory: No cough, no sputum production, no pleuritic chest pain, no shortness of breath  Cardiology: No angina, no dyspnea on exertion, no paroxysmal nocturnal dyspnea, no orthopnea, no palpitation, no leg swelling. Gastroenterology: No dysphagia, no reflux; no abdominal pain, no nausea or vomiting; no constipation or diarrhea.  No hematochezia   Genitourinary: No dysuria, no frequency, hesitancy; no hematuria  Musculoskeletal: no joint pain, no myalgia, no change in range of movement  Neurology: no focal weakness in extremities, no slurred speech, no double vision, no tingling or numbness sensation  Endocrinology: no temperature intolerance, no polyphagia, polydipsia or polyuria  Hematology: no increased bleeding, no bruising, no lymphadenopathy  Skin: no skin changes noticed by patient  Psychology: no depressed mood, no suicidal ideation    Physical Exam   Vitals: BP (!) 188/99   Pulse (!) 107   Temp 98.9 °F (37.2 °C) (Temporal) Resp (!) 31   Wt 108 lb 1.6 oz (49 kg)   SpO2 97%   BMI 19.15 kg/m²           General Appearance: alert and oriented to person, place and time, well-developed and well-nourished, in no acute distress  Head: normocephalic and atraumatic  Neck: neck supple and non tender without mass, no thyromegaly or thyroid nodules, no cervical lymphadenopathy   Pulmonary/Chest: clear to auscultation bilaterally- no wheezes, rales or rhonchi, normal air movement, no respiratory distress  Cardiovascular: normal rate, normal S1 and S2, no gallops, intact distal pulses, and no carotid bruits  Abdomen: soft, non-tender, non-distended, normal bowel sounds, no masses or organomegaly  Extremities: no cyanosis and no clubbing  Musculoskeletal: normal range of motion, no joint swelling, deformity or tenderness  Neurologic: Cranial nerves intact, speech normal. Motor strength and sensation normal.     Lines     site day    Art line       TLC R Fem 0   PICC     Hemoaccess       Lab Results   Component Value Date/Time    PH 7.105 06/18/2014 09:40 AM    PH 7.27 04/20/2012 11:30 PM    PCO2 <15.0 06/18/2014 09:40 AM    PO2 148.8 06/18/2014 09:40 AM    HCO3 23.1 04/13/2014 04:35 AM    BE 0.1 04/13/2014 04:35 AM    THB 17.2 06/18/2014 09:40 AM    O2SAT 98.6 06/18/2014 09:40 AM     Labs and Imaging Studies   Basic Labs  CBC:   Lab Results   Component Value Date/Time    WBC 17.5 11/14/2022 10:56 AM    RBC 4.54 11/14/2022 10:56 AM    HGB 12.7 11/14/2022 10:56 AM    HCT 39.5 11/14/2022 10:56 AM    MCV 87.0 11/14/2022 10:56 AM    RDW 12.4 11/14/2022 10:56 AM     11/14/2022 10:56 AM     CMP:  Lab Results   Component Value Date/Time     11/14/2022 05:40 PM    K 3.8 11/14/2022 05:40 PM    K 4.4 11/05/2021 08:15 AM     11/14/2022 05:40 PM    CO2 22 11/14/2022 05:40 PM    BUN 31 11/14/2022 05:40 PM    PROT 7.9 11/14/2022 10:56 AM       Imaging Studies:     XR CHEST PORTABLE    No acute cardiopulmonary process.        EKG: normal sinus rhythm, unchanged from previous tracings. Resident's Assessment and Plan     Nicolasa New is a 28 y.o. male    Endocrine:   DKA in the setting of type 1 DM likely 2/2 to ?? medication non-compliance, improving  -Has previous history of hospitalization due to medication non-compliance  -Patient admitted with , AG 27, Beta hydroxybutyrate >4.5, urine ketones 40  -Patient follows with Dr. Cadence Chavez, recently started insulin pump.   -Troponin elevated 39, EKG normal, no chest pain, less likely MI  -No recent sick contact or source of infection  -Follow up blood cultures, MRSA nares  -Patient on DKA protocol    2. History of diabetic retinopathy right eye and right retinal detachment and vitreous hemorrhage  -No vision concern on this admission. 3. History of diabetic neuropathy  -On Gabapentin 300 mg BID, resumed    Cardiology:   History of hypertension  -Patient came with hypertension 190/112  -Home medications are metoprolol, Lisinopril, Clonidine  -Resumed Home medications except Lisinopril in the setting of ALEJANDRO. Ordered labetalol PRN. 2. History of hyperlipidemia  -On Lipitor 80 mg daily, rsmued     Nephrology (Fluids/ Electrolytes & Nutrition): ALEJANDRO stage I likely 2/2 DKA in the setting of, improving  -Baseline 1.4,admission Cr 2.4, trending down, repeat Cr 2.1  -Will follow up morning CMP    2. Lactic acidosis 2.2  -Follow up morning lab    Hematology/ Oncology:   History of chronic leukocytosis likely 2/2 stress and smoking cigarettes  -He follows with heme/onc (Dr. Miah Mitchell)  -A work-up was ordered to rule out a myeloproliferative neoplasm  -Flow cytometry is negative for B/T-cell neoplasm, negative for blasts  -CT chest, abdomen was negative for malignancy. Musculoskeletal:   History of degenerative changes of bone    History of drug overdose  -03/09/22 presented with opiate overdose, on Suboxone  -Patient told he is getting his Suboxone from his regular pharmacy.  After verification with inpatient pharmacy resumed Suboxone. History of bipolar disorder  -Not on medications.     PT/OT: Not required at this time  DVT ppx: Lovenox 40 mg daily        Amy Boyer MD, PGY-1   Attending physician: Dr. Yaritza Hankins  Precepted with: Dr. Diana Bucio

## 2022-11-15 VITALS
HEART RATE: 77 BPM | BODY MASS INDEX: 19.14 KG/M2 | DIASTOLIC BLOOD PRESSURE: 88 MMHG | OXYGEN SATURATION: 95 % | WEIGHT: 108 LBS | HEIGHT: 63 IN | RESPIRATION RATE: 4 BRPM | TEMPERATURE: 97.6 F | SYSTOLIC BLOOD PRESSURE: 145 MMHG

## 2022-11-15 PROBLEM — E10.10 DIABETIC KETOACIDOSIS WITHOUT COMA ASSOCIATED WITH TYPE 1 DIABETES MELLITUS (HCC): Status: ACTIVE | Noted: 2022-11-15

## 2022-11-15 LAB
ANION GAP SERPL CALCULATED.3IONS-SCNC: 10 MMOL/L (ref 7–16)
ANION GAP SERPL CALCULATED.3IONS-SCNC: 11 MMOL/L (ref 7–16)
ANION GAP SERPL CALCULATED.3IONS-SCNC: 9 MMOL/L (ref 7–16)
B.E.: -1.7 MMOL/L (ref -3–3)
BASOPHILS ABSOLUTE: 0.07 E9/L (ref 0–0.2)
BASOPHILS RELATIVE PERCENT: 0.5 % (ref 0–2)
BUN BLDV-MCNC: 22 MG/DL (ref 6–20)
BUN BLDV-MCNC: 24 MG/DL (ref 6–20)
BUN BLDV-MCNC: 27 MG/DL (ref 6–20)
CALCIUM SERPL-MCNC: 8.2 MG/DL (ref 8.6–10.2)
CALCIUM SERPL-MCNC: 8.3 MG/DL (ref 8.6–10.2)
CALCIUM SERPL-MCNC: 8.3 MG/DL (ref 8.6–10.2)
CHLORIDE BLD-SCNC: 104 MMOL/L (ref 98–107)
CHLORIDE BLD-SCNC: 104 MMOL/L (ref 98–107)
CHLORIDE BLD-SCNC: 105 MMOL/L (ref 98–107)
CHLORIDE URINE RANDOM: 54 MMOL/L
CO2: 25 MMOL/L (ref 22–29)
CO2: 27 MMOL/L (ref 22–29)
CO2: 28 MMOL/L (ref 22–29)
COHB: 0.2 % (ref 0–1.5)
CREAT SERPL-MCNC: 1.8 MG/DL (ref 0.7–1.2)
CREAT SERPL-MCNC: 1.9 MG/DL (ref 0.7–1.2)
CREAT SERPL-MCNC: 2 MG/DL (ref 0.7–1.2)
CREATININE URINE: 83 MG/DL (ref 40–278)
CREATININE URINE: 83 MG/DL (ref 40–278)
CRITICAL: ABNORMAL
DATE ANALYZED: ABNORMAL
DATE OF COLLECTION: ABNORMAL
EOSINOPHILS ABSOLUTE: 0.05 E9/L (ref 0.05–0.5)
EOSINOPHILS RELATIVE PERCENT: 0.3 % (ref 0–6)
FIO2: 100 %
GFR SERPL CREATININE-BSD FRML MDRD: 45 ML/MIN/1.73
GFR SERPL CREATININE-BSD FRML MDRD: 47 ML/MIN/1.73
GFR SERPL CREATININE-BSD FRML MDRD: 51 ML/MIN/1.73
GLUCOSE BLD-MCNC: 123 MG/DL (ref 74–99)
GLUCOSE BLD-MCNC: 167 MG/DL (ref 74–99)
GLUCOSE BLD-MCNC: 179 MG/DL (ref 74–99)
HCO3: 24 MMOL/L (ref 22–26)
HCT VFR BLD CALC: 29.7 % (ref 37–54)
HEMOGLOBIN: 9.6 G/DL (ref 12.5–16.5)
HHB: 0.8 % (ref 0–5)
IMMATURE GRANULOCYTES #: 0.08 E9/L
IMMATURE GRANULOCYTES %: 0.5 % (ref 0–5)
LAB: ABNORMAL
LYMPHOCYTES ABSOLUTE: 3.51 E9/L (ref 1.5–4)
LYMPHOCYTES RELATIVE PERCENT: 22.8 % (ref 20–42)
Lab: ABNORMAL
MAGNESIUM: 2 MG/DL (ref 1.6–2.6)
MAGNESIUM: 2.1 MG/DL (ref 1.6–2.6)
MAGNESIUM: 2.5 MG/DL (ref 1.6–2.6)
MCH RBC QN AUTO: 28.1 PG (ref 26–35)
MCHC RBC AUTO-ENTMCNC: 32.3 % (ref 32–34.5)
MCV RBC AUTO: 86.8 FL (ref 80–99.9)
METER GLUCOSE: 126 MG/DL (ref 74–99)
METER GLUCOSE: 131 MG/DL (ref 74–99)
METER GLUCOSE: 142 MG/DL (ref 74–99)
METER GLUCOSE: 166 MG/DL (ref 74–99)
METER GLUCOSE: 167 MG/DL (ref 74–99)
METER GLUCOSE: 168 MG/DL (ref 74–99)
METER GLUCOSE: 168 MG/DL (ref 74–99)
METER GLUCOSE: 173 MG/DL (ref 74–99)
METER GLUCOSE: 179 MG/DL (ref 74–99)
METER GLUCOSE: 321 MG/DL (ref 74–99)
METHB: 0.2 % (ref 0–1.5)
MICROALBUMIN UR-MCNC: 1296.9 MG/L
MICROALBUMIN/CREAT UR-RTO: 1562.5 (ref 0–30)
MODE: ABNORMAL
MONOCYTES ABSOLUTE: 0.97 E9/L (ref 0.1–0.95)
MONOCYTES RELATIVE PERCENT: 6.3 % (ref 2–12)
NEUTROPHILS ABSOLUTE: 10.73 E9/L (ref 1.8–7.3)
NEUTROPHILS RELATIVE PERCENT: 69.6 % (ref 43–80)
O2 CONTENT: 16.6 ML/DL
O2 SATURATION: 99.2 % (ref 92–98.5)
O2HB: 98.8 % (ref 94–97)
OPERATOR ID: 366
PATIENT TEMP: 37 C
PCO2: 44.2 MMHG (ref 35–45)
PDW BLD-RTO: 12.3 FL (ref 11.5–15)
PFO2: 2.64 MMHG/%
PH BLOOD GAS: 7.35 (ref 7.35–7.45)
PHOSPHORUS: 2.5 MG/DL (ref 2.5–4.5)
PHOSPHORUS: 3 MG/DL (ref 2.5–4.5)
PHOSPHORUS: 3.2 MG/DL (ref 2.5–4.5)
PLATELET # BLD: 284 E9/L (ref 130–450)
PMV BLD AUTO: 10.6 FL (ref 7–12)
PO2: 264.4 MMHG (ref 75–100)
POTASSIUM SERPL-SCNC: 3.4 MMOL/L (ref 3.5–5)
POTASSIUM SERPL-SCNC: 3.7 MMOL/L (ref 3.5–5)
POTASSIUM SERPL-SCNC: 4.2 MMOL/L (ref 3.5–5)
POTASSIUM, UR: 28 MMOL/L
RBC # BLD: 3.42 E12/L (ref 3.8–5.8)
SODIUM BLD-SCNC: 139 MMOL/L (ref 132–146)
SODIUM BLD-SCNC: 142 MMOL/L (ref 132–146)
SODIUM BLD-SCNC: 142 MMOL/L (ref 132–146)
SODIUM URINE: 88 MMOL/L
SOURCE, BLOOD GAS: ABNORMAL
THB: 11.5 G/DL (ref 11.5–16.5)
TIME ANALYZED: 1035
UREA NITROGEN, UR: 515 MG/DL (ref 800–1666)
WBC # BLD: 15.4 E9/L (ref 4.5–11.5)

## 2022-11-15 PROCEDURE — 2500000003 HC RX 250 WO HCPCS: Performed by: EMERGENCY MEDICINE

## 2022-11-15 PROCEDURE — 82805 BLOOD GASES W/O2 SATURATION: CPT

## 2022-11-15 PROCEDURE — 82044 UR ALBUMIN SEMIQUANTITATIVE: CPT

## 2022-11-15 PROCEDURE — 84133 ASSAY OF URINE POTASSIUM: CPT

## 2022-11-15 PROCEDURE — 2580000003 HC RX 258: Performed by: STUDENT IN AN ORGANIZED HEALTH CARE EDUCATION/TRAINING PROGRAM

## 2022-11-15 PROCEDURE — 83735 ASSAY OF MAGNESIUM: CPT

## 2022-11-15 PROCEDURE — 6360000002 HC RX W HCPCS: Performed by: INTERNAL MEDICINE

## 2022-11-15 PROCEDURE — 82962 GLUCOSE BLOOD TEST: CPT

## 2022-11-15 PROCEDURE — 6370000000 HC RX 637 (ALT 250 FOR IP): Performed by: INTERNAL MEDICINE

## 2022-11-15 PROCEDURE — 84100 ASSAY OF PHOSPHORUS: CPT

## 2022-11-15 PROCEDURE — 84300 ASSAY OF URINE SODIUM: CPT

## 2022-11-15 PROCEDURE — 99291 CRITICAL CARE FIRST HOUR: CPT | Performed by: INTERNAL MEDICINE

## 2022-11-15 PROCEDURE — 82570 ASSAY OF URINE CREATININE: CPT

## 2022-11-15 PROCEDURE — 82436 ASSAY OF URINE CHLORIDE: CPT

## 2022-11-15 PROCEDURE — 85025 COMPLETE CBC W/AUTO DIFF WBC: CPT

## 2022-11-15 PROCEDURE — 6370000000 HC RX 637 (ALT 250 FOR IP): Performed by: STUDENT IN AN ORGANIZED HEALTH CARE EDUCATION/TRAINING PROGRAM

## 2022-11-15 PROCEDURE — 80048 BASIC METABOLIC PNL TOTAL CA: CPT

## 2022-11-15 PROCEDURE — 36592 COLLECT BLOOD FROM PICC: CPT

## 2022-11-15 PROCEDURE — 6360000002 HC RX W HCPCS

## 2022-11-15 PROCEDURE — 84540 ASSAY OF URINE/UREA-N: CPT

## 2022-11-15 PROCEDURE — 2580000003 HC RX 258: Performed by: EMERGENCY MEDICINE

## 2022-11-15 RX ORDER — HYDRALAZINE HYDROCHLORIDE 20 MG/ML
10 INJECTION INTRAMUSCULAR; INTRAVENOUS EVERY 4 HOURS PRN
Status: DISCONTINUED | OUTPATIENT
Start: 2022-11-15 | End: 2022-11-15 | Stop reason: HOSPADM

## 2022-11-15 RX ORDER — INSULIN LISPRO 100 [IU]/ML
0-4 INJECTION, SOLUTION INTRAVENOUS; SUBCUTANEOUS
Status: DISCONTINUED | OUTPATIENT
Start: 2022-11-15 | End: 2022-11-15 | Stop reason: HOSPADM

## 2022-11-15 RX ORDER — NALOXONE HYDROCHLORIDE 0.4 MG/ML
INJECTION, SOLUTION INTRAMUSCULAR; INTRAVENOUS; SUBCUTANEOUS
Status: DISCONTINUED
Start: 2022-11-15 | End: 2022-11-15 | Stop reason: HOSPADM

## 2022-11-15 RX ORDER — INSULIN LISPRO 100 [IU]/ML
4 INJECTION, SOLUTION INTRAVENOUS; SUBCUTANEOUS
Status: DISCONTINUED | OUTPATIENT
Start: 2022-11-15 | End: 2022-11-15 | Stop reason: HOSPADM

## 2022-11-15 RX ORDER — ENOXAPARIN SODIUM 100 MG/ML
30 INJECTION SUBCUTANEOUS DAILY
Status: DISCONTINUED | OUTPATIENT
Start: 2022-11-15 | End: 2022-11-15 | Stop reason: HOSPADM

## 2022-11-15 RX ORDER — INSULIN GLARGINE-YFGN 100 [IU]/ML
14 INJECTION, SOLUTION SUBCUTANEOUS DAILY
Status: DISCONTINUED | OUTPATIENT
Start: 2022-11-15 | End: 2022-11-15 | Stop reason: HOSPADM

## 2022-11-15 RX ORDER — INSULIN LISPRO 100 [IU]/ML
0-4 INJECTION, SOLUTION INTRAVENOUS; SUBCUTANEOUS NIGHTLY
Status: DISCONTINUED | OUTPATIENT
Start: 2022-11-15 | End: 2022-11-15 | Stop reason: HOSPADM

## 2022-11-15 RX ORDER — DEXTROSE MONOHYDRATE 100 MG/ML
INJECTION, SOLUTION INTRAVENOUS CONTINUOUS PRN
Status: DISCONTINUED | OUTPATIENT
Start: 2022-11-15 | End: 2022-11-15 | Stop reason: HOSPADM

## 2022-11-15 RX ADMIN — SODIUM PHOSPHATE, MONOBASIC, MONOHYDRATE AND SODIUM PHOSPHATE, DIBASIC, ANHYDROUS 10 MMOL: 276; 142 INJECTION, SOLUTION INTRAVENOUS at 01:54

## 2022-11-15 RX ADMIN — HYDRALAZINE HYDROCHLORIDE 10 MG: 20 INJECTION INTRAMUSCULAR; INTRAVENOUS at 03:13

## 2022-11-15 RX ADMIN — SODIUM CHLORIDE, PRESERVATIVE FREE 10 ML: 5 INJECTION INTRAVENOUS at 09:19

## 2022-11-15 NOTE — PROGRESS NOTES
Upon entering room this nurse observes patient laying supine with mouth open, pulse ox reapplied, 26% R/A. No response to verbal/painful stimuli. Dr. Laddie Lanes notified, RT bagging patient, non rebreather in place & blood gas obtained. Narcan ordered though patient refusing at this time. Becomes A & O x 4 after short time period, refusing all further medical care and/or meds. AMA signed.

## 2022-11-15 NOTE — CONSULTS
ENDOCRINOLOGY INITIAL CONSULTATION NOTE      Date of admission: 11/14/2022  Date of service: 11/15/2022  Admitting physician: Anjali Coleman MD   Primary Care Physician: Judith Roper DO  Consultant physician: Rosana Stahl MD     Reason for the consultation:  Uncontrolled DM    History of Present Illness: The history is provided by the patient. Accuracy of the patient data is excellent    Renee Garrison is a very pleasant 28 y.o. old male with PMH listed below admitted to [facility] on 11/14/2022 because of DKA, endocrine service was consulted for diabetes management. Patient states he was on good health before he started throwing up. He denies any sick contact or food poisoning. His bowel habit is good, no recent diarrhea. It was not associated with chest pain, SOB, no severe abdominal pain. He has no concern regarding UTI. He denies any weakness, numbness. He was diagnosed with type 1 DM since age of 5 and follows with Dr. Rosana Stahl. Recently, he started insulin pump, he claims he is using it on a correct way. He has history of hospitalization due to DKA for couple of times. Prior to admission  The patient was diagnosed with type 1 DM at the age 5. Prior to admission patient was on insulin pump. Patient has had no hypoglycemic episodes. Patient has not been eating consistent carbohydrate meals, self-blood glucose monitoring has been above goal prior to admission. In addition, patient denied macrovascular or microvascular complications.  The patient is not up to date with yearly diabetic eye exam.   Lab Results   Component Value Date/Time    LABA1C 9.6 09/07/2022 08:58 AM       Inpatient diet:   Carb Restricted diet     Point of care glucose monitoring   (Independently reviewed)   Recent Labs     11/15/22  0031 11/15/22  0125 11/15/22  0228 11/15/22  0334 11/15/22  0421 11/15/22  0527 11/15/22  0628 11/15/22  0837   GLUMET 126* 131* 167* 173* 166* 142* 168* 179*       Past medical history:   Past Medical History:   Diagnosis Date    Asthma     Bipolar affective ([facility]     COPD (chronic obstructive pulmonary disease) ([facility])     Diabetes mellitus type I ([address]     Diabetic neuropathy ([address]     Diabetic, retinopathy, proliferative ([address]     HTN (hypertension)     due to left renal artery stenosis    Hypercholesteremia     Left renal artery stenosis ([address] 09/19/2019    Macular edema due to secondary diabetes ([address]     Opioid dependence ([facility])     Proteinuria     Retinal detachment     right eye    Vitreous hemorrhage of right eye due to diabetes mellitus ([address]        Past surgical history:  Past Surgical History:   Procedure Laterality Date    EYE SURGERY Right 11/20/2020    PARS PLANA VITRECTOMY, MEMBRANE PEEL, ENDOLASER, GAS FLUID EXCHANGE, 25G, MAC, RIGHT EYE performed by Carroll Perea MD at [address] [place] [address] Right 01/08/2021    pars plana vitrectomy with peeling of preretinal tissue and gas fluid exchange    EYE SURGERY Right 1/8/2021    PARS PLANA VITRECTOMY, INTERNAL LIMITING MEMBRANE PEEL, INDOCYANINE GREEN, GAS FLUID EXCHANGE, 25g, MAC, RIGHT EYE performed by Carroll Perea MD at [address] [place] [address] Right 03/2021    macular peel-with q 4 week injections       Social history:   Tobacco:   reports that he has been smoking cigarettes. He has a 7.50 pack-year smoking history. He has never used smokeless tobacco.  Alcohol:   reports no history of alcohol use. Drugs:   reports that he does not currently use drugs after having used the following drugs: Opiates . Family history:    Family History   Problem Relation Age of Onset    No Known Problems Mother     No Known Problems Father     No Known Problems Brother         Homicide     No Known Problems Daughter     No Known Problems Son     Diabetes type 2  Maternal Grandmother        Allergy and drug reactions:    Allergies   Allergen Reactions    Chlorthalidone      Vomiting     Hctz [Hydrochlorothiazide] Nausea Only    Hydralazine Nausea, headache       Scheduled Meds:   insulin glargine  14 Units SubCUTAneous Daily    insulin lispro  4 Units SubCUTAneous TID WC    insulin lispro  0-4 Units SubCUTAneous TID WC    insulin lispro  0-4 Units SubCUTAneous Nightly    enoxaparin  30 mg SubCUTAneous Daily    sodium chloride flush  5-40 mL IntraVENous 2 times per day    aspirin  81 mg Oral Daily    [Held by provider] lisinopril  20 mg Oral Daily    atorvastatin  80 mg Oral Daily    cloNIDine  0.3 mg Oral BID    gabapentin  300 mg Oral BID    metoprolol succinate  50 mg Oral BID    buprenorphine-naloxone  1 tablet SubLINGual BID       PRN Meds:   hydrALAZINE, 10 mg, Q4H PRN  glucose, 4 tablet, PRN  dextrose bolus, 125 mL, PRN   Or  dextrose bolus, 250 mL, PRN  glucagon (rDNA), 1 mg, PRN  dextrose, , Continuous PRN  dextrose bolus, 125 mL, PRN   Or  dextrose bolus, 250 mL, PRN  magnesium sulfate, 1,000 mg, PRN  sodium phosphate IVPB, 10 mmol, PRN   Or  sodium phosphate IVPB, 15 mmol, PRN   Or  sodium phosphate IVPB, 20 mmol, PRN  dextrose 5 % and 0.45 % NaCl, , Continuous PRN  potassium chloride, 20 mEq, PRN  sodium chloride flush, 5-40 mL, PRN  sodium chloride, , PRN  ondansetron, 4 mg, Q8H PRN   Or  ondansetron, 4 mg, Q6H PRN  polyethylene glycol, 17 g, Daily PRN  acetaminophen, 650 mg, Q6H PRN   Or  acetaminophen, 650 mg, Q6H PRN  albuterol, 2.5 mg, Q4H PRN  labetalol, 10 mg, Q4H PRN      Continuous Infusions:   dextrose      sodium chloride      dextrose 5 % and 0.45 % NaCl 150 mL/hr at 11/15/22 0557    insulin 0.3 Units/hr (11/15/22 0830)    sodium chloride         Review of Systems  All systems reviewed.  All negative except for symptoms mentioned in HPI     OBJECTIVE    BP (!) 148/88   Pulse 70   Temp 97.2 °F (36.2 °C) (Temporal)   Resp 18   Ht 5' 3\" (1.6 m)   Wt 108 lb (49 kg)   SpO2 95%   BMI 19.13 kg/m²     Intake/Output Summary (Last 24 hours) at 11/15/2022 0908  Last data filed at 11/15/2022 0557  Gross per 24 hour   Intake 3047.87 ml   Output 700 ml   Net 2347.87 ml       Physical examination:  General: awake alert, oriented x3  HEENT: normocephalic non traumatic, no exophthalmos   Neck: supple, No thyroid tenderness,  Pulm: good equal air entry no added sounds  CVS: S1 + S2  Abd: soft lax, no tenderness  Skin: warm, no lesions, no rash. No open wounds, no ulcers   Neuro: CN intact, sensation decreased bilateral , muscle power normal  Psych: normal mood, and affect    Review of Laboratory Data:  I personally reviewed the following labs:   Recent Labs     11/14/22  1056 11/15/22  0416   WBC 17.5* 15.4*   RBC 4.54 3.42*   HGB 12.7 9.6*   HCT 39.5 29.7*   MCV 87.0 86.8   MCH 28.0 28.1   MCHC 32.2 32.3   RDW 12.4 12.3    284   MPV 9.9 10.6     Recent Labs     11/14/22  1056 11/14/22  1311 11/14/22 2036 11/15/22  0034 11/15/22  0416   *   < > 144 142 142   K 3.6   < > 3.3* 4.2 3.7   CL 96*   < > 101 104 105   CO2 28   < > 21* 27 28   BUN 35*   < > 31* 27* 24*   CREATININE 2.4*   < > 2.1* 2.0* 1.9*   GLUCOSE 163*   < > 249* 123* 167*   CALCIUM 9.2   < > 8.4* 8.2* 8.3*   PROT 7.9  --   --   --   --    LABALBU 4.3  --   --   --   --    BILITOT 0.3  --   --   --   --    ALKPHOS 99  --   --   --   --    AST 17  --   --   --   --    ALT 10  --   --   --   --     < > = values in this interval not displayed.      Beta-Hydroxybutyrate   Date Value Ref Range Status   11/14/2022 >4.50 (H) 0.02 - 0.27 mmol/L Final   03/19/2022 2.57 (H) 0.02 - 0.27 mmol/L Final   11/05/2021 >4.50 (H) 0.02 - 0.27 mmol/L Final     Lab Results   Component Value Date/Time    LABA1C 9.6 09/07/2022 08:58 AM    LABA1C 9.6 06/23/2022 03:13 PM    LABA1C 11.7 03/19/2022 08:11 AM     Lab Results   Component Value Date/Time    TSH 1.890 01/21/2022 01:53 PM    T4FREE 1.04 09/16/2017 10:46 AM    G1QQHFG 5.5 09/16/2017 10:46 AM    FT3 3.7 09/16/2017 10:46 AM    P3ASHTM 151.50 09/16/2017 10:46 AM     Lab Results   Component Value Date/Time    LABA1C 9.6 09/07/2022 08:58 AM    GLUCOSE 167 11/15/2022 04:16 AM    GLUCOSE 282 04/22/2012 02:49 AM    MALBCR 2989.6 06/23/2022 04:00 PM    LABMICR 3557.6 06/23/2022 04:00 PM    LABCREA 119 06/23/2022 04:00 PM     Lab Results   Component Value Date/Time    TRIG 283 03/19/2022 08:11 AM    HDL 40 03/19/2022 08:11 AM    LDLCALC 76 03/19/2022 08:11 AM    CHOL 173 03/19/2022 08:11 AM       Blood culture   Lab Results   Component Value Date/Time    [facility]  03/19/2022 01:08 AM     This organism was isolated in one set. Susceptibility testing is not routinely done as this  organism frequently represents skin contamination. Additional testing can be ordered by calling the  Microbiology Department. BC 5 Days no growth 11/05/2021 02:20 PM       Radiology:  XR CHEST PORTABLE   Final Result   No acute cardiopulmonary process. Medical Records/Labs/Images review:   I personally reviewed and summarized previous records   All labs and imaging were reviewed independently     Francisco Oliva, a 28 y.o.-old male seen today for inpatient diabetes management     Diabetes Mellitus type   Patient's diabetes is uncontrolled   For now, will change diabetes regimen to:  Continue glucose check with meals and at bedtime   Will titrate insulin dose based on the blood glucose trend & insulin requirement  Will arrange for patient to be seen in endocrinology clinic upon discharge for routine diabetes maintenance and prevention. Pt will follow with us after discharge. Endocrine follow up visit,     Interdisciplinary plan for communication with healthcare providers:   Consult recommendations were discussed with the Primary Service/Nursing staff      The above issues were reviewed with the patient who understood and agreed with the plan. Thank you for allowing us to participate in the care of this patient. Please do not hesitate to contact us with any additional questions.      I saw the patient and discussed the management with the resident physician Dr. Toby Padron MD.  I reviewed and agree with the findings and plan as documented in the resident's note    Tyson Boxer MD  Endocrinologist, [facility] Diabetes Care and Endocrinology   [address],[facility] [address]   Phone: [phone]  Fax: [phone]  --------------------------------------------  An electronic signature was used to authenticate this note.  Zack Bernheim, MD on 11/15/2022 at 9:08 AM

## 2022-11-15 NOTE — PROGRESS NOTES
Spoke to patient about hydralazine allergy, patient stated \"It throws me into DKA\". This RN asked if any swelling, throat closure, itching or hives occur to which patient denied. Residents made aware.

## 2022-11-15 NOTE — PLAN OF CARE
Was called to patient bedside with concern that patient was not breathing. Upon entering the room patient's oxygen saturation was 39%. Ambu bag was placed and patient awoke to sternal rub. Ambu bag was used until oxygen saturation reached 100%. ABG was drawn and Narcan was administered. Patient refused to wear non-re breather mask or nasal cannula. Patient stated that he wishes to leave AMA. Patient was counseled on the severity of his current medical condition and the risks of leaving AMA.      Electronically signed by Ivonne Dickerson MD on 11/15/2022 at 10:48 AM

## 2022-11-15 NOTE — PLAN OF CARE
Problem: Chronic Conditions and Co-morbidities  Goal: Patient's chronic conditions and co-morbidity symptoms are monitored and maintained or improved  Outcome: Progressing  Flowsheets  Taken 11/14/2022 2000 by Ifeoma Bran RN  Care Plan - Patient's Chronic Conditions and Co-Morbidity Symptoms are Monitored and Maintained or Improved: Monitor and assess patient's chronic conditions and comorbid symptoms for stability, deterioration, or improvement  Taken 11/14/2022 1800 by Chiquita Jeter 34 - Patient's Chronic Conditions and Co-Morbidity Symptoms are Monitored and Maintained or Improved:   Monitor and assess patient's chronic conditions and comorbid symptoms for stability, deterioration, or improvement   Collaborate with multidisciplinary team to address chronic and comorbid conditions and prevent exacerbation or deterioration   Update acute care plan with appropriate goals if chronic or comorbid symptoms are exacerbated and prevent overall improvement and discharge     Problem: Discharge Planning  Goal: Discharge to home or other facility with appropriate resources  Outcome: Progressing  Flowsheets  Taken 11/14/2022 2000 by Ifeoma Bran RN  Discharge to home or other facility with appropriate resources: Identify barriers to discharge with patient and caregiver  Taken 11/14/2022 1800 by Carlos Walker RN  Discharge to home or other facility with appropriate resources:   Identify barriers to discharge with patient and caregiver   Arrange for needed discharge resources and transportation as appropriate   Identify discharge learning needs (meds, wound care, etc)   Refer to discharge planning if patient needs post-hospital services based on physician order or complex needs related to functional status, cognitive ability or social support system     Problem: ABCDS Injury Assessment  Goal: Absence of physical injury  Outcome: Progressing     Problem: Safety - Adult  Goal: Free from fall injury  Outcome: Progressing

## 2022-11-15 NOTE — PLAN OF CARE
Problem: Chronic Conditions and Co-morbidities  Goal: Patient's chronic conditions and co-morbidity symptoms are monitored and maintained or improved  11/15/2022 0323 by Aminah Orlando RN  Outcome: Progressing  Flowsheets (Taken 11/15/2022 0000)  Care Plan - Patient's Chronic Conditions and Co-Morbidity Symptoms are Monitored and Maintained or Improved: Collaborate with multidisciplinary team to address chronic and comorbid conditions and prevent exacerbation or deterioration  11/14/2022 2026 by Aminah Orlando RN  Outcome: Progressing  Flowsheets  Taken 11/14/2022 2000 by Aminah Orlando RN  Care Plan - Patient's Chronic Conditions and Co-Morbidity Symptoms are Monitored and Maintained or Improved: Monitor and assess patient's chronic conditions and comorbid symptoms for stability, deterioration, or improvement  Taken 11/14/2022 1800 by Ella Wisdom RN  Care Plan - Patient's Chronic Conditions and Co-Morbidity Symptoms are Monitored and Maintained or Improved:   Monitor and assess patient's chronic conditions and comorbid symptoms for stability, deterioration, or improvement   Collaborate with multidisciplinary team to address chronic and comorbid conditions and prevent exacerbation or deterioration   Update acute care plan with appropriate goals if chronic or comorbid symptoms are exacerbated and prevent overall improvement and discharge     Problem: Discharge Planning  Goal: Discharge to home or other facility with appropriate resources  11/15/2022 0323 by Aminah Orlando RN  Outcome: Progressing  Flowsheets (Taken 11/15/2022 0000)  Discharge to home or other facility with appropriate resources: Identify barriers to discharge with patient and caregiver  11/14/2022 2026 by Aminah Orlando RN  Outcome: Progressing  Flowsheets  Taken 11/14/2022 2000 by Aminah Orlando RN  Discharge to home or other facility with appropriate resources: Identify barriers to discharge with patient and caregiver  Taken 11/14/2022 1800 by Maryuri Johnson RN  Discharge to home or other facility with appropriate resources:   Identify barriers to discharge with patient and caregiver   Arrange for needed discharge resources and transportation as appropriate   Identify discharge learning needs (meds, wound care, etc)   Refer to discharge planning if patient needs post-hospital services based on physician order or complex needs related to functional status, cognitive ability or social support system     Problem: ABCDS Injury Assessment  Goal: Absence of physical injury  11/15/2022 0323 by Beth Bright RN  Outcome: Progressing  11/14/2022 2026 by Beth Bright RN  Outcome: Progressing     Problem: Safety - Adult  Goal: Free from fall injury  11/15/2022 0323 by Beth Bright RN  Outcome: Progressing  11/14/2022 2026 by Beth Bright RN  Outcome: Progressing

## 2022-11-15 NOTE — PROGRESS NOTES
[address] [facility]  Department of Internal Medicine   Internal Medicine Residency   MICU Progress Note    Patient:  Hilda Kirk 28 y.o. male  MRN: [id number]     Date of Service: 11/15/2022    Allergy: Chlorthalidone, Hctz [hydrochlorothiazide], and Hydralazine    Subjective     Patient was seen at bedside this morning. Patient stated that he no longer had any abdominal pain or nausea. 24h change: Patient's anion gap closed twice overnight and he was bridged to lantus. ROS: Denies Fever/chills/CP/SOB/N/V/D/C/Dysuria/Blood in stool or urine  Objective     VS: BP (!) 145/88   Pulse 77   Temp 97.6 °F (36.4 °C) (Temporal)   Resp (!) 4   Ht 5' 3\" (1.6 m)   Wt 108 lb (49 kg)   SpO2 95%   BMI 19.13 kg/m²           I & O - 24hr:   Intake/Output Summary (Last 24 hours) at 11/15/2022 1144  Last data filed at 11/15/2022 0900  Gross per 24 hour   Intake 3167.87 ml   Output 900 ml   Net 2267.87 ml       Physical Exam:  Patient refused physical exam this morning; was fully wrapped in his blanket and would not uncover.        Lines     site day    Art line   None    TLC R Fem    PICC None    Hemoaccess None      ABG:     Lab Results   Component Value Date/Time    PH 7.352 11/15/2022 10:35 AM    PH 7.27 04/20/2012 11:30 PM    PCO2 44.2 11/15/2022 10:35 AM    PO2 264.4 11/15/2022 10:35 AM    HCO3 24.0 11/15/2022 10:35 AM    BE -1.7 11/15/2022 10:35 AM    THB 11.5 11/15/2022 10:35 AM    O2SAT 99.2 11/15/2022 10:35 AM        Medications     Nutrition:   Adult diet     Patient currently has   DVT prophylaxis/ GI prophylaxis,      Labs   CBC:   Lab Results   Component Value Date/Time    WBC 15.4 11/15/2022 04:16 AM    RBC 3.42 11/15/2022 04:16 AM    HGB 9.6 11/15/2022 04:16 AM    HCT 29.7 11/15/2022 04:16 AM    MCV 86.8 11/15/2022 04:16 AM    MCH 28.1 11/15/2022 04:16 AM    MCHC 32.3 11/15/2022 04:16 AM    RDW 12.3 11/15/2022 04:16 AM     11/15/2022 04:16 AM    MPV 10.6 11/15/2022 04:16 AM     CMP:    Lab Results   Component Value Date/Time     11/15/2022 08:46 AM    K 3.4 11/15/2022 08:46 AM    K 4.4 11/05/2021 08:15 AM     11/15/2022 08:46 AM    CO2 25 11/15/2022 08:46 AM    BUN 22 11/15/2022 08:46 AM    CREATININE 1.8 11/15/2022 08:46 AM    GFRAA 47 06/23/2022 03:13 PM    LABGLOM 51 11/15/2022 08:46 AM    GLUCOSE 179 11/15/2022 08:46 AM    GLUCOSE 282 04/22/2012 02:49 AM    PROT 7.9 11/14/2022 10:56 AM    LABALBU 4.3 11/14/2022 10:56 AM    LABALBU 4.9 09/10/2011 05:10 PM    CALCIUM 8.3 11/15/2022 08:46 AM    BILITOT 0.3 11/14/2022 10:56 AM    ALKPHOS 99 11/14/2022 10:56 AM    AST 17 11/14/2022 10:56 AM    ALT 10 11/14/2022 10:56 AM       Imaging Studies:  CXR:   11/14/22  No acute cardiopulmonary process. Resident's Assessment and Plan   Assessment  DKA in setting of type 1 DM 2/2 likely 2/2 medication non-compliance  On presentation: , anion gap 27, beta hydroxybutyrate >4.5, urine ketones 40  Patient follows with Dr. Bonifacio Briggs, was recently started on insulin pump  Patient was placed on DKA protocol, was bridged to lantus this morning after his anion gap closed x2    2. History of HTN  Patient's home medications were resumed: metoprolol succinate & clonidine  Lisinopril held in setting of ALEJANDRO  3. History HLD    4. ALEJANDRO Stage 1- improving  Baseline Crt 1.4, Crt today 1.9    5. History of chronic leukocytosis  Patient underwent extensive work-up for leukocytosis by hematology (Dr. Jamie Mtz)  Work-up was negative for myeloproliferative neoplasm, B/T cell neoplasm, blasts  CT chest/abdomen negative for malignancy  Likely 2/2 stress & smoking cigarettes     6. History of drug overdose  3/9/22: patient presented with opiate overdose  Patient currently on suboxone; dose was confirmed with pharmacy and resumed     7. History of bipolar disorder        PLAN  During morning rounds, called to bedside with concern that patient was not breathing.  Upon entering the room the patient's oxygen saturation was 39%. Ambu bag was placed and patient's oxygen saturation returned to 100%. Patient awoke to sternal rub. ABG drawn and narcan administered. Patient was to be transitioned to non-re breather mask but refused and then refused nasal cannula. He refused all further medical treatment and stated that he wished to leave AMA. He was counseled on the severity of his current medical condition and the risks of leaving AMA. Patient indicated that he understood and signed [employer] paperwork. Peripheral Ivs and R fem CVC was removed. Donna Farris MD, PGY-1    Attending physician: Dr. Zhanna Saba  Department of Pulmonary, Critical Care and Sleep Medicine  [address] [facility]  Department of Internal Medicine      During multidisciplinary team rounds Sarah Howell is a 28 y.o. male was seen, examined and discussed. This is confirmation that I have personally seen and examined the patient and that the key elements of the encounter were performed by me (> 85 % time). The medications & laboratory data was discussed and adjusted where necessary. The radiographic images were reviewed or with radiologist or consultant if felt dis-concordant with the exam or history. The above findings were corroborated, plans confirmed and changes made if needed. Family is updated at the bedside as available. Key issues of the case were discussed among consultants. Critical Care time is documented if appropriate. This am during rounds I was called emergently to the bedside due to the patient being apneic and hypoxemic. He was resuscitated with bag valve mask ventilation. He also received one dose of narcan. After receiving the narcan he did become agitated. He awoke and refused oxygen therapy at this time. Shortly thereafter the patient became very agitated and requested requested  to sign out AMA. I did discuss with him the risk of signing out against medical advice including going back into dka and death. He was able to verbalize understanding and left against medical advice. I instructed him to call 911 or to go to the nearest emergency department if his symptoms recurred.      Critical Care time: 45 minutes     Kinza Oliveira DO

## 2022-11-15 NOTE — PROGRESS NOTES
Patient was not seen by me today. He was on ICU and left AMA. This MD was not notified of him leaving and found about it during chart review.  I will not write a discharge summary since I never saw the patient before

## 2022-11-16 ENCOUNTER — TELEPHONE (OUTPATIENT)
Dept: FAMILY MEDICINE CLINIC | Age: 32
End: 2022-11-16

## 2022-11-16 LAB
ACINETOBACTER CALCOAC BAUMANNII COMPLEX BY PCR: NOT DETECTED
BACTEROIDES FRAGILIS BY PCR: NOT DETECTED
BOTTLE TYPE: ABNORMAL
CANDIDA ALBICANS BY PCR: NOT DETECTED
CANDIDA AURIS BY PCR: NOT DETECTED
CANDIDA GLABRATA BY PCR: NOT DETECTED
CANDIDA KRUSEI BY PCR: NOT DETECTED
CANDIDA PARAPSILOSIS BY PCR: NOT DETECTED
CANDIDA TROPICALIS BY PCR: NOT DETECTED
CRYPTOCOCCUS NEOFORMANS/GATTII BY PCR: NOT DETECTED
ENTEROBACTER CLOACAE COMPLEX BY PCR: NOT DETECTED
ENTEROBACTERALES BY PCR: NOT DETECTED
ENTEROCOCCUS FAECALIS BY PCR: NOT DETECTED
ENTEROCOCCUS FAECIUM BY PCR: NOT DETECTED
ESCHERICHIA COLI BY PCR: NOT DETECTED
HAEMOPHILUS INFLUENZAE BY PCR: NOT DETECTED
KLEBSIELLA AEROGENES BY PCR: NOT DETECTED
KLEBSIELLA OXYTOCA BY PCR: NOT DETECTED
KLEBSIELLA PNEUMONIAE GROUP BY PCR: NOT DETECTED
LISTERIA MONOCYTOGENES BY PCR: NOT DETECTED
MRSA CULTURE ONLY: NORMAL
NEISSERIA MENINGITIDIS BY PCR: NOT DETECTED
ORDER NUMBER: ABNORMAL
PROTEUS SPECIES BY PCR: NOT DETECTED
PSEUDOMONAS AERUGINOSA BY PCR: NOT DETECTED
SALMONELLA SPECIES BY PCR: NOT DETECTED
SERRATIA MARCESCENS BY PCR: NOT DETECTED
SOURCE OF BLOOD CULTURE: ABNORMAL
STAPHYLOCOCCUS AUREUS BY PCR: NOT DETECTED
STAPHYLOCOCCUS EPIDERMIDIS BY PCR: NOT DETECTED
STAPHYLOCOCCUS LUGDUNENSIS BY PCR: NOT DETECTED
STAPHYLOCOCCUS SPECIES BY PCR: NOT DETECTED
STENOTROPHOMONAS MALTOPHILIA BY PCR: NOT DETECTED
STREPTOCOCCUS AGALACTIAE BY PCR: NOT DETECTED
STREPTOCOCCUS PNEUMONIAE BY PCR: NOT DETECTED
STREPTOCOCCUS PYOGENES  BY PCR: NOT DETECTED
STREPTOCOCCUS SPECIES BY PCR: DETECTED

## 2022-11-16 NOTE — TELEPHONE ENCOUNTER
Patient phoned and voicemail message left that he is to return to the ED now due to preliminary + (positive) blood culture results that were reported today. Awaiting return call to [facility] [phone] or patient may phone the main number to [facility] at [phone] if needed. Vital Signs Last 24 Hrs  T(C): 36.7 (08 Jun 2022 09:15), Max: 36.9 (07 Jun 2022 19:14)  T(F): 98 (08 Jun 2022 09:15), Max: 98.5 (07 Jun 2022 19:14)  HR: 90 (08 Jun 2022 09:15) (90 - 102)  BP: 112/80 (08 Jun 2022 09:15) (112/80 - 125/91)  BP(mean): 90 (08 Jun 2022 09:15) (90 - 90)  RR: 18 (08 Jun 2022 09:15) (18 - 20)  SpO2: 98% (08 Jun 2022 09:15) (98% - 100%)

## 2022-11-17 ENCOUNTER — HOSPITAL ENCOUNTER (EMERGENCY)
Age: 32
Discharge: HOME | End: 2022-11-17
Attending: EMERGENCY MEDICINE
Payer: COMMERCIAL

## 2022-11-17 VITALS
DIASTOLIC BLOOD PRESSURE: 103 MMHG | HEART RATE: 99 BPM | RESPIRATION RATE: 14 BRPM | SYSTOLIC BLOOD PRESSURE: 140 MMHG | OXYGEN SATURATION: 100 % | TEMPERATURE: 98.1 F

## 2022-11-17 DIAGNOSIS — E10.40 DIABETIC NEUROPATHY, TYPE I DIABETES MELLITUS (HCC): Primary | ICD-10-CM

## 2022-11-17 DIAGNOSIS — I15.0 RENOVASCULAR HYPERTENSION: ICD-10-CM

## 2022-11-17 DIAGNOSIS — E10.9 TYPE 1 DIABETES MELLITUS WITHOUT COMPLICATION (HCC): Primary | ICD-10-CM

## 2022-11-17 DIAGNOSIS — I10 HYPERTENSION, UNSPECIFIED TYPE: ICD-10-CM

## 2022-11-17 DIAGNOSIS — E78.00 PURE HYPERCHOLESTEROLEMIA: ICD-10-CM

## 2022-11-17 PROCEDURE — 99282 EMERGENCY DEPT VISIT SF MDM: CPT

## 2022-11-17 RX ORDER — ATORVASTATIN CALCIUM 80 MG/1
80 TABLET, FILM COATED ORAL DAILY
Qty: 90 TABLET | Refills: 0 | Status: SHIPPED | OUTPATIENT
Start: 2022-11-17

## 2022-11-17 RX ORDER — GABAPENTIN 300 MG/1
300 CAPSULE ORAL 2 TIMES DAILY
Qty: 180 CAPSULE | Refills: 0 | Status: SHIPPED | OUTPATIENT
Start: 2022-11-17 | End: 2023-02-15

## 2022-11-17 RX ORDER — LISINOPRIL 20 MG/1
20 TABLET ORAL DAILY
Qty: 90 TABLET | Refills: 0 | Status: SHIPPED | OUTPATIENT
Start: 2022-11-17

## 2022-11-17 RX ORDER — ASPIRIN 81 MG/1
81 TABLET ORAL DAILY
Qty: 90 TABLET | Refills: 0 | Status: SHIPPED | OUTPATIENT
Start: 2022-11-17

## 2022-11-17 RX ORDER — METOPROLOL SUCCINATE 50 MG/1
50 TABLET, EXTENDED RELEASE ORAL 2 TIMES DAILY
Qty: 180 TABLET | Refills: 0 | Status: SHIPPED | OUTPATIENT
Start: 2022-11-17

## 2022-11-17 RX ORDER — FENOFIBRATE 145 MG/1
145 TABLET, COATED ORAL DAILY
Qty: 90 TABLET | Refills: 0 | Status: SHIPPED | OUTPATIENT
Start: 2022-11-17

## 2022-11-17 RX ORDER — CLONIDINE HYDROCHLORIDE 0.3 MG/1
0.3 TABLET ORAL 2 TIMES DAILY
Qty: 180 TABLET | Refills: 0 | Status: SHIPPED | OUTPATIENT
Start: 2022-11-17

## 2022-11-17 ASSESSMENT — PAIN - FUNCTIONAL ASSESSMENT: PAIN_FUNCTIONAL_ASSESSMENT: NONE - DENIES PAIN

## 2022-11-17 NOTE — ED PROVIDER NOTES
HPI:  11/17/22, Time: 3:08 PM AISSATOU Reed is a 28 y.o. male presenting to the ED for evaluation as had 1/4 positive blood culture for strep viridans from 11/14/22 after recent admission for DKA. Medical record showed patient received call yesterday for this. The other 3 blood culture bottles were negative. Patient has absolutely no complaints, feels fine and blood sugar has been closely monitored. He has no fever, chills, cough, congestion, abdominal pain, dysuria, diarrhea or other complaints. The complaint has been persistent, moderate in severity, and worsened by nothing. Patient denies fever/chills, sore throat, cough, congestion, chest pain, shortness of breath, edema, headache, visual disturbances, focal paresthesias, focal weakness, abdominal pain, nausea, vomiting, diarrhea, constipation, dysuria, hematuria, trauma, neck or back pain, rash or other complaints. ROS:   A complete review of systems was performed and all pertinent positives and negatives are stated within HPI, all other systems reviewed and are negative.      --------------------------------------------- PAST HISTORY ---------------------------------------------  Past Medical History:  has a past medical history of Asthma, Bipolar affective ([address] COPD (chronic obstructive pulmonary disease) ([facility] [address] Diabetes mellitus type I ([address] Diabetic neuropathy ([address] Diabetic, retinopathy, proliferative ([address] HTN (hypertension), Hypercholesteremia, Left renal artery stenosis ([address] Macular edema due to secondary diabetes ([address] Opioid dependence ([address] Proteinuria, Retinal detachment, and Vitreous hemorrhage of right eye due to diabetes mellitus ([address] Past Surgical History:  has a past surgical history that includes Eye surgery (Right, 11/20/2020); Eye surgery (Right, 01/08/2021); Eye surgery (Right, 1/8/2021); and eye surgery (Right, 03/2021). Social History:  reports that he has been smoking cigarettes.  He has a 7.50 pack-year smoking history. He has never used smokeless tobacco. He reports that he does not currently use drugs after having used the following drugs: Opiates . He reports that he does not drink alcohol. Family History: family history includes Diabetes type 2  in his maternal grandmother; No Known Problems in his brother, daughter, father, mother, and son. The patients home medications have been reviewed. Allergies: Chlorthalidone, Hctz [hydrochlorothiazide], and Hydralazine        ----------------------------------------PHYSICAL EXAM--------------------------------------  Constitutional:  Well developed, well nourished, no acute distress, non-toxic appearance   Eyes:  PERRL, conjunctiva normal, EOMI  HENT:  Atraumatic, external ears normal, nose normal, oropharynx moist. Neck- normal range of motion, no nuchal rigidity   Respiratory:  No respiratory distress, normal breath sounds, no rales, no wheezing   Cardiovascular:  Normal rate, normal rhythm, no murmurs, no gallops, no rubs. Radial pulses 2+ bilaterally. GI:  Soft, nondistended, normal bowel sounds, nontender, no organomegaly, no mass, no rebound, no guarding   :  No costovertebral angle tenderness   Musculoskeletal:  No edema, no tenderness, no deformities. Back- no tenderness  Integument:  Well hydrated, no rash. Adequate perfusion. Lymphatic:  No cervical lymphadenopathy noted   Neurologic:  Alert & oriented x 3, CN 2-12 normal, no focal deficits noted. Normal gait. Psychiatric:  Speech and behavior appropriate       -------------------------------------------------- RESULTS -------------------------------------------------  I have personally reviewed all laboratory and imaging results for this patient. Results are listed below. LABS:  No results found for this visit on 11/17/22.     RADIOLOGY:  Interpreted by Radiologist.  No orders to display       ------------------------- NURSING NOTES AND VITALS REVIEWED ---------------------------  The nursing notes within the ED encounter and vital signs as below have been reviewed by myself. BP (!) 140/103   Pulse 99   Temp 98.1 °F (36.7 °C) (Oral)   Resp 14   SpO2 100%   Oxygen Saturation Interpretation: Normal      The patients available past medical records and past encounters were reviewed. ------------------------------ ED COURSE/MEDICAL DECISION MAKING----------------------  Medications - No data to display        Procedures:   none      Medical Decision Makin of 4 bottles positive for strep viridans, 3 out of 4 bottles negative. Patient is asymptomatic. Patient states blood pressure is always high and will follow-up with PCP for that. He would like to go home and follow-up with his PCP. He advises no fevers, chills, body aches or other complaints. He was advised to proceed to the ER immediately if he develops any of those symptoms as that could be a sign of a blood infection. He understands and agrees with this plan. Appears well, nontoxic, neurovascularly intact and ambulatory upon discharge. No recent IV drug use, documented clean since 2017. On Suboxone. Patient was explicitly instructed on specific signs and symptoms on which to return to the emergency room for. Patient was instructed to return to the ER for any new or worsening symptoms. Additional discharge instructions were given verbally. All questions were answered. Patient is comfortable and agreeable with discharge plan. Patient in no acute distress and non-toxic in appearance. This patient's ED course included: re-evaluation prior to disposition and a personal history and physicial eaxmination    This patient has remained hemodynamically stable and remained unchanged during their ED course. Re-Evaluations:  Time: 3:14 PM EST   Re-evaluation.   Patients symptoms show no change  Repeat physical examination is not changed      Consultations:   none    Critical Care: none    I, Doreene Rubinstein, DO, am the Primary Provider of Record    Counseling: The emergency provider has spoken with the patient and discussed todays results, in addition to providing specific details for the plan of care and counseling regarding the diagnosis and prognosis. Questions are answered at this time and they are agreeable with the plan.    --------------------------- IMPRESSION AND DISPOSITION ---------------------------------    IMPRESSION  1. Type 1 diabetes mellitus without complication ([facility] [address]    2.  Hypertension, unspecified type        DISPOSITION  Disposition: Discharge to home  Patient condition is stable             Valere Fraction, DO  11/17/22 [facility], DO  11/17/22 [facility], DO  11/17/22 [id number]

## 2022-11-17 NOTE — TELEPHONE ENCOUNTER
I will refill his HTN and HLD medications. I will also refill his gabapentin for his neuropathy. Any insulin prescriptions will need to be prescribed by Endocrinology, although I think patient has an insulin pump now. Please pend medications for me. Thank you!

## 2022-11-17 NOTE — TELEPHONE ENCOUNTER
Noted. I saw that patient was at [facility] ED. Patient needs an appointment for HTN follow-up.

## 2022-11-19 LAB — CULTURE, BLOOD 2: NORMAL

## 2022-11-20 LAB
BLOOD CULTURE, ROUTINE: ABNORMAL
ORGANISM: ABNORMAL

## 2023-02-06 ENCOUNTER — OFFICE VISIT (OUTPATIENT)
Dept: FAMILY MEDICINE CLINIC | Age: 33
End: 2023-02-06
Payer: COMMERCIAL

## 2023-02-06 VITALS
BODY MASS INDEX: 19.34 KG/M2 | DIASTOLIC BLOOD PRESSURE: 93 MMHG | OXYGEN SATURATION: 99 % | HEART RATE: 88 BPM | SYSTOLIC BLOOD PRESSURE: 138 MMHG | WEIGHT: 109.2 LBS | TEMPERATURE: 97.2 F

## 2023-02-06 DIAGNOSIS — I10 ESSENTIAL HYPERTENSION: Primary | ICD-10-CM

## 2023-02-06 DIAGNOSIS — E10.3413 TYPE 1 DIABETES MELLITUS WITH SEVERE NONPROLIFERATIVE RETINOPATHY OF BOTH EYES AND MACULAR EDEMA (HCC): ICD-10-CM

## 2023-02-06 PROCEDURE — 3079F DIAST BP 80-89 MM HG: CPT | Performed by: FAMILY MEDICINE

## 2023-02-06 PROCEDURE — 2022F DILAT RTA XM EVC RTNOPTHY: CPT | Performed by: FAMILY MEDICINE

## 2023-02-06 PROCEDURE — G8484 FLU IMMUNIZE NO ADMIN: HCPCS | Performed by: FAMILY MEDICINE

## 2023-02-06 PROCEDURE — G8427 DOCREV CUR MEDS BY ELIG CLIN: HCPCS | Performed by: FAMILY MEDICINE

## 2023-02-06 PROCEDURE — G8420 CALC BMI NORM PARAMETERS: HCPCS | Performed by: FAMILY MEDICINE

## 2023-02-06 PROCEDURE — 4004F PT TOBACCO SCREEN RCVD TLK: CPT | Performed by: FAMILY MEDICINE

## 2023-02-06 PROCEDURE — 3046F HEMOGLOBIN A1C LEVEL >9.0%: CPT | Performed by: FAMILY MEDICINE

## 2023-02-06 PROCEDURE — 3075F SYST BP GE 130 - 139MM HG: CPT | Performed by: FAMILY MEDICINE

## 2023-02-06 PROCEDURE — 99214 OFFICE O/P EST MOD 30 MIN: CPT | Performed by: FAMILY MEDICINE

## 2023-02-06 RX ORDER — LISINOPRIL 10 MG/1
10 TABLET ORAL DAILY
Qty: 30 TABLET | Refills: 2 | Status: SHIPPED | OUTPATIENT
Start: 2023-02-06

## 2023-02-06 RX ORDER — METOPROLOL SUCCINATE 50 MG/1
50 TABLET, EXTENDED RELEASE ORAL DAILY
Qty: 60 TABLET | Refills: 2 | Status: SHIPPED
Start: 2023-02-06 | End: 2023-02-06

## 2023-02-06 RX ORDER — BUSPIRONE HYDROCHLORIDE 10 MG/1
1 TABLET ORAL 2 TIMES DAILY
COMMUNITY
Start: 2023-01-24

## 2023-02-06 RX ORDER — CLONIDINE HYDROCHLORIDE 0.1 MG/1
0.1 TABLET ORAL 2 TIMES DAILY
Qty: 30 TABLET | Refills: 2 | Status: SHIPPED | OUTPATIENT
Start: 2023-02-06

## 2023-02-06 RX ORDER — METOPROLOL SUCCINATE 50 MG/1
50 TABLET, EXTENDED RELEASE ORAL 2 TIMES DAILY
Qty: 60 TABLET | Refills: 2 | Status: SHIPPED | OUTPATIENT
Start: 2023-02-06

## 2023-02-06 SDOH — ECONOMIC STABILITY: FOOD INSECURITY: WITHIN THE PAST 12 MONTHS, YOU WORRIED THAT YOUR FOOD WOULD RUN OUT BEFORE YOU GOT MONEY TO BUY MORE.: NEVER TRUE

## 2023-02-06 SDOH — ECONOMIC STABILITY: INCOME INSECURITY: HOW HARD IS IT FOR YOU TO PAY FOR THE VERY BASICS LIKE FOOD, HOUSING, MEDICAL CARE, AND HEATING?: HARD

## 2023-02-06 SDOH — ECONOMIC STABILITY: FOOD INSECURITY: WITHIN THE PAST 12 MONTHS, THE FOOD YOU BOUGHT JUST DIDN'T LAST AND YOU DIDN'T HAVE MONEY TO GET MORE.: NEVER TRUE

## 2023-02-06 ASSESSMENT — ENCOUNTER SYMPTOMS
BLOOD IN STOOL: 0
VOMITING: 0
WHEEZING: 0
ABDOMINAL PAIN: 0
EYE PAIN: 0
SHORTNESS OF BREATH: 0
NAUSEA: 0
DIARRHEA: 0
CONSTIPATION: 0
COUGH: 0

## 2023-02-06 ASSESSMENT — PATIENT HEALTH QUESTIONNAIRE - PHQ9
1. LITTLE INTEREST OR PLEASURE IN DOING THINGS: 0
3. TROUBLE FALLING OR STAYING ASLEEP: 0
2. FEELING DOWN, DEPRESSED OR HOPELESS: 0
7. TROUBLE CONCENTRATING ON THINGS, SUCH AS READING THE NEWSPAPER OR WATCHING TELEVISION: 0
SUM OF ALL RESPONSES TO PHQ QUESTIONS 1-9: 0
5. POOR APPETITE OR OVEREATING: 0
9. THOUGHTS THAT YOU WOULD BE BETTER OFF DEAD, OR OF HURTING YOURSELF: 0
SUM OF ALL RESPONSES TO PHQ QUESTIONS 1-9: 0
SUM OF ALL RESPONSES TO PHQ9 QUESTIONS 1 & 2: 0
4. FEELING TIRED OR HAVING LITTLE ENERGY: 0
8. MOVING OR SPEAKING SO SLOWLY THAT OTHER PEOPLE COULD HAVE NOTICED. OR THE OPPOSITE, BEING SO FIGETY OR RESTLESS THAT YOU HAVE BEEN MOVING AROUND A LOT MORE THAN USUAL: 0
SUM OF ALL RESPONSES TO PHQ QUESTIONS 1-9: 0
6. FEELING BAD ABOUT YOURSELF - OR THAT YOU ARE A FAILURE OR HAVE LET YOURSELF OR YOUR FAMILY DOWN: 0
10. IF YOU CHECKED OFF ANY PROBLEMS, HOW DIFFICULT HAVE THESE PROBLEMS MADE IT FOR YOU TO DO YOUR WORK, TAKE CARE OF THINGS AT HOME, OR GET ALONG WITH OTHER PEOPLE: 0
SUM OF ALL RESPONSES TO PHQ QUESTIONS 1-9: 0

## 2023-02-06 NOTE — PROGRESS NOTES
2/6/2023    Tena Guardado is a 28 y.o. male here for:    Chief Complaint   Patient presents with    Hypertension     Possible BP med adjustment; patient was having dizziness, so has decreased all BP meds to one-half x approximately 2 1/2 months. HPI:  HTN   - On clonidine 0.1 mg BID, lisinopril 10 mg QD, and metoprolol succinate 25 mg BID. Reports compliance with medication. Denies side effects of medication.  - Since decreasing medication doses on his own, he has not had any more dizziness or lightheadedness spells. Denies recent syncopal episodes. - Has not taken any of his BP medications yet today. Current Outpatient Medications   Medication Sig Dispense Refill    busPIRone (BUSPAR) 10 MG tablet Take 1 tablet by mouth 2 times daily      cloNIDine (CATAPRES) 0.1 MG tablet Take 1 tablet by mouth 2 times daily 30 tablet 2    lisinopril (PRINIVIL;ZESTRIL) 10 MG tablet Take 1 tablet by mouth daily 30 tablet 2    metoprolol succinate (TOPROL XL) 50 MG extended release tablet Take 1 tablet by mouth 2 times daily 60 tablet 2    gabapentin (NEURONTIN) 300 MG capsule Take 1 capsule by mouth in the morning and at bedtime for 90 days. 180 capsule 0    atorvastatin (LIPITOR) 80 MG tablet Take 1 tablet by mouth daily (Patient taking differently: Take 40 mg by mouth daily Patient takes 1/2 tablet once daily.) 90 tablet 0    aspirin EC 81 MG EC tablet Take 1 tablet by mouth daily 90 tablet 0    insulin aspart (NOVOLOG) 100 UNIT/ML injection vial Use via insulin pump Max dose 50 units daily 10 mL 11    SUBOXONE 8-2 MG FILM SL film Place 1 Film under the tongue 2 times daily.        fenofibrate (TRICOR) 145 MG tablet Take 1 tablet by mouth daily (Patient not taking: Reported on 2/6/2023) 90 tablet 0    albuterol sulfate HFA (VENTOLIN HFA) 108 (90 Base) MCG/ACT inhaler Inhale 2 puffs into the lungs every 4 hours as needed for Wheezing or Shortness of Breath (Patient not taking: No sig reported) 18 g 0     No current facility-administered medications for this visit.       Allergies   Allergen Reactions    Chlorthalidone      Vomiting     Hctz [Hydrochlorothiazide] Nausea Only    Hydralazine      Nausea, headache       Past Medical & Surgical History:      Diagnosis Date    Asthma     Bipolar affective ([facility]     COPD (chronic obstructive pulmonary disease) ([facility])     Diabetes mellitus type I ([facility]     Diabetic neuropathy ([facility]     Diabetic, retinopathy, proliferative ([facility]     HTN (hypertension)     due to left renal artery stenosis    Hypercholesteremia     Left renal artery stenosis ([facility] 09/19/2019    Macular edema due to secondary diabetes ([facility]     Opioid dependence ([facility])     Proteinuria     Retinal detachment     right eye    Vitreous hemorrhage of right eye due to diabetes mellitus [facility])      Past Surgical History:   Procedure Laterality Date    EYE SURGERY Right 11/20/2020    PARS PLANA VITRECTOMY, MEMBRANE PEEL, ENDOLASER, GAS FLUID EXCHANGE, 25G, MAC, RIGHT EYE performed by Jackie Aviles MD at [address] Right 01/08/2021    pars plana vitrectomy with peeling of preretinal tissue and gas fluid exchange    EYE SURGERY Right 1/8/2021    PARS PLANA VITRECTOMY, INTERNAL LIMITING MEMBRANE PEEL, INDOCYANINE GREEN, GAS FLUID EXCHANGE, 25g, MAC, RIGHT EYE performed by Jackie Aviles MD at [address] Right 03/2021    macular peel-with q 4 week injections       Family History:      Problem Relation Age of Onset    No Known Problems Mother     No Known Problems Father     No Known Problems Brother         Homicide     No Known Problems Daughter     No Known Problems Son     Diabetes type 2  Maternal Grandmother        Social History:  Social History     Tobacco Use    Smoking status: Every Day     Packs/day: 0.50     Years: 15.00     Pack years: 7.50     Types: Cigarettes    Smokeless tobacco: Never   Substance Use Topics    Alcohol use: Never       Review of Systems Constitutional:  Negative for chills and fever. Eyes:  Positive for visual disturbance (has severe diabetic retinopathy). Negative for pain. Respiratory:  Negative for cough, shortness of breath and wheezing. Cardiovascular:  Negative for chest pain, palpitations and leg swelling. Gastrointestinal:  Negative for abdominal pain, blood in stool, constipation, diarrhea, nausea and vomiting. Neurological:  Negative for dizziness, syncope and light-headedness. BP Readings from Last 3 Encounters:   02/06/23 (!) 138/93   11/17/22 (!) 140/103   11/15/22 (!) 145/88       VS:  BP (!) 138/93 (Site: Left Upper Arm, Position: Sitting, Cuff Size: Medium Adult)   Pulse 88   Temp 97.2 °F (36.2 °C) (Temporal)   Wt 109 lb 3.2 oz (49.5 kg)   SpO2 99%   BMI 19.34 kg/m²     Physical Exam  Vitals reviewed. Constitutional:       General: He is awake. He is not in acute distress. Appearance: He is not ill-appearing, toxic-appearing or diaphoretic. Neck:      Vascular: No carotid bruit. Cardiovascular:      Rate and Rhythm: Normal rate and regular rhythm. Heart sounds: S1 normal and S2 normal. No murmur heard. Pulmonary:      Breath sounds: No decreased breath sounds, wheezing, rhonchi or rales. Abdominal:      General: Bowel sounds are normal. There is no distension. Palpations: Abdomen is soft. Tenderness: There is no abdominal tenderness. There is no guarding or rebound. Musculoskeletal:      Cervical back: Neck supple. Right lower leg: No tenderness. No edema. Left lower leg: No tenderness. No edema. Skin:     General: Skin is warm and dry. Neurological:      General: No focal deficit present. Mental Status: He is alert. Psychiatric:         Attention and Perception: Attention and perception normal.         Mood and Affect: Mood and affect normal.         Speech: Speech normal.         Behavior: Behavior normal. Behavior is cooperative.          Thought Content: Thought content normal.         Cognition and Memory: Cognition and memory normal.         Judgment: Judgment normal.       Assessment/Plan:  1. Essential hypertension  Uncontrolled. Will increase metoprolol succinate 25 mg BID to 50 mg BID. Continue clonidine 0.1 mg BID and lisinopril 10 mg QD. Follow-up in 1 month. - CBC with Auto Differential; Future  - Comprehensive Metabolic Panel; Future  - cloNIDine (CATAPRES) 0.1 MG tablet; Take 1 tablet by mouth 2 times daily  Dispense: 30 tablet; Refill: 2  - lisinopril (PRINIVIL;ZESTRIL) 10 MG tablet; Take 1 tablet by mouth daily  Dispense: 30 tablet; Refill: 2  - metoprolol succinate (TOPROL XL) 50 MG extended release tablet; Take 1 tablet by mouth daily  Dispense: 60 tablet; Refill: 2    2. 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       Jones John, DO  Family Medicine

## 2023-02-17 DIAGNOSIS — E10.65 UNCONTROLLED TYPE 1 DIABETES MELLITUS WITH HYPERGLYCEMIA (HCC): ICD-10-CM

## 2023-02-17 DIAGNOSIS — I10 ESSENTIAL HYPERTENSION: ICD-10-CM

## 2023-02-17 DIAGNOSIS — E10.40 DIABETIC NEUROPATHY, TYPE I DIABETES MELLITUS (HCC): ICD-10-CM

## 2023-02-17 DIAGNOSIS — E78.00 PURE HYPERCHOLESTEROLEMIA: ICD-10-CM

## 2023-02-17 RX ORDER — ASPIRIN 81 MG/1
81 TABLET ORAL DAILY
Qty: 30 TABLET | Refills: 2 | Status: SHIPPED | OUTPATIENT
Start: 2023-02-17

## 2023-02-17 RX ORDER — ATORVASTATIN CALCIUM 80 MG/1
80 TABLET, FILM COATED ORAL DAILY
Qty: 90 TABLET | Refills: 0 | Status: SHIPPED | OUTPATIENT
Start: 2023-02-17

## 2023-02-17 RX ORDER — LISINOPRIL 10 MG/1
10 TABLET ORAL DAILY
Qty: 30 TABLET | Refills: 2 | Status: SHIPPED | OUTPATIENT
Start: 2023-02-17

## 2023-02-17 RX ORDER — CLONIDINE HYDROCHLORIDE 0.1 MG/1
0.1 TABLET ORAL 2 TIMES DAILY
Qty: 60 TABLET | Refills: 2 | Status: SHIPPED | OUTPATIENT
Start: 2023-02-17

## 2023-02-17 RX ORDER — METOPROLOL SUCCINATE 50 MG/1
50 TABLET, EXTENDED RELEASE ORAL 2 TIMES DAILY
Qty: 60 TABLET | Refills: 2 | Status: SHIPPED | OUTPATIENT
Start: 2023-02-17

## 2023-02-17 RX ORDER — GABAPENTIN 300 MG/1
300 CAPSULE ORAL 2 TIMES DAILY
Qty: 60 CAPSULE | Refills: 2 | Status: SHIPPED | OUTPATIENT
Start: 2023-02-26 | End: 2023-03-28

## 2023-02-17 NOTE — TELEPHONE ENCOUNTER
Medication Refill Request    LOV 2/6/2023  NOV 3/7/2023    Lab Results   Component Value Date    CREATININE 1.8 (H) 11/15/2022

## 2023-02-19 RX ORDER — INSULIN ASPART 100 [IU]/ML
INJECTION, SOLUTION INTRAVENOUS; SUBCUTANEOUS
Qty: 20 ML | Refills: 5 | Status: SHIPPED | OUTPATIENT
Start: 2023-02-19

## 2023-03-08 ENCOUNTER — HOSPITAL ENCOUNTER (OUTPATIENT)
Age: 33
Discharge: HOME | End: 2023-03-08
Payer: COMMERCIAL

## 2023-03-08 DIAGNOSIS — I10 ESSENTIAL HYPERTENSION: ICD-10-CM

## 2023-03-08 DIAGNOSIS — E10.3413 TYPE 1 DIABETES MELLITUS WITH SEVERE NONPROLIFERATIVE RETINOPATHY OF BOTH EYES AND MACULAR EDEMA (HCC): ICD-10-CM

## 2023-03-08 LAB
ALBUMIN SERPL-MCNC: 3.7 G/DL (ref 3.5–5.2)
ALP BLD-CCNC: 107 U/L (ref 40–129)
ALT SERPL-CCNC: 10 U/L (ref 0–40)
ANION GAP SERPL CALCULATED.3IONS-SCNC: 13 MMOL/L (ref 7–16)
AST SERPL-CCNC: 16 U/L (ref 0–39)
BASOPHILS ABSOLUTE: 0.11 E9/L (ref 0–0.2)
BASOPHILS RELATIVE PERCENT: 0.9 % (ref 0–2)
BILIRUB SERPL-MCNC: <0.2 MG/DL (ref 0–1.2)
BUN BLDV-MCNC: 20 MG/DL (ref 6–20)
CALCIUM SERPL-MCNC: 9.3 MG/DL (ref 8.6–10.2)
CHLORIDE BLD-SCNC: 109 MMOL/L (ref 98–107)
CHOLESTEROL, TOTAL: 208 MG/DL (ref 0–199)
CO2: 20 MMOL/L (ref 22–29)
CREAT SERPL-MCNC: 1.7 MG/DL (ref 0.7–1.2)
CREATININE URINE: 73 MG/DL (ref 40–278)
EOSINOPHILS ABSOLUTE: 0.49 E9/L (ref 0.05–0.5)
EOSINOPHILS RELATIVE PERCENT: 4.1 % (ref 0–6)
GFR SERPL CREATININE-BSD FRML MDRD: 54 ML/MIN/1.73
GLUCOSE BLD-MCNC: 130 MG/DL (ref 74–99)
HBA1C MFR BLD: 8.6 % (ref 4–5.6)
HCT VFR BLD CALC: 37.5 % (ref 37–54)
HDLC SERPL-MCNC: 38 MG/DL
HEMOGLOBIN: 12.5 G/DL (ref 12.5–16.5)
IMMATURE GRANULOCYTES #: 0.03 E9/L
IMMATURE GRANULOCYTES %: 0.2 % (ref 0–5)
LDL CHOLESTEROL CALCULATED: 134 MG/DL (ref 0–99)
LYMPHOCYTES ABSOLUTE: 3.18 E9/L (ref 1.5–4)
LYMPHOCYTES RELATIVE PERCENT: 26.4 % (ref 20–42)
MCH RBC QN AUTO: 27.7 PG (ref 26–35)
MCHC RBC AUTO-ENTMCNC: 33.3 % (ref 32–34.5)
MCV RBC AUTO: 83 FL (ref 80–99.9)
MICROALBUMIN UR-MCNC: 2307.3 MG/L
MICROALBUMIN/CREAT UR-RTO: 3160.7 (ref 0–30)
MONOCYTES ABSOLUTE: 0.51 E9/L (ref 0.1–0.95)
MONOCYTES RELATIVE PERCENT: 4.2 % (ref 2–12)
NEUTROPHILS ABSOLUTE: 7.72 E9/L (ref 1.8–7.3)
NEUTROPHILS RELATIVE PERCENT: 64.2 % (ref 43–80)
PDW BLD-RTO: 15.3 FL (ref 11.5–15)
PLATELET # BLD: 386 E9/L (ref 130–450)
PMV BLD AUTO: 9.3 FL (ref 7–12)
POTASSIUM SERPL-SCNC: 4.6 MMOL/L (ref 3.5–5)
RBC # BLD: 4.52 E12/L (ref 3.8–5.8)
SODIUM BLD-SCNC: 142 MMOL/L (ref 132–146)
TOTAL PROTEIN: 6.9 G/DL (ref 6.4–8.3)
TRIGL SERPL-MCNC: 178 MG/DL (ref 0–149)
VLDLC SERPL CALC-MCNC: 36 MG/DL
WBC # BLD: 12 E9/L (ref 4.5–11.5)

## 2023-03-08 PROCEDURE — 83036 HEMOGLOBIN GLYCOSYLATED A1C: CPT

## 2023-03-08 PROCEDURE — 85025 COMPLETE CBC W/AUTO DIFF WBC: CPT

## 2023-03-08 PROCEDURE — 82044 UR ALBUMIN SEMIQUANTITATIVE: CPT

## 2023-03-08 PROCEDURE — 80053 COMPREHEN METABOLIC PANEL: CPT

## 2023-03-08 PROCEDURE — 36415 COLL VENOUS BLD VENIPUNCTURE: CPT

## 2023-03-08 PROCEDURE — 80061 LIPID PANEL: CPT

## 2023-03-08 PROCEDURE — 82570 ASSAY OF URINE CREATININE: CPT

## 2023-04-04 RX ORDER — BLOOD SUGAR DIAGNOSTIC
1 STRIP MISCELLANEOUS DAILY
Qty: 100 EACH | Refills: 3 | Status: SHIPPED | OUTPATIENT
Start: 2023-04-04

## 2023-04-05 LAB — DIABETIC RETINOPATHY: POSITIVE

## 2023-05-19 ENCOUNTER — HOSPITAL ENCOUNTER (INPATIENT)
Age: 33
LOS: 3 days | Discharge: PSYCHIATRIC OR SUBSTANCE USE FACILITY | DRG: 817 | End: 2023-05-23
Attending: EMERGENCY MEDICINE | Admitting: FAMILY MEDICINE
Payer: COMMERCIAL

## 2023-05-19 ENCOUNTER — APPOINTMENT (OUTPATIENT)
Dept: GENERAL RADIOLOGY | Age: 33
DRG: 817 | End: 2023-05-19
Payer: COMMERCIAL

## 2023-05-19 DIAGNOSIS — T40.411A ACCIDENTAL FENTANYL OVERDOSE, INITIAL ENCOUNTER (HCC): Primary | ICD-10-CM

## 2023-05-19 DIAGNOSIS — N17.9 ACUTE KIDNEY INJURY (HCC): ICD-10-CM

## 2023-05-19 DIAGNOSIS — E78.00 PURE HYPERCHOLESTEROLEMIA: ICD-10-CM

## 2023-05-19 DIAGNOSIS — I10 HYPERTENSION, UNSPECIFIED TYPE: ICD-10-CM

## 2023-05-19 LAB
ALBUMIN SERPL-MCNC: 4.2 G/DL (ref 3.5–5.2)
ALP SERPL-CCNC: 112 U/L (ref 40–129)
ALT SERPL-CCNC: 9 U/L (ref 0–40)
ANION GAP SERPL CALCULATED.3IONS-SCNC: 18 MMOL/L (ref 7–16)
APAP SERPL-MCNC: <5 MCG/ML (ref 10–30)
AST SERPL-CCNC: 19 U/L (ref 0–39)
BASOPHILS # BLD: 0.18 E9/L (ref 0–0.2)
BASOPHILS NFR BLD: 0.8 % (ref 0–2)
BILIRUB SERPL-MCNC: 0.4 MG/DL (ref 0–1.2)
BUN SERPL-MCNC: 34 MG/DL (ref 6–20)
CALCIUM SERPL-MCNC: 9.7 MG/DL (ref 8.6–10.2)
CHLORIDE SERPL-SCNC: 102 MMOL/L (ref 98–107)
CO2 SERPL-SCNC: 23 MMOL/L (ref 22–29)
CREAT SERPL-MCNC: 2.3 MG/DL (ref 0.7–1.2)
EOSINOPHIL # BLD: 0.52 E9/L (ref 0.05–0.5)
EOSINOPHIL NFR BLD: 2.3 % (ref 0–6)
ERYTHROCYTE [DISTWIDTH] IN BLOOD BY AUTOMATED COUNT: 13.9 FL (ref 11.5–15)
ETHANOLAMINE SERPL-MCNC: <10 MG/DL (ref 0–0.08)
GLUCOSE SERPL-MCNC: 269 MG/DL (ref 74–99)
HCT VFR BLD AUTO: 38.3 % (ref 37–54)
HGB BLD-MCNC: 12 G/DL (ref 12.5–16.5)
IMM GRANULOCYTES # BLD: 0.12 E9/L
IMM GRANULOCYTES NFR BLD: 0.5 % (ref 0–5)
LACTATE BLDV-SCNC: 2.6 MMOL/L (ref 0.5–2.2)
LIPASE: 8 U/L (ref 13–60)
LYMPHOCYTES # BLD: 2.26 E9/L (ref 1.5–4)
LYMPHOCYTES NFR BLD: 10 % (ref 20–42)
MCH RBC QN AUTO: 27.1 PG (ref 26–35)
MCHC RBC AUTO-ENTMCNC: 31.3 % (ref 32–34.5)
MCV RBC AUTO: 86.7 FL (ref 80–99.9)
MONOCYTES # BLD: 1.05 E9/L (ref 0.1–0.95)
MONOCYTES NFR BLD: 4.6 % (ref 2–12)
NEUTROPHILS # BLD: 18.54 E9/L (ref 1.8–7.3)
NEUTS SEG NFR BLD: 81.8 % (ref 43–80)
PLATELET # BLD AUTO: 311 E9/L (ref 130–450)
PMV BLD AUTO: 10.8 FL (ref 7–12)
POTASSIUM SERPL-SCNC: 4.6 MMOL/L (ref 3.5–5)
PROT SERPL-MCNC: 7.4 G/DL (ref 6.4–8.3)
RBC # BLD AUTO: 4.42 E12/L (ref 3.8–5.8)
SALICYLATES SERPL-MCNC: <0.3 MG/DL (ref 0–30)
SODIUM SERPL-SCNC: 143 MMOL/L (ref 132–146)
TRICYCLIC ANTIDEPRESSANTS SCREEN SERUM: NEGATIVE NG/ML
TROPONIN, HIGH SENSITIVITY: 180 NG/L (ref 0–11)
WBC # BLD: 22.7 E9/L (ref 4.5–11.5)

## 2023-05-19 PROCEDURE — 80143 DRUG ASSAY ACETAMINOPHEN: CPT

## 2023-05-19 PROCEDURE — 93005 ELECTROCARDIOGRAM TRACING: CPT | Performed by: EMERGENCY MEDICINE

## 2023-05-19 PROCEDURE — 80053 COMPREHEN METABOLIC PANEL: CPT

## 2023-05-19 PROCEDURE — 80307 DRUG TEST PRSMV CHEM ANLYZR: CPT

## 2023-05-19 PROCEDURE — 84484 ASSAY OF TROPONIN QUANT: CPT

## 2023-05-19 PROCEDURE — 99285 EMERGENCY DEPT VISIT HI MDM: CPT

## 2023-05-19 PROCEDURE — 80179 DRUG ASSAY SALICYLATE: CPT

## 2023-05-19 PROCEDURE — 83690 ASSAY OF LIPASE: CPT

## 2023-05-19 PROCEDURE — 96376 TX/PRO/DX INJ SAME DRUG ADON: CPT

## 2023-05-19 PROCEDURE — 96374 THER/PROPH/DIAG INJ IV PUSH: CPT

## 2023-05-19 PROCEDURE — 83605 ASSAY OF LACTIC ACID: CPT

## 2023-05-19 PROCEDURE — 71045 X-RAY EXAM CHEST 1 VIEW: CPT

## 2023-05-19 PROCEDURE — 2580000003 HC RX 258: Performed by: EMERGENCY MEDICINE

## 2023-05-19 PROCEDURE — 2500000003 HC RX 250 WO HCPCS: Performed by: EMERGENCY MEDICINE

## 2023-05-19 PROCEDURE — 85025 COMPLETE CBC W/AUTO DIFF WBC: CPT

## 2023-05-19 PROCEDURE — 82077 ASSAY SPEC XCP UR&BREATH IA: CPT

## 2023-05-19 RX ORDER — ATORVASTATIN CALCIUM 80 MG/1
80 TABLET, FILM COATED ORAL DAILY
Qty: 90 TABLET | Refills: 0 | Status: SHIPPED | OUTPATIENT
Start: 2023-05-19

## 2023-05-19 RX ORDER — 0.9 % SODIUM CHLORIDE 0.9 %
1000 INTRAVENOUS SOLUTION INTRAVENOUS ONCE
Status: COMPLETED | OUTPATIENT
Start: 2023-05-19 | End: 2023-05-19

## 2023-05-19 RX ORDER — LABETALOL HYDROCHLORIDE 5 MG/ML
5 INJECTION, SOLUTION INTRAVENOUS ONCE
Status: COMPLETED | OUTPATIENT
Start: 2023-05-19 | End: 2023-05-19

## 2023-05-19 RX ADMIN — LABETALOL HYDROCHLORIDE 5 MG: 5 INJECTION INTRAVENOUS at 22:56

## 2023-05-19 RX ADMIN — SODIUM CHLORIDE 1000 ML: 9 INJECTION, SOLUTION INTRAVENOUS at 22:17

## 2023-05-19 ASSESSMENT — PAIN - FUNCTIONAL ASSESSMENT: PAIN_FUNCTIONAL_ASSESSMENT: NONE - DENIES PAIN

## 2023-05-20 ENCOUNTER — APPOINTMENT (OUTPATIENT)
Dept: CT IMAGING | Age: 33
DRG: 817 | End: 2023-05-20
Payer: COMMERCIAL

## 2023-05-20 PROBLEM — T50.904A DRUG OVERDOSE OF UNDETERMINED INTENT, INITIAL ENCOUNTER: Status: ACTIVE | Noted: 2023-05-20

## 2023-05-20 LAB
ALBUMIN SERPL-MCNC: 3 G/DL (ref 3.5–5.2)
ALP SERPL-CCNC: 92 U/L (ref 40–129)
ALT SERPL-CCNC: 9 U/L (ref 0–40)
AMPHET UR QL SCN: NOT DETECTED
ANION GAP SERPL CALCULATED.3IONS-SCNC: 16 MMOL/L (ref 7–16)
ANION GAP SERPL CALCULATED.3IONS-SCNC: 17 MMOL/L (ref 7–16)
ANION GAP SERPL CALCULATED.3IONS-SCNC: 28 MMOL/L (ref 7–16)
ANION GAP SERPL CALCULATED.3IONS-SCNC: 8 MMOL/L (ref 7–16)
AST SERPL-CCNC: 19 U/L (ref 0–39)
BACTERIA URNS QL MICRO: ABNORMAL /HPF
BACTERIA URNS QL MICRO: NORMAL /HPF
BARBITURATES UR QL SCN: NOT DETECTED
BASOPHILS # BLD: 0.08 E9/L (ref 0–0.2)
BASOPHILS NFR BLD: 0.7 % (ref 0–2)
BENZODIAZ UR QL SCN: NOT DETECTED
BETA-HYDROXYBUTYRATE: >4.5 MMOL/L (ref 0.02–0.27)
BILIRUB SERPL-MCNC: 0.3 MG/DL (ref 0–1.2)
BILIRUB UR QL STRIP: NEGATIVE
BILIRUB UR QL STRIP: NEGATIVE
BUN SERPL-MCNC: 38 MG/DL (ref 6–20)
BUN SERPL-MCNC: 51 MG/DL (ref 6–20)
BUN SERPL-MCNC: 52 MG/DL (ref 6–20)
BUN SERPL-MCNC: 56 MG/DL (ref 6–20)
CALCIUM SERPL-MCNC: 8 MG/DL (ref 8.6–10.2)
CALCIUM SERPL-MCNC: 8 MG/DL (ref 8.6–10.2)
CALCIUM SERPL-MCNC: 8.1 MG/DL (ref 8.6–10.2)
CALCIUM SERPL-MCNC: 8.8 MG/DL (ref 8.6–10.2)
CANNABINOIDS UR QL SCN: NOT DETECTED
CHLORIDE SERPL-SCNC: 108 MMOL/L (ref 98–107)
CHLORIDE SERPL-SCNC: 108 MMOL/L (ref 98–107)
CHLORIDE SERPL-SCNC: 110 MMOL/L (ref 98–107)
CHLORIDE SERPL-SCNC: 98 MMOL/L (ref 98–107)
CLARITY UR: CLEAR
CLARITY UR: CLEAR
CO2 SERPL-SCNC: 13 MMOL/L (ref 22–29)
CO2 SERPL-SCNC: 17 MMOL/L (ref 22–29)
CO2 SERPL-SCNC: 19 MMOL/L (ref 22–29)
CO2 SERPL-SCNC: 22 MMOL/L (ref 22–29)
COCAINE UR QL SCN: NOT DETECTED
COLOR UR: YELLOW
COLOR UR: YELLOW
CREAT SERPL-MCNC: 2.1 MG/DL (ref 0.7–1.2)
CREAT SERPL-MCNC: 2.4 MG/DL (ref 0.7–1.2)
CREAT SERPL-MCNC: 2.5 MG/DL (ref 0.7–1.2)
CREAT SERPL-MCNC: 2.5 MG/DL (ref 0.7–1.2)
DRUG SCREEN COMMENT UR-IMP: ABNORMAL
EKG ATRIAL RATE: 117 BPM
EKG P AXIS: 51 DEGREES
EKG P-R INTERVAL: 160 MS
EKG Q-T INTERVAL: 316 MS
EKG QRS DURATION: 88 MS
EKG QTC CALCULATION (BAZETT): 440 MS
EKG R AXIS: 29 DEGREES
EKG T AXIS: 56 DEGREES
EKG VENTRICULAR RATE: 117 BPM
EOSINOPHIL # BLD: 0.08 E9/L (ref 0.05–0.5)
EOSINOPHIL NFR BLD: 0.7 % (ref 0–6)
ERYTHROCYTE [DISTWIDTH] IN BLOOD BY AUTOMATED COUNT: 13.7 FL (ref 11.5–15)
FENTANYL SCREEN, URINE: POSITIVE
GLUCOSE SERPL-MCNC: 238 MG/DL (ref 74–99)
GLUCOSE SERPL-MCNC: 306 MG/DL (ref 74–99)
GLUCOSE SERPL-MCNC: 486 MG/DL (ref 74–99)
GLUCOSE SERPL-MCNC: 90 MG/DL (ref 74–99)
GLUCOSE UR STRIP-MCNC: 250 MG/DL
GLUCOSE UR STRIP-MCNC: 250 MG/DL
HBA1C MFR BLD: 7.6 % (ref 4–5.6)
HCT VFR BLD AUTO: 32 % (ref 37–54)
HGB BLD-MCNC: 9.7 G/DL (ref 12.5–16.5)
HGB UR QL STRIP: ABNORMAL
HGB UR QL STRIP: ABNORMAL
IMM GRANULOCYTES # BLD: 0.03 E9/L
IMM GRANULOCYTES NFR BLD: 0.2 % (ref 0–5)
KETONES UR STRIP-MCNC: 40 MG/DL
KETONES UR STRIP-MCNC: ABNORMAL MG/DL
LACTATE BLDV-SCNC: 1 MMOL/L (ref 0.5–1.9)
LEUKOCYTE ESTERASE UR QL STRIP: NEGATIVE
LEUKOCYTE ESTERASE UR QL STRIP: NEGATIVE
LYMPHOCYTES # BLD: 1.43 E9/L (ref 1.5–4)
LYMPHOCYTES NFR BLD: 11.6 % (ref 20–42)
MAGNESIUM SERPL-MCNC: 1.9 MG/DL (ref 1.6–2.6)
MAGNESIUM SERPL-MCNC: 1.9 MG/DL (ref 1.6–2.6)
MCH RBC QN AUTO: 27.2 PG (ref 26–35)
MCHC RBC AUTO-ENTMCNC: 30.3 % (ref 32–34.5)
MCV RBC AUTO: 89.9 FL (ref 80–99.9)
METER GLUCOSE: 135 MG/DL (ref 74–99)
METER GLUCOSE: 138 MG/DL (ref 74–99)
METER GLUCOSE: 147 MG/DL (ref 74–99)
METER GLUCOSE: 171 MG/DL (ref 74–99)
METER GLUCOSE: 197 MG/DL (ref 74–99)
METER GLUCOSE: 234 MG/DL (ref 74–99)
METER GLUCOSE: 235 MG/DL (ref 74–99)
METER GLUCOSE: 303 MG/DL (ref 74–99)
METER GLUCOSE: 370 MG/DL (ref 74–99)
METER GLUCOSE: 441 MG/DL (ref 74–99)
METER GLUCOSE: 61 MG/DL (ref 74–99)
METER GLUCOSE: 67 MG/DL (ref 74–99)
METER GLUCOSE: 88 MG/DL (ref 74–99)
METHADONE UR QL SCN: NOT DETECTED
MONOCYTES # BLD: 0.31 E9/L (ref 0.1–0.95)
MONOCYTES NFR BLD: 2.5 % (ref 2–12)
NEUTROPHILS # BLD: 10.35 E9/L (ref 1.8–7.3)
NEUTS SEG NFR BLD: 84.3 % (ref 43–80)
NITRITE UR QL STRIP: NEGATIVE
NITRITE UR QL STRIP: NEGATIVE
OPIATES UR QL SCN: NOT DETECTED
OXYCODONE URINE: NOT DETECTED
PCP UR QL SCN: NOT DETECTED
PH UR STRIP: 6 [PH] (ref 5–9)
PH UR STRIP: 7 [PH] (ref 5–9)
PHOSPHATE SERPL-MCNC: 2.7 MG/DL (ref 2.5–4.5)
PHOSPHATE SERPL-MCNC: 3.7 MG/DL (ref 2.5–4.5)
PLATELET # BLD AUTO: 253 E9/L (ref 130–450)
PMV BLD AUTO: 11 FL (ref 7–12)
POTASSIUM SERPL-SCNC: 4.7 MMOL/L (ref 3.5–5)
POTASSIUM SERPL-SCNC: 5 MMOL/L (ref 3.5–5)
POTASSIUM SERPL-SCNC: 5.1 MMOL/L (ref 3.5–5)
POTASSIUM SERPL-SCNC: 6 MMOL/L (ref 3.5–5)
PROT SERPL-MCNC: 5.7 G/DL (ref 6.4–8.3)
PROT UR STRIP-MCNC: >=300 MG/DL
PROT UR STRIP-MCNC: >=300 MG/DL
RBC # BLD AUTO: 3.56 E12/L (ref 3.8–5.8)
RBC #/AREA URNS HPF: ABNORMAL /HPF (ref 0–2)
RBC #/AREA URNS HPF: NORMAL /HPF (ref 0–2)
SODIUM SERPL-SCNC: 139 MMOL/L (ref 132–146)
SODIUM SERPL-SCNC: 140 MMOL/L (ref 132–146)
SODIUM SERPL-SCNC: 142 MMOL/L (ref 132–146)
SODIUM SERPL-SCNC: 143 MMOL/L (ref 132–146)
SP GR UR STRIP: 1.01 (ref 1–1.03)
SP GR UR STRIP: 1.02 (ref 1–1.03)
TROPONIN, HIGH SENSITIVITY: 148 NG/L (ref 0–11)
UROBILINOGEN UR STRIP-ACNC: 0.2 E.U./DL
UROBILINOGEN UR STRIP-ACNC: 0.2 E.U./DL
WBC # BLD: 12.3 E9/L (ref 4.5–11.5)
WBC #/AREA URNS HPF: ABNORMAL /HPF (ref 0–5)
WBC #/AREA URNS HPF: NORMAL /HPF (ref 0–5)

## 2023-05-20 PROCEDURE — 80053 COMPREHEN METABOLIC PANEL: CPT

## 2023-05-20 PROCEDURE — 6370000000 HC RX 637 (ALT 250 FOR IP): Performed by: STUDENT IN AN ORGANIZED HEALTH CARE EDUCATION/TRAINING PROGRAM

## 2023-05-20 PROCEDURE — 85025 COMPLETE CBC W/AUTO DIFF WBC: CPT

## 2023-05-20 PROCEDURE — 82962 GLUCOSE BLOOD TEST: CPT

## 2023-05-20 PROCEDURE — 80307 DRUG TEST PRSMV CHEM ANLYZR: CPT

## 2023-05-20 PROCEDURE — 81001 URINALYSIS AUTO W/SCOPE: CPT

## 2023-05-20 PROCEDURE — 2500000003 HC RX 250 WO HCPCS: Performed by: INTERNAL MEDICINE

## 2023-05-20 PROCEDURE — S5553 INSULIN LONG ACTING 5 U: HCPCS | Performed by: STUDENT IN AN ORGANIZED HEALTH CARE EDUCATION/TRAINING PROGRAM

## 2023-05-20 PROCEDURE — 84100 ASSAY OF PHOSPHORUS: CPT

## 2023-05-20 PROCEDURE — 84484 ASSAY OF TROPONIN QUANT: CPT

## 2023-05-20 PROCEDURE — 2700000000 HC OXYGEN THERAPY PER DAY

## 2023-05-20 PROCEDURE — 6370000000 HC RX 637 (ALT 250 FOR IP): Performed by: INTERNAL MEDICINE

## 2023-05-20 PROCEDURE — 6360000002 HC RX W HCPCS: Performed by: INTERNAL MEDICINE

## 2023-05-20 PROCEDURE — 99291 CRITICAL CARE FIRST HOUR: CPT | Performed by: INTERNAL MEDICINE

## 2023-05-20 PROCEDURE — 74176 CT ABD & PELVIS W/O CONTRAST: CPT

## 2023-05-20 PROCEDURE — 2580000003 HC RX 258: Performed by: INTERNAL MEDICINE

## 2023-05-20 PROCEDURE — 82010 KETONE BODYS QUAN: CPT

## 2023-05-20 PROCEDURE — 93010 ELECTROCARDIOGRAM REPORT: CPT | Performed by: INTERNAL MEDICINE

## 2023-05-20 PROCEDURE — 83605 ASSAY OF LACTIC ACID: CPT

## 2023-05-20 PROCEDURE — 2580000003 HC RX 258: Performed by: FAMILY MEDICINE

## 2023-05-20 PROCEDURE — 83036 HEMOGLOBIN GLYCOSYLATED A1C: CPT

## 2023-05-20 PROCEDURE — 87040 BLOOD CULTURE FOR BACTERIA: CPT

## 2023-05-20 PROCEDURE — 6360000002 HC RX W HCPCS: Performed by: STUDENT IN AN ORGANIZED HEALTH CARE EDUCATION/TRAINING PROGRAM

## 2023-05-20 PROCEDURE — 83735 ASSAY OF MAGNESIUM: CPT

## 2023-05-20 PROCEDURE — 36415 COLL VENOUS BLD VENIPUNCTURE: CPT

## 2023-05-20 PROCEDURE — 2500000003 HC RX 250 WO HCPCS: Performed by: EMERGENCY MEDICINE

## 2023-05-20 PROCEDURE — 80048 BASIC METABOLIC PNL TOTAL CA: CPT

## 2023-05-20 PROCEDURE — 6360000002 HC RX W HCPCS: Performed by: FAMILY MEDICINE

## 2023-05-20 PROCEDURE — 6370000000 HC RX 637 (ALT 250 FOR IP): Performed by: FAMILY MEDICINE

## 2023-05-20 PROCEDURE — 2000000000 HC ICU R&B

## 2023-05-20 RX ORDER — METOCLOPRAMIDE HYDROCHLORIDE 5 MG/ML
5 INJECTION INTRAMUSCULAR; INTRAVENOUS EVERY 6 HOURS PRN
Status: DISCONTINUED | OUTPATIENT
Start: 2023-05-20 | End: 2023-05-23 | Stop reason: HOSPADM

## 2023-05-20 RX ORDER — POLYETHYLENE GLYCOL 3350 17 G/17G
17 POWDER, FOR SOLUTION ORAL DAILY PRN
Status: DISCONTINUED | OUTPATIENT
Start: 2023-05-20 | End: 2023-05-23 | Stop reason: HOSPADM

## 2023-05-20 RX ORDER — SODIUM CHLORIDE 9 MG/ML
INJECTION, SOLUTION INTRAVENOUS CONTINUOUS
Status: DISCONTINUED | OUTPATIENT
Start: 2023-05-20 | End: 2023-05-20 | Stop reason: SDUPTHER

## 2023-05-20 RX ORDER — LABETALOL HYDROCHLORIDE 5 MG/ML
5 INJECTION, SOLUTION INTRAVENOUS ONCE
Status: COMPLETED | OUTPATIENT
Start: 2023-05-20 | End: 2023-05-20

## 2023-05-20 RX ORDER — DEXTROSE MONOHYDRATE 100 MG/ML
INJECTION, SOLUTION INTRAVENOUS CONTINUOUS PRN
Status: DISCONTINUED | OUTPATIENT
Start: 2023-05-20 | End: 2023-05-23 | Stop reason: HOSPADM

## 2023-05-20 RX ORDER — GABAPENTIN 300 MG/1
300 CAPSULE ORAL 2 TIMES DAILY
Status: DISCONTINUED | OUTPATIENT
Start: 2023-05-20 | End: 2023-05-23 | Stop reason: HOSPADM

## 2023-05-20 RX ORDER — ACETAMINOPHEN 650 MG/1
650 SUPPOSITORY RECTAL EVERY 6 HOURS PRN
Status: DISCONTINUED | OUTPATIENT
Start: 2023-05-20 | End: 2023-05-23 | Stop reason: HOSPADM

## 2023-05-20 RX ORDER — ENOXAPARIN SODIUM 100 MG/ML
30 INJECTION SUBCUTANEOUS DAILY
Status: DISCONTINUED | OUTPATIENT
Start: 2023-05-21 | End: 2023-05-22

## 2023-05-20 RX ORDER — SODIUM CHLORIDE 0.9 % (FLUSH) 0.9 %
10 SYRINGE (ML) INJECTION EVERY 12 HOURS SCHEDULED
Status: DISCONTINUED | OUTPATIENT
Start: 2023-05-20 | End: 2023-05-23 | Stop reason: HOSPADM

## 2023-05-20 RX ORDER — SODIUM CHLORIDE 0.9 % (FLUSH) 0.9 %
10 SYRINGE (ML) INJECTION PRN
Status: DISCONTINUED | OUTPATIENT
Start: 2023-05-20 | End: 2023-05-23 | Stop reason: HOSPADM

## 2023-05-20 RX ORDER — SODIUM CHLORIDE 450 MG/100ML
INJECTION, SOLUTION INTRAVENOUS CONTINUOUS
Status: DISCONTINUED | OUTPATIENT
Start: 2023-05-20 | End: 2023-05-21

## 2023-05-20 RX ORDER — CLONIDINE HYDROCHLORIDE 0.1 MG/1
0.1 TABLET ORAL 2 TIMES DAILY
Status: DISCONTINUED | OUTPATIENT
Start: 2023-05-20 | End: 2023-05-22

## 2023-05-20 RX ORDER — SODIUM CHLORIDE 9 MG/ML
INJECTION, SOLUTION INTRAVENOUS PRN
Status: DISCONTINUED | OUTPATIENT
Start: 2023-05-20 | End: 2023-05-23 | Stop reason: HOSPADM

## 2023-05-20 RX ORDER — INSULIN ASPART 100 [IU]/ML
5 INJECTION, SUSPENSION SUBCUTANEOUS ONCE
Status: DISCONTINUED | OUTPATIENT
Start: 2023-05-20 | End: 2023-05-20 | Stop reason: CLARIF

## 2023-05-20 RX ORDER — ATORVASTATIN CALCIUM 40 MG/1
80 TABLET, FILM COATED ORAL DAILY
Status: DISCONTINUED | OUTPATIENT
Start: 2023-05-20 | End: 2023-05-23 | Stop reason: HOSPADM

## 2023-05-20 RX ORDER — 0.9 % SODIUM CHLORIDE 0.9 %
1000 INTRAVENOUS SOLUTION INTRAVENOUS ONCE
Status: DISCONTINUED | OUTPATIENT
Start: 2023-05-20 | End: 2023-05-20 | Stop reason: SDUPTHER

## 2023-05-20 RX ORDER — DEXTROSE, SODIUM CHLORIDE, AND POTASSIUM CHLORIDE 5; .45; .15 G/100ML; G/100ML; G/100ML
INJECTION INTRAVENOUS CONTINUOUS PRN
Status: DISCONTINUED | OUTPATIENT
Start: 2023-05-20 | End: 2023-05-22

## 2023-05-20 RX ORDER — PROMETHAZINE HYDROCHLORIDE 12.5 MG/1
12.5 TABLET ORAL EVERY 6 HOURS PRN
Status: DISCONTINUED | OUTPATIENT
Start: 2023-05-20 | End: 2023-05-23 | Stop reason: HOSPADM

## 2023-05-20 RX ORDER — INSULIN LISPRO 100 [IU]/ML
0-4 INJECTION, SOLUTION INTRAVENOUS; SUBCUTANEOUS NIGHTLY
Status: DISCONTINUED | OUTPATIENT
Start: 2023-05-20 | End: 2023-05-20

## 2023-05-20 RX ORDER — ENOXAPARIN SODIUM 100 MG/ML
40 INJECTION SUBCUTANEOUS DAILY
Status: DISCONTINUED | OUTPATIENT
Start: 2023-05-20 | End: 2023-05-20

## 2023-05-20 RX ORDER — BUSPIRONE HYDROCHLORIDE 5 MG/1
10 TABLET ORAL 2 TIMES DAILY
Status: DISCONTINUED | OUTPATIENT
Start: 2023-05-20 | End: 2023-05-23 | Stop reason: HOSPADM

## 2023-05-20 RX ORDER — INSULIN GLARGINE-YFGN 100 [IU]/ML
3 INJECTION, SOLUTION SUBCUTANEOUS ONCE
Status: COMPLETED | OUTPATIENT
Start: 2023-05-20 | End: 2023-05-20

## 2023-05-20 RX ORDER — 0.9 % SODIUM CHLORIDE 0.9 %
1000 INTRAVENOUS SOLUTION INTRAVENOUS ONCE
Status: COMPLETED | OUTPATIENT
Start: 2023-05-20 | End: 2023-05-20

## 2023-05-20 RX ORDER — ACETAMINOPHEN 325 MG/1
650 TABLET ORAL EVERY 6 HOURS PRN
Status: DISCONTINUED | OUTPATIENT
Start: 2023-05-20 | End: 2023-05-23 | Stop reason: HOSPADM

## 2023-05-20 RX ORDER — ASPIRIN 81 MG/1
81 TABLET ORAL DAILY
Status: DISCONTINUED | OUTPATIENT
Start: 2023-05-20 | End: 2023-05-23

## 2023-05-20 RX ORDER — MAGNESIUM SULFATE 1 G/100ML
1000 INJECTION INTRAVENOUS PRN
Status: DISCONTINUED | OUTPATIENT
Start: 2023-05-20 | End: 2023-05-23

## 2023-05-20 RX ORDER — PANTOPRAZOLE SODIUM 40 MG/1
40 TABLET, DELAYED RELEASE ORAL
Status: DISCONTINUED | OUTPATIENT
Start: 2023-05-21 | End: 2023-05-23 | Stop reason: HOSPADM

## 2023-05-20 RX ORDER — METOPROLOL SUCCINATE 50 MG/1
50 TABLET, EXTENDED RELEASE ORAL 2 TIMES DAILY
Status: DISCONTINUED | OUTPATIENT
Start: 2023-05-20 | End: 2023-05-23 | Stop reason: HOSPADM

## 2023-05-20 RX ORDER — ONDANSETRON 2 MG/ML
4 INJECTION INTRAMUSCULAR; INTRAVENOUS EVERY 6 HOURS PRN
Status: DISCONTINUED | OUTPATIENT
Start: 2023-05-20 | End: 2023-05-23 | Stop reason: HOSPADM

## 2023-05-20 RX ORDER — LISINOPRIL 10 MG/1
10 TABLET ORAL DAILY
Status: DISCONTINUED | OUTPATIENT
Start: 2023-05-20 | End: 2023-05-21

## 2023-05-20 RX ORDER — INSULIN LISPRO 100 [IU]/ML
0-8 INJECTION, SOLUTION INTRAVENOUS; SUBCUTANEOUS
Status: DISCONTINUED | OUTPATIENT
Start: 2023-05-20 | End: 2023-05-20

## 2023-05-20 RX ORDER — INSULIN LISPRO 100 [IU]/ML
0-4 INJECTION, SOLUTION INTRAVENOUS; SUBCUTANEOUS
Status: DISCONTINUED | OUTPATIENT
Start: 2023-05-20 | End: 2023-05-20

## 2023-05-20 RX ORDER — INSULIN LISPRO 100 [IU]/ML
3 INJECTION, SOLUTION INTRAVENOUS; SUBCUTANEOUS ONCE
Status: COMPLETED | OUTPATIENT
Start: 2023-05-20 | End: 2023-05-20

## 2023-05-20 RX ORDER — METOCLOPRAMIDE HYDROCHLORIDE 5 MG/ML
10 INJECTION INTRAMUSCULAR; INTRAVENOUS EVERY 6 HOURS PRN
Status: DISCONTINUED | OUTPATIENT
Start: 2023-05-20 | End: 2023-05-20

## 2023-05-20 RX ORDER — POTASSIUM CHLORIDE 7.45 MG/ML
10 INJECTION INTRAVENOUS PRN
Status: DISCONTINUED | OUTPATIENT
Start: 2023-05-20 | End: 2023-05-23

## 2023-05-20 RX ADMIN — POTASSIUM CHLORIDE 10 MEQ: 7.46 INJECTION, SOLUTION INTRAVENOUS at 17:41

## 2023-05-20 RX ADMIN — GABAPENTIN 300 MG: 300 CAPSULE ORAL at 20:27

## 2023-05-20 RX ADMIN — POTASSIUM CHLORIDE 10 MEQ: 7.46 INJECTION, SOLUTION INTRAVENOUS at 22:30

## 2023-05-20 RX ADMIN — Medication 16 G: at 20:18

## 2023-05-20 RX ADMIN — SODIUM PHOSPHATE, MONOBASIC, MONOHYDRATE AND SODIUM PHOSPHATE, DIBASIC, ANHYDROUS 10 MMOL: 276; 142 INJECTION, SOLUTION INTRAVENOUS at 22:15

## 2023-05-20 RX ADMIN — METOCLOPRAMIDE 10 MG: 5 INJECTION, SOLUTION INTRAMUSCULAR; INTRAVENOUS at 10:03

## 2023-05-20 RX ADMIN — Medication 16 G: at 20:39

## 2023-05-20 RX ADMIN — METOPROLOL SUCCINATE 50 MG: 50 TABLET, EXTENDED RELEASE ORAL at 20:27

## 2023-05-20 RX ADMIN — POTASSIUM CHLORIDE, DEXTROSE MONOHYDRATE AND SODIUM CHLORIDE: 150; 5; 450 INJECTION, SOLUTION INTRAVENOUS at 14:12

## 2023-05-20 RX ADMIN — POTASSIUM CHLORIDE 10 MEQ: 7.46 INJECTION, SOLUTION INTRAVENOUS at 21:27

## 2023-05-20 RX ADMIN — LABETALOL HYDROCHLORIDE 5 MG: 5 INJECTION INTRAVENOUS at 01:32

## 2023-05-20 RX ADMIN — ONDANSETRON 4 MG: 2 INJECTION INTRAMUSCULAR; INTRAVENOUS at 08:01

## 2023-05-20 RX ADMIN — SODIUM CHLORIDE 1000 ML: 9 INJECTION, SOLUTION INTRAVENOUS at 11:58

## 2023-05-20 RX ADMIN — SODIUM CHLORIDE: 9 INJECTION, SOLUTION INTRAVENOUS at 03:08

## 2023-05-20 RX ADMIN — INSULIN GLARGINE-YFGN 3 UNITS: 100 INJECTION, SOLUTION SUBCUTANEOUS at 10:07

## 2023-05-20 RX ADMIN — SODIUM CHLORIDE: 4.5 INJECTION, SOLUTION INTRAVENOUS at 12:25

## 2023-05-20 RX ADMIN — SODIUM CHLORIDE, PRESERVATIVE FREE 10 ML: 5 INJECTION INTRAVENOUS at 20:22

## 2023-05-20 RX ADMIN — INSULIN LISPRO 3 UNITS: 100 INJECTION, SOLUTION INTRAVENOUS; SUBCUTANEOUS at 10:07

## 2023-05-20 RX ADMIN — SODIUM CHLORIDE 6.2 UNITS/HR: 9 INJECTION, SOLUTION INTRAVENOUS at 12:03

## 2023-05-20 RX ADMIN — POTASSIUM CHLORIDE 10 MEQ: 7.46 INJECTION, SOLUTION INTRAVENOUS at 16:39

## 2023-05-20 ASSESSMENT — PAIN SCALES - GENERAL
PAINLEVEL_OUTOF10: 0
PAINLEVEL_OUTOF10: 0

## 2023-05-21 LAB
ALBUMIN SERPL-MCNC: 2.7 G/DL (ref 3.5–5.2)
ALP SERPL-CCNC: 91 U/L (ref 40–129)
ALT SERPL-CCNC: 8 U/L (ref 0–40)
ANION GAP SERPL CALCULATED.3IONS-SCNC: 10 MMOL/L (ref 7–16)
ANION GAP SERPL CALCULATED.3IONS-SCNC: 10 MMOL/L (ref 7–16)
ANION GAP SERPL CALCULATED.3IONS-SCNC: 8 MMOL/L (ref 7–16)
ANION GAP SERPL CALCULATED.3IONS-SCNC: 9 MMOL/L (ref 7–16)
APTT BLD: 33.7 SEC (ref 24.5–35.1)
AST SERPL-CCNC: 14 U/L (ref 0–39)
BASOPHILS # BLD: 0.09 E9/L (ref 0–0.2)
BASOPHILS NFR BLD: 0.5 % (ref 0–2)
BILIRUB SERPL-MCNC: 0.3 MG/DL (ref 0–1.2)
BUN SERPL-MCNC: 35 MG/DL (ref 6–20)
BUN SERPL-MCNC: 39 MG/DL (ref 6–20)
BUN SERPL-MCNC: 49 MG/DL (ref 6–20)
BUN SERPL-MCNC: 51 MG/DL (ref 6–20)
CALCIUM SERPL-MCNC: 7.8 MG/DL (ref 8.6–10.2)
CALCIUM SERPL-MCNC: 7.9 MG/DL (ref 8.6–10.2)
CALCIUM SERPL-MCNC: 8.1 MG/DL (ref 8.6–10.2)
CALCIUM SERPL-MCNC: 8.7 MG/DL (ref 8.6–10.2)
CHLORIDE SERPL-SCNC: 107 MMOL/L (ref 98–107)
CHLORIDE SERPL-SCNC: 108 MMOL/L (ref 98–107)
CHLORIDE SERPL-SCNC: 108 MMOL/L (ref 98–107)
CHLORIDE SERPL-SCNC: 111 MMOL/L (ref 98–107)
CHLORIDE UR-SCNC: 69 MMOL/L
CO2 SERPL-SCNC: 18 MMOL/L (ref 22–29)
CO2 SERPL-SCNC: 19 MMOL/L (ref 22–29)
CO2 SERPL-SCNC: 21 MMOL/L (ref 22–29)
CO2 SERPL-SCNC: 24 MMOL/L (ref 22–29)
CREAT SERPL-MCNC: 1.9 MG/DL (ref 0.7–1.2)
CREAT SERPL-MCNC: 2 MG/DL (ref 0.7–1.2)
CREAT SERPL-MCNC: 2.3 MG/DL (ref 0.7–1.2)
CREAT SERPL-MCNC: 2.4 MG/DL (ref 0.7–1.2)
CREAT UR-MCNC: 18 MG/DL (ref 40–278)
EOSINOPHIL # BLD: 0.17 E9/L (ref 0.05–0.5)
EOSINOPHIL NFR BLD: 1 % (ref 0–6)
ERYTHROCYTE [DISTWIDTH] IN BLOOD BY AUTOMATED COUNT: 13.7 FL (ref 11.5–15)
GLUCOSE SERPL-MCNC: 111 MG/DL (ref 74–99)
GLUCOSE SERPL-MCNC: 121 MG/DL (ref 74–99)
GLUCOSE SERPL-MCNC: 77 MG/DL (ref 74–99)
GLUCOSE SERPL-MCNC: 99 MG/DL (ref 74–99)
HCT VFR BLD AUTO: 32.2 % (ref 37–54)
HGB BLD-MCNC: 10.2 G/DL (ref 12.5–16.5)
IMM GRANULOCYTES # BLD: 0.09 E9/L
IMM GRANULOCYTES NFR BLD: 0.5 % (ref 0–5)
INR BLD: 1.2
LYMPHOCYTES # BLD: 4.05 E9/L (ref 1.5–4)
LYMPHOCYTES NFR BLD: 23.1 % (ref 20–42)
MAGNESIUM SERPL-MCNC: 1.8 MG/DL (ref 1.6–2.6)
MAGNESIUM SERPL-MCNC: 1.9 MG/DL (ref 1.6–2.6)
MAGNESIUM SERPL-MCNC: 1.9 MG/DL (ref 1.6–2.6)
MCH RBC QN AUTO: 27.4 PG (ref 26–35)
MCHC RBC AUTO-ENTMCNC: 31.7 % (ref 32–34.5)
MCV RBC AUTO: 86.6 FL (ref 80–99.9)
METER GLUCOSE: 100 MG/DL (ref 74–99)
METER GLUCOSE: 104 MG/DL (ref 74–99)
METER GLUCOSE: 108 MG/DL (ref 74–99)
METER GLUCOSE: 109 MG/DL (ref 74–99)
METER GLUCOSE: 113 MG/DL (ref 74–99)
METER GLUCOSE: 117 MG/DL (ref 74–99)
METER GLUCOSE: 138 MG/DL (ref 74–99)
METER GLUCOSE: 139 MG/DL (ref 74–99)
METER GLUCOSE: 153 MG/DL (ref 74–99)
METER GLUCOSE: 64 MG/DL (ref 74–99)
METER GLUCOSE: 83 MG/DL (ref 74–99)
MONOCYTES # BLD: 0.93 E9/L (ref 0.1–0.95)
MONOCYTES NFR BLD: 5.3 % (ref 2–12)
NEUTROPHILS # BLD: 12.22 E9/L (ref 1.8–7.3)
NEUTS SEG NFR BLD: 69.6 % (ref 43–80)
OSMOLALITY URINE: 236 MOSM/KG (ref 300–900)
PHOSPHATE SERPL-MCNC: 2.8 MG/DL (ref 2.5–4.5)
PHOSPHATE SERPL-MCNC: 3.8 MG/DL (ref 2.5–4.5)
PHOSPHATE SERPL-MCNC: 4.1 MG/DL (ref 2.5–4.5)
PLATELET # BLD AUTO: 305 E9/L (ref 130–450)
PMV BLD AUTO: 10.3 FL (ref 7–12)
POTASSIUM SERPL-SCNC: 4.5 MMOL/L (ref 3.5–5)
POTASSIUM SERPL-SCNC: 5.4 MMOL/L (ref 3.5–5)
POTASSIUM SERPL-SCNC: 5.4 MMOL/L (ref 3.5–5)
POTASSIUM SERPL-SCNC: 5.5 MMOL/L (ref 3.5–5)
POTASSIUM SERPL-SCNC: 6.1 MMOL/L (ref 3.5–5)
POTASSIUM UR-SCNC: 14.1 MMOL/L
PROT SERPL-MCNC: 5.4 G/DL (ref 6.4–8.3)
PROT UR-MCNC: 40 MG/DL (ref 0–12)
PROTEIN/CREAT RATIO: 2.2
PROTEIN/CREAT RATIO: 2.2 (ref 0–0.2)
PROTHROMBIN TIME: 12.6 SEC (ref 9.3–12.4)
RBC # BLD AUTO: 3.72 E12/L (ref 3.8–5.8)
SODIUM SERPL-SCNC: 136 MMOL/L (ref 132–146)
SODIUM SERPL-SCNC: 137 MMOL/L (ref 132–146)
SODIUM SERPL-SCNC: 138 MMOL/L (ref 132–146)
SODIUM SERPL-SCNC: 142 MMOL/L (ref 132–146)
SODIUM UR-SCNC: 72 MMOL/L
WBC # BLD: 17.6 E9/L (ref 4.5–11.5)

## 2023-05-21 PROCEDURE — 80053 COMPREHEN METABOLIC PANEL: CPT

## 2023-05-21 PROCEDURE — 99232 SBSQ HOSP IP/OBS MODERATE 35: CPT | Performed by: INTERNAL MEDICINE

## 2023-05-21 PROCEDURE — 2500000003 HC RX 250 WO HCPCS: Performed by: INTERNAL MEDICINE

## 2023-05-21 PROCEDURE — 84300 ASSAY OF URINE SODIUM: CPT

## 2023-05-21 PROCEDURE — 6370000000 HC RX 637 (ALT 250 FOR IP): Performed by: FAMILY MEDICINE

## 2023-05-21 PROCEDURE — 6360000002 HC RX W HCPCS: Performed by: INTERNAL MEDICINE

## 2023-05-21 PROCEDURE — 80048 BASIC METABOLIC PNL TOTAL CA: CPT

## 2023-05-21 PROCEDURE — 85610 PROTHROMBIN TIME: CPT

## 2023-05-21 PROCEDURE — 82570 ASSAY OF URINE CREATININE: CPT

## 2023-05-21 PROCEDURE — 83735 ASSAY OF MAGNESIUM: CPT

## 2023-05-21 PROCEDURE — 85730 THROMBOPLASTIN TIME PARTIAL: CPT

## 2023-05-21 PROCEDURE — 2580000003 HC RX 258: Performed by: INTERNAL MEDICINE

## 2023-05-21 PROCEDURE — 83935 ASSAY OF URINE OSMOLALITY: CPT

## 2023-05-21 PROCEDURE — 2700000000 HC OXYGEN THERAPY PER DAY

## 2023-05-21 PROCEDURE — S5553 INSULIN LONG ACTING 5 U: HCPCS | Performed by: INTERNAL MEDICINE

## 2023-05-21 PROCEDURE — 2580000003 HC RX 258: Performed by: FAMILY MEDICINE

## 2023-05-21 PROCEDURE — 85025 COMPLETE CBC W/AUTO DIFF WBC: CPT

## 2023-05-21 PROCEDURE — 82436 ASSAY OF URINE CHLORIDE: CPT

## 2023-05-21 PROCEDURE — 84133 ASSAY OF URINE POTASSIUM: CPT

## 2023-05-21 PROCEDURE — 82962 GLUCOSE BLOOD TEST: CPT

## 2023-05-21 PROCEDURE — 99291 CRITICAL CARE FIRST HOUR: CPT | Performed by: INTERNAL MEDICINE

## 2023-05-21 PROCEDURE — 6360000002 HC RX W HCPCS: Performed by: FAMILY MEDICINE

## 2023-05-21 PROCEDURE — 2000000000 HC ICU R&B

## 2023-05-21 PROCEDURE — 6370000000 HC RX 637 (ALT 250 FOR IP): Performed by: INTERNAL MEDICINE

## 2023-05-21 PROCEDURE — 84100 ASSAY OF PHOSPHORUS: CPT

## 2023-05-21 PROCEDURE — 84156 ASSAY OF PROTEIN URINE: CPT

## 2023-05-21 RX ORDER — BUPRENORPHINE HYDROCHLORIDE AND NALOXONE HYDROCHLORIDE DIHYDRATE 8; 2 MG/1; MG/1
1 TABLET SUBLINGUAL EVERY 12 HOURS
Status: DISCONTINUED | OUTPATIENT
Start: 2023-05-21 | End: 2023-05-23 | Stop reason: HOSPADM

## 2023-05-21 RX ORDER — INSULIN GLARGINE-YFGN 100 [IU]/ML
10 INJECTION, SOLUTION SUBCUTANEOUS ONCE
Status: COMPLETED | OUTPATIENT
Start: 2023-05-21 | End: 2023-05-21

## 2023-05-21 RX ORDER — DEXTROSE MONOHYDRATE 25 G/50ML
25 INJECTION, SOLUTION INTRAVENOUS PRN
Status: DISCONTINUED | OUTPATIENT
Start: 2023-05-21 | End: 2023-05-23 | Stop reason: HOSPADM

## 2023-05-21 RX ORDER — INSULIN LISPRO 100 [IU]/ML
0-4 INJECTION, SOLUTION INTRAVENOUS; SUBCUTANEOUS NIGHTLY
Status: DISCONTINUED | OUTPATIENT
Start: 2023-05-21 | End: 2023-05-23 | Stop reason: HOSPADM

## 2023-05-21 RX ORDER — INSULIN LISPRO 100 [IU]/ML
2 INJECTION, SOLUTION INTRAVENOUS; SUBCUTANEOUS
Status: DISCONTINUED | OUTPATIENT
Start: 2023-05-21 | End: 2023-05-23 | Stop reason: HOSPADM

## 2023-05-21 RX ORDER — INSULIN LISPRO 100 [IU]/ML
0-6 INJECTION, SOLUTION INTRAVENOUS; SUBCUTANEOUS
Status: DISCONTINUED | OUTPATIENT
Start: 2023-05-21 | End: 2023-05-23 | Stop reason: HOSPADM

## 2023-05-21 RX ORDER — LABETALOL HYDROCHLORIDE 5 MG/ML
10 INJECTION, SOLUTION INTRAVENOUS EVERY 4 HOURS PRN
Status: DISCONTINUED | OUTPATIENT
Start: 2023-05-21 | End: 2023-05-22

## 2023-05-21 RX ORDER — CALCIUM GLUCONATE 10 MG/ML
1000 INJECTION, SOLUTION INTRAVENOUS
Status: COMPLETED | OUTPATIENT
Start: 2023-05-21 | End: 2023-05-21

## 2023-05-21 RX ORDER — INSULIN GLARGINE-YFGN 100 [IU]/ML
10 INJECTION, SOLUTION SUBCUTANEOUS EVERY MORNING
Status: DISCONTINUED | OUTPATIENT
Start: 2023-05-22 | End: 2023-05-23 | Stop reason: HOSPADM

## 2023-05-21 RX ORDER — HYDRALAZINE HYDROCHLORIDE 20 MG/ML
10 INJECTION INTRAMUSCULAR; INTRAVENOUS EVERY 4 HOURS PRN
Status: DISCONTINUED | OUTPATIENT
Start: 2023-05-21 | End: 2023-05-23 | Stop reason: HOSPADM

## 2023-05-21 RX ORDER — AMLODIPINE BESYLATE 5 MG/1
5 TABLET ORAL DAILY
Status: DISCONTINUED | OUTPATIENT
Start: 2023-05-21 | End: 2023-05-23 | Stop reason: HOSPADM

## 2023-05-21 RX ADMIN — METOPROLOL SUCCINATE 50 MG: 50 TABLET, EXTENDED RELEASE ORAL at 11:19

## 2023-05-21 RX ADMIN — DEXTROSE MONOHYDRATE 25 G: 25 INJECTION, SOLUTION INTRAVENOUS at 18:03

## 2023-05-21 RX ADMIN — BUPRENORPHINE AND NALOXONE 1 TABLET: 8; 2 TABLET SUBLINGUAL at 14:53

## 2023-05-21 RX ADMIN — METOPROLOL SUCCINATE 50 MG: 50 TABLET, EXTENDED RELEASE ORAL at 20:03

## 2023-05-21 RX ADMIN — BUSPIRONE HYDROCHLORIDE 10 MG: 5 TABLET ORAL at 20:02

## 2023-05-21 RX ADMIN — BUSPIRONE HYDROCHLORIDE 10 MG: 5 TABLET ORAL at 08:25

## 2023-05-21 RX ADMIN — ATORVASTATIN CALCIUM 80 MG: 40 TABLET, FILM COATED ORAL at 08:25

## 2023-05-21 RX ADMIN — GABAPENTIN 300 MG: 300 CAPSULE ORAL at 20:03

## 2023-05-21 RX ADMIN — INSULIN HUMAN 6 UNITS: 100 INJECTION, SOLUTION PARENTERAL at 18:02

## 2023-05-21 RX ADMIN — SODIUM BICARBONATE 100 MEQ: 84 INJECTION, SOLUTION INTRAVENOUS at 18:03

## 2023-05-21 RX ADMIN — LISINOPRIL 10 MG: 10 TABLET ORAL at 08:25

## 2023-05-21 RX ADMIN — CLONIDINE HYDROCHLORIDE 0.1 MG: 0.1 TABLET ORAL at 11:20

## 2023-05-21 RX ADMIN — CLONIDINE HYDROCHLORIDE 0.1 MG: 0.1 TABLET ORAL at 20:03

## 2023-05-21 RX ADMIN — ASPIRIN 81 MG: 81 TABLET, COATED ORAL at 08:25

## 2023-05-21 RX ADMIN — INSULIN GLARGINE-YFGN 10 UNITS: 100 INJECTION, SOLUTION SUBCUTANEOUS at 06:55

## 2023-05-21 RX ADMIN — PANTOPRAZOLE SODIUM 40 MG: 40 TABLET, DELAYED RELEASE ORAL at 06:30

## 2023-05-21 RX ADMIN — CALCIUM GLUCONATE 1000 MG: 10 INJECTION, SOLUTION INTRAVENOUS at 18:31

## 2023-05-21 RX ADMIN — SODIUM ZIRCONIUM CYCLOSILICATE 10 G: 10 POWDER, FOR SUSPENSION ORAL at 20:03

## 2023-05-21 RX ADMIN — SODIUM CHLORIDE, PRESERVATIVE FREE 10 ML: 5 INJECTION INTRAVENOUS at 08:25

## 2023-05-21 RX ADMIN — POTASSIUM CHLORIDE, DEXTROSE MONOHYDRATE AND SODIUM CHLORIDE: 150; 5; 450 INJECTION, SOLUTION INTRAVENOUS at 02:48

## 2023-05-21 RX ADMIN — DEXTROSE MONOHYDRATE 125 ML: 100 INJECTION, SOLUTION INTRAVENOUS at 00:37

## 2023-05-21 RX ADMIN — GABAPENTIN 300 MG: 300 CAPSULE ORAL at 08:25

## 2023-05-21 RX ADMIN — SODIUM BICARBONATE: 84 INJECTION, SOLUTION INTRAVENOUS at 18:48

## 2023-05-21 RX ADMIN — LABETALOL HYDROCHLORIDE 10 MG: 5 INJECTION INTRAVENOUS at 17:59

## 2023-05-21 RX ADMIN — SODIUM CHLORIDE, PRESERVATIVE FREE 10 ML: 5 INJECTION INTRAVENOUS at 20:05

## 2023-05-21 RX ADMIN — AMLODIPINE BESYLATE 5 MG: 5 TABLET ORAL at 18:44

## 2023-05-22 PROBLEM — Z91.119 DIETARY NONCOMPLIANCE: Status: ACTIVE | Noted: 2023-05-22

## 2023-05-22 PROBLEM — E10.69 TYPE 1 DIABETES MELLITUS WITH OTHER SPECIFIED COMPLICATION (HCC): Status: ACTIVE | Noted: 2022-03-19

## 2023-05-22 LAB
ALBUMIN SERPL-MCNC: 3 G/DL (ref 3.5–5.2)
ALP SERPL-CCNC: 87 U/L (ref 40–129)
ALT SERPL-CCNC: 13 U/L (ref 0–40)
ANION GAP SERPL CALCULATED.3IONS-SCNC: 9 MMOL/L (ref 7–16)
APTT BLD: 34 SEC (ref 24.5–35.1)
AST SERPL-CCNC: 23 U/L (ref 0–39)
BASOPHILS # BLD: 0.09 E9/L (ref 0–0.2)
BASOPHILS NFR BLD: 0.9 % (ref 0–2)
BILIRUB SERPL-MCNC: 0.2 MG/DL (ref 0–1.2)
BUN SERPL-MCNC: 30 MG/DL (ref 6–20)
CALCIUM SERPL-MCNC: 8.8 MG/DL (ref 8.6–10.2)
CHLORIDE SERPL-SCNC: 106 MMOL/L (ref 98–107)
CO2 SERPL-SCNC: 28 MMOL/L (ref 22–29)
CREAT SERPL-MCNC: 2 MG/DL (ref 0.7–1.2)
EOSINOPHIL # BLD: 0.3 E9/L (ref 0.05–0.5)
EOSINOPHIL NFR BLD: 2.9 % (ref 0–6)
ERYTHROCYTE [DISTWIDTH] IN BLOOD BY AUTOMATED COUNT: 13.8 FL (ref 11.5–15)
FERRITIN SERPL-MCNC: 191 NG/ML
GLUCOSE SERPL-MCNC: 167 MG/DL (ref 74–99)
HCT VFR BLD AUTO: 32.1 % (ref 37–54)
HGB BLD-MCNC: 10.2 G/DL (ref 12.5–16.5)
IMM GRANULOCYTES # BLD: 0.03 E9/L
IMM GRANULOCYTES NFR BLD: 0.3 % (ref 0–5)
INR BLD: 1.1
IRON SATN MFR SERPL: 12 % (ref 20–55)
IRON SERPL-MCNC: 27 MCG/DL (ref 59–158)
LYMPHOCYTES # BLD: 2.52 E9/L (ref 1.5–4)
LYMPHOCYTES NFR BLD: 24 % (ref 20–42)
MAGNESIUM SERPL-MCNC: 1.8 MG/DL (ref 1.6–2.6)
MCH RBC QN AUTO: 27.3 PG (ref 26–35)
MCHC RBC AUTO-ENTMCNC: 31.8 % (ref 32–34.5)
MCV RBC AUTO: 85.8 FL (ref 80–99.9)
METER GLUCOSE: 120 MG/DL (ref 74–99)
METER GLUCOSE: 155 MG/DL (ref 74–99)
METER GLUCOSE: 69 MG/DL (ref 74–99)
METER GLUCOSE: 76 MG/DL (ref 74–99)
METER GLUCOSE: 84 MG/DL (ref 74–99)
METER GLUCOSE: 85 MG/DL (ref 74–99)
MONOCYTES # BLD: 0.55 E9/L (ref 0.1–0.95)
MONOCYTES NFR BLD: 5.2 % (ref 2–12)
NEUTROPHILS # BLD: 7 E9/L (ref 1.8–7.3)
NEUTS SEG NFR BLD: 66.7 % (ref 43–80)
PHOSPHATE SERPL-MCNC: 3.3 MG/DL (ref 2.5–4.5)
PLATELET # BLD AUTO: 273 E9/L (ref 130–450)
PMV BLD AUTO: 10.6 FL (ref 7–12)
POTASSIUM SERPL-SCNC: 4.2 MMOL/L (ref 3.5–5)
POTASSIUM SERPL-SCNC: 4.2 MMOL/L (ref 3.5–5)
PROT SERPL-MCNC: 5.4 G/DL (ref 6.4–8.3)
PROTHROMBIN TIME: 12.2 SEC (ref 9.3–12.4)
RBC # BLD AUTO: 3.74 E12/L (ref 3.8–5.8)
SODIUM SERPL-SCNC: 143 MMOL/L (ref 132–146)
TIBC SERPL-MCNC: 228 MCG/DL (ref 250–450)
WBC # BLD: 10.5 E9/L (ref 4.5–11.5)

## 2023-05-22 PROCEDURE — 84100 ASSAY OF PHOSPHORUS: CPT

## 2023-05-22 PROCEDURE — 83550 IRON BINDING TEST: CPT

## 2023-05-22 PROCEDURE — 6370000000 HC RX 637 (ALT 250 FOR IP): Performed by: INTERNAL MEDICINE

## 2023-05-22 PROCEDURE — 85610 PROTHROMBIN TIME: CPT

## 2023-05-22 PROCEDURE — 2580000003 HC RX 258: Performed by: FAMILY MEDICINE

## 2023-05-22 PROCEDURE — 6370000000 HC RX 637 (ALT 250 FOR IP): Performed by: STUDENT IN AN ORGANIZED HEALTH CARE EDUCATION/TRAINING PROGRAM

## 2023-05-22 PROCEDURE — 2700000000 HC OXYGEN THERAPY PER DAY

## 2023-05-22 PROCEDURE — 1200000000 HC SEMI PRIVATE

## 2023-05-22 PROCEDURE — 6370000000 HC RX 637 (ALT 250 FOR IP): Performed by: FAMILY MEDICINE

## 2023-05-22 PROCEDURE — S5553 INSULIN LONG ACTING 5 U: HCPCS | Performed by: INTERNAL MEDICINE

## 2023-05-22 PROCEDURE — 83735 ASSAY OF MAGNESIUM: CPT

## 2023-05-22 PROCEDURE — 6360000002 HC RX W HCPCS: Performed by: FAMILY MEDICINE

## 2023-05-22 PROCEDURE — 36415 COLL VENOUS BLD VENIPUNCTURE: CPT

## 2023-05-22 PROCEDURE — 2500000003 HC RX 250 WO HCPCS: Performed by: INTERNAL MEDICINE

## 2023-05-22 PROCEDURE — 6360000002 HC RX W HCPCS: Performed by: INTERNAL MEDICINE

## 2023-05-22 PROCEDURE — 83540 ASSAY OF IRON: CPT

## 2023-05-22 PROCEDURE — 85025 COMPLETE CBC W/AUTO DIFF WBC: CPT

## 2023-05-22 PROCEDURE — 80053 COMPREHEN METABOLIC PANEL: CPT

## 2023-05-22 PROCEDURE — 82962 GLUCOSE BLOOD TEST: CPT

## 2023-05-22 PROCEDURE — 80048 BASIC METABOLIC PNL TOTAL CA: CPT

## 2023-05-22 PROCEDURE — 99233 SBSQ HOSP IP/OBS HIGH 50: CPT | Performed by: INTERNAL MEDICINE

## 2023-05-22 PROCEDURE — 85730 THROMBOPLASTIN TIME PARTIAL: CPT

## 2023-05-22 PROCEDURE — 2580000003 HC RX 258: Performed by: INTERNAL MEDICINE

## 2023-05-22 PROCEDURE — 99232 SBSQ HOSP IP/OBS MODERATE 35: CPT | Performed by: INTERNAL MEDICINE

## 2023-05-22 PROCEDURE — 82728 ASSAY OF FERRITIN: CPT

## 2023-05-22 RX ORDER — LISINOPRIL 10 MG/1
10 TABLET ORAL DAILY
Status: DISCONTINUED | OUTPATIENT
Start: 2023-05-22 | End: 2023-05-23 | Stop reason: HOSPADM

## 2023-05-22 RX ORDER — HEPARIN SODIUM 10000 [USP'U]/ML
5000 INJECTION, SOLUTION INTRAVENOUS; SUBCUTANEOUS EVERY 8 HOURS SCHEDULED
Status: DISCONTINUED | OUTPATIENT
Start: 2023-05-22 | End: 2023-05-23 | Stop reason: HOSPADM

## 2023-05-22 RX ORDER — CLONIDINE HYDROCHLORIDE 0.1 MG/1
0.1 TABLET ORAL 3 TIMES DAILY
Status: DISCONTINUED | OUTPATIENT
Start: 2023-05-22 | End: 2023-05-22

## 2023-05-22 RX ORDER — TETRAHYDROZOLINE HCL 0.05 %
1 DROPS OPHTHALMIC (EYE) 2 TIMES DAILY
Status: DISCONTINUED | OUTPATIENT
Start: 2023-05-22 | End: 2023-05-23 | Stop reason: HOSPADM

## 2023-05-22 RX ORDER — ENALAPRILAT 2.5 MG/2ML
1.25 INJECTION INTRAVENOUS EVERY 6 HOURS PRN
Status: DISCONTINUED | OUTPATIENT
Start: 2023-05-22 | End: 2023-05-23 | Stop reason: HOSPADM

## 2023-05-22 RX ORDER — LABETALOL HYDROCHLORIDE 5 MG/ML
20 INJECTION, SOLUTION INTRAVENOUS
Status: DISCONTINUED | OUTPATIENT
Start: 2023-05-22 | End: 2023-05-23 | Stop reason: HOSPADM

## 2023-05-22 RX ORDER — CLONIDINE HYDROCHLORIDE 0.2 MG/1
0.2 TABLET ORAL 3 TIMES DAILY
Status: DISCONTINUED | OUTPATIENT
Start: 2023-05-22 | End: 2023-05-22

## 2023-05-22 RX ADMIN — SODIUM ZIRCONIUM CYCLOSILICATE 10 G: 10 POWDER, FOR SUSPENSION ORAL at 02:05

## 2023-05-22 RX ADMIN — SODIUM CHLORIDE, PRESERVATIVE FREE 10 ML: 5 INJECTION INTRAVENOUS at 10:27

## 2023-05-22 RX ADMIN — SODIUM CHLORIDE, PRESERVATIVE FREE 10 ML: 5 INJECTION INTRAVENOUS at 20:23

## 2023-05-22 RX ADMIN — LABETALOL HYDROCHLORIDE 20 MG: 5 INJECTION INTRAVENOUS at 22:33

## 2023-05-22 RX ADMIN — BUPRENORPHINE AND NALOXONE 1 TABLET: 8; 2 TABLET SUBLINGUAL at 02:05

## 2023-05-22 RX ADMIN — CLONIDINE HYDROCHLORIDE 0.1 MG: 0.1 TABLET ORAL at 10:23

## 2023-05-22 RX ADMIN — BUSPIRONE HYDROCHLORIDE 10 MG: 5 TABLET ORAL at 10:21

## 2023-05-22 RX ADMIN — SODIUM BICARBONATE: 84 INJECTION, SOLUTION INTRAVENOUS at 05:34

## 2023-05-22 RX ADMIN — Medication 1 DROP: at 20:23

## 2023-05-22 RX ADMIN — ENALAPRILAT 1.25 MG: 1.25 INJECTION INTRAVENOUS at 23:06

## 2023-05-22 RX ADMIN — CLONIDINE HYDROCHLORIDE 0.3 MG: 0.2 TABLET ORAL at 20:22

## 2023-05-22 RX ADMIN — BUPRENORPHINE AND NALOXONE 1 TABLET: 8; 2 TABLET SUBLINGUAL at 17:49

## 2023-05-22 RX ADMIN — GABAPENTIN 300 MG: 300 CAPSULE ORAL at 10:26

## 2023-05-22 RX ADMIN — METOPROLOL SUCCINATE 50 MG: 50 TABLET, EXTENDED RELEASE ORAL at 10:22

## 2023-05-22 RX ADMIN — Medication 16 G: at 18:34

## 2023-05-22 RX ADMIN — LABETALOL HYDROCHLORIDE 10 MG: 5 INJECTION INTRAVENOUS at 00:02

## 2023-05-22 RX ADMIN — LISINOPRIL 10 MG: 10 TABLET ORAL at 14:35

## 2023-05-22 RX ADMIN — METOPROLOL SUCCINATE 50 MG: 50 TABLET, EXTENDED RELEASE ORAL at 20:22

## 2023-05-22 RX ADMIN — GABAPENTIN 300 MG: 300 CAPSULE ORAL at 20:22

## 2023-05-22 RX ADMIN — LABETALOL HYDROCHLORIDE 10 MG: 5 INJECTION INTRAVENOUS at 11:21

## 2023-05-22 RX ADMIN — PANTOPRAZOLE SODIUM 40 MG: 40 TABLET, DELAYED RELEASE ORAL at 06:28

## 2023-05-22 RX ADMIN — HYDRALAZINE HYDROCHLORIDE 10 MG: 20 INJECTION INTRAMUSCULAR; INTRAVENOUS at 01:19

## 2023-05-22 RX ADMIN — ONDANSETRON 4 MG: 2 INJECTION INTRAMUSCULAR; INTRAVENOUS at 04:09

## 2023-05-22 RX ADMIN — INSULIN GLARGINE-YFGN 10 UNITS: 100 INJECTION, SOLUTION SUBCUTANEOUS at 10:27

## 2023-05-22 RX ADMIN — CLONIDINE HYDROCHLORIDE 0.1 MG: 0.1 TABLET ORAL at 14:35

## 2023-05-22 RX ADMIN — AMLODIPINE BESYLATE 5 MG: 5 TABLET ORAL at 10:23

## 2023-05-22 RX ADMIN — ATORVASTATIN CALCIUM 40 MG: 40 TABLET, FILM COATED ORAL at 10:24

## 2023-05-22 RX ADMIN — ASPIRIN 81 MG: 81 TABLET, COATED ORAL at 10:23

## 2023-05-22 RX ADMIN — BUSPIRONE HYDROCHLORIDE 10 MG: 5 TABLET ORAL at 20:22

## 2023-05-22 NOTE — PROGRESS NOTES
Patient provided educational material, \"Diabetes Survival Packet\". Patient states the following:    [x]  Drinks Mainly Gatorade and some water  [x]  Eats 3 meals a day no set times, wears pump and knows how to cover meals. States CHO: Ration is 1:15. [x]  Is not currently active  [x]  Wears a CGM with pump. Patient awake and answered questions. States he does not have any problems managing diabetes and pump is working appropriately. No concerns with getting enough medications or supplies for pump. Mom has pump in her possession. States he is followed by Dr Slava Odonnell and does not need any education at this time.     Electronically signed by Lorena Kitchen RD, LD on 5/22/2023 at 12:14 PM

## 2023-05-22 NOTE — PLAN OF CARE
Problem: Safety - Adult  Goal: Free from fall injury  5/21/2023 2101 by Joan Monge RN  Outcome: Progressing     Problem: Pain  Goal: Verbalizes/displays adequate comfort level or baseline comfort level  5/21/2023 2101 by Joan Monge RN  Outcome: Progressing     Problem: Chronic Conditions and Co-morbidities  Goal: Patient's chronic conditions and co-morbidity symptoms are monitored and maintained or improved  5/21/2023 2101 by Joan Monge RN  Outcome: Progressing

## 2023-05-22 NOTE — PROGRESS NOTES
ENDOCRINOLOGY PROGRESS NOTE      Date of admission: 5/19/2023  Date of service: 5/22/2023  Admitting physician: Kevin Morris DO   Primary Care Physician: Yamile Kingston DO  Consultant physician: Margean Nageotte MD     Reason for the consultation:  Uncontrolled DM type 1     History of Present Illness: The history is provided by the patient. Accuracy of the patient data is excellent    Nel Horan is a very pleasant 28 y.o. old male with PMH of DM type 1 on insulin pump, HTN, HLD, opiate dependence admitted to [facility] on 5/19/2023 because overdose by snorting OxyContin and also found to be in DKA, endocrine service was consulted for diabetes management. Subjective  Pt was seen and examined. No acute events.  BG at goal.    Lab Results   Component Value Date/Time    LABA1C 7.6 05/20/2023 11:28 AM       Inpatient diet:   Carb Restricted diet     Point of care glucose monitoring   (Independently reviewed)   Recent Labs     05/21/23  0420 05/21/23  0524 05/21/23  0630 05/21/23  0835 05/21/23  1342 05/21/23  1907 05/21/23  2152 05/22/23  0402   GLUMET 117* 109* 139* 153* 113* 104* 138* 155*         Scheduled Meds:   insulin glargine  10 Units SubCUTAneous QAM    insulin lispro  0-6 Units SubCUTAneous TID WC    insulin lispro  0-4 Units SubCUTAneous Nightly    [Held by provider] insulin lispro  2 Units SubCUTAneous TID WC    buprenorphine-naloxone  1 tablet SubLINGual Q12H    amLODIPine  5 mg Oral Daily    aspirin EC  81 mg Oral Daily    atorvastatin  80 mg Oral Daily    busPIRone  10 mg Oral BID    cloNIDine  0.1 mg Oral BID    gabapentin  300 mg Oral BID    metoprolol succinate  50 mg Oral BID    sodium chloride flush  10 mL IntraVENous 2 times per day    pantoprazole  40 mg Oral QAM AC    enoxaparin  30 mg SubCUTAneous Daily       PRN Meds:   labetalol, 10 mg, Q4H PRN  hydrALAZINE, 10 mg, Q4H PRN  dextrose, 25 g, PRN  sodium chloride flush, 10 mL, PRN  sodium chloride, , PRN  promethazine, 12.5 mg, Q6H PRN

## 2023-05-22 NOTE — PROGRESS NOTES
I discussed this case with Dr. Dottie Baker, as indicated yesterday psychiatry would follow up today with a dispo. At this time psychiatry will not be reversing the pink slip. Once patient is medically cleared he will need admitted to [address]. psychiatric unit for further psychiatric assessment stabilization and treatment. Please be advised to not allow this patient to leave [facility] as he has been pink slipped will require inpatient psychiatric hospitalization prior to his discharge.

## 2023-05-22 NOTE — PROGRESS NOTES
[facility] MD Boni Ash MD Colin Edison, MD Arnie Pierre, CNP   Sheryl Morgan, KORI Bach, ASTER  Progress Note    5/22/2023    SUBJECTIVE:   5/22: Seen in the ICU sitting at the edge of the bed. No acute distress. He is on room air. Appetite is good. He denies dyspnea, chest pain, or palpitations. Insulin drip was discontinued. PROBLEM LIST:    Principal Problem:    Drug overdose of undetermined intent, initial encounter  Resolved Problems:    * No resolved hospital problems. *         DIET:    ADULT DIET; Easy to Chew; 3 carb choices (45 gm/meal);  Low Fat/Low Chol/High Fiber/MADDY     MEDS (scheduled):    insulin glargine  10 Units SubCUTAneous QAM    insulin lispro  0-6 Units SubCUTAneous TID WC    insulin lispro  0-4 Units SubCUTAneous Nightly    [Held by provider] insulin lispro  2 Units SubCUTAneous TID WC    buprenorphine-naloxone  1 tablet SubLINGual Q12H    amLODIPine  5 mg Oral Daily    aspirin EC  81 mg Oral Daily    atorvastatin  80 mg Oral Daily    busPIRone  10 mg Oral BID    cloNIDine  0.1 mg Oral BID    gabapentin  300 mg Oral BID    metoprolol succinate  50 mg Oral BID    sodium chloride flush  10 mL IntraVENous 2 times per day    pantoprazole  40 mg Oral QAM AC    enoxaparin  30 mg SubCUTAneous Daily       MEDS (infusions):   sodium chloride      dextrose      dextrose      dextrose 5% and 0.45% NaCl with KCl 20 mEq Stopped (05/21/23 0930)    insulin Stopped (05/21/23 0030)       MEDS (prn):  labetalol, hydrALAZINE, dextrose, sodium chloride flush, sodium chloride, promethazine **OR** ondansetron, polyethylene glycol, acetaminophen **OR** acetaminophen, dextrose, glucose, glucagon (rDNA), dextrose, dextrose bolus **OR** dextrose bolus, potassium chloride, magnesium sulfate, sodium phosphate IVPB **OR** sodium phosphate IVPB **OR** sodium phosphate IVPB, dextrose 5% and 0.45% NaCl with KCl 20 mEq, metoclopramide    PHYSICAL EXAM:

## 2023-05-22 NOTE — PLAN OF CARE
Problem: Safety - Adult  Goal: Free from fall injury  Outcome: Progressing     Problem: Pain  Goal: Verbalizes/displays adequate comfort level or baseline comfort level  Outcome: Progressing     Problem: Chronic Conditions and Co-morbidities  Goal: Patient's chronic conditions and co-morbidity symptoms are monitored and maintained or improved  Outcome: Progressing  Flowsheets (Taken 5/22/2023 0900)  Care Plan - Patient's Chronic Conditions and Co-Morbidity Symptoms are Monitored and Maintained or Improved:   Monitor and assess patient's chronic conditions and comorbid symptoms for stability, deterioration, or improvement   Collaborate with multidisciplinary team to address chronic and comorbid conditions and prevent exacerbation or deterioration   Update acute care plan with appropriate goals if chronic or comorbid symptoms are exacerbated and prevent overall improvement and discharge     Problem: Discharge Planning  Goal: Discharge to home or other facility with appropriate resources  Outcome: Progressing  Flowsheets (Taken 5/22/2023 0900)  Discharge to home or other facility with appropriate resources:   Identify barriers to discharge with patient and caregiver   Arrange for needed discharge resources and transportation as appropriate   Identify discharge learning needs (meds, wound care, etc)   Refer to discharge planning if patient needs post-hospital services based on physician order or complex needs related to functional status, cognitive ability or social support system

## 2023-05-22 NOTE — PROGRESS NOTES
Hospitalist Progress Note      Synopsis: Patient admitted on 5/19/2023 fentanyl overdose requiring Narcan. On evaluation on 5/20/23 AM patient was noted to have N/V. On further review was noted to have ketones in UA and an anion gap of 23 with elevated blood glucose of ~440 discussed with Critical care attending who agreed that patient seemed to be in DKA. Transferred to MICU for DKA management with insulin drip. Anion gap closed this AM. ALEJANDRO noted to be worsening so Nephrology consulted. Of note, patients mother was concerned patient is having SI, psych consulted. Psychiatry upheld pink slip. Subjective  Seen and examined this AM. No acute events overnight. Clinically improving. Feeling better. No chest pain or shortness of breath  Patient with with flat affect. Exam:  BP (!) 163/100   Pulse 73   Temp 97 °F (36.1 °C) (Temporal)   Resp 16   Ht 5' 3\" (1.6 m)   Wt 107 lb 6.4 oz (48.7 kg)   SpO2 98%   BMI 19.03 kg/m²   General appearance: No apparent distress, appears stated age and cooperative. Flat affect. HEENT: Pupils equal, round, and reactive to light. Conjunctivae/corneas clear. Neck: Supple. No jugular venous distention. Trachea midline. Respiratory:  Normal respiratory effort. Clear to auscultation, bilaterally without Rales/Wheezes/Rhonchi. Cardiovascular: Regular rate and rhythm with normal S1/S2 without murmurs, rubs or gallops. Abdomen: Soft, non-tender, non-distended with normal bowel sounds. Musculoskeletal: No clubbing, cyanosis or edema bilaterally. Brisk capillary refill. 2+ lower extremity pulses (dorsalis pedis).    Skin:  No rashes    Neurologic: awake, alert and following commands     Medications:  Reviewed    Infusion Medications    sodium chloride      dextrose      dextrose      dextrose 5% and 0.45% NaCl with KCl 20 mEq Stopped (05/21/23 0930)    insulin Stopped (05/21/23 0030)     Scheduled Medications    heparin (porcine)  5,000 Units SubCUTAneous 3 times per day

## 2023-05-22 NOTE — TELEPHONE ENCOUNTER
Medication Refill Request    LOV 2/6/2023  NOV Visit date not found    Lab Results   Component Value Date    CREATININE 2.0 (H) 05/22/2023

## 2023-05-23 ENCOUNTER — HOSPITAL ENCOUNTER (INPATIENT)
Age: 33
LOS: 1 days | Discharge: HOME | DRG: 817 | End: 2023-05-24
Attending: PSYCHIATRY & NEUROLOGY | Admitting: PSYCHIATRY & NEUROLOGY
Payer: COMMERCIAL

## 2023-05-23 VITALS
SYSTOLIC BLOOD PRESSURE: 119 MMHG | HEIGHT: 63 IN | OXYGEN SATURATION: 98 % | RESPIRATION RATE: 16 BRPM | BODY MASS INDEX: 19.03 KG/M2 | HEART RATE: 78 BPM | WEIGHT: 107.4 LBS | DIASTOLIC BLOOD PRESSURE: 80 MMHG | TEMPERATURE: 97.6 F

## 2023-05-23 DIAGNOSIS — I10 ESSENTIAL HYPERTENSION: ICD-10-CM

## 2023-05-23 DIAGNOSIS — E10.65 UNCONTROLLED TYPE 1 DIABETES MELLITUS WITH HYPERGLYCEMIA (HCC): ICD-10-CM

## 2023-05-23 PROBLEM — T14.91XA SUICIDE ATTEMPT (HCC): Status: ACTIVE | Noted: 2023-05-23

## 2023-05-23 PROBLEM — F33.2 SEVERE EPISODE OF RECURRENT MAJOR DEPRESSIVE DISORDER, WITHOUT PSYCHOTIC FEATURES (HCC): Status: ACTIVE | Noted: 2023-05-23

## 2023-05-23 PROBLEM — T40.411A ACCIDENTAL FENTANYL OVERDOSE (HCC): Status: ACTIVE | Noted: 2023-05-23

## 2023-05-23 LAB
ANION GAP SERPL CALCULATED.3IONS-SCNC: 9 MMOL/L (ref 7–16)
BASOPHILS # BLD: 0.08 E9/L (ref 0–0.2)
BASOPHILS NFR BLD: 1 % (ref 0–2)
BUN SERPL-MCNC: 23 MG/DL (ref 6–20)
CALCIUM SERPL-MCNC: 8.5 MG/DL (ref 8.6–10.2)
CHLORIDE SERPL-SCNC: 107 MMOL/L (ref 98–107)
CO2 SERPL-SCNC: 29 MMOL/L (ref 22–29)
CREAT SERPL-MCNC: 2.1 MG/DL (ref 0.7–1.2)
EOSINOPHIL # BLD: 0.42 E9/L (ref 0.05–0.5)
EOSINOPHIL NFR BLD: 5.3 % (ref 0–6)
ERYTHROCYTE [DISTWIDTH] IN BLOOD BY AUTOMATED COUNT: 13.6 FL (ref 11.5–15)
GLUCOSE SERPL-MCNC: 72 MG/DL (ref 74–99)
HCT VFR BLD AUTO: 33.7 % (ref 37–54)
HGB BLD-MCNC: 10.6 G/DL (ref 12.5–16.5)
IMM GRANULOCYTES # BLD: 0.02 E9/L
IMM GRANULOCYTES NFR BLD: 0.3 % (ref 0–5)
LYMPHOCYTES # BLD: 2.41 E9/L (ref 1.5–4)
LYMPHOCYTES NFR BLD: 30.3 % (ref 20–42)
MCH RBC QN AUTO: 27.3 PG (ref 26–35)
MCHC RBC AUTO-ENTMCNC: 31.5 % (ref 32–34.5)
MCV RBC AUTO: 86.9 FL (ref 80–99.9)
METER GLUCOSE: 125 MG/DL (ref 74–99)
METER GLUCOSE: 130 MG/DL (ref 74–99)
METER GLUCOSE: 136 MG/DL (ref 74–99)
METER GLUCOSE: 181 MG/DL (ref 74–99)
METER GLUCOSE: 195 MG/DL (ref 74–99)
METER GLUCOSE: 62 MG/DL (ref 74–99)
MONOCYTES # BLD: 0.49 E9/L (ref 0.1–0.95)
MONOCYTES NFR BLD: 6.2 % (ref 2–12)
NEUTROPHILS # BLD: 4.54 E9/L (ref 1.8–7.3)
NEUTS SEG NFR BLD: 56.9 % (ref 43–80)
PLATELET # BLD AUTO: 258 E9/L (ref 130–450)
PMV BLD AUTO: 10.6 FL (ref 7–12)
POTASSIUM SERPL-SCNC: 4.4 MMOL/L (ref 3.5–5)
RBC # BLD AUTO: 3.88 E12/L (ref 3.8–5.8)
SODIUM SERPL-SCNC: 145 MMOL/L (ref 132–146)
WBC # BLD: 8 E9/L (ref 4.5–11.5)

## 2023-05-23 PROCEDURE — 6370000000 HC RX 637 (ALT 250 FOR IP): Performed by: INTERNAL MEDICINE

## 2023-05-23 PROCEDURE — 80048 BASIC METABOLIC PNL TOTAL CA: CPT

## 2023-05-23 PROCEDURE — 99233 SBSQ HOSP IP/OBS HIGH 50: CPT | Performed by: INTERNAL MEDICINE

## 2023-05-23 PROCEDURE — 6360000002 HC RX W HCPCS: Performed by: FAMILY MEDICINE

## 2023-05-23 PROCEDURE — 1240000000 HC EMOTIONAL WELLNESS R&B

## 2023-05-23 PROCEDURE — 82962 GLUCOSE BLOOD TEST: CPT

## 2023-05-23 PROCEDURE — 6370000000 HC RX 637 (ALT 250 FOR IP): Performed by: FAMILY MEDICINE

## 2023-05-23 PROCEDURE — 6360000002 HC RX W HCPCS: Performed by: INTERNAL MEDICINE

## 2023-05-23 PROCEDURE — 36415 COLL VENOUS BLD VENIPUNCTURE: CPT

## 2023-05-23 PROCEDURE — 6370000000 HC RX 637 (ALT 250 FOR IP): Performed by: PSYCHIATRY & NEUROLOGY

## 2023-05-23 PROCEDURE — 2580000003 HC RX 258: Performed by: FAMILY MEDICINE

## 2023-05-23 PROCEDURE — 2500000003 HC RX 250 WO HCPCS: Performed by: INTERNAL MEDICINE

## 2023-05-23 PROCEDURE — 6370000000 HC RX 637 (ALT 250 FOR IP): Performed by: NURSE PRACTITIONER

## 2023-05-23 PROCEDURE — 85025 COMPLETE CBC W/AUTO DIFF WBC: CPT

## 2023-05-23 PROCEDURE — 6360000002 HC RX W HCPCS: Performed by: NURSE PRACTITIONER

## 2023-05-23 RX ORDER — GABAPENTIN 300 MG/1
300 CAPSULE ORAL 2 TIMES DAILY
Status: CANCELLED | OUTPATIENT
Start: 2023-05-23

## 2023-05-23 RX ORDER — GABAPENTIN 300 MG/1
300 CAPSULE ORAL 2 TIMES DAILY
Status: DISCONTINUED | OUTPATIENT
Start: 2023-05-23 | End: 2023-05-24 | Stop reason: HOSPADM

## 2023-05-23 RX ORDER — INSULIN GLARGINE-YFGN 100 [IU]/ML
10 INJECTION, SOLUTION SUBCUTANEOUS EVERY MORNING
Status: DISCONTINUED | OUTPATIENT
Start: 2023-05-24 | End: 2023-05-24 | Stop reason: HOSPADM

## 2023-05-23 RX ORDER — INSULIN GLARGINE-YFGN 100 [IU]/ML
10 INJECTION, SOLUTION SUBCUTANEOUS EVERY MORNING
Status: CANCELLED | OUTPATIENT
Start: 2023-05-23

## 2023-05-23 RX ORDER — PANTOPRAZOLE SODIUM 40 MG/1
40 TABLET, DELAYED RELEASE ORAL
Status: CANCELLED | OUTPATIENT
Start: 2023-05-24

## 2023-05-23 RX ORDER — METOPROLOL SUCCINATE 25 MG/1
50 TABLET, EXTENDED RELEASE ORAL 2 TIMES DAILY
Status: DISCONTINUED | OUTPATIENT
Start: 2023-05-23 | End: 2023-05-24 | Stop reason: HOSPADM

## 2023-05-23 RX ORDER — HYDROXYZINE PAMOATE 25 MG/1
50 CAPSULE ORAL 3 TIMES DAILY PRN
Status: DISCONTINUED | OUTPATIENT
Start: 2023-05-23 | End: 2023-05-24 | Stop reason: HOSPADM

## 2023-05-23 RX ORDER — DEXTROSE MONOHYDRATE 25 G/50ML
25 INJECTION, SOLUTION INTRAVENOUS PRN
Status: DISCONTINUED | OUTPATIENT
Start: 2023-05-23 | End: 2023-05-24 | Stop reason: HOSPADM

## 2023-05-23 RX ORDER — DEXTROSE MONOHYDRATE 100 MG/ML
INJECTION, SOLUTION INTRAVENOUS CONTINUOUS PRN
Status: CANCELLED | OUTPATIENT
Start: 2023-05-23

## 2023-05-23 RX ORDER — ASPIRIN 81 MG/1
81 TABLET ORAL DAILY
Status: CANCELLED | OUTPATIENT
Start: 2023-05-23

## 2023-05-23 RX ORDER — HALOPERIDOL 5 MG/ML
5 INJECTION INTRAMUSCULAR EVERY 6 HOURS PRN
Status: DISCONTINUED | OUTPATIENT
Start: 2023-05-23 | End: 2023-05-24 | Stop reason: HOSPADM

## 2023-05-23 RX ORDER — CLONIDINE HYDROCHLORIDE 0.1 MG/1
0.1 TABLET ORAL 3 TIMES DAILY
Status: DISCONTINUED | OUTPATIENT
Start: 2023-05-23 | End: 2023-05-23 | Stop reason: HOSPADM

## 2023-05-23 RX ORDER — PANTOPRAZOLE SODIUM 40 MG/1
40 TABLET, DELAYED RELEASE ORAL
Status: DISCONTINUED | OUTPATIENT
Start: 2023-05-24 | End: 2023-05-24 | Stop reason: HOSPADM

## 2023-05-23 RX ORDER — POLYETHYLENE GLYCOL 3350 17 G/17G
17 POWDER, FOR SOLUTION ORAL DAILY PRN
Status: CANCELLED | OUTPATIENT
Start: 2023-05-23

## 2023-05-23 RX ORDER — LORAZEPAM 2 MG/ML
1 INJECTION INTRAMUSCULAR EVERY 6 HOURS PRN
Status: DISCONTINUED | OUTPATIENT
Start: 2023-05-23 | End: 2023-05-24 | Stop reason: HOSPADM

## 2023-05-23 RX ORDER — ONDANSETRON 2 MG/ML
4 INJECTION INTRAMUSCULAR; INTRAVENOUS EVERY 6 HOURS PRN
Status: CANCELLED | OUTPATIENT
Start: 2023-05-23

## 2023-05-23 RX ORDER — BUSPIRONE HYDROCHLORIDE 10 MG/1
10 TABLET ORAL 2 TIMES DAILY
Status: DISCONTINUED | OUTPATIENT
Start: 2023-05-23 | End: 2023-05-23

## 2023-05-23 RX ORDER — ACETAMINOPHEN 325 MG/1
650 TABLET ORAL EVERY 6 HOURS PRN
Status: DISCONTINUED | OUTPATIENT
Start: 2023-05-23 | End: 2023-05-23 | Stop reason: SDUPTHER

## 2023-05-23 RX ORDER — PROMETHAZINE HYDROCHLORIDE 12.5 MG/1
12.5 TABLET ORAL EVERY 6 HOURS PRN
Status: CANCELLED | OUTPATIENT
Start: 2023-05-23

## 2023-05-23 RX ORDER — DEXTROSE MONOHYDRATE 100 MG/ML
INJECTION, SOLUTION INTRAVENOUS CONTINUOUS PRN
Status: DISCONTINUED | OUTPATIENT
Start: 2023-05-23 | End: 2023-05-24 | Stop reason: HOSPADM

## 2023-05-23 RX ORDER — BUSPIRONE HYDROCHLORIDE 5 MG/1
10 TABLET ORAL 2 TIMES DAILY
Status: CANCELLED | OUTPATIENT
Start: 2023-05-23

## 2023-05-23 RX ORDER — METOPROLOL SUCCINATE 50 MG/1
50 TABLET, EXTENDED RELEASE ORAL 2 TIMES DAILY
Status: CANCELLED | OUTPATIENT
Start: 2023-05-23

## 2023-05-23 RX ORDER — DIPHENHYDRAMINE HYDROCHLORIDE 50 MG/ML
50 INJECTION INTRAMUSCULAR; INTRAVENOUS EVERY 6 HOURS PRN
Status: DISCONTINUED | OUTPATIENT
Start: 2023-05-23 | End: 2023-05-24 | Stop reason: HOSPADM

## 2023-05-23 RX ORDER — POLYETHYLENE GLYCOL 3350 17 G/17G
17 POWDER, FOR SOLUTION ORAL DAILY PRN
Status: DISCONTINUED | OUTPATIENT
Start: 2023-05-23 | End: 2023-05-24 | Stop reason: HOSPADM

## 2023-05-23 RX ORDER — BUPRENORPHINE HYDROCHLORIDE AND NALOXONE HYDROCHLORIDE DIHYDRATE 8; 2 MG/1; MG/1
0.5 TABLET SUBLINGUAL EVERY 12 HOURS SCHEDULED
Status: DISCONTINUED | OUTPATIENT
Start: 2023-05-23 | End: 2023-05-24 | Stop reason: HOSPADM

## 2023-05-23 RX ORDER — CITALOPRAM 20 MG/1
10 TABLET ORAL DAILY
Status: DISCONTINUED | OUTPATIENT
Start: 2023-05-23 | End: 2023-05-23

## 2023-05-23 RX ORDER — TETRAHYDROZOLINE HCL 0.05 %
1 DROPS OPHTHALMIC (EYE) 2 TIMES DAILY
Status: CANCELLED | OUTPATIENT
Start: 2023-05-23

## 2023-05-23 RX ORDER — LISINOPRIL 10 MG/1
10 TABLET ORAL DAILY
Status: CANCELLED | OUTPATIENT
Start: 2023-05-23

## 2023-05-23 RX ORDER — ONDANSETRON 2 MG/ML
4 INJECTION INTRAMUSCULAR; INTRAVENOUS EVERY 6 HOURS PRN
Status: DISCONTINUED | OUTPATIENT
Start: 2023-05-23 | End: 2023-05-24 | Stop reason: HOSPADM

## 2023-05-23 RX ORDER — ACETAMINOPHEN 650 MG/1
650 SUPPOSITORY RECTAL EVERY 6 HOURS PRN
Status: CANCELLED | OUTPATIENT
Start: 2023-05-23

## 2023-05-23 RX ORDER — HALOPERIDOL 5 MG/1
5 TABLET ORAL EVERY 6 HOURS PRN
Status: DISCONTINUED | OUTPATIENT
Start: 2023-05-23 | End: 2023-05-23

## 2023-05-23 RX ORDER — INSULIN LISPRO 100 [IU]/ML
0-6 INJECTION, SOLUTION INTRAVENOUS; SUBCUTANEOUS
Status: CANCELLED | OUTPATIENT
Start: 2023-05-23

## 2023-05-23 RX ORDER — TETRAHYDROZOLINE HCL 0.05 %
1 DROPS OPHTHALMIC (EYE) 2 TIMES DAILY
Status: DISCONTINUED | OUTPATIENT
Start: 2023-05-23 | End: 2023-05-24 | Stop reason: HOSPADM

## 2023-05-23 RX ORDER — BUPRENORPHINE HYDROCHLORIDE AND NALOXONE HYDROCHLORIDE DIHYDRATE 8; 2 MG/1; MG/1
1 TABLET SUBLINGUAL EVERY 12 HOURS
Status: CANCELLED | OUTPATIENT
Start: 2023-05-23

## 2023-05-23 RX ORDER — ATORVASTATIN CALCIUM 40 MG/1
80 TABLET, FILM COATED ORAL DAILY
Status: CANCELLED | OUTPATIENT
Start: 2023-05-23

## 2023-05-23 RX ORDER — MAGNESIUM HYDROXIDE/ALUMINUM HYDROXICE/SIMETHICONE 120; 1200; 1200 MG/30ML; MG/30ML; MG/30ML
30 SUSPENSION ORAL PRN
Status: DISCONTINUED | OUTPATIENT
Start: 2023-05-23 | End: 2023-05-24 | Stop reason: HOSPADM

## 2023-05-23 RX ORDER — INSULIN LISPRO 100 [IU]/ML
0-4 INJECTION, SOLUTION INTRAVENOUS; SUBCUTANEOUS NIGHTLY
Status: DISCONTINUED | OUTPATIENT
Start: 2023-05-23 | End: 2023-05-24 | Stop reason: HOSPADM

## 2023-05-23 RX ORDER — BUPRENORPHINE HYDROCHLORIDE AND NALOXONE HYDROCHLORIDE DIHYDRATE 8; 2 MG/1; MG/1
1 TABLET SUBLINGUAL EVERY 12 HOURS
Status: DISCONTINUED | OUTPATIENT
Start: 2023-05-23 | End: 2023-05-23

## 2023-05-23 RX ORDER — PROMETHAZINE HYDROCHLORIDE 25 MG/1
12.5 TABLET ORAL EVERY 6 HOURS PRN
Status: DISCONTINUED | OUTPATIENT
Start: 2023-05-23 | End: 2023-05-24 | Stop reason: HOSPADM

## 2023-05-23 RX ORDER — ATORVASTATIN CALCIUM 40 MG/1
80 TABLET, FILM COATED ORAL DAILY
Status: DISCONTINUED | OUTPATIENT
Start: 2023-05-24 | End: 2023-05-24 | Stop reason: HOSPADM

## 2023-05-23 RX ORDER — DEXTROSE MONOHYDRATE 25 G/50ML
25 INJECTION, SOLUTION INTRAVENOUS PRN
Status: CANCELLED | OUTPATIENT
Start: 2023-05-23

## 2023-05-23 RX ORDER — LANOLIN ALCOHOL/MO/W.PET/CERES
3 CREAM (GRAM) TOPICAL NIGHTLY
Status: DISCONTINUED | OUTPATIENT
Start: 2023-05-23 | End: 2023-05-24 | Stop reason: HOSPADM

## 2023-05-23 RX ORDER — ACETAMINOPHEN 325 MG/1
650 TABLET ORAL EVERY 6 HOURS PRN
Status: CANCELLED | OUTPATIENT
Start: 2023-05-23

## 2023-05-23 RX ORDER — NICOTINE 21 MG/24HR
1 PATCH, TRANSDERMAL 24 HOURS TRANSDERMAL DAILY
Status: DISCONTINUED | OUTPATIENT
Start: 2023-05-23 | End: 2023-05-24 | Stop reason: HOSPADM

## 2023-05-23 RX ORDER — INSULIN LISPRO 100 [IU]/ML
0-4 INJECTION, SOLUTION INTRAVENOUS; SUBCUTANEOUS NIGHTLY
Status: CANCELLED | OUTPATIENT
Start: 2023-05-23

## 2023-05-23 RX ORDER — INSULIN LISPRO 100 [IU]/ML
0-6 INJECTION, SOLUTION INTRAVENOUS; SUBCUTANEOUS
Status: DISCONTINUED | OUTPATIENT
Start: 2023-05-23 | End: 2023-05-24 | Stop reason: HOSPADM

## 2023-05-23 RX ORDER — ACETAMINOPHEN 650 MG/1
650 SUPPOSITORY RECTAL EVERY 6 HOURS PRN
Status: DISCONTINUED | OUTPATIENT
Start: 2023-05-23 | End: 2023-05-24 | Stop reason: HOSPADM

## 2023-05-23 RX ORDER — ACETAMINOPHEN 325 MG/1
650 TABLET ORAL EVERY 6 HOURS PRN
Status: DISCONTINUED | OUTPATIENT
Start: 2023-05-23 | End: 2023-05-24 | Stop reason: HOSPADM

## 2023-05-23 RX ORDER — AMLODIPINE BESYLATE 5 MG/1
5 TABLET ORAL DAILY
Status: CANCELLED | OUTPATIENT
Start: 2023-05-23

## 2023-05-23 RX ADMIN — CITALOPRAM HYDROBROMIDE 10 MG: 20 TABLET ORAL at 13:11

## 2023-05-23 RX ADMIN — ONDANSETRON 4 MG: 2 INJECTION INTRAMUSCULAR; INTRAVENOUS at 10:48

## 2023-05-23 RX ADMIN — TETRAHYDROZOLINE HCL 1 DROP: 0.05 SOLUTION/ DROPS OPHTHALMIC at 20:43

## 2023-05-23 RX ADMIN — ASPIRIN 81 MG: 81 TABLET, COATED ORAL at 08:32

## 2023-05-23 RX ADMIN — SERTRALINE 25 MG: 50 TABLET, FILM COATED ORAL at 15:07

## 2023-05-23 RX ADMIN — MELATONIN 3 MG ORAL TABLET 3 MG: 3 TABLET ORAL at 20:43

## 2023-05-23 RX ADMIN — HYDRALAZINE HYDROCHLORIDE 10 MG: 20 INJECTION INTRAMUSCULAR; INTRAVENOUS at 02:19

## 2023-05-23 RX ADMIN — BUPRENORPHINE AND NALOXONE 1 TABLET: 8; 2 TABLET SUBLINGUAL at 01:48

## 2023-05-23 RX ADMIN — PANTOPRAZOLE SODIUM 40 MG: 40 TABLET, DELAYED RELEASE ORAL at 06:02

## 2023-05-23 RX ADMIN — ATORVASTATIN CALCIUM 80 MG: 40 TABLET, FILM COATED ORAL at 08:31

## 2023-05-23 RX ADMIN — METOPROLOL SUCCINATE 50 MG: 50 TABLET, EXTENDED RELEASE ORAL at 08:32

## 2023-05-23 RX ADMIN — BUPRENORPHINE AND NALOXONE 0.5 TABLET: 8; 2 TABLET SUBLINGUAL at 14:57

## 2023-05-23 RX ADMIN — METOPROLOL SUCCINATE 50 MG: 25 TABLET, EXTENDED RELEASE ORAL at 20:43

## 2023-05-23 RX ADMIN — INSULIN LISPRO 1 UNITS: 100 INJECTION, SOLUTION INTRAVENOUS; SUBCUTANEOUS at 18:39

## 2023-05-23 RX ADMIN — GABAPENTIN 300 MG: 300 CAPSULE ORAL at 08:32

## 2023-05-23 RX ADMIN — BUSPIRONE HYDROCHLORIDE 10 MG: 5 TABLET ORAL at 08:32

## 2023-05-23 RX ADMIN — LABETALOL HYDROCHLORIDE 20 MG: 5 INJECTION INTRAVENOUS at 00:22

## 2023-05-23 RX ADMIN — Medication 1 DROP: at 08:35

## 2023-05-23 RX ADMIN — SODIUM CHLORIDE, PRESERVATIVE FREE 10 ML: 5 INJECTION INTRAVENOUS at 08:35

## 2023-05-23 RX ADMIN — AMLODIPINE BESYLATE 5 MG: 5 TABLET ORAL at 08:32

## 2023-05-23 RX ADMIN — GABAPENTIN 300 MG: 300 CAPSULE ORAL at 20:44

## 2023-05-23 RX ADMIN — INSULIN LISPRO 1 UNITS: 100 INJECTION, SOLUTION INTRAVENOUS; SUBCUTANEOUS at 11:10

## 2023-05-23 ASSESSMENT — SLEEP AND FATIGUE QUESTIONNAIRES
DO YOU USE A SLEEP AID: NO
DO YOU HAVE DIFFICULTY SLEEPING: NO
AVERAGE NUMBER OF SLEEP HOURS: 8

## 2023-05-23 ASSESSMENT — LIFESTYLE VARIABLES
HOW MANY STANDARD DRINKS CONTAINING ALCOHOL DO YOU HAVE ON A TYPICAL DAY: PATIENT DOES NOT DRINK
HOW OFTEN DO YOU HAVE A DRINK CONTAINING ALCOHOL: NEVER

## 2023-05-23 ASSESSMENT — PAIN SCALES - GENERAL: PAINLEVEL_OUTOF10: 0

## 2023-05-23 NOTE — PLAN OF CARE
Problem: Safety - Adult  Goal: Free from fall injury  5/23/2023 0804 by Anish Adames RN  Outcome: Adequate for Discharge  5/22/2023 2328 by Miya Baum RN  Outcome: Progressing     Problem: Pain  Goal: Verbalizes/displays adequate comfort level or baseline comfort level  5/23/2023 0804 by Anish Adames RN  Outcome: Adequate for Discharge  5/22/2023 2328 by Miya Baum RN  Outcome: Progressing     Problem: Chronic Conditions and Co-morbidities  Goal: Patient's chronic conditions and co-morbidity symptoms are monitored and maintained or improved  5/23/2023 0804 by Anish Adames RN  Outcome: Adequate for Discharge  5/22/2023 2328 by Miya Baum RN  Outcome: Progressing     Problem: Discharge Planning  Goal: Discharge to home or other facility with appropriate resources  Outcome: Adequate for Discharge

## 2023-05-23 NOTE — CARE COORDINATION
Care Cooordination: spoke to Eliseo at [facility] AN AFFILIATE OF [facility].  Aware of dc order, pink slip faxed to [phone]    Electronically signed by Cami Tello RN on 5/23/2023 at 9:05 AM

## 2023-05-23 NOTE — ED NOTES
Pt accepted to 7w per Jammie Lyons RN    Assigned [id number] OYVOIP    I called Kassandra to update - police can be called to escort to 7w in needed.       aKty Gonzalez, RENU  05/23/23 1045

## 2023-05-23 NOTE — PROGRESS NOTES
PULMONARY  CRITICAL CARE   SERVICE DAILY PROGRESS  NOTE     5/23/2023   Hospital  LOS: 3 days      Admit date- 5/19/2023       Initially admitted for -   Drug Overdose (PT MOTHER GAVE 8MG IN [facility]. PT SNORTED OXY. HX OF DM AND KIDNEY DISEASE . NORMALLY ON SUBOXONE BUT HAS NOT BEEN TAKING IT. )       Subjective:        Seen and examined in ICU. Patient is lying in his bed comfortably. Eager to leave the unit. Was hypoglycemic earlier, Lantus held  Fluctuating blood pressures, holding clonidine and lisinopril this morning. Review of Systems        General- No headaches , dizzinesss, syncope   Pulmonary - No chest pain , hemoptysis   Cardiovascular- No chest pain,   GI- No abd pain ,No difficulty swallowing, diarrhea , no vomiting   Musculoskeletal- No extremity weakness, no edema , no cyanosis   Genitourinary- No burning urination, no dysuria, no incontinence  Neuro- NO syncope , AMS  Or weakness , No tingling , no numbness. Skin- No rashes , no cyanosis. Psychiatric/Behavioral: Negative for confusion, hallucinations and sleep disturbance. The patient is not nervous/anxious. Allergies: Allergies   Allergen Reactions    Chlorthalidone      Vomiting     Hctz [Hydrochlorothiazide] Nausea Only    Hydralazine      Nausea, headache     Home Meds:  Prior to Admission medications    Medication Sig Start Date End Date Taking? Authorizing Provider   atorvastatin (LIPITOR) 80 MG tablet Take 1 tablet by mouth daily 5/19/23   Corrine Middleton PA-C   blood glucose test strips (EXACTECH TEST) strip 1 each by In Vitro route daily As needed. 4/4/23   Kali Harrison MD   insulin aspart (NOVOLOG) 100 UNIT/ML injection vial Use via insulin pump Max dose 50 units daily 2/19/23   Rocky Zhang MD   gabapentin (NEURONTIN) 300 MG capsule Take 1 capsule by mouth in the morning and at bedtime for 30 days.  2/26/23 3/28/23  Sania Corbin DO   aspirin EC 81 MG EC tablet Take 1 tablet by

## 2023-05-23 NOTE — BH NOTE
HX reviewed pt being followed by Psych. He is an active pt of Dr Fernando Palencia and prescribed last on 5/16/23 8mg/2mg BID and given a 4 week supply. This has been started and is part of current medications. He has been pink slipped and is to be transferred to in pt psych for observation and assessment. When cleared by psych for DC he can f/u with Dr Domenic Brooks or if some other TX approach is planned post DC f/u with that. I will discuss o/p f/u at [address] [place] [address] in [place] as possible approach as current [place] is failing apparently       REQUESTING PHYSICIAN:  House [facility]    CHIEF COMPLAINT: h/o drug addiction    HISTORY OF PRESENT ILLNESS:  29 y/o wm admitted with below hx. PCP: Jeff Zacarias DO     Date of Service: Pt seen/examined on 5/20/2023      Chief Complaint:  had concerns including Drug Overdose (PT MOTHER GAVE 8MG IN [facility]. PT SNORTED OXY. HX OF DM AND KIDNEY DISEASE . NORMALLY ON SUBOXONE BUT HAS NOT BEEN TAKING IT. ). History Of Present Illness:    Mr. Monserrat Dillon, a 28y.o. year old male  who  has a past medical history of Asthma, Bipolar affective ([address] COPD (chronic obstructive pulmonary disease) ([address] Diabetes mellitus type I ([address] Diabetic neuropathy ([address] Diabetic, retinopathy, proliferative ([address] HTN (hypertension), Hypercholesteremia, Left renal artery stenosis ([address] Macular edema due to secondary diabetes ([address] Opioid dependence ([address] Proteinuria, Retinal detachment, and Vitreous hemorrhage of right eye due to diabetes mellitus ([address] Monserrat Dillon is a 28 y.o. male with history of bipolar disease COPD history of diabetes history of retinopathy history of hypertension history of hypercholesterolemia history of retinal detachment presenting to the ED for overdose, beginning a short time ago. The complaint has been persistent, moderate in severity, and worsened by nothing. Patient with history of suspected overdose.   Patient reports he snorted

## 2023-05-23 NOTE — DISCHARGE SUMMARY
Discharge Summary    Admit date: 5/19/2023    Discharge date and time: No discharge date for patient encounter. Admitting Physician: Fior Giron DO     Consultants: End, crit care, nephro    Admission Diagnoses:  Overdose     Discharge Diagnoses AND Hospital Course:  DKA- endocrinology followed. Now transitioned to Sq. Follow up with endo outpatient. ALEJANDRO on CKD 3a- hemodynamic mediated due to OxyContin overdose, volume contraction insult in part due to DKA as well as 0.8 prior to presentation. With IV fluid resuscitation, azotemia is improving. Nephrology followed. Diabetic nephropathy, hypertension  Hyperkalemia- resolved  Opioid / OxyContin/ Fentanyl  overdose  HTN- clonidine, lisinopril, metoprolol. Consider chlorthalidone in future if remains elevated  HLD- statin  ? SI- patient has been pink slipped. Discharge to psych. Discharge Exam:  Vitals:    05/23/23 0700   BP: 138/85   Pulse: 78   Resp: 15   Temp:    SpO2:        General appearance: No apparent distress, appears stated age and cooperative. Flat affect. HEENT: Pupils equal, round, and reactive to light. Conjunctivae/corneas clear. Neck: Supple. No jugular venous distention. Trachea midline. Respiratory:  Normal respiratory effort. Clear to auscultation, bilaterally without Rales/Wheezes/Rhonchi. Cardiovascular: Regular rate and rhythm with normal S1/S2 without murmurs, rubs or gallops. Abdomen: Soft, non-tender, non-distended with normal bowel sounds. Musculoskeletal: No clubbing, cyanosis or edema bilaterally. Brisk capillary refill. 2+ lower extremity pulses (dorsalis pedis). Skin:  No rashes    Neurologic: awake, alert and following commands     Disposition: Inpatient psych  The patient's condition is fair. At this time the patient is without objective evidence of an acute process requiring continuing hospitalization or inpatient management. They are stable for discharge with outpatient follow-up.      I have spoken with the

## 2023-05-23 NOTE — ED NOTES
HAYLEY aware that pt needs admission     rAias Gallagher reports that medical clearance is noted in nursing communication     Requested pink slip - received     It is documented that pt would need to be reviewed for 7w    I called and spoke to pt's RN Kassandra CALLAWAY 0320    Awaiting call back from RN on 7w (I spoke to [facility])               Jaqueline Morales, RENU  05/23/23 0863

## 2023-05-23 NOTE — PLAN OF CARE
Problem: Safety - Adult  Goal: Free from fall injury  5/22/2023 2328 by Lyndsey Sandoval RN  Outcome: Progressing     Problem: Pain  Goal: Verbalizes/displays adequate comfort level or baseline comfort level  5/22/2023 2328 by Lyndsey Sandoval RN  Outcome: Progressing     Problem: Chronic Conditions and Co-morbidities  Goal: Patient's chronic conditions and co-morbidity symptoms are monitored and maintained or improved  5/22/2023 2328 by Lyndsey Sandoval RN  Outcome: Progressing

## 2023-05-23 NOTE — PLAN OF CARE
Problem: Self Harm/Suicidality  Goal: Will have no self-injury during hospital stay  Description: INTERVENTIONS:  1. Ensure constant observer at bedside with Q15M safety checks  2. Maintain a safe environment  3. Secure patient belongings  4. Ensure family/visitors adhere to safety recommendations  5. Ensure safety tray has been added to patient's diet order  6. Every shift and PRN: Re-assess suicidal risk via Frequent Screener    Outcome: Progressing     Problem: Depression  Goal: Will be euthymic at discharge  Description: INTERVENTIONS:  1. Administer medication as ordered  2. Provide emotional support via 1:1 interaction with staff  3. Encourage involvement in milieu/groups/activities  4. Monitor for social isolation  Outcome: Progressing     Problem: Behavior  Goal: Pt/Family maintain appropriate behavior and adhere to behavioral management agreement, if implemented  Description: INTERVENTIONS:  1. Assess patient/family's coping skills and  non-compliant behavior (including use of illegal substances)  2. Notify security of behavior or suspected illegal substances which indicate the need for search of the family and/or belongings  3. Encourage verbalization of thoughts and concerns in a socially appropriate manner  4. Utilize positive, consistent limit setting strategies supporting safety of patient, staff and others  5. Encourage participation in the decision making process about the behavioral management agreement  6. If a visitor's behavior poses a threat to safety call refer to organization policy. 7. Initiate consult with , Psychosocial CNS, Spiritual Care as appropriate  Outcome: Progressing     Problem: Anxiety  Goal: Will report anxiety at manageable levels  Description: INTERVENTIONS:  1. Administer medication as ordered  2. Teach and rehearse alternative coping skills  3.  Provide emotional support with 1:1 interaction with staff  Outcome: Progressing     Problem: Drug

## 2023-05-23 NOTE — PROGRESS NOTES
Patient verbally expressing his frustration with his situation. Patient requesting to speak to , she came in to speak with him. Patient refusing to wear telemetry but agreed for a blood pressure recheck. Patient threatening to leave AMA. Patient educated on his pink slip situation and was able to get patient to agree to stay and await his bed placement.

## 2023-05-24 VITALS
SYSTOLIC BLOOD PRESSURE: 107 MMHG | TEMPERATURE: 97.4 F | OXYGEN SATURATION: 97 % | WEIGHT: 107 LBS | RESPIRATION RATE: 16 BRPM | HEIGHT: 63 IN | HEART RATE: 71 BPM | BODY MASS INDEX: 18.96 KG/M2 | DIASTOLIC BLOOD PRESSURE: 74 MMHG

## 2023-05-24 LAB
ANION GAP SERPL CALCULATED.3IONS-SCNC: 13 MMOL/L (ref 7–16)
BUN SERPL-MCNC: 35 MG/DL (ref 6–20)
CALCIUM SERPL-MCNC: 9.4 MG/DL (ref 8.6–10.2)
CHLORIDE SERPL-SCNC: 102 MMOL/L (ref 98–107)
CO2 SERPL-SCNC: 24 MMOL/L (ref 22–29)
CREAT SERPL-MCNC: 2.3 MG/DL (ref 0.7–1.2)
GLUCOSE SERPL-MCNC: 116 MG/DL (ref 74–99)
GLUCOSE SERPL-MCNC: 51 MG/DL (ref 74–99)
METER GLUCOSE: 136 MG/DL (ref 74–99)
METER GLUCOSE: 360 MG/DL (ref 74–99)
METER GLUCOSE: 373 MG/DL (ref 74–99)
METER GLUCOSE: 476 MG/DL (ref 74–99)
METER GLUCOSE: 61 MG/DL (ref 74–99)
METER GLUCOSE: <40 MG/DL (ref 74–99)
POTASSIUM SERPL-SCNC: 5 MMOL/L (ref 3.5–5)
SODIUM SERPL-SCNC: 139 MMOL/L (ref 132–146)

## 2023-05-24 PROCEDURE — 6370000000 HC RX 637 (ALT 250 FOR IP): Performed by: PSYCHIATRY & NEUROLOGY

## 2023-05-24 PROCEDURE — 99232 SBSQ HOSP IP/OBS MODERATE 35: CPT | Performed by: INTERNAL MEDICINE

## 2023-05-24 PROCEDURE — 80048 BASIC METABOLIC PNL TOTAL CA: CPT

## 2023-05-24 PROCEDURE — 82962 GLUCOSE BLOOD TEST: CPT

## 2023-05-24 PROCEDURE — 36415 COLL VENOUS BLD VENIPUNCTURE: CPT

## 2023-05-24 PROCEDURE — 6370000000 HC RX 637 (ALT 250 FOR IP): Performed by: NURSE PRACTITIONER

## 2023-05-24 PROCEDURE — 6370000000 HC RX 637 (ALT 250 FOR IP): Performed by: FAMILY MEDICINE

## 2023-05-24 PROCEDURE — 82947 ASSAY GLUCOSE BLOOD QUANT: CPT

## 2023-05-24 PROCEDURE — S5553 INSULIN LONG ACTING 5 U: HCPCS | Performed by: INTERNAL MEDICINE

## 2023-05-24 PROCEDURE — 6370000000 HC RX 637 (ALT 250 FOR IP): Performed by: INTERNAL MEDICINE

## 2023-05-24 PROCEDURE — 6360000002 HC RX W HCPCS: Performed by: NURSE PRACTITIONER

## 2023-05-24 RX ORDER — TETRAHYDROZOLINE HCL 0.05 %
1 DROPS OPHTHALMIC (EYE) 2 TIMES DAILY
Qty: 3 ML | Refills: 0 | Status: SHIPPED | OUTPATIENT
Start: 2023-05-24 | End: 2023-06-23

## 2023-05-24 RX ORDER — INSULIN ASPART 100 [IU]/ML
INJECTION, SOLUTION INTRAVENOUS; SUBCUTANEOUS
Qty: 20 ML | Refills: 5 | Status: SHIPPED | OUTPATIENT
Start: 2023-05-24

## 2023-05-24 RX ORDER — SERTRALINE HYDROCHLORIDE 25 MG/1
25 TABLET, FILM COATED ORAL DAILY
Qty: 30 TABLET | Refills: 0 | Status: SHIPPED | OUTPATIENT
Start: 2023-05-25 | End: 2023-06-24

## 2023-05-24 RX ORDER — INSULIN LISPRO 100 [IU]/ML
2 INJECTION, SOLUTION INTRAVENOUS; SUBCUTANEOUS
Status: DISCONTINUED | OUTPATIENT
Start: 2023-05-24 | End: 2023-05-24 | Stop reason: HOSPADM

## 2023-05-24 RX ORDER — INSULIN LISPRO 100 [IU]/ML
4 INJECTION, SOLUTION INTRAVENOUS; SUBCUTANEOUS ONCE
Status: COMPLETED | OUTPATIENT
Start: 2023-05-24 | End: 2023-05-24

## 2023-05-24 RX ORDER — NICOTINE 21 MG/24HR
1 PATCH, TRANSDERMAL 24 HOURS TRANSDERMAL DAILY
Qty: 30 PATCH | Refills: 3
Start: 2023-05-25

## 2023-05-24 RX ORDER — CLONIDINE HYDROCHLORIDE 0.1 MG/1
0.3 TABLET ORAL 2 TIMES DAILY
Qty: 30 TABLET | Refills: 0 | Status: SHIPPED | OUTPATIENT
Start: 2023-05-24

## 2023-05-24 RX ADMIN — INSULIN LISPRO 4 UNITS: 100 INJECTION, SOLUTION INTRAVENOUS; SUBCUTANEOUS at 06:12

## 2023-05-24 RX ADMIN — INSULIN LISPRO 5 UNITS: 100 INJECTION, SOLUTION INTRAVENOUS; SUBCUTANEOUS at 10:07

## 2023-05-24 RX ADMIN — ATORVASTATIN CALCIUM 80 MG: 40 TABLET, FILM COATED ORAL at 10:01

## 2023-05-24 RX ADMIN — METOPROLOL SUCCINATE 50 MG: 25 TABLET, EXTENDED RELEASE ORAL at 09:58

## 2023-05-24 RX ADMIN — INSULIN GLARGINE-YFGN 10 UNITS: 100 INJECTION, SOLUTION SUBCUTANEOUS at 10:03

## 2023-05-24 RX ADMIN — BUPRENORPHINE AND NALOXONE 0.5 TABLET: 8; 2 TABLET SUBLINGUAL at 10:40

## 2023-05-24 RX ADMIN — PANTOPRAZOLE SODIUM 40 MG: 40 TABLET, DELAYED RELEASE ORAL at 06:13

## 2023-05-24 RX ADMIN — TETRAHYDROZOLINE HCL 1 DROP: 0.05 SOLUTION/ DROPS OPHTHALMIC at 10:43

## 2023-05-24 RX ADMIN — GABAPENTIN 300 MG: 300 CAPSULE ORAL at 09:58

## 2023-05-24 RX ADMIN — PROMETHAZINE HYDROCHLORIDE 12.5 MG: 25 TABLET ORAL at 03:41

## 2023-05-24 RX ADMIN — SERTRALINE 25 MG: 50 TABLET, FILM COATED ORAL at 10:00

## 2023-05-24 ASSESSMENT — SLEEP AND FATIGUE QUESTIONNAIRES
AVERAGE NUMBER OF SLEEP HOURS: 9
DO YOU USE A SLEEP AID: NO
DO YOU HAVE DIFFICULTY SLEEPING: NO

## 2023-05-24 ASSESSMENT — PAIN SCALES - GENERAL
PAINLEVEL_OUTOF10: 0
PAINLEVEL_OUTOF10: 0

## 2023-05-24 ASSESSMENT — LIFESTYLE VARIABLES
HOW OFTEN DO YOU HAVE A DRINK CONTAINING ALCOHOL: NEVER
HOW MANY STANDARD DRINKS CONTAINING ALCOHOL DO YOU HAVE ON A TYPICAL DAY: PATIENT DOES NOT DRINK

## 2023-05-24 NOTE — DISCHARGE SUMMARY
DISCHARGE SUMMARY      Patient ID:  Lauro Jones  [id number]  45 y.o.  1990    Admit date: 5/23/2023    Discharge date and time: 5/24/2023    Admitting Physician: Margie Correa MD     Discharge Physician: Dr Tacos Lynn MD    Discharge Diagnoses:   Patient Active Problem List   Diagnosis    Hyperlipidemia    Elevated liver enzymes    HTN (hypertension)    Anxiety    Vitamin D insufficiency    Acquired hypothyroidism    Uncontrolled type 1 diabetes mellitus with hyperglycemia (HCC)    Chest pain    Tobacco abuse    Opioid use disorder, severe, on maintenance therapy (HCC)    Bipolar affective ([address]    Other microscopic hematuria    Elevated alkaline phosphatase level    Lactic acidosis    Proliferative diabetic retinopathy of both eyes with macular edema associated with type 1 diabetes mellitus (HCC)    Moderate persistent asthma without complication    Left renal artery stenosis (HCC)    Type 1 diabetes mellitus with other specified complication (HCC)    Hyperglycemia    Diabetic ketoacidosis without coma associated with type 1 diabetes mellitus ([address]    Drug overdose of undetermined intent, initial encounter    Dietary noncompliance    Accidental fentanyl overdose ([address]    Suicide attempt ([address]    Severe episode of recurrent major depressive disorder, without psychotic features ([address]       Admission Condition: poor    Discharged Condition: stable    Admission Circumstance:   Patient presented to the ED with opioid overdose he denies that this was a suicide attempt though his mother had voiced concerns that this was intentional.       PAST MEDICAL/PSYCHIATRIC HISTORY:   Past Medical History:   Diagnosis Date    Asthma     Bipolar affective ([address]     COPD (chronic obstructive pulmonary disease) ([address]     Diabetes mellitus type I ([address]     Diabetic neuropathy ([address]     Diabetic, retinopathy, proliferative ([address]     HTN (hypertension)     due to left renal artery stenosis    Hypercholesteremia     Left renal

## 2023-05-24 NOTE — PLAN OF CARE
Problem: Depression  Goal: Will be euthymic at discharge  Description: INTERVENTIONS:  1. Administer medication as ordered  2. Provide emotional support via 1:1 interaction with staff  3. Encourage involvement in milieu/groups/activities  4. Monitor for social isolation  5/24/2023 1103 by Reynaldo Tsai RN  Outcome: Progressing  5/23/2023 2212 by Jordi Norton RN  Outcome: Progressing     Problem: Behavior  Goal: Pt/Family maintain appropriate behavior and adhere to behavioral management agreement, if implemented  Description: INTERVENTIONS:  1. Assess patient/family's coping skills and  non-compliant behavior (including use of illegal substances)  2. Notify security of behavior or suspected illegal substances which indicate the need for search of the family and/or belongings  3. Encourage verbalization of thoughts and concerns in a socially appropriate manner  4. Utilize positive, consistent limit setting strategies supporting safety of patient, staff and others  5. Encourage participation in the decision making process about the behavioral management agreement  6. If a visitor's behavior poses a threat to safety call refer to organization policy. 7. Initiate consult with , Psychosocial CNS, Spiritual Care as appropriate  Outcome: Progressing     Problem: Anxiety  Goal: Will report anxiety at manageable levels  Description: INTERVENTIONS:  1. Administer medication as ordered  2. Teach and rehearse alternative coping skills  3.  Provide emotional support with 1:1 interaction with staff  5/24/2023 1103 by Reynaldo Tsai RN  Outcome: Progressing  5/23/2023 2212 by Jordi Norton RN  Outcome: Progressing

## 2023-05-24 NOTE — CARE COORDINATION
SW met with pt to schedule follow up appointments. Pt stated that he has follow up appointments scheduled but he is not exactly sure when the appointments are because he does not have access to this information at this time. Pt stated that he just saw the psychiatrist on 5/17 and he sees him once a month and he believes that his next appointment is around 6/13 but is unsure on exact date. Pt has no concerns about being discharged from the hospital and stated that he is ready to leave.      In order to ensure appropriate transition and discharge planning is in place, the following documents have been transmitted to Dr. Alysia Eastman, as the new outpatient provider:    The d/c diagnosis was transmitted to the next care provider  The reason for hospitalization was transmitted to the next care provider  The d/c medications (dosage and indication) were transmitted to the next care provider   The continuing care plan was transmitted to the next care provider

## 2023-05-24 NOTE — CARE COORDINATION
ASAF called Dr. Denis Mart to schedule follow up appointments. ASAF left a voicemail requesting a call back.       [address]  Address: [address] [place] [address]  Phone: [phone]

## 2023-05-24 NOTE — CARE COORDINATION
SW attempted to complete assessment, pt is currently in room sleeping and did not wake up when SW called his name. SW will attempt assessment again at a later time.

## 2023-05-24 NOTE — BH NOTE
[facility]  Discharge Note    Pt discharged with followings belongings:   Dental Appliances: None  Vision - Corrective Lenses: None  Hearing Aid: None  Jewelry: None  Body Piercings Removed: N/A  Clothing: Shorts  Other Valuables: Other (Comment) (none)   Valuables sent home with yes or returned to patient. Patient educated on aftercare instructions: yes  at 4:11 PM .Patient verbalize understanding of AVS:  yes. Status EXAM upon discharge:  Mental Status and Behavioral Exam  Normal: Yes  Level of Assistance: Independent/Self  Facial Expression: Flat  Affect: Blunt  Level of Consciousness: Alert  Frequency of Checks: 4 times per hour, close  Mood:Normal: No  Mood: Depressed, Anxious  Motor Activity:Normal: No  Motor Activity: Decreased  Eye Contact: Fair  Observed Behavior: Cooperative  Sexual Misconduct History: Current - no  Preception: [place] to person, [place] to time, [place] to place, [place] to situation  Attention:Normal: No  Attention: Distractible  Thought Processes: Circumstantial  Thought Content:Normal: No  Thought Content: Poverty of content  Depression Symptoms: Isolative  Anxiety Symptoms: Generalized  Calista Symptoms: No problems reported or observed.   Hallucinations: None  Delusions: No  Memory:Normal: Yes  Memory: Other (comment)  Insight and Judgment: No  Insight and Judgment: Poor judgment, Poor insight    Tobacco Screening:  Practical Counseling, on admission, brennon X, if applicable and completed (first 3 are required if patient doesn't refuse)yes:            ( ) Recognizing danger situations (included triggers and roadblocks)                    ( ) Coping skills (new ways to manage stress,relaxation techniques, changing routine, distraction)                                                           ( ) Basic information about quitting (benefits of quitting, techniques in how to quit, available resources  ( ) Referral for counseling faxed to [facility]

## 2023-05-24 NOTE — H&P
History & Physical      PCP: Elvis Tay DO    Date of Admission: 5/23/2023    Date of Service: Pt seen/examined on 5/23/2023 and is admitted to Inpatient with expected LOS greater than two midnights due to medical therapy. Chief Complaint:  had no chief complaint listed for this encounter. Internal medicine consulted for diabetes. History Of Present Illness:    Mr. Jessica Crowder, a 28y.o. year old male  who  has a past medical history of Asthma, Bipolar affective ([address] COPD (chronic obstructive pulmonary disease) ([address] Diabetes mellitus type I ([address] Diabetic neuropathy ([address] Diabetic, retinopathy, proliferative ([address] HTN (hypertension), Hypercholesteremia, Left renal artery stenosis ([address] Macular edema due to secondary diabetes ([address] Opioid dependence ([address] Proteinuria, Retinal detachment, and Vitreous hemorrhage of right eye due to diabetes mellitus ([address] 28-year-old male treated for DKA and discharged to psychiatry unit for suicidal ideation. Internal medicine consulted for uncontrolled hyperglycemia in type 1 diabetes mellitus. Patient initially uses insulin pump at home managed by endocrinology. During ICU admission he was transitioned to long-acting insulin and sliding scale. Premeal was discontinued due to hypoglycemia. Patient noted to be hyperglycemic with blood glucose greater than 400. He was seen and has no new complaints. No fever chills nausea vomiting diarrhea chest pain shortness of breath or leg swelling.           Past Medical History:        Diagnosis Date    Asthma     Bipolar affective ([address]     COPD (chronic obstructive pulmonary disease) (HCC)     Diabetes mellitus type I ([address]     Diabetic neuropathy ([address]     Diabetic, retinopathy, proliferative ([address]     HTN (hypertension)     due to left renal artery stenosis    Hypercholesteremia     Left renal artery stenosis ([address] 09/19/2019    Macular edema due to secondary diabetes ([address]     Opioid
toward examiner:  cooperative  Speech:  spontaneous, normal rate and normal volume  Mood: \" My mood is good. \"  Affect: pleasant, relaxed, congruent with stated mood  Thought processes: Linear without  flight of ideas or loose associations  Thought content: Devoid of any auditory visual hallucinations delusions or other perceptual normalities.   Denies SI/HI intent or plan   Language: able to name objects and repeate phrases  Immediate recent and remote memory are intact however patient is an unreliable historian  Cognition:  oriented to person, place, and time   Fund of Knowledge: Vocabulary intact, pt is aware of current events and past history  Attention and Concentration intact  Insight judgment impulse control Limited      DIAGNOSIS:  Severe episode of recurrent major depressive disorder, without psychotic feature  Opioid dependence          LABS: REVIEWED TODAY:  Recent Labs     05/21/23  0401 05/22/23  0405 05/23/23  0409   WBC 17.6* 10.5 8.0   HGB 10.2* 10.2* 10.6*    273 258     Recent Labs     05/21/23  1955 05/22/23  0405 05/23/23  0409    143 145   K 4.5 4.2  4.2 4.4   * 106 107   CO2 24 28 29   BUN 35* 30* 23*   CREATININE 1.9* 2.0* 2.1*   GLUCOSE 111* 167* 72*     Recent Labs     05/21/23  0401 05/22/23  0405   BILITOT 0.3 0.2   ALKPHOS 91 87   AST 14 23   ALT 8 13     Lab Results   Component Value Date/Time    LABAMPH NOT DETECTED 05/20/2023 01:35 AM    BARBSCNU NOT DETECTED 05/20/2023 01:35 AM    LABBENZ NOT DETECTED 05/20/2023 01:35 AM    CANNAB NOT DETECTED 03/20/2014 06:50 PM    COCAINESCRN NOT DETECTED 03/20/2014 06:50 PM    LABMETH NOT DETECTED 05/20/2023 01:35 AM    OPIATESCREENURINE NOT DETECTED 05/20/2023 01:35 AM    PHENCYCLIDINESCREENURINE NOT DETECTED 05/20/2023 01:35 AM    ETOH <10 05/19/2023 10:13 PM     Lab Results   Component Value Date/Time    TSH 1.890 01/21/2022 01:53 PM     No results found for: LITHIUM  No results found for: VALPROATE, CBMZ  No results found for:

## 2023-05-24 NOTE — CARE COORDINATION
Peer Recovery Support Note    Name: Jes Dill  Date: 5/24/2023    No chief complaint on file. Peer Support met with patient. [x] Support and education provided  [] Resources provided   [] Treatment referral:   [] Other:   [] Patient declined peer recovery services     Referred By: Jolly Carter (ASAF)    Notes: Spoke with pt just before he got discharged. Mom was taking him home with her. He has many health problems including diabetes. He is on MAT for the addiction part and at this time needs no help. I gave him my card if ever things change.     Signed: Lamar Franco, 5/24/2023

## 2023-05-24 NOTE — GROUP NOTE
Group Therapy Note    Date: 5/24/2023    Group Start Time: 5040  Group End Time: 4076  Group Topic: Cognitive Skills    [facility]    Oly Reynaga, LPC        Group Therapy Note    Attendees: 9     Patient's Goal:  To participate in grounding techniques skills group    Notes:  Patient was an active participant in group    Status After Intervention:  Unchanged    Participation Level: Participant    Participation Quality: Appropriate      Speech:  normal      Thought Process/Content: Logical      Affective Functioning: Congruent      Mood: euthymic      Level of consciousness:  Alert      Response to Learning: Able to verbalize current knowledge/experience      Endings: None Reported    Modes of Intervention: Education, Support, Socialization, Exploration, Clarifying, and Problem-solving      Discipline Responsible: /Counselor      Signature:  Dwayne Land

## 2023-05-24 NOTE — PLAN OF CARE
Problem: Depression  Goal: Will be euthymic at discharge  Description: INTERVENTIONS:  1. Administer medication as ordered  2. Provide emotional support via 1:1 interaction with staff  3. Encourage involvement in milieu/groups/activities  4. Monitor for social isolation  5/23/2023 2212 by Fannie Henriquez RN  Outcome: Progressing     Problem: Anxiety  Goal: Will report anxiety at manageable levels  Description: INTERVENTIONS:  1. Administer medication as ordered  2. Teach and rehearse alternative coping skills  3. Provide emotional support with 1:1 interaction with staff  5/23/2023 2212 by Fannie Henriquez RN  Outcome: Progressing     Patient has been out on the unit, but keeps to self. Cooperative during assessment. C/o decreased appetite. Denies anxiety and depression. Denies suicidal/homicidal ideations and hallucinations at this time. Purposeful rounding continued.

## 2023-05-24 NOTE — BH NOTE
FSBS reading \"low\" on monitor patient states I feel a little weak and shaky treated orally with orange juice and arlen crackers. Placed an order for state blood sugar draw Repeat FSBS  61.

## 2023-05-24 NOTE — CARE COORDINATION
ASAF called Dr. Marquita Brumfield to schedule follow up appointment's. ASAF spoke with Dr. Marquita Brumfield [phone] who stated that he just saw the pt and to have the pt call him tomorrow to confirm his follow up appointments because he does not have access to his computer at this time to confirm appointments. Dr. Marquita Brumfield stated that the pt is a good patient and he always comes to his appointments.      [address]  Address: [address] [place] [address] [address] [place]  Phone: [phone]  Fax: [phone]

## 2023-05-24 NOTE — BH NOTE
Dr Martin Zamudio called to inform this nurse the endocrinologist will address discharge medication.  will come on unit to assess patient.

## 2023-05-24 NOTE — CARE COORDINATION
Biopsychosocial Assessment Note    Social work met with patient to complete the biopsychosocial assessment and C-SSRS. Chief Complaint: pt reported that he is currently in the hospital because \"I had an unintentional overdose on pain medications for my kidneys and there was fentanyl in the medication. \"     Mental Status Exam: Pt is alert and oriented x4. Pt's mood is anxious, affect is flat and blunt. Pt's speech is clear, rate is slow and volume is normal. Pt's eye contact is fair. Pt's thoughts process is logical, thought content is fair, pt has some poverty of content. Pt's memory is intact, pt is a good historian. Pt's insight and judgement is fair. Pt was calm and evasive during assessment and offered limited information. Pt denied SI, HI, AVH. Clinical Summary: Pt is a 22-year-old male, who presented to the ER due to having an unintentional overdose on pain medications that he thought was percocet but was fentanyl . Per ED notes, \"Patient reports he snorted OxyContin. Patient reports he did start because of the fact that he is been having flank pain for the last several days he is also had nausea vomiting. .. Patient was given Narcan by family member at home. \"    Pt reported that he has no previous inpatient hospital admissions and he is currently on Buspar and Suboxone. Pt stated that he treats with  Dr Darlyn Frances for his substance use/mental health treatment. Pt stated that he has no history of trauma/abuse. Pt reported that his main stressor that contributed to his hospital admission was his physical pain. Pt stated that he has no previous suicide attempts to thoughts to end his life. Pt denied any history of self harm. Pt denied being hopeless/helpless or having little interest or pleasure in doing things.  Pt reported that his sleep and appetite have been normal.     Pt stated that he is currently unemployed and recent applied for SSDI due to his loss of eye sight and he is currently on food

## 2023-05-24 NOTE — DISCHARGE INSTRUCTIONS
Follow up for Tobacco Cessation at:    [facility] Tobacco [facility]                                 Date:  Friday 5/26/2023 at [address]  [place],  [address]   ([address]    take [address] elevators to 7th floor)   Phone: [phone]   Fax: [phone]

## 2023-05-25 LAB
BACTERIA BLD CULT ORG #2: NORMAL
BACTERIA BLD CULT: NORMAL

## 2023-07-31 DIAGNOSIS — I10 ESSENTIAL HYPERTENSION: ICD-10-CM

## 2023-07-31 DIAGNOSIS — J18.9 PNEUMONIA DUE TO INFECTIOUS ORGANISM, UNSPECIFIED LATERALITY, UNSPECIFIED PART OF LUNG: ICD-10-CM

## 2023-07-31 DIAGNOSIS — E10.40 DIABETIC NEUROPATHY, TYPE I DIABETES MELLITUS (HCC): ICD-10-CM

## 2023-07-31 DIAGNOSIS — E78.00 PURE HYPERCHOLESTEROLEMIA: ICD-10-CM

## 2023-07-31 RX ORDER — ALBUTEROL SULFATE 90 UG/1
2 AEROSOL, METERED RESPIRATORY (INHALATION) EVERY 4 HOURS PRN
Qty: 18 G | Refills: 0 | Status: SHIPPED | OUTPATIENT
Start: 2023-07-31

## 2023-07-31 RX ORDER — ATORVASTATIN CALCIUM 80 MG/1
80 TABLET, FILM COATED ORAL DAILY
Qty: 30 TABLET | Refills: 1 | Status: SHIPPED | OUTPATIENT
Start: 2023-07-31

## 2023-07-31 RX ORDER — METOPROLOL SUCCINATE 50 MG/1
50 TABLET, EXTENDED RELEASE ORAL 2 TIMES DAILY
Qty: 60 TABLET | Refills: 1 | Status: SHIPPED | OUTPATIENT
Start: 2023-07-31

## 2023-07-31 RX ORDER — FENOFIBRATE 145 MG/1
145 TABLET, COATED ORAL DAILY
Qty: 30 TABLET | Refills: 1 | Status: SHIPPED | OUTPATIENT
Start: 2023-07-31

## 2023-07-31 RX ORDER — GABAPENTIN 300 MG/1
300 CAPSULE ORAL 2 TIMES DAILY
Qty: 60 CAPSULE | Refills: 1 | Status: SHIPPED | OUTPATIENT
Start: 2023-07-31 | End: 2023-10-29

## 2023-07-31 RX ORDER — SERTRALINE HYDROCHLORIDE 25 MG/1
25 TABLET, FILM COATED ORAL DAILY
Qty: 30 TABLET | Refills: 1 | Status: CANCELLED | OUTPATIENT
Start: 2023-07-31 | End: 2023-09-29

## 2023-07-31 RX ORDER — LISINOPRIL 10 MG/1
10 TABLET ORAL DAILY
Qty: 30 TABLET | Refills: 1 | Status: SHIPPED | OUTPATIENT
Start: 2023-07-31

## 2023-07-31 RX ORDER — ASPIRIN 81 MG/1
81 TABLET ORAL DAILY
Qty: 30 TABLET | Refills: 1 | Status: SHIPPED | OUTPATIENT
Start: 2023-07-31

## 2023-07-31 RX ORDER — CLONIDINE HYDROCHLORIDE 0.1 MG/1
0.3 TABLET ORAL 2 TIMES DAILY
Qty: 180 TABLET | Refills: 1 | Status: SHIPPED | OUTPATIENT
Start: 2023-07-31 | End: 2023-10-29

## 2023-07-31 NOTE — TELEPHONE ENCOUNTER
Medication Refill Request    LOV 2/6/2023  NOV Visit date not found    Lab Results   Component Value Date    CREATININE 2.3 (H) 05/24/2023

## 2023-09-08 ENCOUNTER — PATIENT MESSAGE (OUTPATIENT)
Dept: ENDOCRINOLOGY | Age: 33
End: 2023-09-08

## 2023-09-08 DIAGNOSIS — E10.65 UNCONTROLLED TYPE 1 DIABETES MELLITUS WITH HYPERGLYCEMIA (HCC): Primary | ICD-10-CM

## 2023-09-08 RX ORDER — BLOOD SUGAR DIAGNOSTIC
1 STRIP MISCELLANEOUS DAILY
Qty: 100 EACH | Refills: 3 | Status: SHIPPED | OUTPATIENT
Start: 2023-09-08

## 2023-09-15 NOTE — TELEPHONE ENCOUNTER
From: Flakita Harrison  To: Dr. Gasper Andrade: 9/8/2023 11:49 AM EDT  Subject: Prior authorization     Hi, I left a message on the voicemail a couple days ago because my pharmacy called and said they can't give me my glucose test strips until u guys do the prior authorization, he said he sent tmit twice and hasn't got it yet, I went and bought a different brand with 25 strips and I'm almost out of those too so can u please do the prior authorization so I can fill it in my insurance, I'm in a fixed budget and can't afford buying the test strips every week

## 2023-09-16 RX ORDER — BLOOD-GLUCOSE METER
1 EACH MISCELLANEOUS DAILY
Qty: 1 KIT | Refills: 1 | Status: SHIPPED | OUTPATIENT
Start: 2023-09-16

## 2023-09-16 RX ORDER — LANCETS 33 GAUGE
1 EACH MISCELLANEOUS
Qty: 200 EACH | Refills: 11 | Status: SHIPPED | OUTPATIENT
Start: 2023-09-16

## 2023-09-21 LAB — DIABETIC RETINOPATHY: POSITIVE

## 2023-10-05 DIAGNOSIS — E78.00 PURE HYPERCHOLESTEROLEMIA: ICD-10-CM

## 2023-10-05 DIAGNOSIS — E10.40 DIABETIC NEUROPATHY, TYPE I DIABETES MELLITUS (HCC): ICD-10-CM

## 2023-10-05 RX ORDER — FENOFIBRATE 145 MG/1
145 TABLET, COATED ORAL DAILY
Qty: 90 TABLET | Refills: 0 | Status: SHIPPED | OUTPATIENT
Start: 2023-10-05

## 2023-10-05 RX ORDER — ASPIRIN 81 MG/1
81 TABLET ORAL DAILY
Qty: 90 TABLET | Refills: 0 | Status: SHIPPED | OUTPATIENT
Start: 2023-10-05

## 2023-10-05 RX ORDER — GABAPENTIN 300 MG/1
300 CAPSULE ORAL 2 TIMES DAILY
Qty: 180 CAPSULE | Refills: 0 | Status: SHIPPED | OUTPATIENT
Start: 2023-10-05 | End: 2024-01-03

## 2023-10-05 RX ORDER — ATORVASTATIN CALCIUM 80 MG/1
80 TABLET, FILM COATED ORAL DAILY
Qty: 90 TABLET | Refills: 0 | Status: SHIPPED | OUTPATIENT
Start: 2023-10-05

## 2023-10-05 NOTE — TELEPHONE ENCOUNTER
Medication Refill Request    LOV 2/6/2023  NOV 10/5/2023    Lab Results   Component Value Date    CREATININE 2.3 (H) 05/24/2023

## 2023-10-06 ENCOUNTER — HOSPITAL ENCOUNTER (EMERGENCY)
Age: 33
Discharge: HOME | End: 2023-10-06
Attending: EMERGENCY MEDICINE
Payer: COMMERCIAL

## 2023-10-06 VITALS
WEIGHT: 107 LBS | HEART RATE: 88 BPM | OXYGEN SATURATION: 100 % | RESPIRATION RATE: 16 BRPM | BODY MASS INDEX: 18.96 KG/M2 | HEIGHT: 63 IN | SYSTOLIC BLOOD PRESSURE: 142 MMHG | DIASTOLIC BLOOD PRESSURE: 78 MMHG | TEMPERATURE: 98.3 F

## 2023-10-06 DIAGNOSIS — R11.0 NAUSEA: Primary | ICD-10-CM

## 2023-10-06 DIAGNOSIS — R10.84 GENERALIZED ABDOMINAL PAIN: ICD-10-CM

## 2023-10-06 DIAGNOSIS — R73.9 HYPERGLYCEMIA: ICD-10-CM

## 2023-10-06 LAB
ALBUMIN SERPL-MCNC: 4.3 G/DL (ref 3.5–5.2)
ALP SERPL-CCNC: 103 U/L (ref 40–129)
ALT SERPL-CCNC: 6 U/L (ref 0–40)
ANION GAP SERPL CALCULATED.3IONS-SCNC: 12 MMOL/L (ref 7–16)
AST SERPL-CCNC: 12 U/L (ref 0–39)
B-OH-BUTYR SERPL-MCNC: 1.4 MMOL/L (ref 0.02–0.27)
BACTERIA URNS QL MICRO: ABNORMAL
BASOPHILS # BLD: 0.11 K/UL (ref 0–0.2)
BASOPHILS NFR BLD: 1 % (ref 0–2)
BILIRUB SERPL-MCNC: 0.3 MG/DL (ref 0–1.2)
BILIRUB UR QL STRIP: ABNORMAL
BUN SERPL-MCNC: 28 MG/DL (ref 6–20)
CALCIUM SERPL-MCNC: 9.4 MG/DL (ref 8.6–10.2)
CHLORIDE SERPL-SCNC: 104 MMOL/L (ref 98–107)
CHP ED QC CHECK: YES
CLARITY UR: CLEAR
CO2 SERPL-SCNC: 18 MMOL/L (ref 22–29)
COLOR UR: YELLOW
CREAT SERPL-MCNC: 2.5 MG/DL (ref 0.7–1.2)
EKG ATRIAL RATE: 63 BPM
EKG P AXIS: 30 DEGREES
EKG P-R INTERVAL: 154 MS
EKG Q-T INTERVAL: 416 MS
EKG QRS DURATION: 86 MS
EKG QTC CALCULATION (BAZETT): 425 MS
EKG R AXIS: 40 DEGREES
EKG T AXIS: 65 DEGREES
EKG VENTRICULAR RATE: 63 BPM
EOSINOPHIL # BLD: 0.2 K/UL (ref 0.05–0.5)
EOSINOPHILS RELATIVE PERCENT: 2 % (ref 0–6)
ERYTHROCYTE [DISTWIDTH] IN BLOOD BY AUTOMATED COUNT: 13.9 % (ref 11.5–15)
GFR SERPL CREATININE-BSD FRML MDRD: 35 ML/MIN/1.73M2
GLUCOSE BLD-MCNC: 213 MG/DL
GLUCOSE BLD-MCNC: 213 MG/DL (ref 74–99)
GLUCOSE SERPL-MCNC: 214 MG/DL (ref 74–99)
GLUCOSE UR STRIP-MCNC: NEGATIVE MG/DL
HCT VFR BLD AUTO: 41.9 % (ref 37–54)
HGB BLD-MCNC: 13.7 G/DL (ref 12.5–16.5)
HGB UR QL STRIP.AUTO: NEGATIVE
IMM GRANULOCYTES # BLD AUTO: 0.04 K/UL (ref 0–0.58)
IMM GRANULOCYTES NFR BLD: 0 % (ref 0–5)
KETONES UR STRIP-MCNC: NEGATIVE MG/DL
LACTATE BLDV-SCNC: 1 MMOL/L (ref 0.5–2.2)
LEUKOCYTE ESTERASE UR QL STRIP: NEGATIVE
LIPASE SERPL-CCNC: 11 U/L (ref 13–60)
LYMPHOCYTES NFR BLD: 2.11 K/UL (ref 1.5–4)
LYMPHOCYTES RELATIVE PERCENT: 21 % (ref 20–42)
MCH RBC QN AUTO: 27.8 PG (ref 26–35)
MCHC RBC AUTO-ENTMCNC: 32.7 G/DL (ref 32–34.5)
MCV RBC AUTO: 85 FL (ref 80–99.9)
MONOCYTES NFR BLD: 0.35 K/UL (ref 0.1–0.95)
MONOCYTES NFR BLD: 4 % (ref 2–12)
NEUTROPHILS NFR BLD: 72 % (ref 43–80)
NEUTS SEG NFR BLD: 7.13 K/UL (ref 1.8–7.3)
NITRITE UR QL STRIP: NEGATIVE
PH UR STRIP: 5.5 [PH] (ref 5–9)
PLATELET # BLD AUTO: 293 K/UL (ref 130–450)
PMV BLD AUTO: 10.7 FL (ref 7–12)
POTASSIUM SERPL-SCNC: 5.3 MMOL/L (ref 3.5–5)
PROT SERPL-MCNC: 7.3 G/DL (ref 6.4–8.3)
PROT UR STRIP-MCNC: 100 MG/DL
RBC # BLD AUTO: 4.93 M/UL (ref 3.8–5.8)
RBC #/AREA URNS HPF: ABNORMAL /HPF
SODIUM SERPL-SCNC: 134 MMOL/L (ref 132–146)
SP GR UR STRIP: 1.02 (ref 1–1.03)
TROPONIN I SERPL HS-MCNC: 125 NG/L (ref 0–11)
UROBILINOGEN UR STRIP-ACNC: 0.2 EU/DL (ref 0–1)
WBC #/AREA URNS HPF: ABNORMAL /HPF
WBC OTHER # BLD: 9.9 K/UL (ref 4.5–11.5)

## 2023-10-06 PROCEDURE — 93005 ELECTROCARDIOGRAM TRACING: CPT | Performed by: STUDENT IN AN ORGANIZED HEALTH CARE EDUCATION/TRAINING PROGRAM

## 2023-10-06 PROCEDURE — 96375 TX/PRO/DX INJ NEW DRUG ADDON: CPT

## 2023-10-06 PROCEDURE — 2580000003 HC RX 258: Performed by: STUDENT IN AN ORGANIZED HEALTH CARE EDUCATION/TRAINING PROGRAM

## 2023-10-06 PROCEDURE — 82962 GLUCOSE BLOOD TEST: CPT

## 2023-10-06 PROCEDURE — A4216 STERILE WATER/SALINE, 10 ML: HCPCS | Performed by: STUDENT IN AN ORGANIZED HEALTH CARE EDUCATION/TRAINING PROGRAM

## 2023-10-06 PROCEDURE — 82010 KETONE BODYS QUAN: CPT

## 2023-10-06 PROCEDURE — 85025 COMPLETE CBC W/AUTO DIFF WBC: CPT

## 2023-10-06 PROCEDURE — 6360000002 HC RX W HCPCS: Performed by: STUDENT IN AN ORGANIZED HEALTH CARE EDUCATION/TRAINING PROGRAM

## 2023-10-06 PROCEDURE — 6370000000 HC RX 637 (ALT 250 FOR IP): Performed by: EMERGENCY MEDICINE

## 2023-10-06 PROCEDURE — 2500000003 HC RX 250 WO HCPCS: Performed by: STUDENT IN AN ORGANIZED HEALTH CARE EDUCATION/TRAINING PROGRAM

## 2023-10-06 PROCEDURE — 93010 ELECTROCARDIOGRAM REPORT: CPT | Performed by: INTERNAL MEDICINE

## 2023-10-06 PROCEDURE — 96374 THER/PROPH/DIAG INJ IV PUSH: CPT

## 2023-10-06 PROCEDURE — 84484 ASSAY OF TROPONIN QUANT: CPT

## 2023-10-06 PROCEDURE — 83690 ASSAY OF LIPASE: CPT

## 2023-10-06 PROCEDURE — 99284 EMERGENCY DEPT VISIT MOD MDM: CPT

## 2023-10-06 PROCEDURE — 80053 COMPREHEN METABOLIC PANEL: CPT

## 2023-10-06 PROCEDURE — 83605 ASSAY OF LACTIC ACID: CPT

## 2023-10-06 PROCEDURE — 81001 URINALYSIS AUTO W/SCOPE: CPT

## 2023-10-06 RX ORDER — KETOROLAC TROMETHAMINE 30 MG/ML
15 INJECTION, SOLUTION INTRAMUSCULAR; INTRAVENOUS ONCE
Status: DISCONTINUED | OUTPATIENT
Start: 2023-10-06 | End: 2023-10-06

## 2023-10-06 RX ORDER — SUCRALFATE 1 G/1
1 TABLET ORAL 4 TIMES DAILY
Qty: 120 TABLET | Refills: 3 | Status: SHIPPED | OUTPATIENT
Start: 2023-10-06

## 2023-10-06 RX ORDER — FAMOTIDINE 20 MG/1
20 TABLET, FILM COATED ORAL DAILY
Qty: 60 TABLET | Refills: 0 | Status: SHIPPED | OUTPATIENT
Start: 2023-10-06

## 2023-10-06 RX ORDER — 0.9 % SODIUM CHLORIDE 0.9 %
1000 INTRAVENOUS SOLUTION INTRAVENOUS ONCE
Status: COMPLETED | OUTPATIENT
Start: 2023-10-06 | End: 2023-10-06

## 2023-10-06 RX ORDER — ONDANSETRON 4 MG/1
4 TABLET, ORALLY DISINTEGRATING ORAL 3 TIMES DAILY PRN
Qty: 21 TABLET | Refills: 0 | Status: SHIPPED | OUTPATIENT
Start: 2023-10-06

## 2023-10-06 RX ORDER — ONDANSETRON 2 MG/ML
4 INJECTION INTRAMUSCULAR; INTRAVENOUS ONCE
Status: COMPLETED | OUTPATIENT
Start: 2023-10-06 | End: 2023-10-06

## 2023-10-06 RX ADMIN — FAMOTIDINE 20 MG: 10 INJECTION, SOLUTION INTRAVENOUS at 15:36

## 2023-10-06 RX ADMIN — ONDANSETRON 4 MG: 2 INJECTION INTRAMUSCULAR; INTRAVENOUS at 15:36

## 2023-10-06 RX ADMIN — SODIUM CHLORIDE 1000 ML: 9 INJECTION, SOLUTION INTRAVENOUS at 15:35

## 2023-10-06 RX ADMIN — Medication: at 15:38

## 2023-10-06 ASSESSMENT — PAIN - FUNCTIONAL ASSESSMENT: PAIN_FUNCTIONAL_ASSESSMENT: 0-10

## 2023-10-06 ASSESSMENT — PAIN DESCRIPTION - ORIENTATION: ORIENTATION: MID;UPPER

## 2023-10-06 ASSESSMENT — PAIN DESCRIPTION - LOCATION: LOCATION: ABDOMEN

## 2023-10-06 ASSESSMENT — PAIN SCALES - GENERAL: PAINLEVEL_OUTOF10: 7

## 2023-10-06 NOTE — ED NOTES
Discharge instructions given. Patient verbalizes understanding. No other noted or stated problems at this time. Patient will follow up with primary care.       Jeffrey Staton RN  10/06/23 0040

## 2023-10-06 NOTE — ED PROVIDER NOTES
patient is nontoxic-appearing, afebrile, hemodynamically stable and in no acute distress. Initial vitals within appropriate limits. Differential diagnosis includes was not limited to pancreatitis, UTI, ALEJANDRO, kidney stone, DKA, dehydration, ACS, acute viral syndrome or electrolyte abnormalities. Physical exam was remarkable for mild tenderness to palpation in the epigastric region with no rebound, guarding or rigidity. No peritoneal signs. No findings suggesting acute surgical abdomen. No CVA tenderness. No Pelayo sign. No Rovsing sign. No other concerning physical exam findings. Lungs are clear to auscultation bilaterally. Heart regular rhythm with normal rate. Skin normal color with no paleness or jaundice. No significant pretibial edema. Work up in the emergency department as interpreted by me is detailed in the ED course. There are no clinically significant lab abnormalities noted. Mainly, there is no leukocytosis, clinically significant anemia, major electrolyte abnormalities or notable changes in renal or liver function. Glucose elevated at 214 but anion gap was normal.  There was no clinical or laboratory evidence that would suggest DKA. Beta hydroxybutyrate 1.4. Cardiac evaluation essentially benign. Troponin was elevated but appears to be chronically elevated on review of previous labs. Troponin is actually less than baseline today. EKG was sinus and nonspecific with no gross acute ischemic changes. Clinical presentation and history seem to suggest acid reflux for a cause of the patient's symptoms. Patient was given IV Zofran, IV Pepcid, IV fluids and a GI cocktail in the emergency department. On reevaluation, he was noting significant improvement in his presenting symptoms. Repeat abdominal exam is improved as well. Given this, along with findings, did not feel that further emergent evaluation was indicated.  Work up and results were discussed with patient and they were agreeable with pretension, bipolar disorder, polysubstance abuse and asthma/COPD     CONSULTS: None      Disposition:   Appropriate for outpatient management      Pt will be d/c and was prescribed zofran, carafate and pepcid and will follow up with his PCP . He is educated on signs and symptoms that require emergent evaluation. Pt is advised to return to the ED if his symptoms change or worsen. If his pain persists, pt may need further evaluation. Pt is agreeable to plan and all questions have been answered at this time. 1. Nausea    2. Generalized abdominal pain    3. Hyperglycemia          Re-Evaluations/Consultations:             ED Course as of 10/06/23 1912   Fri Oct 06, 2023   1431 ATTENDING PROVIDER ATTESTATION:     I have personally performed and/or participated in the history, exam, medical decision making, and procedures and agree with all pertinent clinical information. I have also reviewed and agree with the past medical, family and social history unless otherwise noted. I have discussed this patient in detail with the resident, and provided the instruction and education regarding recurrent epigastric discomfort since last week. Patient states that every morning he seems to wake up with a burning sensation in his epigastrium. It is partially alleviated with eating. He states it is manageable throughout the day. It is worse when he lays down at night. He has no history of alcohol consumption or spicy foods or excessive caffeine. He is an insulin-dependent diabetic but states his sugars been under control. Undergo laboratory evaluation and treatment for presumptive gastroesophageal reflux and then reassessment after treatment.    [RM]   4887 Creatinine(!): 2.5  History of CKD, baseline creatinine around 2. [PP]   1648 BUN,BUNPL(!): 28 [PP]   1648 Glucose, Random(!): 214 [PP]   1648 Anion Gap: 12 [PP]   1648 Sodium: 134 [PP]   1648 Potassium(!): 5.3 [PP]   1648 Chloride: 104 [PP]   1648 CO2(!): 18 [PP]

## 2023-10-09 ENCOUNTER — TELEMEDICINE (OUTPATIENT)
Dept: FAMILY MEDICINE CLINIC | Age: 33
End: 2023-10-09
Payer: COMMERCIAL

## 2023-10-09 DIAGNOSIS — N18.32 TYPE 1 DIABETES MELLITUS WITH STAGE 3B CHRONIC KIDNEY DISEASE (HCC): ICD-10-CM

## 2023-10-09 DIAGNOSIS — E78.2 MIXED HYPERLIPIDEMIA: ICD-10-CM

## 2023-10-09 DIAGNOSIS — Z71.6 ENCOUNTER FOR SMOKING CESSATION COUNSELING: ICD-10-CM

## 2023-10-09 DIAGNOSIS — E10.22 TYPE 1 DIABETES MELLITUS WITH STAGE 3B CHRONIC KIDNEY DISEASE (HCC): ICD-10-CM

## 2023-10-09 DIAGNOSIS — I10 ESSENTIAL HYPERTENSION: Primary | ICD-10-CM

## 2023-10-09 PROCEDURE — 2022F DILAT RTA XM EVC RTNOPTHY: CPT | Performed by: FAMILY MEDICINE

## 2023-10-09 PROCEDURE — G8427 DOCREV CUR MEDS BY ELIG CLIN: HCPCS | Performed by: FAMILY MEDICINE

## 2023-10-09 PROCEDURE — 99214 OFFICE O/P EST MOD 30 MIN: CPT | Performed by: FAMILY MEDICINE

## 2023-10-09 PROCEDURE — 3051F HG A1C>EQUAL 7.0%<8.0%: CPT | Performed by: FAMILY MEDICINE

## 2023-10-09 RX ORDER — LISINOPRIL 5 MG/1
5 TABLET ORAL DAILY
Qty: 90 TABLET | Refills: 0 | Status: SHIPPED | OUTPATIENT
Start: 2023-10-09

## 2023-10-09 RX ORDER — METOPROLOL SUCCINATE 50 MG/1
50 TABLET, EXTENDED RELEASE ORAL DAILY
Qty: 90 TABLET | Refills: 0 | Status: SHIPPED | OUTPATIENT
Start: 2023-10-09

## 2023-10-09 NOTE — PROGRESS NOTES
Never    Drug use: Not Currently     Types: Opiates , Oxycodone (Oxy)     Comment: 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 10/2017, now takes Suboxone   ,   Family History   Problem Relation Age of Onset    No Known Problems Mother     No Known Problems Father     No Known Problems Brother         Homicide     No Known Problems Daughter     No Known Problems Son     Diabetes type 2  Maternal Grandmother    ,   Immunization History   Administered Date(s) Administered    Hep B, ENGERIX-B, RECOMBIVAX-HB, (age Birth - 22y), IM, 0.5mL 01/08/2002, 02/12/2002, 08/19/2002    Influenza Vaccine, unspecified formulation 10/09/2002    MMR, Rushie Brooklyn, M-M-R II, (age 12m+), SC, 0.5mL 08/19/2002    TDaP, ADACEL (age 6y-58y), Juan Meals (age 10y+), IM, 0.5mL 10/07/2019       PHYSICAL EXAMINATION:  [ INSTRUCTIONS:  \"[x]\" Indicates a positive item  \"[]\" Indicates a negative item  -- DELETE ALL ITEMS NOT EXAMINED]  Vital Signs: (As obtained by patient/caregiver or practitioner observation)    Blood pressure- 101/73   Heart rate- 84     Respiratory rate- NA     Temperature- NA   Pulse oximetry- NA    Constitutional: [x] Appears well-developed and well-nourished [x] No apparent distress        Mental status  [x] Alert and awake       Eyes:  EOM    [x]  Normal    Sclera  [x]  Normal           Discharge [x]  None visible      HENT:   [x] Normocephalic, atraumatic. [x] Mouth/Throat: Mucous membranes are moist.       Neck: [x] No visualized mass     Pulmonary/Chest: [x] Respiratory effort normal.  [x] No visualized signs of difficulty breathing or respiratory distress          Skin:        [x] No significant exanthematous lesions or discoloration noted on facial skin                 Psychiatric:       [x] Normal Affect [x] No Hallucinations            ASSESSMENT/PLAN:  1. Essential hypertension  Stable. Will continue lisinopril 5 mg QD and metoprolol succinate 50 mg QD. Wean off of clonidine. Check labs.    - lisinopril (PRINIVIL;ZESTRIL) 5

## 2023-10-18 LAB — DIABETIC RETINOPATHY: POSITIVE

## 2023-11-13 DIAGNOSIS — Z71.6 ENCOUNTER FOR SMOKING CESSATION COUNSELING: ICD-10-CM

## 2023-11-13 NOTE — TELEPHONE ENCOUNTER
Medication Refill Request    LOV 10/9/2023  NOV Visit date not found    Lab Results   Component Value Date    CREATININE 2.5 (H) 10/06/2023        Glycopyrrolate Pregnancy And Lactation Text: This medication is Pregnancy Category B and is considered safe during pregnancy. It is unknown if it is excreted breast milk.

## 2023-12-29 ENCOUNTER — PATIENT MESSAGE (OUTPATIENT)
Dept: ENDOCRINOLOGY | Age: 33
End: 2023-12-29

## 2024-01-01 ENCOUNTER — HOSPITAL ENCOUNTER (INPATIENT)
Age: 34
LOS: 2 days | Discharge: LEFT BEFORE DISCHARGE | DRG: 420 | End: 2024-01-03
Attending: EMERGENCY MEDICINE | Admitting: INTERNAL MEDICINE
Payer: COMMERCIAL

## 2024-01-01 DIAGNOSIS — E10.10 TYPE 1 DIABETES MELLITUS WITH KETOACIDOSIS WITHOUT COMA (HCC): Primary | ICD-10-CM

## 2024-01-01 PROBLEM — E13.10 DIABETIC KETOACIDOSIS WITHOUT COMA ASSOCIATED WITH OTHER SPECIFIED DIABETES MELLITUS (HCC): Status: ACTIVE | Noted: 2024-01-01

## 2024-01-01 PROBLEM — N17.9 ACUTE KIDNEY INJURY SUPERIMPOSED ON CKD (HCC): Status: ACTIVE | Noted: 2024-01-01

## 2024-01-01 PROBLEM — F19.10 POLYSUBSTANCE ABUSE (HCC): Status: ACTIVE | Noted: 2024-01-01

## 2024-01-01 PROBLEM — N18.9 ACUTE KIDNEY INJURY SUPERIMPOSED ON CKD (HCC): Status: ACTIVE | Noted: 2024-01-01

## 2024-01-01 LAB
ALBUMIN SERPL-MCNC: 4.5 G/DL (ref 3.5–5.2)
ALP SERPL-CCNC: 76 U/L (ref 40–129)
ALT SERPL-CCNC: 12 U/L (ref 0–40)
AMPHET UR QL SCN: NEGATIVE
ANION GAP SERPL CALCULATED.3IONS-SCNC: 21 MMOL/L (ref 7–16)
ANION GAP SERPL CALCULATED.3IONS-SCNC: 25 MMOL/L (ref 7–16)
ANION GAP SERPL CALCULATED.3IONS-SCNC: 30 MMOL/L (ref 7–16)
APAP SERPL-MCNC: <5 UG/ML (ref 10–30)
AST SERPL-CCNC: 15 U/L (ref 0–39)
B-OH-BUTYR SERPL-MCNC: >4.5 MMOL/L (ref 0.02–0.27)
BARBITURATES UR QL SCN: NEGATIVE
BASOPHILS # BLD: 0.06 K/UL (ref 0–0.2)
BASOPHILS NFR BLD: 0 % (ref 0–2)
BENZODIAZ UR QL: NEGATIVE
BILIRUB SERPL-MCNC: 0.3 MG/DL (ref 0–1.2)
BUN SERPL-MCNC: 45 MG/DL (ref 6–20)
BUN SERPL-MCNC: 48 MG/DL (ref 6–20)
BUN SERPL-MCNC: 55 MG/DL (ref 6–20)
BUPRENORPHINE UR QL: POSITIVE
CALCIUM SERPL-MCNC: 8.3 MG/DL (ref 8.6–10.2)
CALCIUM SERPL-MCNC: 8.3 MG/DL (ref 8.6–10.2)
CALCIUM SERPL-MCNC: 9.5 MG/DL (ref 8.6–10.2)
CANNABINOIDS UR QL SCN: NEGATIVE
CHLORIDE SERPL-SCNC: 101 MMOL/L (ref 98–107)
CHLORIDE SERPL-SCNC: 97 MMOL/L (ref 98–107)
CHLORIDE SERPL-SCNC: 99 MMOL/L (ref 98–107)
CHP ED QC CHECK: NORMAL
CHP ED QC CHECK: NORMAL
CO2 SERPL-SCNC: 16 MMOL/L (ref 22–29)
CO2 SERPL-SCNC: 17 MMOL/L (ref 22–29)
CO2 SERPL-SCNC: 21 MMOL/L (ref 22–29)
COCAINE UR QL SCN: NEGATIVE
CREAT SERPL-MCNC: 2.8 MG/DL (ref 0.7–1.2)
CREAT SERPL-MCNC: 2.9 MG/DL (ref 0.7–1.2)
CREAT SERPL-MCNC: 3.5 MG/DL (ref 0.7–1.2)
CREAT UR-MCNC: 86.8 MG/DL (ref 40–278)
EOSINOPHIL # BLD: 0.04 K/UL (ref 0.05–0.5)
EOSINOPHILS RELATIVE PERCENT: 0 % (ref 0–6)
ERYTHROCYTE [DISTWIDTH] IN BLOOD BY AUTOMATED COUNT: 13.7 % (ref 11.5–15)
ETHANOLAMINE SERPL-MCNC: <10 MG/DL
FENTANYL UR QL: POSITIVE
GFR SERPL CREATININE-BSD FRML MDRD: 23 ML/MIN/1.73M2
GFR SERPL CREATININE-BSD FRML MDRD: 29 ML/MIN/1.73M2
GFR SERPL CREATININE-BSD FRML MDRD: 30 ML/MIN/1.73M2
GLUCOSE BLD-MCNC: 133 MG/DL
GLUCOSE BLD-MCNC: 133 MG/DL (ref 74–99)
GLUCOSE BLD-MCNC: 177 MG/DL (ref 74–99)
GLUCOSE BLD-MCNC: 199 MG/DL (ref 74–99)
GLUCOSE BLD-MCNC: 216 MG/DL
GLUCOSE BLD-MCNC: 216 MG/DL (ref 74–99)
GLUCOSE BLD-MCNC: 217 MG/DL (ref 74–99)
GLUCOSE BLD-MCNC: 267 MG/DL (ref 74–99)
GLUCOSE SERPL-MCNC: 139 MG/DL (ref 74–99)
GLUCOSE SERPL-MCNC: 272 MG/DL (ref 74–99)
GLUCOSE SERPL-MCNC: 291 MG/DL (ref 74–99)
HBA1C MFR BLD: 7.5 % (ref 4–5.6)
HCT VFR BLD AUTO: 36.6 % (ref 37–54)
HGB BLD-MCNC: 12.1 G/DL (ref 12.5–16.5)
IMM GRANULOCYTES # BLD AUTO: 0.07 K/UL (ref 0–0.58)
IMM GRANULOCYTES NFR BLD: 1 % (ref 0–5)
LACTATE BLDV-SCNC: 1.2 MMOL/L (ref 0.5–2.2)
LIPASE SERPL-CCNC: 8 U/L (ref 13–60)
LYMPHOCYTES NFR BLD: 1.67 K/UL (ref 1.5–4)
LYMPHOCYTES RELATIVE PERCENT: 12 % (ref 20–42)
MAGNESIUM SERPL-MCNC: 1.7 MG/DL (ref 1.6–2.6)
MAGNESIUM SERPL-MCNC: 1.9 MG/DL (ref 1.6–2.6)
MCH RBC QN AUTO: 28.2 PG (ref 26–35)
MCHC RBC AUTO-ENTMCNC: 33.1 G/DL (ref 32–34.5)
MCV RBC AUTO: 85.3 FL (ref 80–99.9)
METHADONE UR QL: NEGATIVE
MONOCYTES NFR BLD: 0.67 K/UL (ref 0.1–0.95)
MONOCYTES NFR BLD: 5 % (ref 2–12)
NEUTROPHILS NFR BLD: 82 % (ref 43–80)
NEUTS SEG NFR BLD: 11.44 K/UL (ref 1.8–7.3)
OPIATES UR QL SCN: NEGATIVE
OSMOLALITY SERPL: 328 MOSM/KG (ref 285–310)
OXYCODONE UR QL SCN: NEGATIVE
PCP UR QL SCN: NEGATIVE
PH VENOUS: 7.45 (ref 7.35–7.45)
PHOSPHATE SERPL-MCNC: 2.9 MG/DL (ref 2.5–4.5)
PHOSPHATE SERPL-MCNC: 3.5 MG/DL (ref 2.5–4.5)
PLATELET # BLD AUTO: 381 K/UL (ref 130–450)
PMV BLD AUTO: 10 FL (ref 7–12)
POTASSIUM SERPL-SCNC: 3.8 MMOL/L (ref 3.5–5)
POTASSIUM SERPL-SCNC: 4.2 MMOL/L (ref 3.5–5)
POTASSIUM SERPL-SCNC: 4.5 MMOL/L (ref 3.5–5)
PROT SERPL-MCNC: 7.8 G/DL (ref 6.4–8.3)
RBC # BLD AUTO: 4.29 M/UL (ref 3.8–5.8)
SALICYLATES SERPL-MCNC: <0.3 MG/DL (ref 0–30)
SODIUM SERPL-SCNC: 141 MMOL/L (ref 132–146)
SODIUM SERPL-SCNC: 143 MMOL/L (ref 132–146)
SODIUM SERPL-SCNC: 143 MMOL/L (ref 132–146)
SODIUM UR-SCNC: 83 MMOL/L
TEST INFORMATION: ABNORMAL
TOXIC TRICYCLIC SC,BLOOD: NEGATIVE
TSH SERPL DL<=0.05 MIU/L-ACNC: 0.25 UIU/ML (ref 0.27–4.2)
UUN UR-MCNC: 562 MG/DL (ref 800–1666)
WBC OTHER # BLD: 14 K/UL (ref 4.5–11.5)

## 2024-01-01 PROCEDURE — 83690 ASSAY OF LIPASE: CPT

## 2024-01-01 PROCEDURE — 83605 ASSAY OF LACTIC ACID: CPT

## 2024-01-01 PROCEDURE — 93005 ELECTROCARDIOGRAM TRACING: CPT

## 2024-01-01 PROCEDURE — 96361 HYDRATE IV INFUSION ADD-ON: CPT

## 2024-01-01 PROCEDURE — 6370000000 HC RX 637 (ALT 250 FOR IP): Performed by: EMERGENCY MEDICINE

## 2024-01-01 PROCEDURE — 2580000003 HC RX 258: Performed by: EMERGENCY MEDICINE

## 2024-01-01 PROCEDURE — 81001 URINALYSIS AUTO W/SCOPE: CPT

## 2024-01-01 PROCEDURE — 2580000003 HC RX 258: Performed by: INTERNAL MEDICINE

## 2024-01-01 PROCEDURE — 84300 ASSAY OF URINE SODIUM: CPT

## 2024-01-01 PROCEDURE — 80053 COMPREHEN METABOLIC PANEL: CPT

## 2024-01-01 PROCEDURE — 83930 ASSAY OF BLOOD OSMOLALITY: CPT

## 2024-01-01 PROCEDURE — 6360000002 HC RX W HCPCS: Performed by: INTERNAL MEDICINE

## 2024-01-01 PROCEDURE — 99285 EMERGENCY DEPT VISIT HI MDM: CPT

## 2024-01-01 PROCEDURE — 83036 HEMOGLOBIN GLYCOSYLATED A1C: CPT

## 2024-01-01 PROCEDURE — 82962 GLUCOSE BLOOD TEST: CPT

## 2024-01-01 PROCEDURE — 6360000002 HC RX W HCPCS: Performed by: EMERGENCY MEDICINE

## 2024-01-01 PROCEDURE — 6360000002 HC RX W HCPCS

## 2024-01-01 PROCEDURE — 80048 BASIC METABOLIC PNL TOTAL CA: CPT

## 2024-01-01 PROCEDURE — 99223 1ST HOSP IP/OBS HIGH 75: CPT | Performed by: INTERNAL MEDICINE

## 2024-01-01 PROCEDURE — 83735 ASSAY OF MAGNESIUM: CPT

## 2024-01-01 PROCEDURE — 96374 THER/PROPH/DIAG INJ IV PUSH: CPT

## 2024-01-01 PROCEDURE — 85025 COMPLETE CBC W/AUTO DIFF WBC: CPT

## 2024-01-01 PROCEDURE — 2500000003 HC RX 250 WO HCPCS: Performed by: EMERGENCY MEDICINE

## 2024-01-01 PROCEDURE — 82010 KETONE BODYS QUAN: CPT

## 2024-01-01 PROCEDURE — 82570 ASSAY OF URINE CREATININE: CPT

## 2024-01-01 PROCEDURE — G0480 DRUG TEST DEF 1-7 CLASSES: HCPCS

## 2024-01-01 PROCEDURE — 80179 DRUG ASSAY SALICYLATE: CPT

## 2024-01-01 PROCEDURE — 36415 COLL VENOUS BLD VENIPUNCTURE: CPT

## 2024-01-01 PROCEDURE — 2000000000 HC ICU R&B

## 2024-01-01 PROCEDURE — 84540 ASSAY OF URINE/UREA-N: CPT

## 2024-01-01 PROCEDURE — 80307 DRUG TEST PRSMV CHEM ANLYZR: CPT

## 2024-01-01 PROCEDURE — 84443 ASSAY THYROID STIM HORMONE: CPT

## 2024-01-01 PROCEDURE — 6360000002 HC RX W HCPCS: Performed by: STUDENT IN AN ORGANIZED HEALTH CARE EDUCATION/TRAINING PROGRAM

## 2024-01-01 PROCEDURE — 80143 DRUG ASSAY ACETAMINOPHEN: CPT

## 2024-01-01 PROCEDURE — 84100 ASSAY OF PHOSPHORUS: CPT

## 2024-01-01 PROCEDURE — 82800 BLOOD PH: CPT

## 2024-01-01 RX ORDER — FENOFIBRATE 54 MG/1
54 TABLET ORAL DAILY
Status: DISCONTINUED | OUTPATIENT
Start: 2024-01-01 | End: 2024-01-03 | Stop reason: HOSPADM

## 2024-01-01 RX ORDER — HEPARIN SODIUM 10000 [USP'U]/ML
5000 INJECTION, SOLUTION INTRAVENOUS; SUBCUTANEOUS 2 TIMES DAILY
Status: DISCONTINUED | OUTPATIENT
Start: 2024-01-01 | End: 2024-01-03 | Stop reason: HOSPADM

## 2024-01-01 RX ORDER — 0.9 % SODIUM CHLORIDE 0.9 %
1000 INTRAVENOUS SOLUTION INTRAVENOUS ONCE
Status: COMPLETED | OUTPATIENT
Start: 2024-01-01 | End: 2024-01-01

## 2024-01-01 RX ORDER — ASPIRIN 81 MG/1
81 TABLET ORAL DAILY
Status: DISCONTINUED | OUTPATIENT
Start: 2024-01-02 | End: 2024-01-03 | Stop reason: HOSPADM

## 2024-01-01 RX ORDER — FAMOTIDINE 20 MG/1
20 TABLET, FILM COATED ORAL DAILY
Status: DISCONTINUED | OUTPATIENT
Start: 2024-01-02 | End: 2024-01-01

## 2024-01-01 RX ORDER — SODIUM CHLORIDE 0.9 % (FLUSH) 0.9 %
5-40 SYRINGE (ML) INJECTION EVERY 12 HOURS SCHEDULED
Status: DISCONTINUED | OUTPATIENT
Start: 2024-01-01 | End: 2024-01-03 | Stop reason: HOSPADM

## 2024-01-01 RX ORDER — ONDANSETRON 2 MG/ML
4 INJECTION INTRAMUSCULAR; INTRAVENOUS ONCE
Status: COMPLETED | OUTPATIENT
Start: 2024-01-01 | End: 2024-01-01

## 2024-01-01 RX ORDER — THIAMINE HYDROCHLORIDE 100 MG/ML
100 INJECTION, SOLUTION INTRAMUSCULAR; INTRAVENOUS DAILY
Status: DISCONTINUED | OUTPATIENT
Start: 2024-01-01 | End: 2024-01-03 | Stop reason: HOSPADM

## 2024-01-01 RX ORDER — HYDROXYZINE HYDROCHLORIDE 50 MG/ML
25 INJECTION, SOLUTION INTRAMUSCULAR EVERY 8 HOURS PRN
Status: DISCONTINUED | OUTPATIENT
Start: 2024-01-01 | End: 2024-01-03 | Stop reason: HOSPADM

## 2024-01-01 RX ORDER — DEXTROSE MONOHYDRATE, SODIUM CHLORIDE, AND POTASSIUM CHLORIDE 50; 1.49; 4.5 G/1000ML; G/1000ML; G/1000ML
INJECTION, SOLUTION INTRAVENOUS CONTINUOUS PRN
Status: DISCONTINUED | OUTPATIENT
Start: 2024-01-01 | End: 2024-01-02

## 2024-01-01 RX ORDER — METHOCARBAMOL 750 MG/1
750 TABLET, FILM COATED ORAL EVERY 6 HOURS PRN
Status: DISCONTINUED | OUTPATIENT
Start: 2024-01-01 | End: 2024-01-03 | Stop reason: HOSPADM

## 2024-01-01 RX ORDER — MAGNESIUM SULFATE IN WATER 40 MG/ML
2000 INJECTION, SOLUTION INTRAVENOUS PRN
Status: DISCONTINUED | OUTPATIENT
Start: 2024-01-01 | End: 2024-01-03 | Stop reason: HOSPADM

## 2024-01-01 RX ORDER — GABAPENTIN 300 MG/1
300 CAPSULE ORAL 2 TIMES DAILY
Status: DISCONTINUED | OUTPATIENT
Start: 2024-01-01 | End: 2024-01-03 | Stop reason: HOSPADM

## 2024-01-01 RX ORDER — NICOTINE 21 MG/24HR
1 PATCH, TRANSDERMAL 24 HOURS TRANSDERMAL DAILY
Status: DISCONTINUED | OUTPATIENT
Start: 2024-01-01 | End: 2024-01-03 | Stop reason: HOSPADM

## 2024-01-01 RX ORDER — HYDROXYZINE PAMOATE 50 MG/1
50 CAPSULE ORAL EVERY 8 HOURS PRN
Status: DISCONTINUED | OUTPATIENT
Start: 2024-01-01 | End: 2024-01-03 | Stop reason: HOSPADM

## 2024-01-01 RX ORDER — ATORVASTATIN CALCIUM 40 MG/1
80 TABLET, FILM COATED ORAL DAILY
Status: DISCONTINUED | OUTPATIENT
Start: 2024-01-02 | End: 2024-01-03 | Stop reason: HOSPADM

## 2024-01-01 RX ORDER — DEXTROSE MONOHYDRATE 50 MG/ML
INJECTION, SOLUTION INTRAVENOUS CONTINUOUS
Status: DISCONTINUED | OUTPATIENT
Start: 2024-01-01 | End: 2024-01-02

## 2024-01-01 RX ORDER — SODIUM CHLORIDE 0.9 % (FLUSH) 0.9 %
5-40 SYRINGE (ML) INJECTION PRN
Status: DISCONTINUED | OUTPATIENT
Start: 2024-01-01 | End: 2024-01-03 | Stop reason: HOSPADM

## 2024-01-01 RX ORDER — METOPROLOL SUCCINATE 50 MG/1
50 TABLET, EXTENDED RELEASE ORAL DAILY
Status: DISCONTINUED | OUTPATIENT
Start: 2024-01-01 | End: 2024-01-03 | Stop reason: HOSPADM

## 2024-01-01 RX ORDER — POTASSIUM CHLORIDE 7.45 MG/ML
10 INJECTION INTRAVENOUS PRN
Status: DISCONTINUED | OUTPATIENT
Start: 2024-01-01 | End: 2024-01-03 | Stop reason: HOSPADM

## 2024-01-01 RX ORDER — ONDANSETRON 4 MG/1
4 TABLET, ORALLY DISINTEGRATING ORAL EVERY 8 HOURS PRN
Status: DISCONTINUED | OUTPATIENT
Start: 2024-01-01 | End: 2024-01-03 | Stop reason: HOSPADM

## 2024-01-01 RX ORDER — SODIUM CHLORIDE 450 MG/100ML
INJECTION, SOLUTION INTRAVENOUS CONTINUOUS
Status: DISCONTINUED | OUTPATIENT
Start: 2024-01-01 | End: 2024-01-02

## 2024-01-01 RX ORDER — FOLIC ACID 5 MG/ML
1 INJECTION, SOLUTION INTRAMUSCULAR; INTRAVENOUS; SUBCUTANEOUS DAILY
Status: DISCONTINUED | OUTPATIENT
Start: 2024-01-01 | End: 2024-01-03 | Stop reason: HOSPADM

## 2024-01-01 RX ORDER — ACETAMINOPHEN 325 MG/1
650 TABLET ORAL EVERY 6 HOURS PRN
Status: DISCONTINUED | OUTPATIENT
Start: 2024-01-01 | End: 2024-01-03 | Stop reason: HOSPADM

## 2024-01-01 RX ORDER — PROMETHAZINE HYDROCHLORIDE 25 MG/ML
6.25 INJECTION, SOLUTION INTRAMUSCULAR; INTRAVENOUS EVERY 6 HOURS PRN
Status: DISCONTINUED | OUTPATIENT
Start: 2024-01-01 | End: 2024-01-03 | Stop reason: HOSPADM

## 2024-01-01 RX ORDER — LOPERAMIDE HYDROCHLORIDE 2 MG/1
2 CAPSULE ORAL 4 TIMES DAILY PRN
Status: DISCONTINUED | OUTPATIENT
Start: 2024-01-01 | End: 2024-01-03 | Stop reason: HOSPADM

## 2024-01-01 RX ORDER — ACETAMINOPHEN 650 MG/1
650 SUPPOSITORY RECTAL EVERY 6 HOURS PRN
Status: DISCONTINUED | OUTPATIENT
Start: 2024-01-01 | End: 2024-01-03 | Stop reason: HOSPADM

## 2024-01-01 RX ORDER — IPRATROPIUM BROMIDE AND ALBUTEROL SULFATE 2.5; .5 MG/3ML; MG/3ML
1 SOLUTION RESPIRATORY (INHALATION) EVERY 4 HOURS PRN
Status: DISCONTINUED | OUTPATIENT
Start: 2024-01-01 | End: 2024-01-03 | Stop reason: HOSPADM

## 2024-01-01 RX ORDER — LABETALOL HYDROCHLORIDE 5 MG/ML
20 INJECTION, SOLUTION INTRAVENOUS EVERY 4 HOURS PRN
Status: DISCONTINUED | OUTPATIENT
Start: 2024-01-01 | End: 2024-01-03 | Stop reason: HOSPADM

## 2024-01-01 RX ORDER — ONDANSETRON 2 MG/ML
INJECTION INTRAMUSCULAR; INTRAVENOUS
Status: COMPLETED
Start: 2024-01-01 | End: 2024-01-01

## 2024-01-01 RX ORDER — LISINOPRIL 5 MG/1
5 TABLET ORAL DAILY
Status: DISCONTINUED | OUTPATIENT
Start: 2024-01-01 | End: 2024-01-03 | Stop reason: HOSPADM

## 2024-01-01 RX ORDER — SODIUM CHLORIDE 9 MG/ML
INJECTION, SOLUTION INTRAVENOUS PRN
Status: DISCONTINUED | OUTPATIENT
Start: 2024-01-01 | End: 2024-01-03 | Stop reason: HOSPADM

## 2024-01-01 RX ORDER — POLYETHYLENE GLYCOL 3350 17 G/17G
17 POWDER, FOR SOLUTION ORAL DAILY PRN
Status: DISCONTINUED | OUTPATIENT
Start: 2024-01-01 | End: 2024-01-03 | Stop reason: HOSPADM

## 2024-01-01 RX ORDER — ONDANSETRON 2 MG/ML
4 INJECTION INTRAMUSCULAR; INTRAVENOUS EVERY 6 HOURS PRN
Status: DISCONTINUED | OUTPATIENT
Start: 2024-01-01 | End: 2024-01-03 | Stop reason: HOSPADM

## 2024-01-01 RX ADMIN — SODIUM CHLORIDE 0.7 UNITS/HR: 9 INJECTION, SOLUTION INTRAVENOUS at 14:27

## 2024-01-01 RX ADMIN — ONDANSETRON 4 MG: 2 INJECTION INTRAMUSCULAR; INTRAVENOUS at 10:39

## 2024-01-01 RX ADMIN — PROMETHAZINE HYDROCHLORIDE 6.25 MG: 25 INJECTION INTRAMUSCULAR; INTRAVENOUS at 21:41

## 2024-01-01 RX ADMIN — DEXTROSE MONOHYDRATE: 50 INJECTION, SOLUTION INTRAVENOUS at 12:32

## 2024-01-01 RX ADMIN — SODIUM CHLORIDE, PRESERVATIVE FREE 10 ML: 5 INJECTION INTRAVENOUS at 21:00

## 2024-01-01 RX ADMIN — ONDANSETRON 4 MG: 2 INJECTION INTRAMUSCULAR; INTRAVENOUS at 17:40

## 2024-01-01 RX ADMIN — SODIUM CHLORIDE: 9 INJECTION, SOLUTION INTRAVENOUS at 22:54

## 2024-01-01 RX ADMIN — SODIUM CHLORIDE 0.37 UNITS/HR: 9 INJECTION, SOLUTION INTRAVENOUS at 13:11

## 2024-01-01 RX ADMIN — SODIUM CHLORIDE 1000 ML: 9 INJECTION, SOLUTION INTRAVENOUS at 10:07

## 2024-01-01 RX ADMIN — THIAMINE HYDROCHLORIDE 100 MG: 100 INJECTION, SOLUTION INTRAMUSCULAR; INTRAVENOUS at 17:39

## 2024-01-01 RX ADMIN — SODIUM CHLORIDE 1000 ML: 9 INJECTION, SOLUTION INTRAVENOUS at 10:06

## 2024-01-01 RX ADMIN — POTASSIUM CHLORIDE 10 MEQ: 7.46 INJECTION, SOLUTION INTRAVENOUS at 22:54

## 2024-01-01 RX ADMIN — LABETALOL HYDROCHLORIDE 20 MG: 5 INJECTION, SOLUTION INTRAVENOUS at 20:32

## 2024-01-01 RX ADMIN — HYDROMORPHONE HYDROCHLORIDE 0.25 MG: 1 INJECTION, SOLUTION INTRAMUSCULAR; INTRAVENOUS; SUBCUTANEOUS at 21:24

## 2024-01-01 RX ADMIN — POTASSIUM CHLORIDE, DEXTROSE MONOHYDRATE AND SODIUM CHLORIDE: 150; 5; 450 INJECTION, SOLUTION INTRAVENOUS at 17:51

## 2024-01-01 ASSESSMENT — ENCOUNTER SYMPTOMS
RESPIRATORY NEGATIVE: 1
SORE THROAT: 0
COUGH: 0
EYES NEGATIVE: 1
APNEA: 0
VOMITING: 1
NAUSEA: 1
CHEST TIGHTNESS: 0
SINUS PRESSURE: 0
SINUS PAIN: 0
ABDOMINAL PAIN: 1

## 2024-01-01 ASSESSMENT — PAIN DESCRIPTION - LOCATION
LOCATION: ABDOMEN
LOCATION: ABDOMEN;BACK

## 2024-01-01 ASSESSMENT — PAIN DESCRIPTION - PAIN TYPE: TYPE: ACUTE PAIN

## 2024-01-01 ASSESSMENT — PAIN SCALES - GENERAL
PAINLEVEL_OUTOF10: 8
PAINLEVEL_OUTOF10: 2
PAINLEVEL_OUTOF10: 9

## 2024-01-01 ASSESSMENT — PAIN DESCRIPTION - FREQUENCY: FREQUENCY: INTERMITTENT

## 2024-01-01 ASSESSMENT — PAIN DESCRIPTION - DESCRIPTORS: DESCRIPTORS: ACHING

## 2024-01-01 ASSESSMENT — PAIN - FUNCTIONAL ASSESSMENT: PAIN_FUNCTIONAL_ASSESSMENT: NONE - DENIES PAIN

## 2024-01-01 NOTE — H&P
[facility] Hospitalist Group History and Physical      CHIEF COMPLAINT:  had concerns including Emesis (Pts mother called EMS concerned for vomiting, pt has an insulin pump on. . Pt weaning self off of suboxone , last used Friday. ).     HISTORY OF PRESENT ILLNESS:     33-year-old male with medical condition of type 1 diabetes mellitus, on insulin pump, drug abuse on Suboxone, essential hypertension on clonidine 0.3 twice daily, lisinopril 10 mg metoprolol noncompliance presented to the ED with chief concern of nausea vomiting since Friday.  Patient recently stopped his Suboxone. patient was admitted to the inpatient psych care in May 2023 and was discharged on sertraline 25 mg daily.  On arrival patient was tachycardic, hypertensive 190/103, CBC noted for white count of 14 with neutrophilic predominance, lactic acid 1.2, CMP with anion gap of 30, creatinine of 3.5, hydroxybutyrate greater than 4.5.  Patient was started on DKA protocol and admitted to the medical ICU for further care.     Discussed with ED physician     Informant for H&P: Patient    REVIEW OF SYSTEMS:  A comprehensive review of systems was negative except for: what is in the HPI    PHYSICAL EXAM:  Vitals:  BP (!) 165/83   Pulse (!) 113   Temp 97.8 °F (36.6 °C) (Oral)   Resp 16   Ht 1.6 m (5' 3\")   Wt 49.9 kg (110 lb)   SpO2 92%   BMI 19.49 kg/m²     General Appearance: alert and oriented to person, place and time and in no acute distress, lactic  Skin: warm and dry  HEENT: normocephalic and atraumatic, pupils equal, round, and reactive to light, extraocular eye movements intact, conjunctivae normal  Chest: clear to auscultation bilaterally- no wheezes, rales or rhonchi, normal air movement, no respiratory distress  Cardiovascular: normal rate, normal S1 and S2 and no carotid bruits  Abdomen: soft, non-tender, non-distended, normal bowel sounds, no masses or organomegaly  Extremities: no cyanosis, no clubbing and no

## 2024-01-01 NOTE — ED PROVIDER NOTES
Place 1 patch onto the skin daily for 14 days    ONDANSETRON (ZOFRAN-ODT) 4 MG DISINTEGRATING TABLET    Take 1 tablet by mouth 3 times daily as needed for Nausea or Vomiting    ONETOUCH DELICA LANCETS 33G MISC    1 each by Does not apply route 4 times daily (with meals and nightly) To help calibrate pump *may change to insurance preferred supplies*    SUBOXONE 8-2 MG FILM SL FILM    Place 1 Film under the tongue 2 times daily.    SUCRALFATE (CARAFATE) 1 GM TABLET    Take 1 tablet by mouth 4 times daily       ALLERGIES     Chlorthalidone, Hctz [hydrochlorothiazide], and Hydralazine    FAMILYHISTORY       Family History   Problem Relation Age of Onset    No Known Problems Mother     No Known Problems Father     No Known Problems Brother         Homicide     No Known Problems Daughter     No Known Problems Son     Diabetes type 2  Maternal Grandmother         SOCIAL HISTORY       Social History     Tobacco Use    Smoking status: Every Day     Current packs/day: 0.50     Average packs/day: 0.5 packs/day for 15.0 years (7.5 ttl pk-yrs)     Types: Cigarettes    Smokeless tobacco: Never   Vaping Use    Vaping Use: Never used   Substance Use Topics    Alcohol use: Never    Drug use: Not Currently     Types: Opiates , Oxycodone (Oxy)     Comment: 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 10/2017, now takes Suboxone       SCREENINGS                         CIWA Assessment  BP: (!) 175/84  Pulse: (!) 113  Clinical Opiate Withdrawal Scale Score: 0 (01/01/24 0956)        PHYSICAL EXAM  1 or more Elements     ED Triage Vitals [01/01/24 0944]   BP Temp Temp src Pulse Respirations SpO2 Height Weight   (!) 190/103 98 °F (36.7 °C) -- (!) 108 16 98 % -- --           Constitutional/General: Alert and oriented x3  Head: Normocephalic and atraumatic  Eyes: PERRL, EOMI, sclera non icteric  ENT:  Oropharynx clear, handling secretions  Neck: Supple, full ROM, no stridor, no meningeal signs  Respiratory: Lungs clear to auscultation bilaterally,

## 2024-01-02 LAB
ANION GAP SERPL CALCULATED.3IONS-SCNC: 10 MMOL/L (ref 7–16)
ANION GAP SERPL CALCULATED.3IONS-SCNC: 10 MMOL/L (ref 7–16)
ANION GAP SERPL CALCULATED.3IONS-SCNC: 11 MMOL/L (ref 7–16)
ANION GAP SERPL CALCULATED.3IONS-SCNC: 11 MMOL/L (ref 7–16)
ANION GAP SERPL CALCULATED.3IONS-SCNC: 12 MMOL/L (ref 7–16)
ANION GAP SERPL CALCULATED.3IONS-SCNC: 13 MMOL/L (ref 7–16)
ANION GAP SERPL CALCULATED.3IONS-SCNC: 9 MMOL/L (ref 7–16)
BACTERIA URNS QL MICRO: ABNORMAL
BASOPHILS # BLD: 0.04 K/UL (ref 0–0.2)
BASOPHILS NFR BLD: 0 % (ref 0–2)
BILIRUB UR QL STRIP: ABNORMAL
BUN SERPL-MCNC: 22 MG/DL (ref 6–20)
BUN SERPL-MCNC: 24 MG/DL (ref 6–20)
BUN SERPL-MCNC: 25 MG/DL (ref 6–20)
BUN SERPL-MCNC: 27 MG/DL (ref 6–20)
BUN SERPL-MCNC: 28 MG/DL (ref 6–20)
BUN SERPL-MCNC: 35 MG/DL (ref 6–20)
BUN SERPL-MCNC: 36 MG/DL (ref 6–20)
CA-I BLD-SCNC: 1.12 MMOL/L (ref 1.15–1.33)
CALCIUM SERPL-MCNC: 7.2 MG/DL (ref 8.6–10.2)
CALCIUM SERPL-MCNC: 7.9 MG/DL (ref 8.6–10.2)
CALCIUM SERPL-MCNC: 8.2 MG/DL (ref 8.6–10.2)
CALCIUM SERPL-MCNC: 8.3 MG/DL (ref 8.6–10.2)
CALCIUM SERPL-MCNC: 8.4 MG/DL (ref 8.6–10.2)
CHLORIDE SERPL-SCNC: 102 MMOL/L (ref 98–107)
CHLORIDE SERPL-SCNC: 103 MMOL/L (ref 98–107)
CHLORIDE SERPL-SCNC: 104 MMOL/L (ref 98–107)
CHLORIDE SERPL-SCNC: 105 MMOL/L (ref 98–107)
CHLORIDE SERPL-SCNC: 106 MMOL/L (ref 98–107)
CHLORIDE SERPL-SCNC: 108 MMOL/L (ref 98–107)
CHLORIDE SERPL-SCNC: 109 MMOL/L (ref 98–107)
CLARITY UR: CLEAR
CO2 SERPL-SCNC: 19 MMOL/L (ref 22–29)
CO2 SERPL-SCNC: 21 MMOL/L (ref 22–29)
CO2 SERPL-SCNC: 22 MMOL/L (ref 22–29)
CO2 SERPL-SCNC: 22 MMOL/L (ref 22–29)
CO2 SERPL-SCNC: 23 MMOL/L (ref 22–29)
CO2 SERPL-SCNC: 23 MMOL/L (ref 22–29)
CO2 SERPL-SCNC: 24 MMOL/L (ref 22–29)
COLOR UR: YELLOW
CREAT SERPL-MCNC: 2.1 MG/DL (ref 0.7–1.2)
CREAT SERPL-MCNC: 2.3 MG/DL (ref 0.7–1.2)
CREAT SERPL-MCNC: 2.4 MG/DL (ref 0.7–1.2)
CREAT SERPL-MCNC: 2.6 MG/DL (ref 0.7–1.2)
CREAT SERPL-MCNC: 2.6 MG/DL (ref 0.7–1.2)
EKG ATRIAL RATE: 99 BPM
EKG P AXIS: 38 DEGREES
EKG P-R INTERVAL: 134 MS
EKG Q-T INTERVAL: 362 MS
EKG QRS DURATION: 84 MS
EKG QTC CALCULATION (BAZETT): 464 MS
EKG R AXIS: 54 DEGREES
EKG T AXIS: 70 DEGREES
EKG VENTRICULAR RATE: 99 BPM
EOSINOPHIL # BLD: 0.02 K/UL (ref 0.05–0.5)
EOSINOPHILS RELATIVE PERCENT: 0 % (ref 0–6)
ERYTHROCYTE [DISTWIDTH] IN BLOOD BY AUTOMATED COUNT: 13.4 % (ref 11.5–15)
GFR SERPL CREATININE-BSD FRML MDRD: 33 ML/MIN/1.73M2
GFR SERPL CREATININE-BSD FRML MDRD: 33 ML/MIN/1.73M2
GFR SERPL CREATININE-BSD FRML MDRD: 35 ML/MIN/1.73M2
GFR SERPL CREATININE-BSD FRML MDRD: 36 ML/MIN/1.73M2
GFR SERPL CREATININE-BSD FRML MDRD: 37 ML/MIN/1.73M2
GFR SERPL CREATININE-BSD FRML MDRD: 38 ML/MIN/1.73M2
GFR SERPL CREATININE-BSD FRML MDRD: 42 ML/MIN/1.73M2
GLUCOSE BLD-MCNC: 100 MG/DL (ref 74–99)
GLUCOSE BLD-MCNC: 103 MG/DL (ref 74–99)
GLUCOSE BLD-MCNC: 105 MG/DL (ref 74–99)
GLUCOSE BLD-MCNC: 123 MG/DL (ref 74–99)
GLUCOSE BLD-MCNC: 126 MG/DL (ref 74–99)
GLUCOSE BLD-MCNC: 126 MG/DL (ref 74–99)
GLUCOSE BLD-MCNC: 127 MG/DL (ref 74–99)
GLUCOSE BLD-MCNC: 129 MG/DL (ref 74–99)
GLUCOSE BLD-MCNC: 131 MG/DL (ref 74–99)
GLUCOSE BLD-MCNC: 136 MG/DL (ref 74–99)
GLUCOSE BLD-MCNC: 138 MG/DL (ref 74–99)
GLUCOSE BLD-MCNC: 144 MG/DL (ref 74–99)
GLUCOSE BLD-MCNC: 145 MG/DL (ref 74–99)
GLUCOSE BLD-MCNC: 145 MG/DL (ref 74–99)
GLUCOSE BLD-MCNC: 147 MG/DL (ref 74–99)
GLUCOSE BLD-MCNC: 167 MG/DL (ref 74–99)
GLUCOSE BLD-MCNC: 195 MG/DL (ref 74–99)
GLUCOSE BLD-MCNC: 201 MG/DL (ref 74–99)
GLUCOSE BLD-MCNC: 202 MG/DL (ref 74–99)
GLUCOSE BLD-MCNC: 208 MG/DL (ref 74–99)
GLUCOSE BLD-MCNC: 219 MG/DL (ref 74–99)
GLUCOSE BLD-MCNC: 99 MG/DL (ref 74–99)
GLUCOSE SERPL-MCNC: 128 MG/DL (ref 74–99)
GLUCOSE SERPL-MCNC: 130 MG/DL (ref 74–99)
GLUCOSE SERPL-MCNC: 136 MG/DL (ref 74–99)
GLUCOSE SERPL-MCNC: 216 MG/DL (ref 74–99)
GLUCOSE SERPL-MCNC: 235 MG/DL (ref 74–99)
GLUCOSE SERPL-MCNC: 307 MG/DL (ref 74–99)
GLUCOSE SERPL-MCNC: 657 MG/DL (ref 74–99)
GLUCOSE UR STRIP-MCNC: NEGATIVE MG/DL
HCT VFR BLD AUTO: 28.3 % (ref 37–54)
HGB BLD-MCNC: 9.6 G/DL (ref 12.5–16.5)
HGB UR QL STRIP.AUTO: ABNORMAL
IMM GRANULOCYTES # BLD AUTO: 0.06 K/UL (ref 0–0.58)
IMM GRANULOCYTES NFR BLD: 1 % (ref 0–5)
KETONES UR STRIP-MCNC: 40 MG/DL
LEUKOCYTE ESTERASE UR QL STRIP: NEGATIVE
LYMPHOCYTES NFR BLD: 1.84 K/UL (ref 1.5–4)
LYMPHOCYTES RELATIVE PERCENT: 14 % (ref 20–42)
MAGNESIUM SERPL-MCNC: 1.5 MG/DL (ref 1.6–2.6)
MAGNESIUM SERPL-MCNC: 1.6 MG/DL (ref 1.6–2.6)
MAGNESIUM SERPL-MCNC: 2 MG/DL (ref 1.6–2.6)
MAGNESIUM SERPL-MCNC: 2.1 MG/DL (ref 1.6–2.6)
MAGNESIUM SERPL-MCNC: 2.3 MG/DL (ref 1.6–2.6)
MCH RBC QN AUTO: 28.6 PG (ref 26–35)
MCHC RBC AUTO-ENTMCNC: 33.9 G/DL (ref 32–34.5)
MCV RBC AUTO: 84.2 FL (ref 80–99.9)
MONOCYTES NFR BLD: 0.98 K/UL (ref 0.1–0.95)
MONOCYTES NFR BLD: 8 % (ref 2–12)
NEUTROPHILS NFR BLD: 77 % (ref 43–80)
NEUTS SEG NFR BLD: 10.12 K/UL (ref 1.8–7.3)
NITRITE UR QL STRIP: NEGATIVE
PH UR STRIP: 6 [PH] (ref 5–9)
PHOSPHATE SERPL-MCNC: 1.3 MG/DL (ref 2.5–4.5)
PHOSPHATE SERPL-MCNC: 1.5 MG/DL (ref 2.5–4.5)
PHOSPHATE SERPL-MCNC: 2.3 MG/DL (ref 2.5–4.5)
PHOSPHATE SERPL-MCNC: 2.3 MG/DL (ref 2.5–4.5)
PHOSPHATE SERPL-MCNC: 2.9 MG/DL (ref 2.5–4.5)
PLATELET # BLD AUTO: 279 K/UL (ref 130–450)
PMV BLD AUTO: 10 FL (ref 7–12)
POTASSIUM SERPL-SCNC: 3.9 MMOL/L (ref 3.5–5)
POTASSIUM SERPL-SCNC: 4 MMOL/L (ref 3.5–5)
POTASSIUM SERPL-SCNC: 4.4 MMOL/L (ref 3.5–5)
POTASSIUM SERPL-SCNC: 4.4 MMOL/L (ref 3.5–5)
POTASSIUM SERPL-SCNC: 4.8 MMOL/L (ref 3.5–5)
POTASSIUM SERPL-SCNC: 5.1 MMOL/L (ref 3.5–5)
POTASSIUM SERPL-SCNC: 7.1 MMOL/L (ref 3.5–5)
PROCALCITONIN SERPL-MCNC: 0.28 NG/ML (ref 0–0.08)
PROT UR STRIP-MCNC: 100 MG/DL
RBC # BLD AUTO: 3.36 M/UL (ref 3.8–5.8)
RBC #/AREA URNS HPF: ABNORMAL /HPF
SODIUM SERPL-SCNC: 132 MMOL/L (ref 132–146)
SODIUM SERPL-SCNC: 137 MMOL/L (ref 132–146)
SODIUM SERPL-SCNC: 138 MMOL/L (ref 132–146)
SODIUM SERPL-SCNC: 138 MMOL/L (ref 132–146)
SODIUM SERPL-SCNC: 139 MMOL/L (ref 132–146)
SODIUM SERPL-SCNC: 141 MMOL/L (ref 132–146)
SODIUM SERPL-SCNC: 142 MMOL/L (ref 132–146)
SP GR UR STRIP: 1.02 (ref 1–1.03)
T4 FREE SERPL-MCNC: 1.2 NG/DL (ref 0.9–1.7)
TSH SERPL DL<=0.05 MIU/L-ACNC: 0.22 UIU/ML (ref 0.27–4.2)
UROBILINOGEN UR STRIP-ACNC: 0.2 EU/DL (ref 0–1)
WBC #/AREA URNS HPF: ABNORMAL /HPF
WBC OTHER # BLD: 13.1 K/UL (ref 4.5–11.5)

## 2024-01-02 PROCEDURE — 93010 ELECTROCARDIOGRAM REPORT: CPT | Performed by: INTERNAL MEDICINE

## 2024-01-02 PROCEDURE — 6370000000 HC RX 637 (ALT 250 FOR IP): Performed by: STUDENT IN AN ORGANIZED HEALTH CARE EDUCATION/TRAINING PROGRAM

## 2024-01-02 PROCEDURE — 2500000003 HC RX 250 WO HCPCS: Performed by: INTERNAL MEDICINE

## 2024-01-02 PROCEDURE — 80048 BASIC METABOLIC PNL TOTAL CA: CPT

## 2024-01-02 PROCEDURE — 2580000003 HC RX 258: Performed by: INTERNAL MEDICINE

## 2024-01-02 PROCEDURE — 82330 ASSAY OF CALCIUM: CPT

## 2024-01-02 PROCEDURE — 84443 ASSAY THYROID STIM HORMONE: CPT

## 2024-01-02 PROCEDURE — 6360000002 HC RX W HCPCS: Performed by: INTERNAL MEDICINE

## 2024-01-02 PROCEDURE — 85025 COMPLETE CBC W/AUTO DIFF WBC: CPT

## 2024-01-02 PROCEDURE — 84439 ASSAY OF FREE THYROXINE: CPT

## 2024-01-02 PROCEDURE — 82962 GLUCOSE BLOOD TEST: CPT

## 2024-01-02 PROCEDURE — 6370000000 HC RX 637 (ALT 250 FOR IP)

## 2024-01-02 PROCEDURE — 6360000002 HC RX W HCPCS: Performed by: EMERGENCY MEDICINE

## 2024-01-02 PROCEDURE — 6370000000 HC RX 637 (ALT 250 FOR IP): Performed by: INTERNAL MEDICINE

## 2024-01-02 PROCEDURE — 2000000000 HC ICU R&B

## 2024-01-02 PROCEDURE — 84145 PROCALCITONIN (PCT): CPT

## 2024-01-02 PROCEDURE — 2580000003 HC RX 258: Performed by: EMERGENCY MEDICINE

## 2024-01-02 PROCEDURE — 6360000002 HC RX W HCPCS: Performed by: CHIROPRACTOR

## 2024-01-02 PROCEDURE — 6360000002 HC RX W HCPCS

## 2024-01-02 PROCEDURE — 99291 CRITICAL CARE FIRST HOUR: CPT | Performed by: INTERNAL MEDICINE

## 2024-01-02 PROCEDURE — 2500000003 HC RX 250 WO HCPCS: Performed by: EMERGENCY MEDICINE

## 2024-01-02 PROCEDURE — 99232 SBSQ HOSP IP/OBS MODERATE 35: CPT | Performed by: STUDENT IN AN ORGANIZED HEALTH CARE EDUCATION/TRAINING PROGRAM

## 2024-01-02 PROCEDURE — C9113 INJ PANTOPRAZOLE SODIUM, VIA: HCPCS

## 2024-01-02 PROCEDURE — 2580000003 HC RX 258

## 2024-01-02 PROCEDURE — 99222 1ST HOSP IP/OBS MODERATE 55: CPT | Performed by: INTERNAL MEDICINE

## 2024-01-02 PROCEDURE — 84100 ASSAY OF PHOSPHORUS: CPT

## 2024-01-02 PROCEDURE — 36415 COLL VENOUS BLD VENIPUNCTURE: CPT

## 2024-01-02 PROCEDURE — 87040 BLOOD CULTURE FOR BACTERIA: CPT

## 2024-01-02 PROCEDURE — 83735 ASSAY OF MAGNESIUM: CPT

## 2024-01-02 RX ORDER — AMLODIPINE BESYLATE 5 MG/1
5 TABLET ORAL DAILY
Status: DISCONTINUED | OUTPATIENT
Start: 2024-01-02 | End: 2024-01-03 | Stop reason: HOSPADM

## 2024-01-02 RX ORDER — LABETALOL HYDROCHLORIDE 5 MG/ML
10 INJECTION, SOLUTION INTRAVENOUS ONCE
Status: COMPLETED | OUTPATIENT
Start: 2024-01-02 | End: 2024-01-02

## 2024-01-02 RX ORDER — PROCHLORPERAZINE EDISYLATE 5 MG/ML
10 INJECTION INTRAMUSCULAR; INTRAVENOUS ONCE
Status: COMPLETED | OUTPATIENT
Start: 2024-01-02 | End: 2024-01-02

## 2024-01-02 RX ORDER — DEXTROSE AND SODIUM CHLORIDE 5; .45 G/100ML; G/100ML
INJECTION, SOLUTION INTRAVENOUS CONTINUOUS
Status: DISCONTINUED | OUTPATIENT
Start: 2024-01-02 | End: 2024-01-03 | Stop reason: HOSPADM

## 2024-01-02 RX ORDER — DEXTROSE MONOHYDRATE 100 MG/ML
INJECTION, SOLUTION INTRAVENOUS CONTINUOUS PRN
Status: CANCELLED | OUTPATIENT
Start: 2024-01-02

## 2024-01-02 RX ORDER — LABETALOL HYDROCHLORIDE 5 MG/ML
20 INJECTION, SOLUTION INTRAVENOUS ONCE
Status: COMPLETED | OUTPATIENT
Start: 2024-01-02 | End: 2024-01-02

## 2024-01-02 RX ORDER — ENALAPRILAT 1.25 MG/ML
1.25 INJECTION INTRAVENOUS EVERY 6 HOURS PRN
Status: DISCONTINUED | OUTPATIENT
Start: 2024-01-02 | End: 2024-01-03 | Stop reason: HOSPADM

## 2024-01-02 RX ORDER — BUPRENORPHINE HYDROCHLORIDE AND NALOXONE HYDROCHLORIDE DIHYDRATE 8; 2 MG/1; MG/1
1 TABLET SUBLINGUAL 2 TIMES DAILY
Status: DISCONTINUED | OUTPATIENT
Start: 2024-01-02 | End: 2024-01-03 | Stop reason: HOSPADM

## 2024-01-02 RX ORDER — DICYCLOMINE HYDROCHLORIDE 10 MG/ML
10 INJECTION INTRAMUSCULAR ONCE
Status: COMPLETED | OUTPATIENT
Start: 2024-01-02 | End: 2024-01-02

## 2024-01-02 RX ORDER — INSULIN GLARGINE 100 [IU]/ML
8 INJECTION, SOLUTION SUBCUTANEOUS NIGHTLY
Status: DISCONTINUED | OUTPATIENT
Start: 2024-01-02 | End: 2024-01-02

## 2024-01-02 RX ORDER — ROCURONIUM BROMIDE 10 MG/ML
INJECTION, SOLUTION INTRAVENOUS
Status: DISCONTINUED
Start: 2024-01-02 | End: 2024-01-02 | Stop reason: CLARIF

## 2024-01-02 RX ORDER — INSULIN LISPRO 100 [IU]/ML
0-6 INJECTION, SOLUTION INTRAVENOUS; SUBCUTANEOUS
Status: DISCONTINUED | OUTPATIENT
Start: 2024-01-02 | End: 2024-01-02

## 2024-01-02 RX ORDER — ETOMIDATE 2 MG/ML
INJECTION INTRAVENOUS
Status: DISCONTINUED
Start: 2024-01-02 | End: 2024-01-02 | Stop reason: CLARIF

## 2024-01-02 RX ORDER — INSULIN LISPRO 100 [IU]/ML
0-4 INJECTION, SOLUTION INTRAVENOUS; SUBCUTANEOUS NIGHTLY
Status: DISCONTINUED | OUTPATIENT
Start: 2024-01-02 | End: 2024-01-02

## 2024-01-02 RX ADMIN — POTASSIUM CHLORIDE 10 MEQ: 7.46 INJECTION, SOLUTION INTRAVENOUS at 10:41

## 2024-01-02 RX ADMIN — PROMETHAZINE HYDROCHLORIDE 6.25 MG: 25 INJECTION INTRAMUSCULAR; INTRAVENOUS at 10:51

## 2024-01-02 RX ADMIN — ATORVASTATIN CALCIUM 80 MG: 40 TABLET, FILM COATED ORAL at 08:53

## 2024-01-02 RX ADMIN — LABETALOL HYDROCHLORIDE 20 MG: 5 INJECTION, SOLUTION INTRAVENOUS at 17:39

## 2024-01-02 RX ADMIN — ENALAPRILAT 1.25 MG: 1.25 INJECTION INTRAVENOUS at 19:54

## 2024-01-02 RX ADMIN — POTASSIUM CHLORIDE 10 MEQ: 7.46 INJECTION, SOLUTION INTRAVENOUS at 08:45

## 2024-01-02 RX ADMIN — MAGNESIUM SULFATE HEPTAHYDRATE 2000 MG: 40 INJECTION, SOLUTION INTRAVENOUS at 05:35

## 2024-01-02 RX ADMIN — DEXTROSE AND SODIUM CHLORIDE: 5; 450 INJECTION, SOLUTION INTRAVENOUS at 12:07

## 2024-01-02 RX ADMIN — POTASSIUM CHLORIDE 10 MEQ: 7.46 INJECTION, SOLUTION INTRAVENOUS at 01:21

## 2024-01-02 RX ADMIN — HEPARIN SODIUM 5000 UNITS: 10000 INJECTION INTRAVENOUS; SUBCUTANEOUS at 08:55

## 2024-01-02 RX ADMIN — SODIUM PHOSPHATE, MONOBASIC, MONOHYDRATE AND SODIUM PHOSPHATE, DIBASIC, ANHYDROUS 15 MMOL: 142; 276 INJECTION, SOLUTION INTRAVENOUS at 23:27

## 2024-01-02 RX ADMIN — FOLIC ACID 1 MG: 5 INJECTION, SOLUTION INTRAMUSCULAR; INTRAVENOUS; SUBCUTANEOUS at 10:45

## 2024-01-02 RX ADMIN — HEPARIN SODIUM 5000 UNITS: 10000 INJECTION INTRAVENOUS; SUBCUTANEOUS at 20:35

## 2024-01-02 RX ADMIN — DICYCLOMINE HYDROCHLORIDE 10 MG: 10 INJECTION, SOLUTION INTRAMUSCULAR at 02:28

## 2024-01-02 RX ADMIN — POTASSIUM CHLORIDE, DEXTROSE MONOHYDRATE AND SODIUM CHLORIDE: 150; 5; 450 INJECTION, SOLUTION INTRAVENOUS at 08:00

## 2024-01-02 RX ADMIN — HYDROMORPHONE HYDROCHLORIDE 0.5 MG: 1 INJECTION, SOLUTION INTRAMUSCULAR; INTRAVENOUS; SUBCUTANEOUS at 11:11

## 2024-01-02 RX ADMIN — LABETALOL HYDROCHLORIDE 20 MG: 5 INJECTION INTRAVENOUS at 13:20

## 2024-01-02 RX ADMIN — LABETALOL HYDROCHLORIDE 10 MG: 5 INJECTION INTRAVENOUS at 11:11

## 2024-01-02 RX ADMIN — POTASSIUM CHLORIDE 10 MEQ: 7.46 INJECTION, SOLUTION INTRAVENOUS at 00:40

## 2024-01-02 RX ADMIN — BUPRENORPHINE HYDROCHLORIDE AND NALOXONE HYDROCHLORIDE DIHYDRATE 1 TABLET: 8; 2 TABLET SUBLINGUAL at 12:07

## 2024-01-02 RX ADMIN — DEXTROSE AND SODIUM CHLORIDE: 5; 450 INJECTION, SOLUTION INTRAVENOUS at 23:12

## 2024-01-02 RX ADMIN — SODIUM PHOSPHATE, MONOBASIC, MONOHYDRATE AND SODIUM PHOSPHATE, DIBASIC, ANHYDROUS 15 MMOL: 142; 276 INJECTION, SOLUTION INTRAVENOUS at 05:05

## 2024-01-02 RX ADMIN — BENZOCAINE: 200 SPRAY DENTAL; ORAL; PERIODONTAL at 23:24

## 2024-01-02 RX ADMIN — HYDROXYZINE HYDROCHLORIDE 25 MG: 50 INJECTION, SOLUTION INTRAMUSCULAR at 07:43

## 2024-01-02 RX ADMIN — SODIUM CHLORIDE, PRESERVATIVE FREE 10 ML: 5 INJECTION INTRAVENOUS at 19:55

## 2024-01-02 RX ADMIN — POTASSIUM CHLORIDE, DEXTROSE MONOHYDRATE AND SODIUM CHLORIDE: 150; 5; 450 INJECTION, SOLUTION INTRAVENOUS at 01:19

## 2024-01-02 RX ADMIN — SODIUM CHLORIDE, PRESERVATIVE FREE 10 ML: 5 INJECTION INTRAVENOUS at 07:53

## 2024-01-02 RX ADMIN — ONDANSETRON 4 MG: 2 INJECTION INTRAMUSCULAR; INTRAVENOUS at 20:22

## 2024-01-02 RX ADMIN — GABAPENTIN 300 MG: 300 CAPSULE ORAL at 08:54

## 2024-01-02 RX ADMIN — LABETALOL HYDROCHLORIDE 20 MG: 5 INJECTION INTRAVENOUS at 15:18

## 2024-01-02 RX ADMIN — ASPIRIN 81 MG: 81 TABLET, COATED ORAL at 08:54

## 2024-01-02 RX ADMIN — AMLODIPINE BESYLATE 5 MG: 5 TABLET ORAL at 18:38

## 2024-01-02 RX ADMIN — THIAMINE HYDROCHLORIDE 100 MG: 100 INJECTION, SOLUTION INTRAMUSCULAR; INTRAVENOUS at 08:53

## 2024-01-02 RX ADMIN — POTASSIUM CHLORIDE 10 MEQ: 7.46 INJECTION, SOLUTION INTRAVENOUS at 07:50

## 2024-01-02 RX ADMIN — SODIUM CHLORIDE, PRESERVATIVE FREE 40 MG: 5 INJECTION INTRAVENOUS at 08:53

## 2024-01-02 RX ADMIN — BENZOCAINE AND MENTHOL 1 LOZENGE: 15; 3.6 LOZENGE ORAL at 05:56

## 2024-01-02 RX ADMIN — LABETALOL HYDROCHLORIDE 20 MG: 5 INJECTION, SOLUTION INTRAVENOUS at 03:31

## 2024-01-02 RX ADMIN — ONDANSETRON 4 MG: 2 INJECTION INTRAMUSCULAR; INTRAVENOUS at 00:06

## 2024-01-02 RX ADMIN — LABETALOL HYDROCHLORIDE 20 MG: 5 INJECTION, SOLUTION INTRAVENOUS at 07:43

## 2024-01-02 RX ADMIN — METOPROLOL SUCCINATE 50 MG: 50 TABLET, EXTENDED RELEASE ORAL at 08:54

## 2024-01-02 RX ADMIN — POTASSIUM CHLORIDE 10 MEQ: 7.46 INJECTION, SOLUTION INTRAVENOUS at 23:44

## 2024-01-02 RX ADMIN — PROCHLORPERAZINE EDISYLATE 10 MG: 5 INJECTION INTRAMUSCULAR; INTRAVENOUS at 23:15

## 2024-01-02 ASSESSMENT — PAIN SCALES - GENERAL
PAINLEVEL_OUTOF10: 7
PAINLEVEL_OUTOF10: 4
PAINLEVEL_OUTOF10: 0
PAINLEVEL_OUTOF10: 3
PAINLEVEL_OUTOF10: 0
PAINLEVEL_OUTOF10: 0
PAINLEVEL_OUTOF10: 8
PAINLEVEL_OUTOF10: 3
PAINLEVEL_OUTOF10: 2
PAINLEVEL_OUTOF10: 0

## 2024-01-02 ASSESSMENT — PAIN DESCRIPTION - PAIN TYPE
TYPE: ACUTE PAIN

## 2024-01-02 ASSESSMENT — PAIN DESCRIPTION - LOCATION
LOCATION: ABDOMEN
LOCATION: THROAT
LOCATION: THROAT
LOCATION: ABDOMEN
LOCATION: THROAT
LOCATION: ABDOMEN

## 2024-01-02 ASSESSMENT — PAIN DESCRIPTION - FREQUENCY
FREQUENCY: INTERMITTENT

## 2024-01-02 ASSESSMENT — PAIN DESCRIPTION - DESCRIPTORS
DESCRIPTORS: SORE;TENDER
DESCRIPTORS: ACHING;CRAMPING
DESCRIPTORS: SORE
DESCRIPTORS: ACHING;DISCOMFORT;CRAMPING;SHARP
DESCRIPTORS: ACHING;CRAMPING;SHARP

## 2024-01-02 NOTE — CONSULTS
[facility]  MD Vinod Ortega MD Ali Hassan, MD Lisa Kniska, KORI Oconnell CNP  Consultation  Patient's Name: Rodolfo Benjamin  4:19 PM  1/2/2024    Nephrologist: Vinod Nino MD    Reason for Consult:  ALEJANDRO, hyperkalemia   Requesting Physician:  Leonel Barriga MD    Chief Complaint:  Nausea/vomiting     History Obtained From: Patient, chart    History of Present Ilness:         Mr. Benjamin is a pleasant 33 year old gentleman who presented to the emergency room due to nausea, vomiting, and abdominal pain. He was having nausea and vomiting for two days prior to arrival to the emergency room and was unable to tolerate his oral medications or oral intake. He is a type 1 diabetic and utilizes an insulin pump that has been functioning well. His glucose level was normal on presentation to the emergency room, but his anion gap was elevated and he had acidosis along with an elevated beta hydroxybutyrate. Subsequently, he was admitted to the ICU with DKA and started on an insulin drip. He does have a history of substance abuse and is in the process of weaning himself from Suboxone. His last dose was Friday and he felt his symptoms were related to withdrawal, so he took fentanyl to alleviate withdrawal symptoms. Since arrival to the emergency room his blood pressure has been elevated. His past medical history is significant for type 1 diabetes on insulin pump, retinopathy, neuropathy, nephropathy, retinal detachment and vitreous hemorrhage of the right eye, hypertension, dyslipidemia, and unilateral left renal artery stenosis, chronic kidney disease, polysubstance use, asthma, COPD, bipolar affective disorder.          We were consulted for ALEJADNRO and hyperkalemia. His potassium level was reported as 7.1 as of this morning, but the specimen was hemolyzed. Repeat potassium is stable at 4.4. His creatinine level on admission to the hospital was 3.5 
stenosis    Hypercholesteremia     Left renal artery stenosis (HCC) 09/19/2019    Macular edema due to secondary diabetes (HCC)     Opioid dependence (HCC)     Proteinuria     Retinal detachment     right eye    Vitreous hemorrhage of right eye due to diabetes mellitus (HCC)      Past Surgical History:        Procedure Laterality Date    EYE SURGERY Right 11/20/2020    PARS PLANA VITRECTOMY, MEMBRANE PEEL, ENDOLASER, GAS FLUID EXCHANGE, 25G, MAC, RIGHT EYE performed by Jeremiah Rincon MD at [facility] OR    EYE SURGERY Right 01/08/2021    pars plana vitrectomy with peeling of preretinal tissue and gas fluid exchange    EYE SURGERY Right 1/8/2021    PARS PLANA VITRECTOMY, INTERNAL LIMITING MEMBRANE PEEL, INDOCYANINE GREEN, GAS FLUID EXCHANGE, 25g, MAC, RIGHT EYE performed by Jeremiah Rincon MD at [facility] OR    EYE SURGERY Right 03/2021    macular peel-with q 4 week injections     Medications Prior to Admission:    Prior to Admission medications    Medication Sig Start Date End Date Taking? Authorizing Provider   nicotine (NICODERM CQ) 7 MG/24HR Place 1 patch onto the skin daily for 14 days 11/14/23 11/28/23  Corazon Corbin DO   lisinopril (PRINIVIL;ZESTRIL) 5 MG tablet Take 1 tablet by mouth daily 10/9/23   Corazon Corbin DO   metoprolol succinate (TOPROL XL) 50 MG extended release tablet Take 1 tablet by mouth daily 10/9/23   Corazon Corbin DO   sucralfate (CARAFATE) 1 GM tablet Take 1 tablet by mouth 4 times daily 10/6/23   Nilam Kitchen DO   famotidine (PEPCID) 20 MG tablet Take 1 tablet by mouth daily 10/6/23   Nilam Kitchen DO   ondansetron (ZOFRAN-ODT) 4 MG disintegrating tablet Take 1 tablet by mouth 3 times daily as needed for Nausea or Vomiting 10/6/23   Nilam Kitchen DO   atorvastatin (LIPITOR) 80 MG tablet Take 1 tablet by mouth daily 10/5/23   Corazon Corbin DO   gabapentin (NEURONTIN) 300 MG capsule Take 1 capsule by mouth in the morning and at bedtime for 90 
1544  Last data filed at 1/2/2024 1300  Gross per 24 hour   Intake --   Output 2000 ml   Net -2000 ml       Physical examination:  General: awake alert, oriented x3  HEENT: normocephalic non traumatic, no exophthalmos   Neck: supple, No thyroid tenderness,  Pulm: good equal air entry no added sounds  CVS: S1 + S2  Abd: soft lax, no tenderness  Skin: warm, no lesions, no rash. No open wounds, no ulcers   Neuro: CN intact, sensation decreased bilateral , muscle power normal  Psych: normal mood, and affect    Review of Laboratory Data:  I personally reviewed the following labs:   Recent Labs     01/01/24  0959 01/02/24  0436   WBC 14.0* 13.1*   RBC 4.29 3.36*   HGB 12.1* 9.6*   HCT 36.6* 28.3*   MCV 85.3 84.2   MCH 28.2 28.6   MCHC 33.1 33.9   RDW 13.7 13.4    279   MPV 10.0 10.0     Recent Labs     01/01/24  0959 01/01/24  1005 01/02/24  1047 01/02/24  1143 01/02/24  1258      < > 138 132 138   K 4.2   < > 5.1* 7.1* 4.4   CL 97*   < > 105 103 106   CO2 16*   < > 21* 19* 23   BUN 55*   < > 28* 25* 27*   CREATININE 3.5*   < > 2.4* 2.1* 2.4*   GLUCOSE 139*   < > 307* 657* 130*   CALCIUM 9.5   < > 8.2* 7.2* 8.2*   PROT 7.8  --   --   --   --    LABALBU 4.5  --   --   --   --    BILITOT 0.3  --   --   --   --    ALKPHOS 76  --   --   --   --    AST 15  --   --   --   --    ALT 12  --   --   --   --     < > = values in this interval not displayed.     Beta-Hydroxybutyrate   Date Value Ref Range Status   01/01/2024 >4.50 (H) 0.02 - 0.27 mmol/L Final   10/06/2023 1.40 (H) 0.02 - 0.27 mmol/L Final   05/20/2023 >4.50 (H) 0.02 - 0.27 mmol/L Final     Lab Results   Component Value Date/Time    LABA1C 7.5 01/01/2024 02:38 PM    LABA1C 7.6 05/20/2023 11:28 AM    LABA1C 8.6 03/08/2023 12:48 PM     Lab Results   Component Value Date/Time    TSH 0.22 (L) 01/02/2024 04:36 AM    T4FREE 1.2 01/02/2024 02:46 AM    A0TVOTS 5.5 09/16/2017 10:46 AM    FT3 3.7 09/16/2017 10:46 AM    H5GCVGW 151.50 09/16/2017 10:46 AM     Lab

## 2024-01-02 NOTE — CARE COORDINATION
Care Coordination  The patient was admitted after having some emesis at home as he is on an insulin pump . His blood sugar was 156. The patient was weaning himself off suboxone last use was last Friday. The patient has a past medical history  of dm type 1 on an insulin pump. The patient is non complaint .  In the ed his blood pressure was 190/103, wbc of 14, LA 1.2, cmp with ion gap  30, bun 55/ creat 3.5.  Patient was started on DKA protocol and admitted to the medical ICU for further care.  His blood sugar was 139 and his wbc 14.0.   Diabetic ketoacidosis without coma associated with other specified diabetes mellitus . Drug screen Positive for Fentanyl and buprinophine. Endocrinology consulted for pump mgt.. ALEJANDRO  creatinin 3.5 basline of 2. COWS protocol . He is currently npo. Diabetic mgt consulted. He was restarted on suboxone 8-2 mg sl bid, ivf at 150cc hr, Iv Folic acid 1 mg iv q24 hrs, Iv Sodium phosphate  and iv mag prn as needed.  Spoke to the patients mother Alexandrea Cash  and per her her son lives with her in a 1 story home with 2-3 steps to enter. The patient is legally blind and does need some assistance from his mom. No dme at home and his primary care physician is Dr Iniguez and his pharmacy is [facility] pharmacy . His plan is to return home with his mother upon discharge.

## 2024-01-02 NOTE — PLAN OF CARE
Problem: Skin/Tissue Integrity  Goal: Absence of new skin breakdown  Description: 1.  Monitor for areas of redness and/or skin breakdown  2.  Assess vascular access sites hourly  3.  Every 4-6 hours minimum:  Change oxygen saturation probe site  4.  Every 4-6 hours:  If on nasal continuous positive airway pressure, respiratory therapy assess nares and determine need for appliance change or resting period.  1/2/2024 0926 by Thelma Major RN  Outcome: Progressing  1/2/2024 0043 by Lucho Gabriel RN  Outcome: Progressing     Problem: Safety - Adult  Goal: Free from fall injury  1/2/2024 0926 by Thelma Major RN  Outcome: Progressing  1/2/2024 0043 by Lucho Gabriel RN  Outcome: Progressing     Problem: Pain  Goal: Verbalizes/displays adequate comfort level or baseline comfort level  1/2/2024 0926 by Thelma Major RN  Outcome: Progressing  1/2/2024 0043 by Lucho Gabriel RN  Outcome: Progressing     Problem: Gastrointestinal - Adult  Goal: Minimal or absence of nausea and vomiting  1/2/2024 0926 by Thelma Major RN  Outcome: Progressing  1/2/2024 0043 by Lucho Gabriel RN  Outcome: Progressing  Goal: Maintains or returns to baseline bowel function  1/2/2024 0926 by Thelma Major RN  Outcome: Progressing  1/2/2024 0043 by Lucho Gabriel RN  Outcome: Progressing  Goal: Maintains adequate nutritional intake  1/2/2024 0926 by Thelma Major RN  Outcome: Progressing  1/2/2024 0043 by Lucho Gabriel RN  Outcome: Progressing     Problem: Genitourinary - Adult  Goal: Absence of urinary retention  1/2/2024 0926 by Thelma Major RN  Outcome: Progressing  1/2/2024 0043 by Lucho Gabriel RN  Outcome: Progressing     Problem: Infection - Adult  Goal: Absence of infection at discharge  1/2/2024 0926 by Thelma Major RN  Outcome: Progressing  1/2/2024 0043 by Lucho Gabriel RN  Outcome: Progressing  Goal: Absence of infection during hospitalization  1/2/2024 0926

## 2024-01-03 ENCOUNTER — TELEPHONE (OUTPATIENT)
Dept: FAMILY MEDICINE CLINIC | Age: 34
End: 2024-01-03

## 2024-01-03 VITALS
DIASTOLIC BLOOD PRESSURE: 110 MMHG | WEIGHT: 110.23 LBS | OXYGEN SATURATION: 98 % | SYSTOLIC BLOOD PRESSURE: 183 MMHG | TEMPERATURE: 98.7 F | HEART RATE: 93 BPM | HEIGHT: 63 IN | BODY MASS INDEX: 19.53 KG/M2 | RESPIRATION RATE: 15 BRPM

## 2024-01-03 LAB
GLUCOSE BLD-MCNC: 103 MG/DL (ref 74–99)
GLUCOSE BLD-MCNC: 126 MG/DL (ref 74–99)
GLUCOSE BLD-MCNC: 180 MG/DL (ref 74–99)
GLUCOSE BLD-MCNC: 88 MG/DL (ref 74–99)

## 2024-01-03 PROCEDURE — 6360000002 HC RX W HCPCS: Performed by: EMERGENCY MEDICINE

## 2024-01-03 PROCEDURE — 99238 HOSP IP/OBS DSCHRG MGMT 30/<: CPT | Performed by: STUDENT IN AN ORGANIZED HEALTH CARE EDUCATION/TRAINING PROGRAM

## 2024-01-03 PROCEDURE — 82962 GLUCOSE BLOOD TEST: CPT

## 2024-01-03 RX ADMIN — POTASSIUM CHLORIDE 10 MEQ: 7.46 INJECTION, SOLUTION INTRAVENOUS at 00:40

## 2024-01-03 NOTE — PLAN OF CARE
Problem: Chronic Conditions and Co-morbidities  Goal: Patient's chronic conditions and co-morbidity symptoms are monitored and maintained or improved  Outcome: Progressing     Problem: Discharge Planning  Goal: Discharge to home or other facility with appropriate resources  Outcome: Progressing     Problem: Skin/Tissue Integrity  Goal: Absence of new skin breakdown  Description: 1.  Monitor for areas of redness and/or skin breakdown  2.  Assess vascular access sites hourly  3.  Every 4-6 hours minimum:  Change oxygen saturation probe site  4.  Every 4-6 hours:  If on nasal continuous positive airway pressure, respiratory therapy assess nares and determine need for appliance change or resting period.  Outcome: Progressing     Problem: Safety - Adult  Goal: Free from fall injury  Outcome: Progressing     Problem: Pain  Goal: Verbalizes/displays adequate comfort level or baseline comfort level  Outcome: Progressing     Problem: Gastrointestinal - Adult  Goal: Minimal or absence of nausea and vomiting  Outcome: Progressing  Goal: Maintains or returns to baseline bowel function  Outcome: Progressing  Goal: Maintains adequate nutritional intake  Outcome: Progressing     Problem: Genitourinary - Adult  Goal: Absence of urinary retention  Outcome: Progressing     Problem: Infection - Adult  Goal: Absence of infection at discharge  Outcome: Progressing  Goal: Absence of infection during hospitalization  Outcome: Progressing  Goal: Absence of fever/infection during anticipated neutropenic period  Outcome: Progressing     Problem: Metabolic/Fluid and Electrolytes - Adult  Goal: Electrolytes maintained within normal limits  Outcome: Progressing  Goal: Hemodynamic stability and optimal renal function maintained  Outcome: Progressing  Goal: Glucose maintained within prescribed range  Outcome: Progressing     Problem: Hematologic - Adult  Goal: Maintains hematologic stability  Outcome: Progressing     Problem: ABCDS Injury

## 2024-01-03 NOTE — DISCHARGE SUMMARY
Hospitalist Discharge Summary    Patient ID: Rodolfo Benjamin   Patient : 1990  Patient's PCP: Corazon Corbin DO    Admit Date: 2024   Admitting Physician: Ivan Viveros MD    Discharge Date:  1/3/2024   Discharge Physician: Leonel Barriga MD   Discharge Condition: Guarded  Discharge Disposition: Against medical advice       Hospital course in brief:  (Please refer to daily progress notes for a comprehensive review of the hospitalization by requesting medical records)    The patient has decided to leave against medical advice, because of personal reasons.     He has normal mental status and adequate capacity to make medical decisions.    The patient refuses hospital admission and wants to go home.    The risks have been explained to the patient, including worsening illness, chronic pain, permanent disability, and death.      The patient was able to understand and state the risks and benefits of hospital admission.  This was witnessed by nurse  and ICU resident .    He had the opportunity to ask questions about his medical condition.    The patient was treated to the extent that he would allow, and knows that he may return for care at any time.        Consults:   IP CONSULT TO CRITICAL CARE  IP CONSULT TO DIABETES EDUCATOR  IP CONSULT TO INTERNAL MEDICINE  IP CONSULT TO ENDOCRINOLOGY  IP CONSULT TO NEPHROLOGY  IP CONSULT TO ENDOCRINOLOGY    Discharge Diagnoses:  DKA  IDDM      DActivity: activity as tolerated    Significant labs:  CBC:   Recent Labs     24  0959 24  0436   WBC 14.0* 13.1*   RBC 4.29 3.36*   HGB 12.1* 9.6*   HCT 36.6* 28.3*   MCV 85.3 84.2   RDW 13.7 13.4    279     BMP:   Recent Labs     24  1047 24  1143 24  1258 24  1728 24  2045      < > 138 141 142   K 5.1*   < > 4.4 4.4 3.9      < > 106 108* 109*   CO2 21*   < > 23 23 22   BUN 28*   < > 27* 24* 22*   CREATININE 2.4*   < > 2.4* 2.4* 2.3*   MG 2.3  --  2.1  --  2.0

## 2024-01-03 NOTE — PLAN OF CARE
Informed by bedside RN that patient wanted to leave AMA. Spoke with patient at bedside. Advised him that it would be in his best interest to stay in the hospital especially since he is still in DKA and with elevated BP. Discussed risks including possible death. Patient was alert and oriented x3, able to understand consequences. He still wishes to leave AMA. Advised patient to return to ED if with recurrence or progression of symptoms. Requested bedside RN to inform primary attending.     Electronically signed by Marcia Cason MD on 1/3/2024 at 5:13 AM

## 2024-01-03 NOTE — PROGRESS NOTES
[facility]   Department of Internal Medicine   Internal Medicine Residency  MICU Progress Note    Patient:  Rodolfo Benjamin 33 y.o. male   MRN: [id number]       Date of Service: 2024    Allergy: Chlorthalidone, Hctz [hydrochlorothiazide], and Hydralazine    Subjective     Patient was seen and examined this morning at bedside in no acute distress. Overnight, no acute events reported. He is sleeping comfortably in his bed.     Objective     TEMPERATURE:  Current - Temp: 98.6 °F (37 °C); Max - Temp  Av.9 °F (36.6 °C)  Min: 97 °F (36.1 °C)  Max: 98.6 °F (37 °C)  RESPIRATIONS RANGE: Resp  Av.4  Min: 13  Max: 35  PULSE RANGE: Pulse  Av.8  Min: 88  Max: 113  BLOOD PRESSURE RANGE:  Systolic (24hrs), Av , Min:157 , Max:190   ; Diastolic (24hrs), Av, Min:83, Max:103    PULSE OXIMETRY RANGE: SpO2  Av.2 %  Min: 90 %  Max: 98 %    I & O - 24hr:    Intake/Output Summary (Last 24 hours) at 2024 0734  Last data filed at 2024 0600  Gross per 24 hour   Intake --   Output 1200 ml   Net -1200 ml     I/O last 3 completed shifts:  In: -   Out: 1200 [Urine:1000; Emesis/NG output:200] No intake/output data recorded.   Weight change:     Physical Exam:  General Appearance:    Alert, cooperative, no acute distress.   HEENT:    NC/AT, mucous membranes are moist   Neck:   Supple, no jugular venous distention.    Resp:     CTAB, No wheezes, No rhonchi, no use of accessory muscles   Heart:    RRR, S1 and S2 normal, no murmur, rub or gallop.    Abdomen:     Soft, non-tender, non-distended with normal bowel sounds   Extremities:   Atraumatic, no cyanosis or edema   Pulses:  Radial and pedal pulses are intact bilaterally   Neurologic:   AAOx3, No focal motor deficit       Medications     Continuous Infusions:   dextrose 5% and 0.45% NaCl with KCl 20 mEq 150 mL/hr at 24 0119    sodium chloride      insulin 0.1 Units/hr (24 0729)    dextrose Stopped (24 4509)    sodium 
Patient admitted to MICU with the following belongings:  Cell Phone, Cell Phone , Pants, Shirt, and Socks. The following belongings admitted with the patient, None, were sent home with the patient's family.   4 Eyes Skin Assessment     NAME:  Rodolfo Benjamin  YOB: 1990  MEDICAL RECORD NUMBER:  [id number]    The patient is being assessed for  Admission    I agree that at least one RN has performed a thorough Head to Toe Skin Assessment on the patient. ALL assessment sites listed below have been assessed.      Areas assessed by both nurses:    Head, Face, Ears, Shoulders, Back, Chest, Arms, Elbows, Hands, Sacrum. Buttock, Coccyx, Ischium, and Legs. Feet and Heels        Does the Patient have a Wound? No noted wound(s)       Raji Prevention initiated by RN: Yes  Wound Care Orders initiated by RN: No    Pressure Injury (Stage 3,4, Unstageable, DTI, NWPT, and Complex wounds) if present, place Wound referral order by RN under : No    New Ostomies, if present place, Ostomy referral order under : No     Nurse 1 eSignature: Electronically signed by Lucho Gabriel RN on 1/2/24 at 4:05 AM EST    **SHARE this note so that the co-signing nurse can place an eSignature**    Nurse 2 eSignature: {Esignature:[id number]}   
Patient has decided on their own terms to leave AMA. This nurse notified MICU Resident overseeing care of the patient and all other physicians that are consulted. All belongings with patient. IV access removed.   
Patient has met criteria to be bridged from insulin gtt to the patients own insulin pump, however patient states that they cannot tolerate anything by mouth at this time d/t nausea and a sore throat related to emesis. This nurse talked with the medical residents on the plan of care, and at this time we will continue with the insulin gtt until patient able to tolerate by mouth medications/food. New orders placed in order to resolve stated issue. If this issue does not resolve, medical residents verbalized that they will re-assess and change plan of care as appropriate in order to continue management of patients DKA and diabetes care. This nurse educated and updated patient on plan of care at this time, patient was frustrated that we are not bridging him onto his pump, after further education patient was agreeable to plan of care at this time.   
Reason for consult:  DKA    A1C:  7.5% - 1/1/24           Patient states the following concerns/barriers to diabetes self-management:     [] None       [] Medication cost   [] Food cost/availability        [] Reading  [] Hearing   [x] Vision- Legally Blind R eye.                 [] Work    [] Transportation  [] No insurance  [] Physical limitations    [x] Other: Poor dentition         Patient states the following about their diabetes/health:   [x]   Diagnosed Type 1 DM age 9.    [x]   Eats 1 meal per day.  Pt had teeth pulled last February and has struggled eating.  He is waiting for further dental work to replace teeth.  He can only eat soft foods.  Also increased loss of appetite with recent discontinuation of suboxone.  Does not drink sugary beverages.  [x]   Uses guardian CGM with insulin pump.  Also had a glucometer and supplies as a back up. Denies hypoglycemic episodes.   [x]   Uses Medtronic 770G insulin pump at home.  Has supplies and verbalizes understanding of its use.   [x]   Does not exercise  [x]   Has support of Mother who is also a nurse.          Diabetes survival packet provided to:   [x] Patient     [] Other:    Information given:   Definition of diabetes   Target glucose ranges/A1C   Self-monitoring of blood glucose   Prevention/symptoms/treatment of hypo-/hyperglycemia   Medication adherence             The plate method/meal planning guidelines             The benefits of exercise and recommendations             Reducing the risk of chronic complications  DKA and sick day guidelines     Diabetes medications reviewed (use, purpose, action): Reviewed Chart.  Pt states he uses Novolog in pump.  Pt reports understanding of insulin pump and its use.  Reviewed Novolog and it's action/ duration as well as hypoglycemia protocol.              Post-education Assessment  [x]  Attentive to teaching  [x]  Answered questions appropriately when asked   []  Seems able to apply concepts to daily lifestyle  []  
essential hypertension  Secondary to noncompliance  Hold lisinopril  As needed labetalol  Monitor blood pressure     Tobacco use disorder  Nicotine patch           DVT Prophylaxis [] Lovenox, [x]  Heparin, [] SCDs, [] Ambulation   GI Prophylaxis [x] PPI,  [] H2 Blocker,  [] Carafate,  [] Diet/Tube Feeds   Disposition Patient requires continued admission due to pending ICU care, insulin gtt   MDM [] Low, [x] Moderate,[]  High       Medications:  REVIEWED DAILY    Infusion Medications    dextrose 5 % and 0.45 % NaCl 100 mL/hr at 01/02/24 1207    insulin Stopped (01/02/24 1437)    sodium chloride 10 mL/hr at 01/01/24 5634     Scheduled Medications    buprenorphine-naloxone  1 tablet SubLINGual BID    aspirin  81 mg Oral Daily    atorvastatin  80 mg Oral Daily    [Held by provider] fenofibrate  54 mg Oral Daily    gabapentin  300 mg Oral BID    [Held by provider] lisinopril  5 mg Oral Daily    metoprolol succinate  50 mg Oral Daily    sodium chloride flush  5-40 mL IntraVENous 2 times per day    heparin (porcine)  5,000 Units SubCUTAneous BID    thiamine  100 mg IntraVENous Daily    folic acid  1 mg IntraVENous Daily    nicotine  1 patch TransDERmal Daily    pantoprazole (PROTONIX) 40 mg in sodium chloride (PF) 0.9 % 10 mL injection  40 mg IntraVENous Daily     PRN Meds: benzocaine-menthol, dextrose bolus **OR** dextrose bolus, potassium chloride, magnesium sulfate, sodium phosphate 15 mmol in sodium chloride 0.9 % 250 mL IVPB, sodium chloride flush, sodium chloride, ondansetron **OR** ondansetron, polyethylene glycol, acetaminophen **OR** acetaminophen, labetalol, methocarbamol, loperamide, [Held by provider] hydrOXYzine pamoate, promethazine, hydrOXYzine, ipratropium 0.5 mg-albuterol 2.5 mg    Labs:     Recent Labs     01/01/24  0959 01/02/24  0436   WBC 14.0* 13.1*   HGB 12.1* 9.6*   HCT 36.6* 28.3*    279       Recent Labs     01/02/24  0436 01/02/24  1047 01/02/24  1143 01/02/24  1258    138 132 138

## 2024-01-07 LAB
MICROORGANISM SPEC CULT: NORMAL
MICROORGANISM SPEC CULT: NORMAL
SERVICE CMNT-IMP: NORMAL
SERVICE CMNT-IMP: NORMAL
SPECIMEN DESCRIPTION: NORMAL
SPECIMEN DESCRIPTION: NORMAL

## 2024-01-13 DIAGNOSIS — E10.65 UNCONTROLLED TYPE 1 DIABETES MELLITUS WITH HYPERGLYCEMIA (HCC): ICD-10-CM

## 2024-01-13 DIAGNOSIS — E10.40 DIABETIC NEUROPATHY, TYPE I DIABETES MELLITUS (HCC): ICD-10-CM

## 2024-01-13 DIAGNOSIS — I10 ESSENTIAL HYPERTENSION: ICD-10-CM

## 2024-01-13 DIAGNOSIS — Z71.6 ENCOUNTER FOR SMOKING CESSATION COUNSELING: ICD-10-CM

## 2024-01-13 DIAGNOSIS — E78.00 PURE HYPERCHOLESTEROLEMIA: ICD-10-CM

## 2024-01-15 RX ORDER — ASPIRIN 81 MG/1
81 TABLET ORAL DAILY
Qty: 90 TABLET | Refills: 0 | Status: SHIPPED | OUTPATIENT
Start: 2024-01-15

## 2024-01-15 RX ORDER — ATORVASTATIN CALCIUM 80 MG/1
80 TABLET, FILM COATED ORAL DAILY
Qty: 90 TABLET | Refills: 0 | Status: SHIPPED | OUTPATIENT
Start: 2024-01-15

## 2024-01-15 RX ORDER — FENOFIBRATE 145 MG/1
145 TABLET, COATED ORAL DAILY
Qty: 90 TABLET | Refills: 0 | Status: SHIPPED | OUTPATIENT
Start: 2024-01-15

## 2024-01-15 RX ORDER — BLOOD SUGAR DIAGNOSTIC
1 STRIP MISCELLANEOUS DAILY
Qty: 100 EACH | Refills: 3 | OUTPATIENT
Start: 2024-01-15

## 2024-01-15 RX ORDER — LISINOPRIL 5 MG/1
5 TABLET ORAL DAILY
Qty: 90 TABLET | Refills: 0 | Status: SHIPPED | OUTPATIENT
Start: 2024-01-15

## 2024-01-15 RX ORDER — GABAPENTIN 300 MG/1
300 CAPSULE ORAL 2 TIMES DAILY
Qty: 180 CAPSULE | Refills: 0 | Status: SHIPPED | OUTPATIENT
Start: 2024-01-15 | End: 2024-04-14

## 2024-01-15 RX ORDER — METOPROLOL SUCCINATE 50 MG/1
50 TABLET, EXTENDED RELEASE ORAL DAILY
Qty: 90 TABLET | Refills: 0 | Status: SHIPPED | OUTPATIENT
Start: 2024-01-15

## 2024-01-15 NOTE — TELEPHONE ENCOUNTER
Medication Refill Request    LOV 10/9/2023  NOV 2/12/2024    Lab Results   Component Value Date    CREATININE 2.3 (H) 01/02/2024

## 2024-01-17 ENCOUNTER — OFFICE VISIT (OUTPATIENT)
Dept: ENDOCRINOLOGY | Age: 34
End: 2024-01-17

## 2024-01-17 VITALS
HEIGHT: 63 IN | WEIGHT: 108 LBS | SYSTOLIC BLOOD PRESSURE: 137 MMHG | HEART RATE: 80 BPM | OXYGEN SATURATION: 98 % | DIASTOLIC BLOOD PRESSURE: 81 MMHG | BODY MASS INDEX: 19.14 KG/M2

## 2024-01-17 DIAGNOSIS — E10.65 UNCONTROLLED TYPE 1 DIABETES MELLITUS WITH HYPERGLYCEMIA (HCC): Primary | ICD-10-CM

## 2024-01-17 DIAGNOSIS — E55.9 VITAMIN D DEFICIENCY: ICD-10-CM

## 2024-01-17 RX ORDER — BUSPIRONE HYDROCHLORIDE 10 MG/1
TABLET ORAL
COMMUNITY
Start: 2023-05-23

## 2024-01-17 RX ORDER — SERTRALINE HYDROCHLORIDE 25 MG/1
TABLET, FILM COATED ORAL
COMMUNITY
Start: 2023-05-24

## 2024-01-17 NOTE — PROGRESS NOTES
[facility] PHYSICIANS [facility] Department of Endocrinology Diabetes and Metabolism   [address] [place] [id number]   Phone: [phone]  Fax: [phone]    Date of Service: 1/17/2024  Primary Care Physician: Corazon Corbin DO  Referring physician: No ref. provider found  Provider: Kali Elliott MD    Reason for the visit:  DM type 1     History of Present Illness:  The history is provided by the patient. No  was used. Accuracy of the patient data is excellent.  Rodolfo Benjamin is a very pleasant 33 y.o. male seen today for diabetes management     Rodolfo Benjamin was diagnosed with diabetes at age 9 and currently on basal rate 0.6, CR 15, ISF 65, goal 100-150, active insulin time 2:30hr   Uses CGM   Most recent A1c results summarized below  Lab Results   Component Value Date/Time    LABA1C 7.5 01/01/2024 02:38 PM    LABA1C 7.6 05/20/2023 11:28 AM    LABA1C 8.6 03/08/2023 12:48 PM     Patient has had no hypoglycemic episodes   The patient hasn't been mindful of what has been eating and wasn't following diabetes diet    I reviewed current medications and the patient has no issues with diabetes medications  Rodolfo Benjamin is up to date with eye exam. + diabetic retinopathy , receiving injection both sides   The patient performs his own feet care  Microvascular complications:  No Retinopathy, Nephropathy or Neuropathy   Macrovascular complications: no CAD, PVD, or Stroke    PAST MEDICAL HISTORY   Past Medical History:   Diagnosis Date    Asthma     Bipolar affective (HCC)     COPD (chronic obstructive pulmonary disease) (HCC)     Diabetes mellitus type I (HCC)     Diabetic neuropathy (HCC)     Diabetic, retinopathy, proliferative (HCC)     HTN (hypertension)     due to left renal artery stenosis    Hypercholesteremia     Left renal artery stenosis (HCC) 09/19/2019    Macular edema due to secondary diabetes (HCC)     Opioid dependence (HCC)     Proteinuria

## 2024-01-18 NOTE — TELEPHONE ENCOUNTER
From: Rodolfo Benjamin  To: Dr. Kali Elliott  Sent: 12/29/2023 2:29 PM EST  Subject: [employer] request     Hi I tried calling the office and did not get in contact with anyone, but [employer] called me twice now and asked me to get ahold of you gubaltazar because they are trying to replace my transmitter for my pump but they need the prescription filled from you gubaltazar before they can have the [employer] company send it too me, and my transmitter has been acting up this last 2 or 3 weeks now saying my sensor glucose is normal then when I check my blood sugar it is literally almost 100% every single time even though my sensor will say 110 I will check it on my meter and it ends uo being like 240 so I need to get this nee transmitter as soon as possible, of theres anything else i need to do just let ne know they said they sent you marie over whatever papers or whateverr it is they needed, thank you, I appreciate it

## 2024-01-26 ENCOUNTER — HOSPITAL ENCOUNTER (OUTPATIENT)
Age: 34
Discharge: HOME | End: 2024-01-26
Payer: COMMERCIAL

## 2024-01-26 DIAGNOSIS — E10.65 UNCONTROLLED TYPE 1 DIABETES MELLITUS WITH HYPERGLYCEMIA (HCC): ICD-10-CM

## 2024-01-26 LAB
ANION GAP SERPL CALCULATED.3IONS-SCNC: 11 MMOL/L (ref 7–16)
BUN SERPL-MCNC: 34 MG/DL (ref 6–20)
CALCIUM SERPL-MCNC: 9.4 MG/DL (ref 8.6–10.2)
CHLORIDE SERPL-SCNC: 106 MMOL/L (ref 98–107)
CHOLEST SERPL-MCNC: 133 MG/DL
CO2 SERPL-SCNC: 22 MMOL/L (ref 22–29)
CREAT SERPL-MCNC: 3 MG/DL (ref 0.7–1.2)
CREAT UR-MCNC: 125.4 MG/DL (ref 40–278)
GFR SERPL CREATININE-BSD FRML MDRD: 27 ML/MIN/1.73M2
GLUCOSE SERPL-MCNC: 111 MG/DL (ref 74–99)
HDLC SERPL-MCNC: 47 MG/DL
LDLC SERPL CALC-MCNC: 75 MG/DL
MICROALBUMIN UR-MCNC: 1424 MG/L (ref 0–19)
MICROALBUMIN/CREAT UR-RTO: 1135 MCG/MG CREAT (ref 0–30)
POTASSIUM SERPL-SCNC: 6.1 MMOL/L (ref 3.5–5)
SODIUM SERPL-SCNC: 139 MMOL/L (ref 132–146)
TRIGL SERPL-MCNC: 55 MG/DL
TSH SERPL DL<=0.05 MIU/L-ACNC: 1.72 UIU/ML (ref 0.27–4.2)
VLDLC SERPL CALC-MCNC: 11 MG/DL

## 2024-01-26 PROCEDURE — 82043 UR ALBUMIN QUANTITATIVE: CPT

## 2024-01-26 PROCEDURE — 80061 LIPID PANEL: CPT

## 2024-01-26 PROCEDURE — 82570 ASSAY OF URINE CREATININE: CPT

## 2024-01-26 PROCEDURE — 80048 BASIC METABOLIC PNL TOTAL CA: CPT

## 2024-01-26 PROCEDURE — 36415 COLL VENOUS BLD VENIPUNCTURE: CPT

## 2024-01-26 PROCEDURE — 84443 ASSAY THYROID STIM HORMONE: CPT

## 2024-02-11 ENCOUNTER — TELEPHONE (OUTPATIENT)
Dept: ENDOCRINOLOGY | Age: 34
End: 2024-02-11

## 2024-02-12 NOTE — TELEPHONE ENCOUNTER
Notify patient to  All results are very good apart from the urine protein which is high.  Current proteinuria is likely from the underlying chronic kidney disease.  Please continue following with your nephrologist.  Also, controlling diabetes is extremely important to prevent further kidney damage

## 2024-04-03 DIAGNOSIS — E10.65 UNCONTROLLED TYPE 1 DIABETES MELLITUS WITH HYPERGLYCEMIA (HCC): ICD-10-CM

## 2024-04-03 RX ORDER — INSULIN ASPART 100 [IU]/ML
INJECTION, SOLUTION INTRAVENOUS; SUBCUTANEOUS
Qty: 20 ML | Refills: 5 | Status: SHIPPED | OUTPATIENT
Start: 2024-04-03

## 2024-04-24 ENCOUNTER — PATIENT MESSAGE (OUTPATIENT)
Dept: FAMILY MEDICINE CLINIC | Age: 34
End: 2024-04-24

## 2024-04-24 DIAGNOSIS — I10 ESSENTIAL HYPERTENSION: ICD-10-CM

## 2024-04-24 DIAGNOSIS — E78.00 PURE HYPERCHOLESTEROLEMIA: ICD-10-CM

## 2024-04-24 DIAGNOSIS — E10.40 DIABETIC NEUROPATHY, TYPE I DIABETES MELLITUS (HCC): ICD-10-CM

## 2024-04-25 RX ORDER — ATORVASTATIN CALCIUM 80 MG/1
80 TABLET, FILM COATED ORAL DAILY
Qty: 30 TABLET | Refills: 0 | Status: SHIPPED | OUTPATIENT
Start: 2024-04-25

## 2024-04-25 RX ORDER — METOPROLOL SUCCINATE 50 MG/1
50 TABLET, EXTENDED RELEASE ORAL DAILY
Qty: 30 TABLET | Refills: 0 | Status: SHIPPED | OUTPATIENT
Start: 2024-04-25

## 2024-04-25 RX ORDER — GABAPENTIN 300 MG/1
300 CAPSULE ORAL 2 TIMES DAILY
Qty: 60 CAPSULE | Refills: 0 | Status: SHIPPED | OUTPATIENT
Start: 2024-04-25 | End: 2024-05-25

## 2024-04-25 RX ORDER — ASPIRIN 81 MG/1
81 TABLET ORAL DAILY
Qty: 30 TABLET | Refills: 0 | Status: SHIPPED | OUTPATIENT
Start: 2024-04-25

## 2024-04-25 RX ORDER — LISINOPRIL 5 MG/1
5 TABLET ORAL DAILY
Qty: 30 TABLET | Refills: 0 | Status: SHIPPED | OUTPATIENT
Start: 2024-04-25

## 2024-04-25 NOTE — TELEPHONE ENCOUNTER
From: Rodolfo Benjamin  To: Dr. Corazon Corbin  Sent: 4/24/2024 5:54 PM EDT  Subject: Prescriptions Refills    Hi, my pharmacy called me today because I am due to fill  my medications this coming Friday but I do not have any refills on any of them left. So I told him I will contact you guys to call them in or to refill them so I went ahead and scheduled an appointment with Dr Dias for the earliest appointment that was available and that is for May 24th which is a little over a month away, so if there is anyway that you marie could call them in for a 30 days fill until I come in next month? I would appreciate it, or if there is any open video appointments before then I would gladly do that as well, if needed, thank you marie!

## 2024-04-30 ENCOUNTER — HOSPITAL ENCOUNTER (OUTPATIENT)
Age: 34
Discharge: HOME | End: 2024-04-30
Payer: COMMERCIAL

## 2024-04-30 DIAGNOSIS — E10.40 DIABETIC NEUROPATHY, TYPE I DIABETES MELLITUS (HCC): ICD-10-CM

## 2024-04-30 DIAGNOSIS — I10 ESSENTIAL HYPERTENSION: ICD-10-CM

## 2024-04-30 LAB
ALBUMIN SERPL-MCNC: 3.6 G/DL (ref 3.5–5.2)
ALP SERPL-CCNC: 106 U/L (ref 40–129)
ALT SERPL-CCNC: 8 U/L (ref 0–40)
ANION GAP SERPL CALCULATED.3IONS-SCNC: 10 MMOL/L (ref 7–16)
AST SERPL-CCNC: 9 U/L (ref 0–39)
BASOPHILS # BLD: 0.04 K/UL (ref 0–0.2)
BASOPHILS NFR BLD: 0 % (ref 0–2)
BILIRUB SERPL-MCNC: 0.2 MG/DL (ref 0–1.2)
BUN SERPL-MCNC: 38 MG/DL (ref 6–20)
CALCIUM SERPL-MCNC: 8.7 MG/DL (ref 8.6–10.2)
CHLORIDE SERPL-SCNC: 104 MMOL/L (ref 98–107)
CO2 SERPL-SCNC: 21 MMOL/L (ref 22–29)
CREAT SERPL-MCNC: 2.7 MG/DL (ref 0.7–1.2)
CREAT UR-MCNC: 81.3 MG/DL (ref 40–278)
EOSINOPHIL # BLD: 0.31 K/UL (ref 0.05–0.5)
EOSINOPHILS RELATIVE PERCENT: 3 % (ref 0–6)
ERYTHROCYTE [DISTWIDTH] IN BLOOD BY AUTOMATED COUNT: 13.1 % (ref 11.5–15)
GFR SERPL CREATININE-BSD FRML MDRD: 31 ML/MIN/1.73M2
GLUCOSE SERPL-MCNC: 312 MG/DL (ref 74–99)
HBA1C MFR BLD: 7.4 % (ref 4–5.6)
HCT VFR BLD AUTO: 36.4 % (ref 37–54)
HGB BLD-MCNC: 12 G/DL (ref 12.5–16.5)
IMM GRANULOCYTES # BLD AUTO: 0.03 K/UL (ref 0–0.58)
IMM GRANULOCYTES NFR BLD: 0 % (ref 0–5)
LYMPHOCYTES NFR BLD: 1.68 K/UL (ref 1.5–4)
LYMPHOCYTES RELATIVE PERCENT: 14 % (ref 20–42)
MCH RBC QN AUTO: 28.4 PG (ref 26–35)
MCHC RBC AUTO-ENTMCNC: 33 G/DL (ref 32–34.5)
MCV RBC AUTO: 86.3 FL (ref 80–99.9)
MICROALBUMIN UR-MCNC: 1824 MG/L (ref 0–19)
MICROALBUMIN/CREAT UR-RTO: 2243 MCG/MG CREAT (ref 0–30)
MONOCYTES NFR BLD: 0.46 K/UL (ref 0.1–0.95)
MONOCYTES NFR BLD: 4 % (ref 2–12)
NEUTROPHILS NFR BLD: 80 % (ref 43–80)
NEUTS SEG NFR BLD: 9.9 K/UL (ref 1.8–7.3)
PLATELET # BLD AUTO: 308 K/UL (ref 130–450)
PMV BLD AUTO: 10.5 FL (ref 7–12)
POTASSIUM SERPL-SCNC: 6.1 MMOL/L (ref 3.5–5)
PROT SERPL-MCNC: 6.5 G/DL (ref 6.4–8.3)
RBC # BLD AUTO: 4.22 M/UL (ref 3.8–5.8)
SODIUM SERPL-SCNC: 135 MMOL/L (ref 132–146)
WBC OTHER # BLD: 12.4 K/UL (ref 4.5–11.5)

## 2024-04-30 PROCEDURE — 83036 HEMOGLOBIN GLYCOSYLATED A1C: CPT

## 2024-04-30 PROCEDURE — 82570 ASSAY OF URINE CREATININE: CPT

## 2024-04-30 PROCEDURE — 36415 COLL VENOUS BLD VENIPUNCTURE: CPT

## 2024-04-30 PROCEDURE — 84443 ASSAY THYROID STIM HORMONE: CPT

## 2024-04-30 PROCEDURE — 80053 COMPREHEN METABOLIC PANEL: CPT

## 2024-04-30 PROCEDURE — 82043 UR ALBUMIN QUANTITATIVE: CPT

## 2024-04-30 PROCEDURE — 80061 LIPID PANEL: CPT

## 2024-04-30 PROCEDURE — 85025 COMPLETE CBC W/AUTO DIFF WBC: CPT

## 2024-05-01 ENCOUNTER — TELEPHONE (OUTPATIENT)
Dept: FAMILY MEDICINE CLINIC | Age: 34
End: 2024-05-01

## 2024-05-01 DIAGNOSIS — E87.5 HYPERKALEMIA: Primary | ICD-10-CM

## 2024-05-01 DIAGNOSIS — I70.1 RENAL ARTERY STENOSIS (HCC): ICD-10-CM

## 2024-05-01 DIAGNOSIS — E10.22 CKD STAGE 3 DUE TO TYPE 1 DIABETES MELLITUS (HCC): Primary | ICD-10-CM

## 2024-05-01 DIAGNOSIS — N18.30 CKD STAGE 3 DUE TO TYPE 1 DIABETES MELLITUS (HCC): Primary | ICD-10-CM

## 2024-05-01 DIAGNOSIS — E10.21 DIABETIC NEPHROPATHY ASSOCIATED WITH TYPE 1 DIABETES MELLITUS (HCC): ICD-10-CM

## 2024-05-01 PROBLEM — E11.21 DIABETIC NEPHROPATHY (HCC): Status: ACTIVE | Noted: 2024-05-01

## 2024-05-01 PROBLEM — E13.311 MACULAR EDEMA DUE TO SECONDARY DIABETES (HCC): Status: ACTIVE | Noted: 2024-05-01

## 2024-05-01 PROBLEM — E11.40 DIABETIC NEUROPATHY (HCC): Status: ACTIVE | Noted: 2024-05-01

## 2024-05-01 PROBLEM — E10.9 DIABETES MELLITUS TYPE I (HCC): Status: ACTIVE | Noted: 2022-03-19

## 2024-05-01 PROBLEM — E11.3599 DIABETIC, RETINOPATHY, PROLIFERATIVE (HCC): Status: ACTIVE | Noted: 2024-05-01

## 2024-05-01 PROBLEM — E10.29 DM (DIABETES MELLITUS), TYPE 1 WITH RENAL COMPLICATIONS (HCC): Status: ACTIVE | Noted: 2022-03-19

## 2024-05-01 LAB
CHOLEST SERPL-MCNC: 135 MG/DL
HDLC SERPL-MCNC: 31 MG/DL
LDLC SERPL CALC-MCNC: 78 MG/DL
TRIGL SERPL-MCNC: 132 MG/DL
TSH SERPL DL<=0.05 MIU/L-ACNC: 1.32 UIU/ML (ref 0.27–4.2)
VLDLC SERPL CALC-MCNC: 26 MG/DL

## 2024-05-01 NOTE — TELEPHONE ENCOUNTER
PEDIATRIC NEUROLOGY     Name: Donna Abdi  PCP:  No Pcp  ADMISSION DATE:  2021  ATTENDING PHYSICIAN:  Adela Ulloa MD  DATE:  2021    Interval History    • No seizures  • Awake, slightly irritable but consolable      Past Medical History  Patient Active Problem List   Diagnosis   •   infant of 28 completed weeks of gestation   • Twin delivery by    • Respiratory distress of    • At risk for apnea   • Anemia   • Hypernatremia   • Thrombocytopenia (CMS/HCC)   • RDS of    • Hypotension due to hypovolemia   • Elevated serum creatinine   • Metabolic acidosis   • Inguinal hernia s/p Repair bilateral inguinal hernias, R hydrocoelectomy, circumcision on    • Moderate BPD (bronchopulmonary dysplasia)   • Hypertension       Family History  See EMR    Social History  See EMR    Medications  Current Facility-Administered Medications   Medication Dose Route Frequency Provider Last Rate Last Admin   • bacitracin ointment   Topical PRN Elsie Guillen MD   Given at 21 0220   • acetaminophen (TYLENOL) oral suspension 67.52 mg  15 mg/kg (Dosing Weight) Oral Q6H Elsie Guillen MD   67.52 mg at 21 021   • amLODIPine (compounded) oral suspension 0.2 mg  0.2 mg Per NG tube Q12H Elsie Guillen MD   0.2 mg at 21   • albuterol inhaler 1 puff  1 puff Inhalation Q12H Riya Alexis DO   1 puff at 21   • simethicone (MYLICON) 40 MG/0.6ML drops 20 mg  20 mg Oral 4x Daily PRN Ashly Ortega CNP   20 mg at 21 0038   • fluticasone propionate (FLOVENT HFA) 44 MCG/ACT inhaler 1 puff  1 puff Inhalation Q12H Duane D Stich, MD   1 puff at 21   • [Held by provider] pediatric multivitamin (POLY-VI-SOL) oral solution 1 mL  1 mL Oral Q24H Ashly Ortega CNP   1 mL at 21 0742       Allergies  ALLERGIES:  No Known Allergies      Review of Systems   I have reviewed the information as listed in the medical  When I spoke to patient in regard to his BW results, patient asked if a referral can be placed for patient to Nephrology?    Please advise   record, medications, allergies, past medical, surgical, social and family histories were reviewed and updated as appropriate.    Vital signs reviewed  Visit Vitals  BP (!) 111/53 (BP Location: RLE - Right lower extremity)   Pulse 145   Temp 98.4 °F (36.9 °C) (Axillary)   Resp 32   Ht 20.08\" (51 cm)   Wt (!) 4310 g   HC 37.5 cm (14.76\")   SpO2 97%   BMI 16.57 kg/m²       Physical Exam  HC Readings from Last 3 Encounters:   12/19/21 37.5 cm (14.76\") (<1 %, Z= -2.57)*     * Growth percentiles are based on WHO (Boys, 0-2 years) data.      Neurological Exam  Mental Status: Awake, slightly irritable but consolable  Cranial Nerve: no nystagmus, no obvious facial asymmetry, fontanelle appropriate  Motor/Sensory: Withdrawal of both extremities upper and lower extremities spontaneously and in response to touch and tickle  Reflexes: Intact      Impression  Donna is a pleasant 3 month old male 28-week premature infant twin a with a history of multiple medical issues.  Neurology consulted for paroxysmal movements concerning for possible seizure-like activity in the setting apneas, bradycardias and desaturations.     Recommendations  · EEG (10/2): disorganization of the background activity consistent with extreme prematurity.  However convincing epileptiform activity or ongoing electroencephalographic seizure activity was not evident  · Suggest monitoring clinically for seizures   · If clinical seizure activity is noted, we will use phenobarbital loading dose 20 mg/KG/dose intravenously and then consider maintenance phenobarbital.   · In the interim however, since convincing clinical seizures or electroencephalographic evidence of seizures is not evident, it may be prudent to monitor for evolution of symptoms.  · Continue to monitor clinically for seizures.    I have discussed the nature of the difficulties, course, prognosis, plan, recommendations and outcome extensively with the clinical team as well as with mother previously at  the bedside.    We will follow the child clinically.    Thanks for the consultation.    Cecilia Soriano MD  Pediatric Neurology

## 2024-05-10 ENCOUNTER — TELEPHONE (OUTPATIENT)
Dept: FAMILY MEDICINE CLINIC | Age: 34
End: 2024-05-10

## 2024-05-10 DIAGNOSIS — I15.0 RENOVASCULAR HYPERTENSION: Primary | ICD-10-CM

## 2024-05-10 RX ORDER — AMLODIPINE BESYLATE 5 MG/1
5 TABLET ORAL DAILY
Qty: 90 TABLET | Refills: 1 | Status: SHIPPED | OUTPATIENT
Start: 2024-05-10

## 2024-05-10 NOTE — TELEPHONE ENCOUNTER
I ordered a small dose of amlodipine 5 mg QD and sent medication to patient's pharmacy. I know patient has been on this in the past, but given degree of high K+, we cannot use lisinopril. We will try a small dose of amlodipine again and see if he tolerates it better this time.

## 2024-05-10 NOTE — TELEPHONE ENCOUNTER
Spoke with the patient to notify.  He agrees to try Amlodipine 5mg and will use GoodRx coupon.  He is to call and let us know if he has any problems with the Amlodipine.

## 2024-05-10 NOTE — TELEPHONE ENCOUNTER
The patient reports his at-home BP reading today was 170/102.  He has been off Lisinopril for a couple of days (due to hyperpotassemia).

## 2024-05-17 DIAGNOSIS — E78.00 PURE HYPERCHOLESTEROLEMIA: ICD-10-CM

## 2024-05-17 DIAGNOSIS — I10 ESSENTIAL HYPERTENSION: ICD-10-CM

## 2024-05-17 DIAGNOSIS — E10.40 DIABETIC NEUROPATHY, TYPE I DIABETES MELLITUS (HCC): ICD-10-CM

## 2024-05-17 RX ORDER — ATORVASTATIN CALCIUM 80 MG/1
80 TABLET, FILM COATED ORAL DAILY
Qty: 30 TABLET | Refills: 0 | Status: SHIPPED | OUTPATIENT
Start: 2024-05-17

## 2024-05-17 RX ORDER — GABAPENTIN 300 MG/1
300 CAPSULE ORAL 2 TIMES DAILY
Qty: 60 CAPSULE | Refills: 0 | Status: SHIPPED | OUTPATIENT
Start: 2024-05-25 | End: 2024-06-24

## 2024-05-17 RX ORDER — LISINOPRIL 5 MG/1
5 TABLET ORAL DAILY
Qty: 30 TABLET | Refills: 0 | OUTPATIENT
Start: 2024-05-17

## 2024-05-17 RX ORDER — ASPIRIN 81 MG/1
81 TABLET ORAL DAILY
Qty: 30 TABLET | Refills: 0 | Status: SHIPPED | OUTPATIENT
Start: 2024-05-17

## 2024-05-17 NOTE — TELEPHONE ENCOUNTER
Medication Refill Request    LOV 10/9/2023  NOV 6/7/2024    Lab Results   Component Value Date    CREATININE 2.7 (H) 04/30/2024

## 2024-06-05 ENCOUNTER — OFFICE VISIT (OUTPATIENT)
Dept: ENDOCRINOLOGY | Age: 34
End: 2024-06-05
Payer: COMMERCIAL

## 2024-06-05 VITALS
DIASTOLIC BLOOD PRESSURE: 100 MMHG | OXYGEN SATURATION: 99 % | HEART RATE: 73 BPM | SYSTOLIC BLOOD PRESSURE: 164 MMHG | HEIGHT: 63 IN | BODY MASS INDEX: 18.5 KG/M2 | WEIGHT: 104.4 LBS | RESPIRATION RATE: 18 BRPM

## 2024-06-05 DIAGNOSIS — Z91.119 DIETARY NONCOMPLIANCE: ICD-10-CM

## 2024-06-05 DIAGNOSIS — E10.22 TYPE 1 DIABETES MELLITUS WITH STAGE 3B CHRONIC KIDNEY DISEASE (HCC): ICD-10-CM

## 2024-06-05 DIAGNOSIS — N18.32 TYPE 1 DIABETES MELLITUS WITH STAGE 3B CHRONIC KIDNEY DISEASE (HCC): ICD-10-CM

## 2024-06-05 DIAGNOSIS — E10.21 TYPE 1 DIABETES MELLITUS WITH DIABETIC NEPHROPATHY (HCC): ICD-10-CM

## 2024-06-05 DIAGNOSIS — E10.42 TYPE 1 DIABETES MELLITUS WITH DIABETIC POLYNEUROPATHY (HCC): ICD-10-CM

## 2024-06-05 DIAGNOSIS — E10.65 TYPE 1 DIABETES MELLITUS WITH HYPERGLYCEMIA (HCC): Primary | ICD-10-CM

## 2024-06-05 PROCEDURE — 2022F DILAT RTA XM EVC RTNOPTHY: CPT | Performed by: FAMILY MEDICINE

## 2024-06-05 PROCEDURE — 3077F SYST BP >= 140 MM HG: CPT | Performed by: FAMILY MEDICINE

## 2024-06-05 PROCEDURE — 99214 OFFICE O/P EST MOD 30 MIN: CPT | Performed by: FAMILY MEDICINE

## 2024-06-05 PROCEDURE — G8427 DOCREV CUR MEDS BY ELIG CLIN: HCPCS | Performed by: FAMILY MEDICINE

## 2024-06-05 PROCEDURE — 3051F HG A1C>EQUAL 7.0%<8.0%: CPT | Performed by: FAMILY MEDICINE

## 2024-06-05 PROCEDURE — G8419 CALC BMI OUT NRM PARAM NOF/U: HCPCS | Performed by: FAMILY MEDICINE

## 2024-06-05 PROCEDURE — 4004F PT TOBACCO SCREEN RCVD TLK: CPT | Performed by: FAMILY MEDICINE

## 2024-06-05 PROCEDURE — 3080F DIAST BP >= 90 MM HG: CPT | Performed by: FAMILY MEDICINE

## 2024-06-05 PROCEDURE — 95251 CONT GLUC MNTR ANALYSIS I&R: CPT | Performed by: FAMILY MEDICINE

## 2024-06-05 RX ORDER — INSULIN ASPART 100 [IU]/ML
INJECTION, SOLUTION INTRAVENOUS; SUBCUTANEOUS
Qty: 30 ML | Refills: 11 | Status: SHIPPED | OUTPATIENT
Start: 2024-06-05

## 2024-06-05 NOTE — PROGRESS NOTES
Reactions    Chlorthalidone      Vomiting     Hctz [Hydrochlorothiazide] Nausea Only    Hydralazine      Nausea, headache       CURRENT MEDICATIONS   Current Outpatient Medications   Medication Sig Dispense Refill    atorvastatin (LIPITOR) 80 MG tablet Take 1 tablet by mouth daily 30 tablet 0    gabapentin (NEURONTIN) 300 MG capsule Take 1 capsule by mouth in the morning and at bedtime for 30 days. 60 capsule 0    aspirin 81 MG EC tablet Take 1 tablet by mouth daily 30 tablet 0    amLODIPine (NORVASC) 5 MG tablet Take 1 tablet by mouth daily (Patient taking differently: Take 2 tablets by mouth daily) 90 tablet 1    metoprolol succinate (TOPROL XL) 50 MG extended release tablet Take 1 tablet by mouth daily 30 tablet 0    insulin aspart (NOVOLOG) 100 UNIT/ML injection vial Use via insulin pump Max dose 50 units daily 20 mL 5    sertraline (ZOLOFT) 25 MG tablet       busPIRone (BUSPAR) 10 MG tablet       nicotine (NICODERM CQ) 7 MG/24HR Place 1 patch onto the skin daily for 14 days 14 patch 0    sucralfate (CARAFATE) 1 GM tablet Take 1 tablet by mouth 4 times daily 120 tablet 3    famotidine (PEPCID) 20 MG tablet Take 1 tablet by mouth daily 60 tablet 0    ondansetron (ZOFRAN-ODT) 4 MG disintegrating tablet Take 1 tablet by mouth 3 times daily as needed for Nausea or Vomiting 21 tablet 0    Blood Glucose Monitoring Suppl (ONE TOUCH ULTRA 2) w/Device KIT 1 kit by Does not apply route daily *may change to insurance preferred supplies* 1 kit 1    blood glucose test strips (ASCENSIA AUTODISC VI;ONE TOUCH ULTRA TEST VI) strip 1 each by In Vitro route 4 times daily (with meals and nightly) *may change to insurance preferred supplies* 200 each 11    OneTouch Delica Lancets 33G MISC 1 each by Does not apply route 4 times daily (with meals and nightly) To help calibrate pump *may change to insurance preferred supplies* 200 each 11    blood glucose test strips (EXACTECH TEST) strip 1 each by In Vitro route daily As needed. 100

## 2024-06-06 SDOH — ECONOMIC STABILITY: HOUSING INSECURITY
IN THE LAST 12 MONTHS, WAS THERE A TIME WHEN YOU DID NOT HAVE A STEADY PLACE TO SLEEP OR SLEPT IN A SHELTER (INCLUDING NOW)?: PATIENT DECLINED

## 2024-06-06 SDOH — ECONOMIC STABILITY: INCOME INSECURITY: HOW HARD IS IT FOR YOU TO PAY FOR THE VERY BASICS LIKE FOOD, HOUSING, MEDICAL CARE, AND HEATING?: PATIENT DECLINED

## 2024-06-06 SDOH — ECONOMIC STABILITY: TRANSPORTATION INSECURITY
IN THE PAST 12 MONTHS, HAS LACK OF TRANSPORTATION KEPT YOU FROM MEETINGS, WORK, OR FROM GETTING THINGS NEEDED FOR DAILY LIVING?: PATIENT DECLINED

## 2024-06-06 SDOH — ECONOMIC STABILITY: FOOD INSECURITY: WITHIN THE PAST 12 MONTHS, YOU WORRIED THAT YOUR FOOD WOULD RUN OUT BEFORE YOU GOT MONEY TO BUY MORE.: PATIENT DECLINED

## 2024-06-06 SDOH — ECONOMIC STABILITY: FOOD INSECURITY: WITHIN THE PAST 12 MONTHS, THE FOOD YOU BOUGHT JUST DIDN'T LAST AND YOU DIDN'T HAVE MONEY TO GET MORE.: PATIENT DECLINED

## 2024-06-06 ASSESSMENT — PATIENT HEALTH QUESTIONNAIRE - PHQ9
1. LITTLE INTEREST OR PLEASURE IN DOING THINGS: SEVERAL DAYS
5. POOR APPETITE OR OVEREATING: NEARLY EVERY DAY
4. FEELING TIRED OR HAVING LITTLE ENERGY: NEARLY EVERY DAY
10. IF YOU CHECKED OFF ANY PROBLEMS, HOW DIFFICULT HAVE THESE PROBLEMS MADE IT FOR YOU TO DO YOUR WORK, TAKE CARE OF THINGS AT HOME, OR GET ALONG WITH OTHER PEOPLE: VERY DIFFICULT
5. POOR APPETITE OR OVEREATING: NEARLY EVERY DAY
SUM OF ALL RESPONSES TO PHQ QUESTIONS 1-9: 8
3. TROUBLE FALLING OR STAYING ASLEEP: SEVERAL DAYS
7. TROUBLE CONCENTRATING ON THINGS, SUCH AS READING THE NEWSPAPER OR WATCHING TELEVISION: NOT AT ALL
10. IF YOU CHECKED OFF ANY PROBLEMS, HOW DIFFICULT HAVE THESE PROBLEMS MADE IT FOR YOU TO DO YOUR WORK, TAKE CARE OF THINGS AT HOME, OR GET ALONG WITH OTHER PEOPLE: VERY DIFFICULT
SUM OF ALL RESPONSES TO PHQ QUESTIONS 1-9: 8
4. FEELING TIRED OR HAVING LITTLE ENERGY: NEARLY EVERY DAY
SUM OF ALL RESPONSES TO PHQ QUESTIONS 1-9: 8
7. TROUBLE CONCENTRATING ON THINGS, SUCH AS READING THE NEWSPAPER OR WATCHING TELEVISION: NOT AT ALL
6. FEELING BAD ABOUT YOURSELF - OR THAT YOU ARE A FAILURE OR HAVE LET YOURSELF OR YOUR FAMILY DOWN: NOT AT ALL
2. FEELING DOWN, DEPRESSED OR HOPELESS: NOT AT ALL
2. FEELING DOWN, DEPRESSED OR HOPELESS: NOT AT ALL
1. LITTLE INTEREST OR PLEASURE IN DOING THINGS: SEVERAL DAYS
9. THOUGHTS THAT YOU WOULD BE BETTER OFF DEAD, OR OF HURTING YOURSELF: NOT AT ALL
SUM OF ALL RESPONSES TO PHQ9 QUESTIONS 1 & 2: 1
SUM OF ALL RESPONSES TO PHQ QUESTIONS 1-9: 8
3. TROUBLE FALLING OR STAYING ASLEEP: SEVERAL DAYS
SUM OF ALL RESPONSES TO PHQ QUESTIONS 1-9: 8
8. MOVING OR SPEAKING SO SLOWLY THAT OTHER PEOPLE COULD HAVE NOTICED. OR THE OPPOSITE - BEING SO FIDGETY OR RESTLESS THAT YOU HAVE BEEN MOVING AROUND A LOT MORE THAN USUAL: NOT AT ALL
6. FEELING BAD ABOUT YOURSELF - OR THAT YOU ARE A FAILURE OR HAVE LET YOURSELF OR YOUR FAMILY DOWN: NOT AT ALL
9. THOUGHTS THAT YOU WOULD BE BETTER OFF DEAD, OR OF HURTING YOURSELF: NOT AT ALL
8. MOVING OR SPEAKING SO SLOWLY THAT OTHER PEOPLE COULD HAVE NOTICED. OR THE OPPOSITE, BEING SO FIGETY OR RESTLESS THAT YOU HAVE BEEN MOVING AROUND A LOT MORE THAN USUAL: NOT AT ALL

## 2024-06-07 ENCOUNTER — OFFICE VISIT (OUTPATIENT)
Dept: FAMILY MEDICINE CLINIC | Age: 34
End: 2024-06-07
Payer: COMMERCIAL

## 2024-06-07 VITALS
SYSTOLIC BLOOD PRESSURE: 130 MMHG | OXYGEN SATURATION: 99 % | BODY MASS INDEX: 18.53 KG/M2 | TEMPERATURE: 97.9 F | HEART RATE: 72 BPM | WEIGHT: 104.6 LBS | DIASTOLIC BLOOD PRESSURE: 84 MMHG

## 2024-06-07 DIAGNOSIS — R10.84 GENERALIZED ABDOMINAL PAIN: ICD-10-CM

## 2024-06-07 DIAGNOSIS — E87.5 HYPERKALEMIA: ICD-10-CM

## 2024-06-07 DIAGNOSIS — I70.1 LEFT RENAL ARTERY STENOSIS (HCC): ICD-10-CM

## 2024-06-07 DIAGNOSIS — N18.32 STAGE 3B CHRONIC KIDNEY DISEASE (HCC): ICD-10-CM

## 2024-06-07 DIAGNOSIS — F11.20 OPIOID DEPENDENCE, UNCOMPLICATED (HCC): ICD-10-CM

## 2024-06-07 DIAGNOSIS — E10.65 UNCONTROLLED TYPE 1 DIABETES MELLITUS WITH HYPERGLYCEMIA (HCC): Primary | ICD-10-CM

## 2024-06-07 DIAGNOSIS — I15.0 RENOVASCULAR HYPERTENSION: ICD-10-CM

## 2024-06-07 DIAGNOSIS — E10.3513 PROLIFERATIVE DIABETIC RETINOPATHY OF BOTH EYES WITH MACULAR EDEMA ASSOCIATED WITH TYPE 1 DIABETES MELLITUS (HCC): ICD-10-CM

## 2024-06-07 DIAGNOSIS — E10.21 DIABETIC NEPHROPATHY ASSOCIATED WITH TYPE 1 DIABETES MELLITUS (HCC): ICD-10-CM

## 2024-06-07 DIAGNOSIS — E10.40 DIABETIC NEUROPATHY, TYPE I DIABETES MELLITUS (HCC): ICD-10-CM

## 2024-06-07 DIAGNOSIS — D72.829 LEUKOCYTOSIS, UNSPECIFIED TYPE: ICD-10-CM

## 2024-06-07 DIAGNOSIS — F31.70 BIPOLAR DISORDER IN FULL REMISSION, MOST RECENT EPISODE UNSPECIFIED TYPE (HCC): ICD-10-CM

## 2024-06-07 LAB
ANION GAP SERPL CALCULATED.3IONS-SCNC: 13 MMOL/L (ref 7–16)
BASOPHILS ABSOLUTE: 0.11 K/UL (ref 0–0.2)
BASOPHILS RELATIVE PERCENT: 1 % (ref 0–2)
BUN BLDV-MCNC: 24 MG/DL (ref 6–20)
CALCIUM SERPL-MCNC: 9.3 MG/DL (ref 8.6–10.2)
CHLORIDE BLD-SCNC: 104 MMOL/L (ref 98–107)
CO2: 19 MMOL/L (ref 22–29)
CREAT SERPL-MCNC: 2.4 MG/DL (ref 0.7–1.2)
EOSINOPHILS ABSOLUTE: 0.36 K/UL (ref 0.05–0.5)
EOSINOPHILS RELATIVE PERCENT: 3 % (ref 0–6)
GFR, ESTIMATED: 35 ML/MIN/1.73M2
GLUCOSE BLD-MCNC: 148 MG/DL (ref 74–99)
HCT VFR BLD CALC: 39.6 % (ref 37–54)
HEMOGLOBIN: 13.1 G/DL (ref 12.5–16.5)
IMMATURE GRANULOCYTES %: 0 % (ref 0–5)
IMMATURE GRANULOCYTES ABSOLUTE: 0.04 K/UL (ref 0–0.58)
LIPASE: 8 U/L (ref 13–60)
LYMPHOCYTES ABSOLUTE: 2.54 K/UL (ref 1.5–4)
LYMPHOCYTES RELATIVE PERCENT: 20 % (ref 20–42)
MCH RBC QN AUTO: 29 PG (ref 26–35)
MCHC RBC AUTO-ENTMCNC: 33.1 G/DL (ref 32–34.5)
MCV RBC AUTO: 87.6 FL (ref 80–99.9)
MONOCYTES ABSOLUTE: 0.45 K/UL (ref 0.1–0.95)
MONOCYTES RELATIVE PERCENT: 4 % (ref 2–12)
NEUTROPHILS ABSOLUTE: 9.04 K/UL (ref 1.8–7.3)
NEUTROPHILS RELATIVE PERCENT: 72 % (ref 43–80)
PDW BLD-RTO: 13.2 % (ref 11.5–15)
PMV BLD AUTO: 11.5 FL (ref 7–12)
POTASSIUM SERPL-SCNC: 5.2 MMOL/L (ref 3.5–5)
RBC # BLD: 4.52 M/UL (ref 3.8–5.8)
SODIUM BLD-SCNC: 136 MMOL/L (ref 132–146)
WBC # BLD: 12.5 K/UL (ref 4.5–11.5)

## 2024-06-07 PROCEDURE — G2211 COMPLEX E/M VISIT ADD ON: HCPCS | Performed by: FAMILY MEDICINE

## 2024-06-07 PROCEDURE — 3051F HG A1C>EQUAL 7.0%<8.0%: CPT | Performed by: FAMILY MEDICINE

## 2024-06-07 PROCEDURE — 4004F PT TOBACCO SCREEN RCVD TLK: CPT | Performed by: FAMILY MEDICINE

## 2024-06-07 PROCEDURE — G8420 CALC BMI NORM PARAMETERS: HCPCS | Performed by: FAMILY MEDICINE

## 2024-06-07 PROCEDURE — G8427 DOCREV CUR MEDS BY ELIG CLIN: HCPCS | Performed by: FAMILY MEDICINE

## 2024-06-07 PROCEDURE — 99214 OFFICE O/P EST MOD 30 MIN: CPT | Performed by: FAMILY MEDICINE

## 2024-06-07 PROCEDURE — 2022F DILAT RTA XM EVC RTNOPTHY: CPT | Performed by: FAMILY MEDICINE

## 2024-06-07 RX ORDER — GABAPENTIN 300 MG/1
300 CAPSULE ORAL 2 TIMES DAILY
Qty: 180 CAPSULE | Refills: 0 | Status: SHIPPED | OUTPATIENT
Start: 2024-06-07 | End: 2024-09-05

## 2024-06-07 RX ORDER — METOPROLOL SUCCINATE 50 MG/1
50 TABLET, EXTENDED RELEASE ORAL 2 TIMES DAILY
Qty: 180 TABLET | Refills: 0 | Status: SHIPPED | OUTPATIENT
Start: 2024-06-07 | End: 2024-09-05

## 2024-06-07 RX ORDER — ONDANSETRON 4 MG/1
4 TABLET, ORALLY DISINTEGRATING ORAL 3 TIMES DAILY PRN
Qty: 90 TABLET | Refills: 5 | Status: SHIPPED | OUTPATIENT
Start: 2024-06-07

## 2024-06-07 NOTE — PROGRESS NOTES
6/10/2024    Rodolfo Benjamin is a 33 y.o. male here for:    Chief Complaint   Patient presents with    Hypertension    Diabetes     Saw Endocrinology on Wednesday        HPI:  T1DM  - Last HgbA1c= 7.4% on 04/30/2024  - Follows with [facility]. On insulin pump.   - Follows with Ophthalmology, Dr. Rincon, for VEG-F injections for diabetic retinopathy and macular edema. Has upcoming appointment on 06/17/2024. Cannot drive due to poor vision.   - Follows with Podiatry, Dr. Anglin. Last seen on 10/31/2023 for diabetic foot exam.   - On gabapentin 300 mg BID for diabetic neuropathy. Reports compliance with medication. Denies side effects of medication.   - Saw Nephrology, Dr. Jackson, in the past. Has renal left renal artery stenosis and CKD stage III. Has not seen in a very long time.   - Prescribed atorvastatin 80 mg QD. Has not been taking this medication.   - Patient reports abdominal pain and vomiting with any food he eats. Endocrinology wants patient evaluated for gastroparesis.     HTN, left renal artery stenosis, CKD  - On amlodipine 5 mg QD and metoprolol succinate 50 mg BID. Reports compliance with medications. Denies side effects of medications.   - Cannot tolerate ACE inhibitors or ARBs due to hyperkalemia.   - Saw Nephrology, Dr. Jackson, in the past. Having difficulty scheduling an appointment. Has left renal artery stensosis and CKD stage III.     Bipolar affective disorder, opioid use disorder   - Follows with Psychiatry, Dr. Bee, once monthly for Suboxone. Denies relapse.   - Not currently on Psychiatric medications. Denies depression and anxiety. Denies SI and HI.       Current Outpatient Medications   Medication Sig Dispense Refill    ondansetron (ZOFRAN-ODT) 4 MG disintegrating tablet Take 1 tablet by mouth 3 times daily as needed for Nausea or Vomiting 90 tablet 5    metoprolol succinate (TOPROL XL) 50 MG extended release tablet Take 1 tablet by mouth 2 times daily

## 2024-06-10 PROBLEM — T14.91XA SUICIDE ATTEMPT (HCC): Status: RESOLVED | Noted: 2023-05-23 | Resolved: 2024-06-10

## 2024-06-10 PROBLEM — N18.32 STAGE 3B CHRONIC KIDNEY DISEASE (HCC): Status: ACTIVE | Noted: 2024-06-10

## 2024-06-10 PROBLEM — R07.9 CHEST PAIN: Status: RESOLVED | Noted: 2019-04-08 | Resolved: 2024-06-10

## 2024-06-10 PROBLEM — J45.40 MODERATE PERSISTENT ASTHMA WITHOUT COMPLICATION: Status: RESOLVED | Noted: 2020-03-17 | Resolved: 2024-06-10

## 2024-06-10 PROBLEM — F11.20 OPIOID USE DISORDER, SEVERE, ON MAINTENANCE THERAPY (HCC): Status: RESOLVED | Noted: 2019-04-08 | Resolved: 2024-06-10

## 2024-06-10 PROBLEM — R74.8 ELEVATED ALKALINE PHOSPHATASE LEVEL: Status: RESOLVED | Noted: 2019-10-07 | Resolved: 2024-06-10

## 2024-06-10 PROBLEM — F33.2 SEVERE EPISODE OF RECURRENT MAJOR DEPRESSIVE DISORDER, WITHOUT PSYCHOTIC FEATURES (HCC): Status: RESOLVED | Noted: 2023-05-23 | Resolved: 2024-06-10

## 2024-06-10 PROBLEM — E87.5 HYPERKALEMIA: Status: ACTIVE | Noted: 2024-06-10

## 2024-06-10 PROBLEM — T40.411A ACCIDENTAL FENTANYL OVERDOSE (HCC): Status: RESOLVED | Noted: 2023-05-23 | Resolved: 2024-06-10

## 2024-06-10 PROBLEM — T50.904A DRUG OVERDOSE OF UNDETERMINED INTENT, INITIAL ENCOUNTER: Status: RESOLVED | Noted: 2023-05-20 | Resolved: 2024-06-10

## 2024-06-10 PROBLEM — E87.20 LACTIC ACIDOSIS: Status: RESOLVED | Noted: 2019-11-19 | Resolved: 2024-06-10

## 2024-06-10 PROBLEM — N17.9 ACUTE KIDNEY INJURY SUPERIMPOSED ON CKD (HCC): Status: RESOLVED | Noted: 2024-01-01 | Resolved: 2024-06-10

## 2024-06-10 PROBLEM — F19.10 POLYSUBSTANCE ABUSE (HCC): Status: RESOLVED | Noted: 2024-01-01 | Resolved: 2024-06-10

## 2024-06-10 PROBLEM — E10.10 DIABETIC KETOACIDOSIS WITHOUT COMA ASSOCIATED WITH TYPE 1 DIABETES MELLITUS (HCC): Status: RESOLVED | Noted: 2022-11-15 | Resolved: 2024-06-10

## 2024-06-10 PROBLEM — E13.10 DIABETIC KETOACIDOSIS WITHOUT COMA ASSOCIATED WITH OTHER SPECIFIED DIABETES MELLITUS (HCC): Status: RESOLVED | Noted: 2024-01-01 | Resolved: 2024-06-10

## 2024-06-10 PROBLEM — N18.9 ACUTE KIDNEY INJURY SUPERIMPOSED ON CKD (HCC): Status: RESOLVED | Noted: 2024-01-01 | Resolved: 2024-06-10

## 2024-06-10 PROBLEM — Z91.119 DIETARY NONCOMPLIANCE: Status: RESOLVED | Noted: 2023-05-22 | Resolved: 2024-06-10

## 2024-06-10 PROBLEM — I15.0 RENOVASCULAR HYPERTENSION: Status: ACTIVE | Noted: 2024-06-10

## 2024-06-12 DIAGNOSIS — E87.5 HYPERKALEMIA: Primary | ICD-10-CM

## 2024-06-12 PROBLEM — I12.9 HYPERTENSION ASSOCIATED WITH CHRONIC KIDNEY DISEASE DUE TO TYPE 1 DIABETES MELLITUS (HCC): Status: ACTIVE | Noted: 2024-06-10

## 2024-06-12 PROBLEM — E10.22 HYPERTENSION ASSOCIATED WITH CHRONIC KIDNEY DISEASE DUE TO TYPE 1 DIABETES MELLITUS (HCC): Status: ACTIVE | Noted: 2024-06-10

## 2024-06-12 PROBLEM — E11.21 DIABETIC NEPHROPATHY (HCC): Status: RESOLVED | Noted: 2024-05-01 | Resolved: 2024-06-12

## 2024-06-12 PROBLEM — N18.32 TYPE 1 DIABETES MELLITUS WITH STAGE 3B CHRONIC KIDNEY DISEASE (HCC): Status: ACTIVE | Noted: 2017-11-01

## 2024-06-12 PROBLEM — E10.22 TYPE 1 DIABETES MELLITUS WITH STAGE 3B CHRONIC KIDNEY DISEASE (HCC): Status: ACTIVE | Noted: 2017-11-01

## 2024-06-27 ENCOUNTER — TELEPHONE (OUTPATIENT)
Dept: ENDOCRINOLOGY | Age: 34
End: 2024-06-27

## 2024-06-27 NOTE — TELEPHONE ENCOUNTER
----- Message from Rodolfo Benjamin sent at 6/27/2024  1:05 PM EDT -----  Regarding: [employer] uodate  Contact: [phone]  hi, I got an email with videos about the upgrade for my pump that I am supposed to be doing soon and you said to not open those until our appointment on July 10th correct? also [employer] sent me a box with stuff for the upgrade but im not sure if i am I supposed to wait until July 10th to do any of that stuff as well? I just want to make sure I don't show up to the appointment not doing something that im supposed to or not yet, thanks!

## 2024-06-27 NOTE — TELEPHONE ENCOUNTER
I do not have an upcoming appointment with him.  He is likely meeting with [employer] staff on July 10.  He is correct just bring everything to the appointment he does not need to do the upgrade videos.

## 2024-07-09 ENCOUNTER — HOSPITAL ENCOUNTER (OUTPATIENT)
Dept: NUCLEAR MEDICINE | Age: 34
Discharge: HOME | End: 2024-07-09
Attending: FAMILY MEDICINE
Payer: COMMERCIAL

## 2024-07-09 ENCOUNTER — HOSPITAL ENCOUNTER (OUTPATIENT)
Dept: CT IMAGING | Age: 34
Discharge: HOME | End: 2024-07-11
Attending: FAMILY MEDICINE
Payer: COMMERCIAL

## 2024-07-09 DIAGNOSIS — R10.84 GENERALIZED ABDOMINAL PAIN: ICD-10-CM

## 2024-07-09 PROCEDURE — 3430000000 HC RX DIAGNOSTIC RADIOPHARMACEUTICAL: Performed by: RADIOLOGY

## 2024-07-09 PROCEDURE — A9541 TC99M SULFUR COLLOID: HCPCS | Performed by: RADIOLOGY

## 2024-07-09 PROCEDURE — 78264 GASTRIC EMPTYING IMG STUDY: CPT

## 2024-07-09 PROCEDURE — 74176 CT ABD & PELVIS W/O CONTRAST: CPT

## 2024-07-09 RX ADMIN — TECHNETIUM TC 99M SULFUR COLLOID 1 MILLICURIE: KIT at 07:20

## 2024-07-10 DIAGNOSIS — E10.43 DIABETIC GASTROPARESIS ASSOCIATED WITH TYPE 1 DIABETES MELLITUS (HCC): Primary | ICD-10-CM

## 2024-07-10 DIAGNOSIS — K31.84 DIABETIC GASTROPARESIS ASSOCIATED WITH TYPE 1 DIABETES MELLITUS (HCC): Primary | ICD-10-CM

## 2024-07-10 DIAGNOSIS — N32.89 BLADDER WALL THICKENING: Primary | ICD-10-CM

## 2024-07-10 LAB
DIABETIC RETINOPATHY: NORMAL
DIABETIC RETINOPATHY: POSITIVE
DIABETIC RETINOPATHY: POSITIVE

## 2024-07-10 RX ORDER — METOCLOPRAMIDE HYDROCHLORIDE 5 MG/5ML
5 SOLUTION ORAL
Qty: 1800 ML | Refills: 0 | Status: SHIPPED
Start: 2024-07-10 | End: 2024-07-11 | Stop reason: DRUGHIGH

## 2024-07-11 ENCOUNTER — TELEPHONE (OUTPATIENT)
Dept: FAMILY MEDICINE CLINIC | Age: 34
End: 2024-07-11

## 2024-07-11 DIAGNOSIS — K31.84 DIABETIC GASTROPARESIS (HCC): Primary | ICD-10-CM

## 2024-07-11 DIAGNOSIS — E11.43 DIABETIC GASTROPARESIS (HCC): Primary | ICD-10-CM

## 2024-07-11 RX ORDER — METOCLOPRAMIDE 5 MG/1
5 TABLET ORAL 4 TIMES DAILY
Qty: 120 TABLET | Refills: 2 | Status: SHIPPED | OUTPATIENT
Start: 2024-07-11

## 2024-07-11 NOTE — TELEPHONE ENCOUNTER
Patient would like a pill form of the Reglan, he states he can't see the lines on the syringe to dose the liquid.

## 2024-07-22 ENCOUNTER — HOSPITAL ENCOUNTER (OUTPATIENT)
Age: 34
Discharge: HOME | End: 2024-07-22
Payer: COMMERCIAL

## 2024-07-22 DIAGNOSIS — E87.5 HYPERKALEMIA: ICD-10-CM

## 2024-07-22 DIAGNOSIS — N32.89 BLADDER WALL THICKENING: ICD-10-CM

## 2024-07-22 LAB
25(OH)D3 SERPL-MCNC: 22.6 NG/ML (ref 30–100)
ALBUMIN SERPL-MCNC: 4.1 G/DL (ref 3.5–5.2)
ALBUMIN SERPL-MCNC: 4.1 G/DL (ref 3.5–5.2)
ALP SERPL-CCNC: 95 U/L (ref 40–129)
ALP SERPL-CCNC: 96 U/L (ref 40–129)
ALT SERPL-CCNC: 8 U/L (ref 0–40)
ALT SERPL-CCNC: 8 U/L (ref 0–40)
ANION GAP SERPL CALCULATED.3IONS-SCNC: 11 MMOL/L (ref 7–16)
ANION GAP SERPL CALCULATED.3IONS-SCNC: 11 MMOL/L (ref 7–16)
AST SERPL-CCNC: 13 U/L (ref 0–39)
AST SERPL-CCNC: 9 U/L (ref 0–39)
BILIRUB SERPL-MCNC: 0.3 MG/DL (ref 0–1.2)
BILIRUB SERPL-MCNC: 0.3 MG/DL (ref 0–1.2)
BILIRUB UR QL STRIP: NEGATIVE
BUN SERPL-MCNC: 49 MG/DL (ref 6–20)
BUN SERPL-MCNC: 49 MG/DL (ref 6–20)
CALCIUM SERPL-MCNC: 9.7 MG/DL (ref 8.6–10.2)
CALCIUM SERPL-MCNC: 9.7 MG/DL (ref 8.6–10.2)
CHLORIDE SERPL-SCNC: 104 MMOL/L (ref 98–107)
CHLORIDE SERPL-SCNC: 104 MMOL/L (ref 98–107)
CHOLEST SERPL-MCNC: 163 MG/DL
CLARITY UR: CLEAR
CO2 SERPL-SCNC: 23 MMOL/L (ref 22–29)
CO2 SERPL-SCNC: 23 MMOL/L (ref 22–29)
COLOR UR: YELLOW
CREAT SERPL-MCNC: 2.8 MG/DL (ref 0.7–1.2)
CREAT SERPL-MCNC: 2.8 MG/DL (ref 0.7–1.2)
ERYTHROCYTE [DISTWIDTH] IN BLOOD BY AUTOMATED COUNT: 13.2 % (ref 11.5–15)
GFR, ESTIMATED: 29 ML/MIN/1.73M2
GFR, ESTIMATED: 30 ML/MIN/1.73M2
GLUCOSE SERPL-MCNC: 122 MG/DL (ref 74–99)
GLUCOSE SERPL-MCNC: 123 MG/DL (ref 74–99)
GLUCOSE UR STRIP-MCNC: NEGATIVE MG/DL
HBA1C MFR BLD: 7.8 % (ref 4–5.6)
HCT VFR BLD AUTO: 35.3 % (ref 37–54)
HDLC SERPL-MCNC: 32 MG/DL
HGB BLD-MCNC: 11.7 G/DL (ref 12.5–16.5)
HGB UR QL STRIP.AUTO: NEGATIVE
KETONES UR STRIP-MCNC: NEGATIVE MG/DL
LDLC SERPL CALC-MCNC: 103 MG/DL
LEUKOCYTE ESTERASE UR QL STRIP: NEGATIVE
MCH RBC QN AUTO: 27.9 PG (ref 26–35)
MCHC RBC AUTO-ENTMCNC: 33.1 G/DL (ref 32–34.5)
MCV RBC AUTO: 84.2 FL (ref 80–99.9)
NITRITE UR QL STRIP: NEGATIVE
PH UR STRIP: 7 [PH] (ref 5–9)
PHOSPHATE SERPL-MCNC: 5.4 MG/DL (ref 2.5–4.5)
PLATELET # BLD AUTO: 307 K/UL (ref 130–450)
PMV BLD AUTO: 10.7 FL (ref 7–12)
POTASSIUM SERPL-SCNC: 5.3 MMOL/L (ref 3.5–5)
POTASSIUM SERPL-SCNC: 5.4 MMOL/L (ref 3.5–5)
PROT SERPL-MCNC: 7 G/DL (ref 6.4–8.3)
PROT SERPL-MCNC: 7.1 G/DL (ref 6.4–8.3)
PROT UR STRIP-MCNC: 100 MG/DL
PTH-INTACT SERPL-MCNC: 127.3 PG/ML (ref 15–65)
RBC # BLD AUTO: 4.19 M/UL (ref 3.8–5.8)
RBC #/AREA URNS HPF: ABNORMAL /HPF
SODIUM SERPL-SCNC: 138 MMOL/L (ref 132–146)
SODIUM SERPL-SCNC: 138 MMOL/L (ref 132–146)
SP GR UR STRIP: 1.01 (ref 1–1.03)
TRIGL SERPL-MCNC: 141 MG/DL
UROBILINOGEN UR STRIP-ACNC: 0.2 EU/DL (ref 0–1)
VLDLC SERPL CALC-MCNC: 28 MG/DL
WBC #/AREA URNS HPF: ABNORMAL /HPF
WBC OTHER # BLD: 9.7 K/UL (ref 4.5–11.5)

## 2024-07-22 PROCEDURE — 36415 COLL VENOUS BLD VENIPUNCTURE: CPT

## 2024-07-22 PROCEDURE — 80053 COMPREHEN METABOLIC PANEL: CPT

## 2024-07-22 PROCEDURE — 80061 LIPID PANEL: CPT

## 2024-07-22 PROCEDURE — 83036 HEMOGLOBIN GLYCOSYLATED A1C: CPT

## 2024-07-22 PROCEDURE — 84100 ASSAY OF PHOSPHORUS: CPT

## 2024-07-22 PROCEDURE — 82306 VITAMIN D 25 HYDROXY: CPT

## 2024-07-22 PROCEDURE — 85027 COMPLETE CBC AUTOMATED: CPT

## 2024-07-22 PROCEDURE — 83970 ASSAY OF PARATHORMONE: CPT

## 2024-07-22 PROCEDURE — 81001 URINALYSIS AUTO W/SCOPE: CPT

## 2024-07-24 PROBLEM — E87.5 CHRONIC HYPERKALEMIA: Status: ACTIVE | Noted: 2024-07-24

## 2024-07-24 PROBLEM — D63.8 ANEMIA OF CHRONIC DISEASE: Status: ACTIVE | Noted: 2024-07-24

## 2024-07-24 RX ORDER — LOSARTAN POTASSIUM 25 MG/1
25 TABLET ORAL DAILY
COMMUNITY
Start: 2024-07-15 | End: 2025-07-15

## 2024-07-24 RX ORDER — FUROSEMIDE 40 MG/1
40 TABLET ORAL DAILY
COMMUNITY
Start: 2024-07-15

## 2024-07-24 RX ORDER — SODIUM BICARBONATE 650 MG/1
1300 TABLET ORAL 3 TIMES DAILY
COMMUNITY
Start: 2024-07-15 | End: 2025-07-15

## 2024-07-26 ENCOUNTER — HOSPITAL ENCOUNTER (OUTPATIENT)
Dept: ULTRASOUND IMAGING | Age: 34
End: 2024-07-26
Attending: INTERNAL MEDICINE
Payer: COMMERCIAL

## 2024-07-26 DIAGNOSIS — R80.1 PERSISTENT PROTEINURIA, UNSPECIFIED: ICD-10-CM

## 2024-07-26 DIAGNOSIS — N18.32 CHRONIC KIDNEY DISEASE, STAGE 3B (HCC): ICD-10-CM

## 2024-07-26 DIAGNOSIS — E87.22 CHRONIC METABOLIC ACIDOSIS: ICD-10-CM

## 2024-07-26 DIAGNOSIS — I12.9 HYPERTENSIVE CHRONIC KIDNEY DISEASE WITH STAGE 1 THROUGH STAGE 4 CHRONIC KIDNEY DISEASE, OR UNSPECIFIED CHRONIC KIDNEY DISEASE: ICD-10-CM

## 2024-07-26 DIAGNOSIS — N28.9 KIDNEY DISEASE: ICD-10-CM

## 2024-07-26 PROCEDURE — 76770 US EXAM ABDO BACK WALL COMP: CPT

## 2024-07-26 PROCEDURE — 76775 US EXAM ABDO BACK WALL LIM: CPT | Performed by: INTERNAL MEDICINE

## 2024-07-29 ENCOUNTER — HOSPITAL ENCOUNTER (OUTPATIENT)
Age: 34
Discharge: HOME | End: 2024-07-29
Payer: COMMERCIAL

## 2024-07-29 LAB
ANION GAP SERPL CALCULATED.3IONS-SCNC: 14 MMOL/L (ref 7–16)
BUN SERPL-MCNC: 54 MG/DL (ref 6–20)
CALCIUM SERPL-MCNC: 10 MG/DL (ref 8.6–10.2)
CHLORIDE SERPL-SCNC: 103 MMOL/L (ref 98–107)
CO2 SERPL-SCNC: 24 MMOL/L (ref 22–29)
CREAT SERPL-MCNC: 3.3 MG/DL (ref 0.7–1.2)
GFR, ESTIMATED: 25 ML/MIN/1.73M2
GLUCOSE SERPL-MCNC: 190 MG/DL (ref 74–99)
POTASSIUM SERPL-SCNC: 6.2 MMOL/L (ref 3.5–5)
SODIUM SERPL-SCNC: 141 MMOL/L (ref 132–146)

## 2024-07-29 PROCEDURE — 80048 BASIC METABOLIC PNL TOTAL CA: CPT

## 2024-07-29 PROCEDURE — 36415 COLL VENOUS BLD VENIPUNCTURE: CPT

## 2024-07-30 ENCOUNTER — TELEPHONE (OUTPATIENT)
Dept: ENDOCRINOLOGY | Age: 34
End: 2024-07-30

## 2024-07-30 RX ORDER — INSULIN LISPRO 100 [IU]/ML
100 INJECTION, SOLUTION INTRAVENOUS; SUBCUTANEOUS DAILY
Qty: 90 ML | Refills: 0 | Status: SHIPPED | OUTPATIENT
Start: 2024-07-30

## 2024-08-01 ENCOUNTER — HOSPITAL ENCOUNTER (OUTPATIENT)
Age: 34
Discharge: HOME | End: 2024-08-01
Payer: COMMERCIAL

## 2024-08-01 LAB
ANION GAP SERPL CALCULATED.3IONS-SCNC: 14 MMOL/L (ref 7–16)
BUN SERPL-MCNC: 65 MG/DL (ref 6–20)
CALCIUM SERPL-MCNC: 9.6 MG/DL (ref 8.6–10.2)
CHLORIDE SERPL-SCNC: 101 MMOL/L (ref 98–107)
CO2 SERPL-SCNC: 24 MMOL/L (ref 22–29)
CREAT SERPL-MCNC: 3.5 MG/DL (ref 0.7–1.2)
GFR, ESTIMATED: 22 ML/MIN/1.73M2
GLUCOSE SERPL-MCNC: 166 MG/DL (ref 74–99)
POTASSIUM SERPL-SCNC: 5.7 MMOL/L (ref 3.5–5)
SODIUM SERPL-SCNC: 139 MMOL/L (ref 132–146)

## 2024-08-01 PROCEDURE — 36415 COLL VENOUS BLD VENIPUNCTURE: CPT

## 2024-08-01 PROCEDURE — 80048 BASIC METABOLIC PNL TOTAL CA: CPT

## 2024-08-05 ENCOUNTER — HOSPITAL ENCOUNTER (OUTPATIENT)
Age: 34
Discharge: HOME | End: 2024-08-05
Payer: COMMERCIAL

## 2024-08-05 LAB
ALBUMIN SERPL-MCNC: 4 G/DL (ref 3.5–5.2)
ALP SERPL-CCNC: 98 U/L (ref 40–129)
ALT SERPL-CCNC: 10 U/L (ref 0–40)
ANION GAP SERPL CALCULATED.3IONS-SCNC: 11 MMOL/L (ref 7–16)
AST SERPL-CCNC: 12 U/L (ref 0–39)
BILIRUB SERPL-MCNC: 0.2 MG/DL (ref 0–1.2)
BUN SERPL-MCNC: 63 MG/DL (ref 6–20)
CALCIUM SERPL-MCNC: 8.8 MG/DL (ref 8.6–10.2)
CHLORIDE SERPL-SCNC: 104 MMOL/L (ref 98–107)
CO2 SERPL-SCNC: 25 MMOL/L (ref 22–29)
CREAT SERPL-MCNC: 3.8 MG/DL (ref 0.7–1.2)
GFR, ESTIMATED: 21 ML/MIN/1.73M2
GLUCOSE SERPL-MCNC: 155 MG/DL (ref 74–99)
POTASSIUM SERPL-SCNC: 5.3 MMOL/L (ref 3.5–5)
PROT SERPL-MCNC: 6.7 G/DL (ref 6.4–8.3)
SODIUM SERPL-SCNC: 140 MMOL/L (ref 132–146)

## 2024-08-05 PROCEDURE — 80053 COMPREHEN METABOLIC PANEL: CPT

## 2024-08-05 PROCEDURE — 36415 COLL VENOUS BLD VENIPUNCTURE: CPT

## 2024-08-07 LAB
DIABETIC RETINOPATHY: POSITIVE
DIABETIC RETINOPATHY: POSITIVE

## 2024-08-09 ENCOUNTER — HOSPITAL ENCOUNTER (OUTPATIENT)
Age: 34
Discharge: HOME | End: 2024-08-09
Payer: COMMERCIAL

## 2024-08-09 LAB
25(OH)D3 SERPL-MCNC: 15 NG/ML (ref 30–100)
ALBUMIN SERPL-MCNC: 3.9 G/DL (ref 3.5–5.2)
ALP SERPL-CCNC: 95 U/L (ref 40–129)
ALT SERPL-CCNC: 10 U/L (ref 0–40)
ANION GAP SERPL CALCULATED.3IONS-SCNC: 13 MMOL/L (ref 7–16)
AST SERPL-CCNC: 14 U/L (ref 0–39)
BILIRUB SERPL-MCNC: 0.3 MG/DL (ref 0–1.2)
BILIRUB UR QL STRIP: NEGATIVE
BUN SERPL-MCNC: 57 MG/DL (ref 6–20)
CALCIUM SERPL-MCNC: 9.2 MG/DL (ref 8.6–10.2)
CHLORIDE SERPL-SCNC: 105 MMOL/L (ref 98–107)
CHOLEST SERPL-MCNC: 155 MG/DL
CLARITY UR: CLEAR
CO2 SERPL-SCNC: 21 MMOL/L (ref 22–29)
COLOR UR: YELLOW
CREAT SERPL-MCNC: 3.4 MG/DL (ref 0.7–1.2)
CREAT UR-MCNC: 70.4 MG/DL (ref 40–278)
ERYTHROCYTE [DISTWIDTH] IN BLOOD BY AUTOMATED COUNT: 13.2 % (ref 11.5–15)
GFR, ESTIMATED: 24 ML/MIN/1.73M2
GLUCOSE SERPL-MCNC: 213 MG/DL (ref 74–99)
GLUCOSE UR STRIP-MCNC: 100 MG/DL
HBA1C MFR BLD: 7.7 % (ref 4–5.6)
HCT VFR BLD AUTO: 32.6 % (ref 37–54)
HDLC SERPL-MCNC: 34 MG/DL
HGB BLD-MCNC: 10.8 G/DL (ref 12.5–16.5)
HGB UR QL STRIP.AUTO: NEGATIVE
KETONES UR STRIP-MCNC: NEGATIVE MG/DL
LDLC SERPL CALC-MCNC: 103 MG/DL
LEUKOCYTE ESTERASE UR QL STRIP: NEGATIVE
MCH RBC QN AUTO: 28.1 PG (ref 26–35)
MCHC RBC AUTO-ENTMCNC: 33.1 G/DL (ref 32–34.5)
MCV RBC AUTO: 84.7 FL (ref 80–99.9)
MICROALBUMIN UR-MCNC: 813 MG/L (ref 0–19)
MICROALBUMIN/CREAT UR-RTO: 1155 MCG/MG CREAT (ref 0–30)
NITRITE UR QL STRIP: NEGATIVE
PH UR STRIP: 6.5 [PH] (ref 5–9)
PHOSPHATE SERPL-MCNC: 5.4 MG/DL (ref 2.5–4.5)
PLATELET # BLD AUTO: 336 K/UL (ref 130–450)
PMV BLD AUTO: 10.5 FL (ref 7–12)
POTASSIUM SERPL-SCNC: 5.6 MMOL/L (ref 3.5–5)
PROT SERPL-MCNC: 6.9 G/DL (ref 6.4–8.3)
PROT UR STRIP-MCNC: 100 MG/DL
PTH-INTACT SERPL-MCNC: 210.8 PG/ML (ref 15–65)
RBC # BLD AUTO: 3.85 M/UL (ref 3.8–5.8)
RBC #/AREA URNS HPF: ABNORMAL /HPF
SODIUM SERPL-SCNC: 139 MMOL/L (ref 132–146)
SP GR UR STRIP: 1.02 (ref 1–1.03)
TRIGL SERPL-MCNC: 90 MG/DL
UROBILINOGEN UR STRIP-ACNC: 0.2 EU/DL (ref 0–1)
VLDLC SERPL CALC-MCNC: 18 MG/DL
WBC #/AREA URNS HPF: ABNORMAL /HPF
WBC OTHER # BLD: 11.2 K/UL (ref 4.5–11.5)

## 2024-08-09 PROCEDURE — 81001 URINALYSIS AUTO W/SCOPE: CPT

## 2024-08-09 PROCEDURE — 82043 UR ALBUMIN QUANTITATIVE: CPT

## 2024-08-09 PROCEDURE — 80053 COMPREHEN METABOLIC PANEL: CPT

## 2024-08-09 PROCEDURE — 83036 HEMOGLOBIN GLYCOSYLATED A1C: CPT

## 2024-08-09 PROCEDURE — 82306 VITAMIN D 25 HYDROXY: CPT

## 2024-08-09 PROCEDURE — 82570 ASSAY OF URINE CREATININE: CPT

## 2024-08-09 PROCEDURE — 83970 ASSAY OF PARATHORMONE: CPT

## 2024-08-09 PROCEDURE — 80061 LIPID PANEL: CPT

## 2024-08-09 PROCEDURE — 36415 COLL VENOUS BLD VENIPUNCTURE: CPT

## 2024-08-09 PROCEDURE — 85027 COMPLETE CBC AUTOMATED: CPT

## 2024-08-09 PROCEDURE — 84100 ASSAY OF PHOSPHORUS: CPT

## 2024-08-21 ENCOUNTER — HOSPITAL ENCOUNTER (EMERGENCY)
Age: 34
Discharge: HOME | End: 2024-08-21
Payer: COMMERCIAL

## 2024-08-21 VITALS
SYSTOLIC BLOOD PRESSURE: 158 MMHG | RESPIRATION RATE: 14 BRPM | HEART RATE: 87 BPM | TEMPERATURE: 98.4 F | DIASTOLIC BLOOD PRESSURE: 89 MMHG | OXYGEN SATURATION: 97 %

## 2024-08-21 DIAGNOSIS — R11.0 NAUSEA: Primary | ICD-10-CM

## 2024-08-21 LAB
ALBUMIN SERPL-MCNC: 4.9 G/DL (ref 3.5–5.2)
ALP SERPL-CCNC: 112 U/L (ref 40–129)
ALT SERPL-CCNC: 8 U/L (ref 0–40)
ANION GAP SERPL CALCULATED.3IONS-SCNC: 15 MMOL/L (ref 7–16)
AST SERPL-CCNC: 15 U/L (ref 0–39)
B-OH-BUTYR SERPL-MCNC: 0.13 MMOL/L (ref 0.02–0.27)
BASOPHILS # BLD: 0.08 K/UL (ref 0–0.2)
BASOPHILS NFR BLD: 1 % (ref 0–2)
BILIRUB SERPL-MCNC: 0.4 MG/DL (ref 0–1.2)
BILIRUB UR QL STRIP: NEGATIVE
BUN SERPL-MCNC: 45 MG/DL (ref 6–20)
CALCIUM SERPL-MCNC: 9.5 MG/DL (ref 8.6–10.2)
CHLORIDE SERPL-SCNC: 98 MMOL/L (ref 98–107)
CLARITY UR: CLEAR
CO2 SERPL-SCNC: 27 MMOL/L (ref 22–29)
COLOR UR: YELLOW
CREAT SERPL-MCNC: 3.4 MG/DL (ref 0.7–1.2)
EOSINOPHIL # BLD: 0.32 K/UL (ref 0.05–0.5)
EOSINOPHILS RELATIVE PERCENT: 4 % (ref 0–6)
ERYTHROCYTE [DISTWIDTH] IN BLOOD BY AUTOMATED COUNT: 12.9 % (ref 11.5–15)
GFR, ESTIMATED: 23 ML/MIN/1.73M2
GLUCOSE BLD-MCNC: 220 MG/DL (ref 74–99)
GLUCOSE SERPL-MCNC: 194 MG/DL (ref 74–99)
GLUCOSE UR STRIP-MCNC: 100 MG/DL
HCT VFR BLD AUTO: 37.3 % (ref 37–54)
HGB BLD-MCNC: 12.4 G/DL (ref 12.5–16.5)
HGB UR QL STRIP.AUTO: NEGATIVE
IMM GRANULOCYTES # BLD AUTO: 0.03 K/UL (ref 0–0.58)
IMM GRANULOCYTES NFR BLD: 0 % (ref 0–5)
KETONES UR STRIP-MCNC: NEGATIVE MG/DL
LACTATE BLDV-SCNC: 1.4 MMOL/L (ref 0.5–2.2)
LEUKOCYTE ESTERASE UR QL STRIP: NEGATIVE
LYMPHOCYTES NFR BLD: 1.5 K/UL (ref 1.5–4)
LYMPHOCYTES RELATIVE PERCENT: 18 % (ref 20–42)
MCH RBC QN AUTO: 27.7 PG (ref 26–35)
MCHC RBC AUTO-ENTMCNC: 33.2 G/DL (ref 32–34.5)
MCV RBC AUTO: 83.3 FL (ref 80–99.9)
MONOCYTES NFR BLD: 0.32 K/UL (ref 0.1–0.95)
MONOCYTES NFR BLD: 4 % (ref 2–12)
NEUTROPHILS NFR BLD: 74 % (ref 43–80)
NEUTS SEG NFR BLD: 6.26 K/UL (ref 1.8–7.3)
NITRITE UR QL STRIP: NEGATIVE
PH UR STRIP: 6.5 [PH] (ref 5–9)
PH VENOUS: 7.29 (ref 7.35–7.45)
PLATELET # BLD AUTO: 346 K/UL (ref 130–450)
PMV BLD AUTO: 9.9 FL (ref 7–12)
POTASSIUM SERPL-SCNC: 4.2 MMOL/L (ref 3.5–5)
PROT SERPL-MCNC: 8.5 G/DL (ref 6.4–8.3)
PROT UR STRIP-MCNC: 100 MG/DL
RBC # BLD AUTO: 4.48 M/UL (ref 3.8–5.8)
RBC #/AREA URNS HPF: ABNORMAL /HPF
SODIUM SERPL-SCNC: 140 MMOL/L (ref 132–146)
SP GR UR STRIP: 1.02 (ref 1–1.03)
UROBILINOGEN UR STRIP-ACNC: 1 EU/DL (ref 0–1)
WBC #/AREA URNS HPF: ABNORMAL /HPF
WBC OTHER # BLD: 8.5 K/UL (ref 4.5–11.5)

## 2024-08-21 PROCEDURE — 82010 KETONE BODYS QUAN: CPT

## 2024-08-21 PROCEDURE — 85025 COMPLETE CBC W/AUTO DIFF WBC: CPT

## 2024-08-21 PROCEDURE — 83605 ASSAY OF LACTIC ACID: CPT

## 2024-08-21 PROCEDURE — 82962 GLUCOSE BLOOD TEST: CPT

## 2024-08-21 PROCEDURE — 82800 BLOOD PH: CPT

## 2024-08-21 PROCEDURE — 81001 URINALYSIS AUTO W/SCOPE: CPT

## 2024-08-21 PROCEDURE — 80053 COMPREHEN METABOLIC PANEL: CPT

## 2024-08-21 PROCEDURE — 2580000003 HC RX 258: Performed by: NURSE PRACTITIONER

## 2024-08-21 PROCEDURE — 99284 EMERGENCY DEPT VISIT MOD MDM: CPT

## 2024-08-21 RX ORDER — 0.9 % SODIUM CHLORIDE 0.9 %
30 INTRAVENOUS SOLUTION INTRAVENOUS ONCE
Status: COMPLETED | OUTPATIENT
Start: 2024-08-21 | End: 2024-08-21

## 2024-08-21 RX ADMIN — SODIUM CHLORIDE 1428 ML: 9 INJECTION, SOLUTION INTRAVENOUS at 15:50

## 2024-08-21 ASSESSMENT — PAIN - FUNCTIONAL ASSESSMENT: PAIN_FUNCTIONAL_ASSESSMENT: NONE - DENIES PAIN

## 2024-08-21 NOTE — DISCHARGE INSTRUCTIONS
Your labs were at your baseline.  Please make sure that you follow-up with your regular doctor.  No evidence of diabetic ketoacidosis

## 2024-08-21 NOTE — ED PROVIDER NOTES
TABLET    Take 1 tablet by mouth 2 times daily    ONDANSETRON (ZOFRAN-ODT) 4 MG DISINTEGRATING TABLET    Take 1 tablet by mouth 3 times daily as needed for Nausea or Vomiting    SODIUM BICARBONATE 650 MG TABLET    Take 12 tablets by mouth 3 times daily He is to take 12 tablets daily.    SODIUM ZIRCONIUM CYCLOSILICATE (LOKELMA) 10 G PACK ORAL SUSPENSION    Take 1 packet by mouth daily       ALLERGIES     Chlorthalidone, Hctz [hydrochlorothiazide], and Hydralazine    FAMILYHISTORY       Family History   Problem Relation Age of Onset    No Known Problems Mother     No Known Problems Father     No Known Problems Brother         Homicide     No Known Problems Daughter     No Known Problems Son     Diabetes type 2  Maternal Grandmother         SOCIAL HISTORY       Social History     Tobacco Use    Smoking status: Every Day     Current packs/day: 0.50     Average packs/day: 0.5 packs/day for 15.0 years (7.5 ttl pk-yrs)     Types: Cigarettes    Smokeless tobacco: Never   Vaping Use    Vaping status: Never Used   Substance Use Topics    Alcohol use: Never    Drug use: Not Currently     Types: Opiates , Oxycodone (Oxy)     Comment: 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 10/2017, now takes Suboxone       SCREENINGS        [place] Coma Scale  Eye Opening: Spontaneous  Best Verbal Response: Oriented  Best Motor Response: Obeys commands  [place] Coma Scale Score: 15                CIWA Assessment  BP: (!) 158/89  Pulse: 87           PHYSICAL EXAM  1 or more Elements     ED Triage Vitals [08/21/24 1517]   BP Systolic BP Percentile Diastolic BP Percentile Temp Temp Source Pulse Respirations SpO2   (!) 158/89 -- -- 98.4 °F (36.9 °C) Oral 87 14 97 %      Height Weight         -- --             Constitutional/General: Alert and oriented x3  Head: Normocephalic and atraumatic  Eyes: PERRL, EOMI, conjunctiva normal, sclera non icteric  ENT:  Oropharynx clear, handling secretions, no trismus, no asymmetry of the posterior oropharynx or

## 2024-08-22 ENCOUNTER — PREP FOR PROCEDURE (OUTPATIENT)
Dept: OPHTHALMOLOGY | Age: 34
End: 2024-08-22

## 2024-08-22 ENCOUNTER — ANESTHESIA EVENT (OUTPATIENT)
Dept: OPERATING ROOM | Age: 34
End: 2024-08-22
Payer: COMMERCIAL

## 2024-08-22 RX ORDER — SODIUM CHLORIDE 0.9 % (FLUSH) 0.9 %
5-40 SYRINGE (ML) INJECTION EVERY 12 HOURS SCHEDULED
Status: CANCELLED | OUTPATIENT
Start: 2024-08-22

## 2024-08-22 RX ORDER — TROPICAMIDE 10 MG/ML
1 SOLUTION/ DROPS OPHTHALMIC SEE ADMIN INSTRUCTIONS
Status: CANCELLED | OUTPATIENT
Start: 2024-08-23

## 2024-08-22 RX ORDER — SODIUM CHLORIDE 9 MG/ML
INJECTION, SOLUTION INTRAVENOUS PRN
Status: CANCELLED | OUTPATIENT
Start: 2024-08-22

## 2024-08-22 RX ORDER — OFLOXACIN 3 MG/ML
1 SOLUTION/ DROPS OPHTHALMIC ONCE
Status: CANCELLED | OUTPATIENT
Start: 2024-08-23

## 2024-08-22 RX ORDER — PHENYLEPHRINE HYDROCHLORIDE 25 MG/ML
1 SOLUTION/ DROPS OPHTHALMIC SEE ADMIN INSTRUCTIONS
Status: CANCELLED | OUTPATIENT
Start: 2024-08-23

## 2024-08-22 RX ORDER — SODIUM CHLORIDE 9 MG/ML
INJECTION, SOLUTION INTRAVENOUS CONTINUOUS
Status: CANCELLED | OUTPATIENT
Start: 2024-08-22

## 2024-08-22 RX ORDER — SODIUM CHLORIDE 0.9 % (FLUSH) 0.9 %
5-40 SYRINGE (ML) INJECTION PRN
Status: CANCELLED | OUTPATIENT
Start: 2024-08-22

## 2024-08-22 RX ORDER — PROPARACAINE HYDROCHLORIDE 5 MG/ML
1 SOLUTION/ DROPS OPHTHALMIC SEE ADMIN INSTRUCTIONS
Status: CANCELLED | OUTPATIENT
Start: 2024-08-23

## 2024-08-22 ASSESSMENT — COPD QUESTIONNAIRES: CAT_SEVERITY: MILD

## 2024-08-22 ASSESSMENT — LIFESTYLE VARIABLES: SMOKING_STATUS: 1

## 2024-08-23 ENCOUNTER — ANESTHESIA (OUTPATIENT)
Dept: OPERATING ROOM | Age: 34
End: 2024-08-23
Payer: COMMERCIAL

## 2024-08-23 ENCOUNTER — HOSPITAL ENCOUNTER (OUTPATIENT)
Age: 34
Setting detail: OUTPATIENT SURGERY
Discharge: HOME | End: 2024-08-23
Attending: OPHTHALMOLOGY | Admitting: OPHTHALMOLOGY
Payer: COMMERCIAL

## 2024-08-23 VITALS
DIASTOLIC BLOOD PRESSURE: 91 MMHG | WEIGHT: 106 LBS | HEART RATE: 70 BPM | OXYGEN SATURATION: 100 % | HEIGHT: 63 IN | SYSTOLIC BLOOD PRESSURE: 169 MMHG | TEMPERATURE: 97.1 F | RESPIRATION RATE: 16 BRPM | BODY MASS INDEX: 18.78 KG/M2

## 2024-08-23 PROCEDURE — 6370000000 HC RX 637 (ALT 250 FOR IP): Performed by: PHYSICIAN ASSISTANT

## 2024-08-23 PROCEDURE — 2580000003 HC RX 258: Performed by: PHYSICIAN ASSISTANT

## 2024-08-23 PROCEDURE — 2500000003 HC RX 250 WO HCPCS: Performed by: PHYSICIAN ASSISTANT

## 2024-08-23 RX ORDER — SODIUM CHLORIDE 0.9 % (FLUSH) 0.9 %
5-40 SYRINGE (ML) INJECTION EVERY 12 HOURS SCHEDULED
Status: DISCONTINUED | OUTPATIENT
Start: 2024-08-23 | End: 2024-08-23 | Stop reason: HOSPADM

## 2024-08-23 RX ORDER — OFLOXACIN 3 MG/ML
1 SOLUTION/ DROPS OPHTHALMIC ONCE
Status: COMPLETED | OUTPATIENT
Start: 2024-08-23 | End: 2024-08-23

## 2024-08-23 RX ORDER — SODIUM CHLORIDE 9 MG/ML
INJECTION, SOLUTION INTRAVENOUS CONTINUOUS
Status: DISCONTINUED | OUTPATIENT
Start: 2024-08-23 | End: 2024-08-23 | Stop reason: HOSPADM

## 2024-08-23 RX ORDER — DIPHENHYDRAMINE HYDROCHLORIDE 50 MG/ML
12.5 INJECTION INTRAMUSCULAR; INTRAVENOUS
Status: CANCELLED | OUTPATIENT
Start: 2024-08-23 | End: 2024-08-24

## 2024-08-23 RX ORDER — TROPICAMIDE 10 MG/ML
1 SOLUTION/ DROPS OPHTHALMIC
Status: COMPLETED | OUTPATIENT
Start: 2024-08-23 | End: 2024-08-23

## 2024-08-23 RX ORDER — SODIUM CHLORIDE 0.9 % (FLUSH) 0.9 %
5-40 SYRINGE (ML) INJECTION PRN
Status: CANCELLED | OUTPATIENT
Start: 2024-08-23

## 2024-08-23 RX ORDER — PHENYLEPHRINE HYDROCHLORIDE 25 MG/ML
1 SOLUTION/ DROPS OPHTHALMIC
Status: COMPLETED | OUTPATIENT
Start: 2024-08-23 | End: 2024-08-23

## 2024-08-23 RX ORDER — LABETALOL HYDROCHLORIDE 5 MG/ML
5 INJECTION, SOLUTION INTRAVENOUS
Status: CANCELLED | OUTPATIENT
Start: 2024-08-23

## 2024-08-23 RX ORDER — HYDRALAZINE HYDROCHLORIDE 20 MG/ML
5 INJECTION INTRAMUSCULAR; INTRAVENOUS
Status: CANCELLED | OUTPATIENT
Start: 2024-08-23

## 2024-08-23 RX ORDER — NALOXONE HYDROCHLORIDE 0.4 MG/ML
INJECTION, SOLUTION INTRAMUSCULAR; INTRAVENOUS; SUBCUTANEOUS PRN
Status: CANCELLED | OUTPATIENT
Start: 2024-08-23

## 2024-08-23 RX ORDER — MEPERIDINE HYDROCHLORIDE 25 MG/ML
12.5 INJECTION INTRAMUSCULAR; INTRAVENOUS; SUBCUTANEOUS ONCE
Status: CANCELLED | OUTPATIENT
Start: 2024-08-23 | End: 2024-08-23

## 2024-08-23 RX ORDER — SODIUM CHLORIDE 9 MG/ML
INJECTION, SOLUTION INTRAVENOUS PRN
Status: DISCONTINUED | OUTPATIENT
Start: 2024-08-23 | End: 2024-08-23 | Stop reason: HOSPADM

## 2024-08-23 RX ORDER — PROCHLORPERAZINE EDISYLATE 5 MG/ML
5 INJECTION INTRAMUSCULAR; INTRAVENOUS
Status: CANCELLED | OUTPATIENT
Start: 2024-08-23 | End: 2024-08-24

## 2024-08-23 RX ORDER — SODIUM CHLORIDE 0.9 % (FLUSH) 0.9 %
5-40 SYRINGE (ML) INJECTION EVERY 12 HOURS SCHEDULED
Status: CANCELLED | OUTPATIENT
Start: 2024-08-23

## 2024-08-23 RX ORDER — PROPARACAINE HYDROCHLORIDE 5 MG/ML
1 SOLUTION/ DROPS OPHTHALMIC SEE ADMIN INSTRUCTIONS
Status: COMPLETED | OUTPATIENT
Start: 2024-08-23 | End: 2024-08-23

## 2024-08-23 RX ORDER — FENTANYL CITRATE 0.05 MG/ML
25 INJECTION, SOLUTION INTRAMUSCULAR; INTRAVENOUS EVERY 5 MIN PRN
Status: CANCELLED | OUTPATIENT
Start: 2024-08-23

## 2024-08-23 RX ORDER — SODIUM CHLORIDE 0.9 % (FLUSH) 0.9 %
5-40 SYRINGE (ML) INJECTION PRN
Status: DISCONTINUED | OUTPATIENT
Start: 2024-08-23 | End: 2024-08-23 | Stop reason: HOSPADM

## 2024-08-23 RX ORDER — SODIUM CHLORIDE 9 MG/ML
INJECTION, SOLUTION INTRAVENOUS PRN
Status: CANCELLED | OUTPATIENT
Start: 2024-08-23

## 2024-08-23 RX ADMIN — TROPICAMIDE 1 DROP: 10 SOLUTION/ DROPS OPHTHALMIC at 09:00

## 2024-08-23 RX ADMIN — TROPICAMIDE 1 DROP: 10 SOLUTION/ DROPS OPHTHALMIC at 09:05

## 2024-08-23 RX ADMIN — TROPICAMIDE 1 DROP: 10 SOLUTION/ DROPS OPHTHALMIC at 08:55

## 2024-08-23 RX ADMIN — PHENYLEPHRINE HYDROCHLORIDE 1 DROP: 25 SOLUTION/ DROPS OPHTHALMIC at 08:55

## 2024-08-23 RX ADMIN — SODIUM CHLORIDE: 9 INJECTION, SOLUTION INTRAVENOUS at 09:06

## 2024-08-23 RX ADMIN — PHENYLEPHRINE HYDROCHLORIDE 1 DROP: 25 SOLUTION/ DROPS OPHTHALMIC at 09:00

## 2024-08-23 RX ADMIN — PROPARACAINE HYDROCHLORIDE 1 DROP: 5 SOLUTION/ DROPS OPHTHALMIC at 08:55

## 2024-08-23 RX ADMIN — OFLOXACIN 1 DROP: 3 SOLUTION OPHTHALMIC at 08:55

## 2024-08-23 RX ADMIN — PHENYLEPHRINE HYDROCHLORIDE 1 DROP: 25 SOLUTION/ DROPS OPHTHALMIC at 09:05

## 2024-08-23 ASSESSMENT — PAIN - FUNCTIONAL ASSESSMENT: PAIN_FUNCTIONAL_ASSESSMENT: NONE - DENIES PAIN

## 2024-08-23 NOTE — PROGRESS NOTES
OR staff informed pt that equipment malfunction necessary for surgery. Unable to proceed. Surgery to be rescheduled

## 2024-08-23 NOTE — OP NOTE
Operative Note      Patient: Rodolfo Benjamin  YOB: 1990  MRN: [id number]    Date of Procedure: 8/23/2024      Electronically signed by Jeremiah Rincon MD on 8/22/2024 at 9:28 PM

## 2024-08-23 NOTE — H&P
I have examined the patient and reviewed the history and physical from August 7, 2024 and find no relevant changes.    I have reviewed with the patient and/or family the risks, benefits, and alternatives to the procedure and they have agreed to proceed.    Jeremiah Rincon MD

## 2024-08-23 NOTE — DISCHARGE INSTRUCTIONS
[facility]  Instructions after Vitreoretinal Surgery  [phone]  Your post op appointment is Saturday, August 24 @ 9:45am with Noe Nuñez PA-C in [place]: [address] [place] [id number]. .     It is very important to follow these instructions after your eye surgery to ensure its success.  Please bring this sheet with you to your first follow up appointment.    1.   A responsible adult must drive you home after surgery. You might feel well, but the medications given during the surgery might affect you for several hours afterward. Do not drive, operate machinery or participate in any activity that requires coordination or judgment for at least 24 hours after your surgery.     2. DO NOT take the patch and shield off, unless otherwise instructed by your physician. Your doctor will remove it at your follow-up appointment.  The metal/plastic shield will be worn at bedtime for 1 week following your surgery.     3. Your head positioning is ***      4. Activity should be limited to: NOTHING MORE STRENUOUS THAN WALKING FOR 3 WEEKS. Avoid any straining, lifting or bending for the first week.    5. An ice pack may be used if the eye is uncomfortable. Remove only the metal/plastic shield, leaving the patch in place. (The patch may be discolored, bloody or wet THIS IS NORMAL).  Ice may be used for 20 minutes at a time, every 2 to 4 hours for the first 24-36 hours after surgery.    6. If you have a GAS BUBBLE in your eye: DO NOT REMOVE THE ALERT BRACELET until you are instructed to do so by your physician. In most cases this will remain on for 6-8 weeks. Air travel or traveling in high altitudes is prohibited until approved by your physician.     7. After your surgery you can resume all of your regular medications. This includes Blood thinners such as Aspirin,  Plavix, Coumadin, or Warfarin.     8. Pain-Relieving Medications:  Extra Strength Tylenol: 2 Tablets every four hours as needed for pain  OR  Advil 200m Tablets three times a day with meals as needed for pain     9. For any nausea or vomiting after your surgery PLEASE CALL THE OFFICE. For any pain that is not relieved by pain medications or that gets worse PLEASE CALL THE OFFICE. If your vision gets worse anytime during the healing process, PLEASE CALL THE OFFICE.     10. Do not consume ANY alcoholic beverages for 48hours after your surgery or while taking medications for pain or nausea.    11. EYE DROPS:  You will have received prescriptions for all the drops listed below. Please fill the prescriptions, DO NOT USE THE DROPS, bring the drops to your first postoperative appointment (time listed above). These medications are available in a generic form and should be less expensive than the brand name medication. Please make sure you ask the pharmacist for the generic forms of these medications, if you want them. Also bring any other eye drops that you are taking to this appointment:    PREDNISOLONE, use 1 drop in the Surgical eye 4 times a day.   OFLOXACIN, use 1 drop in the Surgical eye 4 times a day    12.  A warm moist compress may be used 4-6 times a day for comfort AFTER your follow up appointment.    13. You should wear some type of eye protection (metal shield, your glasses, or sunglasses) if you are somewhere where dust or debris could potentially get into the eye. Sunglasses may be worn for your comfort while outside or driving.    14. If your eye burt or tears a great deal, please blot it gently with a clean tissue.  DO NOT rub your eye with a tissue or your fingers.    15. Please bring this sheet and all of your Eye Medications to all of your follow up appointments.    16. If you have any questions or difficulties, contact the office where you are normally seen during normal business hours. Please reserve after hours calls for emergencies. If you have a life-threatening emergency (shortness of breath, chest pain etc) please call 911.

## 2024-08-29 DIAGNOSIS — R10.84 GENERALIZED ABDOMINAL PAIN: ICD-10-CM

## 2024-08-30 DIAGNOSIS — E10.40 DIABETIC NEUROPATHY, TYPE I DIABETES MELLITUS (HCC): ICD-10-CM

## 2024-08-30 RX ORDER — ONDANSETRON 4 MG/1
4 TABLET, ORALLY DISINTEGRATING ORAL 3 TIMES DAILY PRN
Qty: 90 TABLET | Refills: 5 | Status: SHIPPED | OUTPATIENT
Start: 2024-08-30

## 2024-08-30 NOTE — TELEPHONE ENCOUNTER
Medication Refill Request    LOV 6/7/2024  NOV 9/9/2024    Lab Results   Component Value Date    CREATININE 3.4 (H) 08/21/2024

## 2024-08-31 RX ORDER — PREDNISOLONE ACETATE 10 MG/ML
SUSPENSION/ DROPS OPHTHALMIC
COMMUNITY
Start: 2024-08-07

## 2024-08-31 RX ORDER — GABAPENTIN 300 MG/1
300 CAPSULE ORAL 2 TIMES DAILY
Qty: 180 CAPSULE | Refills: 0 | Status: SHIPPED | OUTPATIENT
Start: 2024-09-15 | End: 2024-12-14

## 2024-08-31 RX ORDER — OFLOXACIN 3 MG/ML
SOLUTION/ DROPS OPHTHALMIC
COMMUNITY
Start: 2024-08-07

## 2024-09-09 ENCOUNTER — OFFICE VISIT (OUTPATIENT)
Dept: FAMILY MEDICINE CLINIC | Age: 34
End: 2024-09-09
Payer: COMMERCIAL

## 2024-09-09 VITALS
WEIGHT: 104.4 LBS | SYSTOLIC BLOOD PRESSURE: 100 MMHG | DIASTOLIC BLOOD PRESSURE: 62 MMHG | HEART RATE: 72 BPM | BODY MASS INDEX: 18.5 KG/M2 | TEMPERATURE: 98 F | HEIGHT: 63 IN | OXYGEN SATURATION: 99 %

## 2024-09-09 DIAGNOSIS — N18.4 CHRONIC KIDNEY DISEASE (CKD), STAGE IV (SEVERE) (HCC): ICD-10-CM

## 2024-09-09 DIAGNOSIS — Z00.00 WELCOME TO MEDICARE PREVENTIVE VISIT: Primary | ICD-10-CM

## 2024-09-09 DIAGNOSIS — I15.0 RENOVASCULAR HYPERTENSION: ICD-10-CM

## 2024-09-09 DIAGNOSIS — E10.40 DIABETIC NEUROPATHY, TYPE I DIABETES MELLITUS (HCC): ICD-10-CM

## 2024-09-09 DIAGNOSIS — N25.81 SECONDARY HYPERPARATHYROIDISM OF RENAL ORIGIN (HCC): ICD-10-CM

## 2024-09-09 DIAGNOSIS — M25.512 ACUTE PAIN OF LEFT SHOULDER: ICD-10-CM

## 2024-09-09 PROBLEM — E87.5 HYPERKALEMIA: Status: RESOLVED | Noted: 2024-06-10 | Resolved: 2024-09-09

## 2024-09-09 PROCEDURE — G0402 INITIAL PREVENTIVE EXAM: HCPCS | Performed by: FAMILY MEDICINE

## 2024-09-09 PROCEDURE — 4004F PT TOBACCO SCREEN RCVD TLK: CPT | Performed by: FAMILY MEDICINE

## 2024-09-09 PROCEDURE — 3051F HG A1C>EQUAL 7.0%<8.0%: CPT | Performed by: FAMILY MEDICINE

## 2024-09-09 PROCEDURE — 99213 OFFICE O/P EST LOW 20 MIN: CPT | Performed by: FAMILY MEDICINE

## 2024-09-09 PROCEDURE — G8419 CALC BMI OUT NRM PARAM NOF/U: HCPCS | Performed by: FAMILY MEDICINE

## 2024-09-09 PROCEDURE — G8427 DOCREV CUR MEDS BY ELIG CLIN: HCPCS | Performed by: FAMILY MEDICINE

## 2024-09-09 PROCEDURE — 93000 ELECTROCARDIOGRAM COMPLETE: CPT | Performed by: FAMILY MEDICINE

## 2024-09-09 PROCEDURE — 2022F DILAT RTA XM EVC RTNOPTHY: CPT | Performed by: FAMILY MEDICINE

## 2024-09-09 RX ORDER — FUROSEMIDE 40 MG
40 TABLET ORAL DAILY
Qty: 60 TABLET | Status: CANCELLED | OUTPATIENT
Start: 2024-09-09

## 2024-09-09 RX ORDER — SODIUM BICARBONATE 650 MG/1
7800 TABLET ORAL 3 TIMES DAILY
Qty: 1080 TABLET | Refills: 11 | Status: CANCELLED | OUTPATIENT
Start: 2024-09-09 | End: 2025-09-09

## 2024-09-09 RX ORDER — METOPROLOL SUCCINATE 50 MG/1
50 TABLET, EXTENDED RELEASE ORAL 2 TIMES DAILY
Qty: 180 TABLET | Refills: 0 | Status: SHIPPED | OUTPATIENT
Start: 2024-09-09 | End: 2024-12-08

## 2024-09-09 RX ORDER — ASPIRIN 81 MG/1
81 TABLET ORAL DAILY
Qty: 30 TABLET | Refills: 0 | Status: CANCELLED | OUTPATIENT
Start: 2024-09-09

## 2024-09-09 RX ORDER — GABAPENTIN 300 MG/1
300 CAPSULE ORAL 2 TIMES DAILY
Qty: 180 CAPSULE | Refills: 0 | Status: SHIPPED | OUTPATIENT
Start: 2024-09-15 | End: 2024-12-14

## 2024-09-09 SDOH — HEALTH STABILITY: PHYSICAL HEALTH: ON AVERAGE, HOW MANY MINUTES DO YOU ENGAGE IN EXERCISE AT THIS LEVEL?: PATIENT DECLINED

## 2024-09-09 SDOH — HEALTH STABILITY: PHYSICAL HEALTH
ON AVERAGE, HOW MANY DAYS PER WEEK DO YOU ENGAGE IN MODERATE TO STRENUOUS EXERCISE (LIKE A BRISK WALK)?: PATIENT DECLINED

## 2024-09-09 ASSESSMENT — PATIENT HEALTH QUESTIONNAIRE - PHQ9
SUM OF ALL RESPONSES TO PHQ QUESTIONS 1-9: 1
SUM OF ALL RESPONSES TO PHQ QUESTIONS 1-9: 1
8. MOVING OR SPEAKING SO SLOWLY THAT OTHER PEOPLE COULD HAVE NOTICED. OR THE OPPOSITE, BEING SO FIGETY OR RESTLESS THAT YOU HAVE BEEN MOVING AROUND A LOT MORE THAN USUAL: NOT AT ALL
10. IF YOU CHECKED OFF ANY PROBLEMS, HOW DIFFICULT HAVE THESE PROBLEMS MADE IT FOR YOU TO DO YOUR WORK, TAKE CARE OF THINGS AT HOME, OR GET ALONG WITH OTHER PEOPLE: NOT DIFFICULT AT ALL
5. POOR APPETITE OR OVEREATING: NEARLY EVERY DAY
3. TROUBLE FALLING OR STAYING ASLEEP: MORE THAN HALF THE DAYS
1. LITTLE INTEREST OR PLEASURE IN DOING THINGS: SEVERAL DAYS
2. FEELING DOWN, DEPRESSED OR HOPELESS: NOT AT ALL
SUM OF ALL RESPONSES TO PHQ QUESTIONS 1-9: 1
SUM OF ALL RESPONSES TO PHQ9 QUESTIONS 1 & 2: 1
1. LITTLE INTEREST OR PLEASURE IN DOING THINGS: SEVERAL DAYS
SUM OF ALL RESPONSES TO PHQ QUESTIONS 1-9: 1
SUM OF ALL RESPONSES TO PHQ QUESTIONS 1-9: 8
SUM OF ALL RESPONSES TO PHQ QUESTIONS 1-9: 8
4. FEELING TIRED OR HAVING LITTLE ENERGY: MORE THAN HALF THE DAYS
SUM OF ALL RESPONSES TO PHQ QUESTIONS 1-9: 8
7. TROUBLE CONCENTRATING ON THINGS, SUCH AS READING THE NEWSPAPER OR WATCHING TELEVISION: NOT AT ALL
9. THOUGHTS THAT YOU WOULD BE BETTER OFF DEAD, OR OF HURTING YOURSELF: NOT AT ALL
6. FEELING BAD ABOUT YOURSELF - OR THAT YOU ARE A FAILURE OR HAVE LET YOURSELF OR YOUR FAMILY DOWN: NOT AT ALL
SUM OF ALL RESPONSES TO PHQ9 QUESTIONS 1 & 2: 1
SUM OF ALL RESPONSES TO PHQ QUESTIONS 1-9: 8
2. FEELING DOWN, DEPRESSED OR HOPELESS: NOT AT ALL

## 2024-09-09 ASSESSMENT — LIFESTYLE VARIABLES
HOW MANY STANDARD DRINKS CONTAINING ALCOHOL DO YOU HAVE ON A TYPICAL DAY: PATIENT DOES NOT DRINK
HOW OFTEN DO YOU HAVE A DRINK CONTAINING ALCOHOL: NEVER
HOW OFTEN DO YOU HAVE A DRINK CONTAINING ALCOHOL: 1
HOW MANY STANDARD DRINKS CONTAINING ALCOHOL DO YOU HAVE ON A TYPICAL DAY: 0
HOW OFTEN DO YOU HAVE SIX OR MORE DRINKS ON ONE OCCASION: 1

## 2024-09-11 ENCOUNTER — ANESTHESIA EVENT (OUTPATIENT)
Dept: OPERATING ROOM | Age: 34
End: 2024-09-11
Payer: COMMERCIAL

## 2024-09-11 ENCOUNTER — PREP FOR PROCEDURE (OUTPATIENT)
Dept: OPHTHALMOLOGY | Age: 34
End: 2024-09-11

## 2024-09-11 RX ORDER — PROPARACAINE HYDROCHLORIDE 5 MG/ML
1 SOLUTION/ DROPS OPHTHALMIC SEE ADMIN INSTRUCTIONS
Status: CANCELLED | OUTPATIENT
Start: 2024-09-12

## 2024-09-11 RX ORDER — SODIUM CHLORIDE 9 MG/ML
INJECTION, SOLUTION INTRAVENOUS CONTINUOUS
Status: CANCELLED | OUTPATIENT
Start: 2024-09-11

## 2024-09-11 RX ORDER — INSULIN LISPRO 100 [IU]/ML
100 INJECTION, SOLUTION INTRAVENOUS; SUBCUTANEOUS DAILY
Qty: 30 ML | Refills: 0 | Status: SHIPPED | OUTPATIENT
Start: 2024-09-11

## 2024-09-11 RX ORDER — SODIUM CHLORIDE 0.9 % (FLUSH) 0.9 %
5-40 SYRINGE (ML) INJECTION PRN
Status: CANCELLED | OUTPATIENT
Start: 2024-09-11

## 2024-09-11 RX ORDER — PHENYLEPHRINE HYDROCHLORIDE 25 MG/ML
1 SOLUTION/ DROPS OPHTHALMIC SEE ADMIN INSTRUCTIONS
Status: CANCELLED | OUTPATIENT
Start: 2024-09-12

## 2024-09-11 RX ORDER — TROPICAMIDE 10 MG/ML
1 SOLUTION/ DROPS OPHTHALMIC SEE ADMIN INSTRUCTIONS
Status: CANCELLED | OUTPATIENT
Start: 2024-09-12

## 2024-09-11 RX ORDER — SODIUM CHLORIDE 0.9 % (FLUSH) 0.9 %
5-40 SYRINGE (ML) INJECTION EVERY 12 HOURS SCHEDULED
Status: CANCELLED | OUTPATIENT
Start: 2024-09-11

## 2024-09-11 RX ORDER — OFLOXACIN 3 MG/ML
1 SOLUTION/ DROPS OPHTHALMIC ONCE
Status: CANCELLED | OUTPATIENT
Start: 2024-09-12

## 2024-09-11 RX ORDER — SODIUM CHLORIDE 9 MG/ML
INJECTION, SOLUTION INTRAVENOUS PRN
Status: CANCELLED | OUTPATIENT
Start: 2024-09-11

## 2024-09-11 ASSESSMENT — LIFESTYLE VARIABLES: SMOKING_STATUS: 1

## 2024-09-11 ASSESSMENT — COPD QUESTIONNAIRES: CAT_SEVERITY: MILD

## 2024-09-12 ENCOUNTER — ANESTHESIA (OUTPATIENT)
Dept: OPERATING ROOM | Age: 34
End: 2024-09-12
Payer: COMMERCIAL

## 2024-09-12 ENCOUNTER — HOSPITAL ENCOUNTER (OUTPATIENT)
Age: 34
Setting detail: OUTPATIENT SURGERY
Discharge: HOME | End: 2024-09-12
Attending: OPHTHALMOLOGY | Admitting: OPHTHALMOLOGY
Payer: COMMERCIAL

## 2024-09-12 VITALS
OXYGEN SATURATION: 100 % | HEART RATE: 78 BPM | WEIGHT: 105 LBS | BODY MASS INDEX: 18.61 KG/M2 | SYSTOLIC BLOOD PRESSURE: 137 MMHG | TEMPERATURE: 97.8 F | RESPIRATION RATE: 16 BRPM | DIASTOLIC BLOOD PRESSURE: 82 MMHG | HEIGHT: 63 IN

## 2024-09-12 PROCEDURE — 6370000000 HC RX 637 (ALT 250 FOR IP): Performed by: OPHTHALMOLOGY

## 2024-09-12 PROCEDURE — 3600000003 HC SURGERY LEVEL 3 BASE: Performed by: OPHTHALMOLOGY

## 2024-09-12 PROCEDURE — 7100000010 HC PHASE II RECOVERY - FIRST 15 MIN: Performed by: OPHTHALMOLOGY

## 2024-09-12 PROCEDURE — 82962 GLUCOSE BLOOD TEST: CPT | Performed by: OPHTHALMOLOGY

## 2024-09-12 PROCEDURE — 2580000003 HC RX 258: Performed by: PHYSICIAN ASSISTANT

## 2024-09-12 PROCEDURE — 2500000003 HC RX 250 WO HCPCS: Performed by: OPHTHALMOLOGY

## 2024-09-12 PROCEDURE — 3700000001 HC ADD 15 MINUTES (ANESTHESIA): Performed by: OPHTHALMOLOGY

## 2024-09-12 PROCEDURE — 2500000003 HC RX 250 WO HCPCS: Performed by: PHYSICIAN ASSISTANT

## 2024-09-12 PROCEDURE — 6360000002 HC RX W HCPCS: Performed by: NURSE ANESTHETIST, CERTIFIED REGISTERED

## 2024-09-12 PROCEDURE — 3600000013 HC SURGERY LEVEL 3 ADDTL 15MIN: Performed by: OPHTHALMOLOGY

## 2024-09-12 PROCEDURE — 7100000011 HC PHASE II RECOVERY - ADDTL 15 MIN: Performed by: OPHTHALMOLOGY

## 2024-09-12 PROCEDURE — 6360000002 HC RX W HCPCS: Performed by: OPHTHALMOLOGY

## 2024-09-12 PROCEDURE — 3700000000 HC ANESTHESIA ATTENDED CARE: Performed by: OPHTHALMOLOGY

## 2024-09-12 PROCEDURE — 2709999900 HC NON-CHARGEABLE SUPPLY: Performed by: OPHTHALMOLOGY

## 2024-09-12 PROCEDURE — 6370000000 HC RX 637 (ALT 250 FOR IP): Performed by: PHYSICIAN ASSISTANT

## 2024-09-12 PROCEDURE — 2720000010 HC SURG SUPPLY STERILE: Performed by: OPHTHALMOLOGY

## 2024-09-12 RX ORDER — DROPERIDOL 2.5 MG/ML
0.62 INJECTION, SOLUTION INTRAMUSCULAR; INTRAVENOUS
Status: DISCONTINUED | OUTPATIENT
Start: 2024-09-12 | End: 2024-09-12 | Stop reason: HOSPADM

## 2024-09-12 RX ORDER — TETRACAINE HYDROCHLORIDE 5 MG/ML
SOLUTION OPHTHALMIC PRN
Status: DISCONTINUED | OUTPATIENT
Start: 2024-09-12 | End: 2024-09-12 | Stop reason: ALTCHOICE

## 2024-09-12 RX ORDER — ONDANSETRON 2 MG/ML
INJECTION INTRAMUSCULAR; INTRAVENOUS
Status: DISCONTINUED | OUTPATIENT
Start: 2024-09-12 | End: 2024-09-12 | Stop reason: SDUPTHER

## 2024-09-12 RX ORDER — PHENYLEPHRINE HYDROCHLORIDE 25 MG/ML
1 SOLUTION/ DROPS OPHTHALMIC SEE ADMIN INSTRUCTIONS
Status: DISCONTINUED | OUTPATIENT
Start: 2024-09-12 | End: 2024-09-12 | Stop reason: HOSPADM

## 2024-09-12 RX ORDER — POVIDONE-IODINE 5 %
SOLUTION, NON-ORAL OPHTHALMIC (EYE) PRN
Status: DISCONTINUED | OUTPATIENT
Start: 2024-09-12 | End: 2024-09-12 | Stop reason: ALTCHOICE

## 2024-09-12 RX ORDER — FENTANYL CITRATE 50 UG/ML
INJECTION, SOLUTION INTRAMUSCULAR; INTRAVENOUS
Status: DISCONTINUED | OUTPATIENT
Start: 2024-09-12 | End: 2024-09-12 | Stop reason: SDUPTHER

## 2024-09-12 RX ORDER — SODIUM CHLORIDE 9 MG/ML
INJECTION, SOLUTION INTRAVENOUS PRN
Status: DISCONTINUED | OUTPATIENT
Start: 2024-09-12 | End: 2024-09-12 | Stop reason: HOSPADM

## 2024-09-12 RX ORDER — SODIUM CHLORIDE 0.9 % (FLUSH) 0.9 %
5-40 SYRINGE (ML) INJECTION EVERY 12 HOURS SCHEDULED
Status: DISCONTINUED | OUTPATIENT
Start: 2024-09-12 | End: 2024-09-12 | Stop reason: HOSPADM

## 2024-09-12 RX ORDER — MIDAZOLAM HYDROCHLORIDE 1 MG/ML
INJECTION INTRAMUSCULAR; INTRAVENOUS
Status: DISCONTINUED | OUTPATIENT
Start: 2024-09-12 | End: 2024-09-12 | Stop reason: SDUPTHER

## 2024-09-12 RX ORDER — FENTANYL CITRATE 0.05 MG/ML
50 INJECTION, SOLUTION INTRAMUSCULAR; INTRAVENOUS EVERY 5 MIN PRN
Status: DISCONTINUED | OUTPATIENT
Start: 2024-09-12 | End: 2024-09-12 | Stop reason: HOSPADM

## 2024-09-12 RX ORDER — LABETALOL HYDROCHLORIDE 5 MG/ML
INJECTION, SOLUTION INTRAVENOUS
Status: DISCONTINUED | OUTPATIENT
Start: 2024-09-12 | End: 2024-09-12 | Stop reason: SDUPTHER

## 2024-09-12 RX ORDER — PROPARACAINE HYDROCHLORIDE 5 MG/ML
1 SOLUTION/ DROPS OPHTHALMIC SEE ADMIN INSTRUCTIONS
Status: DISCONTINUED | OUTPATIENT
Start: 2024-09-12 | End: 2024-09-12 | Stop reason: HOSPADM

## 2024-09-12 RX ORDER — DIPHENHYDRAMINE HYDROCHLORIDE 50 MG/ML
12.5 INJECTION INTRAMUSCULAR; INTRAVENOUS
Status: DISCONTINUED | OUTPATIENT
Start: 2024-09-12 | End: 2024-09-12 | Stop reason: HOSPADM

## 2024-09-12 RX ORDER — SODIUM CHLORIDE 0.9 % (FLUSH) 0.9 %
5-40 SYRINGE (ML) INJECTION PRN
Status: DISCONTINUED | OUTPATIENT
Start: 2024-09-12 | End: 2024-09-12 | Stop reason: HOSPADM

## 2024-09-12 RX ORDER — TROPICAMIDE 10 MG/ML
1 SOLUTION/ DROPS OPHTHALMIC SEE ADMIN INSTRUCTIONS
Status: DISCONTINUED | OUTPATIENT
Start: 2024-09-12 | End: 2024-09-12 | Stop reason: HOSPADM

## 2024-09-12 RX ORDER — MORPHINE SULFATE 2 MG/ML
1 INJECTION, SOLUTION INTRAMUSCULAR; INTRAVENOUS EVERY 5 MIN PRN
Status: DISCONTINUED | OUTPATIENT
Start: 2024-09-12 | End: 2024-09-12 | Stop reason: HOSPADM

## 2024-09-12 RX ORDER — SODIUM CHLORIDE 9 MG/ML
INJECTION, SOLUTION INTRAVENOUS CONTINUOUS
Status: DISCONTINUED | OUTPATIENT
Start: 2024-09-12 | End: 2024-09-12 | Stop reason: HOSPADM

## 2024-09-12 RX ORDER — OFLOXACIN 3 MG/ML
1 SOLUTION/ DROPS OPHTHALMIC ONCE
Status: COMPLETED | OUTPATIENT
Start: 2024-09-12 | End: 2024-09-12

## 2024-09-12 RX ORDER — NEOMYCIN SULFATE, POLYMYXIN B SULFATE, AND DEXAMETHASONE 3.5; 10000; 1 MG/G; [USP'U]/G; MG/G
OINTMENT OPHTHALMIC PRN
Status: DISCONTINUED | OUTPATIENT
Start: 2024-09-12 | End: 2024-09-12 | Stop reason: ALTCHOICE

## 2024-09-12 RX ORDER — NALOXONE HYDROCHLORIDE 0.4 MG/ML
INJECTION, SOLUTION INTRAMUSCULAR; INTRAVENOUS; SUBCUTANEOUS PRN
Status: DISCONTINUED | OUTPATIENT
Start: 2024-09-12 | End: 2024-09-12 | Stop reason: HOSPADM

## 2024-09-12 RX ORDER — INDOCYANINE GREEN AND WATER 25 MG
KIT INJECTION PRN
Status: DISCONTINUED | OUTPATIENT
Start: 2024-09-12 | End: 2024-09-12 | Stop reason: ALTCHOICE

## 2024-09-12 RX ADMIN — FENTANYL CITRATE 50 MCG: 50 INJECTION, SOLUTION INTRAMUSCULAR; INTRAVENOUS at 12:32

## 2024-09-12 RX ADMIN — FENTANYL CITRATE 50 MCG: 50 INJECTION, SOLUTION INTRAMUSCULAR; INTRAVENOUS at 12:35

## 2024-09-12 RX ADMIN — LABETALOL HYDROCHLORIDE 2.5 MG: 5 INJECTION INTRAVENOUS at 12:50

## 2024-09-12 RX ADMIN — PHENYLEPHRINE HYDROCHLORIDE 1 DROP: 25 SOLUTION/ DROPS OPHTHALMIC at 12:05

## 2024-09-12 RX ADMIN — OFLOXACIN 1 DROP: 3 SOLUTION OPHTHALMIC at 12:05

## 2024-09-12 RX ADMIN — TROPICAMIDE 1 DROP: 10 SOLUTION/ DROPS OPHTHALMIC at 12:05

## 2024-09-12 RX ADMIN — PROPARACAINE HYDROCHLORIDE 1 DROP: 5 SOLUTION/ DROPS OPHTHALMIC at 12:05

## 2024-09-12 RX ADMIN — ONDANSETRON 4 MG: 2 INJECTION, SOLUTION INTRAMUSCULAR; INTRAVENOUS at 12:45

## 2024-09-12 RX ADMIN — SODIUM CHLORIDE: 9 INJECTION, SOLUTION INTRAVENOUS at 12:12

## 2024-09-12 RX ADMIN — MIDAZOLAM 2 MG: 1 INJECTION INTRAMUSCULAR; INTRAVENOUS at 12:31

## 2024-09-12 ASSESSMENT — PAIN - FUNCTIONAL ASSESSMENT
PAIN_FUNCTIONAL_ASSESSMENT: NONE - DENIES PAIN
PAIN_FUNCTIONAL_ASSESSMENT: 0-10
PAIN_FUNCTIONAL_ASSESSMENT: NONE - DENIES PAIN

## 2024-09-12 ASSESSMENT — ENCOUNTER SYMPTOMS: RESPIRATORY NEGATIVE: 1

## 2024-09-12 ASSESSMENT — PAIN SCALES - GENERAL: PAINLEVEL_OUTOF10: 0

## 2024-09-13 DIAGNOSIS — N18.4 ANEMIA OF CHRONIC RENAL FAILURE, STAGE 4 (SEVERE) (HCC): Primary | ICD-10-CM

## 2024-09-13 DIAGNOSIS — D63.1 ANEMIA OF CHRONIC RENAL FAILURE, STAGE 4 (SEVERE) (HCC): Primary | ICD-10-CM

## 2024-09-13 RX ORDER — SODIUM CHLORIDE 9 MG/ML
INJECTION, SOLUTION INTRAVENOUS CONTINUOUS
OUTPATIENT
Start: 2024-09-13

## 2024-09-13 RX ORDER — SODIUM CHLORIDE 9 MG/ML
5-250 INJECTION, SOLUTION INTRAVENOUS PRN
OUTPATIENT
Start: 2024-09-13

## 2024-09-13 RX ORDER — HEPARIN 100 UNIT/ML
500 SYRINGE INTRAVENOUS PRN
OUTPATIENT
Start: 2024-09-13

## 2024-09-13 RX ORDER — SODIUM CHLORIDE 0.9 % (FLUSH) 0.9 %
5-40 SYRINGE (ML) INJECTION PRN
OUTPATIENT
Start: 2024-09-13

## 2024-09-13 RX ORDER — EPINEPHRINE 1 MG/ML
0.3 INJECTION, SOLUTION, CONCENTRATE INTRAVENOUS PRN
OUTPATIENT
Start: 2024-09-13

## 2024-09-13 RX ORDER — DIPHENHYDRAMINE HYDROCHLORIDE 50 MG/ML
50 INJECTION INTRAMUSCULAR; INTRAVENOUS
OUTPATIENT
Start: 2024-09-13

## 2024-10-04 ENCOUNTER — TELEPHONE (OUTPATIENT)
Dept: FAMILY MEDICINE CLINIC | Age: 34
End: 2024-10-04

## 2024-10-04 NOTE — TELEPHONE ENCOUNTER
Corazon Mackey from [facility] called and would like to discuss that sonia has reached out to them for Hospice [phone] Please advise

## 2024-10-04 NOTE — TELEPHONE ENCOUNTER
Returned call. Please schedule patient for office appointment at end of day to discuss this matter further.

## 2024-10-09 ENCOUNTER — TELEPHONE (OUTPATIENT)
Dept: FAMILY MEDICINE CLINIC | Age: 34
End: 2024-10-09

## 2024-10-09 ENCOUNTER — OFFICE VISIT (OUTPATIENT)
Dept: FAMILY MEDICINE CLINIC | Age: 34
End: 2024-10-09
Payer: COMMERCIAL

## 2024-10-09 VITALS
OXYGEN SATURATION: 99 % | DIASTOLIC BLOOD PRESSURE: 88 MMHG | SYSTOLIC BLOOD PRESSURE: 138 MMHG | WEIGHT: 103.4 LBS | BODY MASS INDEX: 18.32 KG/M2 | TEMPERATURE: 98 F | HEART RATE: 61 BPM

## 2024-10-09 DIAGNOSIS — E10.3413: ICD-10-CM

## 2024-10-09 DIAGNOSIS — N18.6 ESRD (END STAGE RENAL DISEASE) (HCC): Primary | ICD-10-CM

## 2024-10-09 DIAGNOSIS — N18.4 TYPE 1 DIABETES MELLITUS WITH STAGE 4 CHRONIC KIDNEY DISEASE (HCC): ICD-10-CM

## 2024-10-09 DIAGNOSIS — E10.42 DIABETIC POLYNEUROPATHY ASSOCIATED WITH TYPE 1 DIABETES MELLITUS (HCC): ICD-10-CM

## 2024-10-09 DIAGNOSIS — E10.22 TYPE 1 DIABETES MELLITUS WITH STAGE 4 CHRONIC KIDNEY DISEASE (HCC): ICD-10-CM

## 2024-10-09 DIAGNOSIS — K31.84 GASTROPARESIS: ICD-10-CM

## 2024-10-09 PROCEDURE — G8427 DOCREV CUR MEDS BY ELIG CLIN: HCPCS | Performed by: FAMILY MEDICINE

## 2024-10-09 PROCEDURE — 4004F PT TOBACCO SCREEN RCVD TLK: CPT | Performed by: FAMILY MEDICINE

## 2024-10-09 PROCEDURE — 2022F DILAT RTA XM EVC RTNOPTHY: CPT | Performed by: FAMILY MEDICINE

## 2024-10-09 PROCEDURE — 3051F HG A1C>EQUAL 7.0%<8.0%: CPT | Performed by: FAMILY MEDICINE

## 2024-10-09 PROCEDURE — 99215 OFFICE O/P EST HI 40 MIN: CPT | Performed by: FAMILY MEDICINE

## 2024-10-09 PROCEDURE — G8484 FLU IMMUNIZE NO ADMIN: HCPCS | Performed by: FAMILY MEDICINE

## 2024-10-09 PROCEDURE — G8419 CALC BMI OUT NRM PARAM NOF/U: HCPCS | Performed by: FAMILY MEDICINE

## 2024-10-09 PROCEDURE — G2211 COMPLEX E/M VISIT ADD ON: HCPCS | Performed by: FAMILY MEDICINE

## 2024-10-09 RX ORDER — CALCIUM ACETATE 667 MG/1
CAPSULE ORAL
COMMUNITY
Start: 2024-09-11

## 2024-10-09 RX ORDER — CALCITRIOL 0.25 UG/1
0.25 CAPSULE, LIQUID FILLED ORAL DAILY
COMMUNITY
Start: 2024-09-11 | End: 2025-09-11

## 2024-10-09 NOTE — PROGRESS NOTES
10/9/2024    Rodolfo Benjamin is a 34 y.o. male here for:    Chief Complaint   Patient presents with    Other     Talk about Hospice Care        HPI:  Hospice Care Discussion   - Patient presents today to discuss potential hospice care. He recently contacted [facility] last week regarding enrollment in hospice. I was informed of this by [facility]. Patient has a PMHx of T1DM with complications (diabetic retinopathy with severe macular edema, neuropathy, and gastroparesis), HTN, and CKD stage IV. He is not a candidate for a renal or pancreatic transplant due to his smoking, BMI (underweight), and not passing a venogram study on 09/30/2024. He follows with Nephrology, Dr. Wilkinson. Patient is refusing AV fistula and dialysis. He states that he has many medical conditions. He states that it is \"just one thing after another.\" He needs another eye surgery due to recent complications from eye surgery on 09/12/2024. He is vomiting after every meal. He has not had a BM since Saturday. These are due to his gastroparesis. Furthermore, he does not have the transportation every other day to go to dialysis. He does not want to end up living in a facility full-time for dialysis. 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 He states that those patients were just \"miserable\" because they did not have a quality of life. Patient has talked to his Mother about his decision, who is a nurse. He tells me that his Mother does not agree with his decision, but she understands. Patient states that he has been thinking about this decision since the first time he saw Dr. Wilkinson, as he knew dialysis would be inevitable in the future. He states that \"dialysis will make his GFR go up,\" but it is not going to \"undo the damage that has been done from the diabetes-- hands, feet, eyes, and stomach. He states that he does not feel depressed. He states that he has thought a lot about this

## 2024-10-09 NOTE — TELEPHONE ENCOUNTER
Pt called in and is asking for a referral [facility] please [facility]

## 2024-10-14 RX ORDER — INSULIN LISPRO 100 [IU]/ML
100 INJECTION, SOLUTION INTRAVENOUS; SUBCUTANEOUS DAILY
Qty: 30 ML | Refills: 0 | Status: SHIPPED | OUTPATIENT
Start: 2024-10-14

## 2024-11-07 DIAGNOSIS — E10.40 DIABETIC NEUROPATHY, TYPE I DIABETES MELLITUS (HCC): ICD-10-CM

## 2024-11-08 RX ORDER — GABAPENTIN 300 MG/1
300 CAPSULE ORAL 2 TIMES DAILY
Qty: 180 CAPSULE | Refills: 0 | Status: SHIPPED | OUTPATIENT
Start: 2024-11-08 | End: 2025-02-06

## 2024-11-08 NOTE — TELEPHONE ENCOUNTER
Name of Medication(s) Requested:  Requested Prescriptions     Pending Prescriptions Disp Refills    gabapentin (NEURONTIN) 300 MG capsule 180 capsule 0     Sig: Take 1 capsule by mouth in the morning and at bedtime for 90 days.       Medication is on current medication list Yes    Dosage and directions were verified? Yes    Quantity verified: 90 day supply     Pharmacy Verified?  Yes    Last Appointment:  10/9/2024    Future appts:  Future Appointments   Date Time Provider Department Center   12/9/2024  2:30 PM Corazon Corbin DO [facility] DEP   2/13/2025 11:00 AM Aminata Powers APRN - CNP [facility]   9/15/2025  3:00 PM Corazon Corbin DO [facility] DEP        (If no appt send self scheduling link. .REFILLAPPT)  Scheduling request sent?     [] Yes  [x] No    Does patient need updated?  [] Yes  [x] No

## 2024-11-25 ENCOUNTER — TELEPHONE (OUTPATIENT)
Dept: ENDOCRINOLOGY | Age: 34
End: 2024-11-25

## 2024-11-25 NOTE — TELEPHONE ENCOUNTER
[facility] states number of file for the patient is not a working phone number.   Skylar is asking if office would send  patient a [employer] message letting him know his Omni pod is ready.  Please call PH. [phone] if there are any  questions or concerns

## 2025-07-24 ENCOUNTER — HOSPITAL ENCOUNTER (EMERGENCY)
Age: 35
Discharge: HOME | End: 2025-07-24
Attending: EMERGENCY MEDICINE
Payer: COMMERCIAL

## 2025-07-24 VITALS
SYSTOLIC BLOOD PRESSURE: 203 MMHG | HEART RATE: 89 BPM | HEIGHT: 63 IN | WEIGHT: 105 LBS | BODY MASS INDEX: 18.61 KG/M2 | OXYGEN SATURATION: 98 % | RESPIRATION RATE: 16 BRPM | DIASTOLIC BLOOD PRESSURE: 133 MMHG | TEMPERATURE: 99 F

## 2025-07-24 DIAGNOSIS — R11.2 NAUSEA AND VOMITING, UNSPECIFIED VOMITING TYPE: Primary | ICD-10-CM

## 2025-07-24 PROCEDURE — 96374 THER/PROPH/DIAG INJ IV PUSH: CPT

## 2025-07-24 PROCEDURE — 99284 EMERGENCY DEPT VISIT MOD MDM: CPT

## 2025-07-24 PROCEDURE — 2580000003 HC RX 258

## 2025-07-24 PROCEDURE — 96375 TX/PRO/DX INJ NEW DRUG ADDON: CPT

## 2025-07-24 PROCEDURE — 6360000002 HC RX W HCPCS

## 2025-07-24 RX ORDER — DIPHENHYDRAMINE HYDROCHLORIDE 50 MG/ML
25 INJECTION, SOLUTION INTRAMUSCULAR; INTRAVENOUS ONCE
Status: COMPLETED | OUTPATIENT
Start: 2025-07-24 | End: 2025-07-24

## 2025-07-24 RX ORDER — 0.9 % SODIUM CHLORIDE 0.9 %
1000 INTRAVENOUS SOLUTION INTRAVENOUS ONCE
Status: COMPLETED | OUTPATIENT
Start: 2025-07-24 | End: 2025-07-24

## 2025-07-24 RX ORDER — PROCHLORPERAZINE EDISYLATE 5 MG/ML
10 INJECTION INTRAMUSCULAR; INTRAVENOUS ONCE
Status: COMPLETED | OUTPATIENT
Start: 2025-07-24 | End: 2025-07-24

## 2025-07-24 RX ADMIN — DIPHENHYDRAMINE HYDROCHLORIDE 25 MG: 50 INJECTION INTRAMUSCULAR; INTRAVENOUS at 21:08

## 2025-07-24 RX ADMIN — PROCHLORPERAZINE EDISYLATE 10 MG: 5 INJECTION INTRAMUSCULAR; INTRAVENOUS at 21:08

## 2025-07-24 RX ADMIN — SODIUM CHLORIDE 1000 ML: 0.9 INJECTION, SOLUTION INTRAVENOUS at 21:08

## 2025-07-24 ASSESSMENT — PAIN - FUNCTIONAL ASSESSMENT: PAIN_FUNCTIONAL_ASSESSMENT: NONE - DENIES PAIN

## 2025-07-25 NOTE — ED PROVIDER NOTES
Disp-90 tablet, R-1Normal             ALLERGIES     Chlorthalidone, Hctz [hydrochlorothiazide], and Hydralazine    FAMILYHISTORY       Family History   Problem Relation Age of Onset    No Known Problems Mother     No Known Problems Father     No Known Problems Brother         Homicide     No Known Problems Daughter     No Known Problems Son     Diabetes type 2  Maternal Grandmother         SOCIAL HISTORY       Social History     Tobacco Use    Smoking status: Every Day     Current packs/day: 0.50     Average packs/day: 0.5 packs/day for 19.6 years (9.8 ttl pk-yrs)     Types: Cigarettes     Start date: 2006    Smokeless tobacco: Never   Vaping Use    Vaping status: Never Used   Substance Use Topics    Alcohol use: Never    Drug use: Not Currently     Types: Opiates , Oxycodone (Oxy)     Comment: ADDICTED TO ORAL OPIATES  CLEAN since 10/2017, now takes Suboxone       SCREENINGS        [place] Coma Scale  Eye Opening: Spontaneous  Best Verbal Response: Oriented  Best Motor Response: Obeys commands  [place]ne Coma Scale Score: 15                CIWA Assessment  BP: (!) 203/133  Pulse: 89           PHYSICAL EXAM  1 or more Elements     ED Triage Vitals [07/24/25 1952]   BP Systolic BP Percentile Diastolic BP Percentile Temp Temp Source Pulse Respirations SpO2   (!) 200/119 -- -- 99 °F (37.2 °C) Oral 89 16 98 %      Height Weight - Scale         1.6 m (5' 3\") 47.6 kg (105 lb)                   Constitutional/General: Alert and oriented x3, frail in appearance, mildly pale  Head: Normocephalic and atraumatic  Eyes: sclera non icteric,   ENT:  Oropharynx clear, handling secretions.  Neck: Supple, no JVD  Respiratory: Pt not in respiratory distress. Normal work of breathing. Lungs clear to auscultation bilaterally.  Cardiovascular:  Regular rate. Regular rhythm. Distal pulses intact.   Chest: No chest wall tenderness  GI:  Abdomen Soft, Non tender, Non distended.  No rebound, guarding, or rigidity.  Glucometer and insulin pump

## (undated) DEVICE — LENS VITRCTMY FLAT DISP

## (undated) DEVICE — STOPCOCK IV PRIMING 0.26ML 3 W W/ TWO FEM LUERLOK PRT 1

## (undated) DEVICE — VITRECTOMY PACK 25 GA INPUT

## (undated) DEVICE — WETFIELD ERASER 25GA FINE TIP STR

## (undated) DEVICE — GLOVE SURG SZ 8 L12IN FNGR THK94MIL STD WHT LTX FREE

## (undated) DEVICE — SYRINGE, LUER LOCK, 10ML: Brand: MEDLINE

## (undated) DEVICE — 3M™ TRANSPORE™ WHITE SURGICAL TAPE 1534-1, 1 INCH X 10 YARD (2,5CM X 9,1M), 12 ROLLS/CARTON 10 CARTONS/CASE: Brand: 3M™ TRANSPORE™

## (undated) DEVICE — STANDARD HYPODERMIC NEEDLE,POLYPROPYLENE HUB: Brand: MONOJECT

## (undated) DEVICE — NEEDLE SYR 18GA L1.5IN RED PLAS HUB S STL BLNT FILL W/O

## (undated) DEVICE — PROBE LSR 25GA DIR FOR VISION

## (undated) DEVICE — PACK PROCEDURE SURG RETINAL ST ES CUST

## (undated) DEVICE — ISPAN C3F8 PERFLUOROPROPANEISPAN* C3F8 IS A FLUORINATED GREENHOUSE GAS COVERED BY THE KYOTO PROTOCOL AND HAS A GLOBAL WARMING POTENTIAL (GWP) OF 8,600. RESIDUAL ISPAN* C3F8 GAS SHOULD BE RECOVERED BY APPROPRIATELY QUALIFIED PERSONNEL AND RECYCLED, RECLAIMED OR DESTROYED IN ACCORDANCE WITH LOCAL ORDINANCES.: Brand: ISPAN

## (undated) DEVICE — 25+® REVOLUTION DSP ILM FORCEPS: Brand: ALCON GRIESHABER REVOLUTION 25+

## (undated) DEVICE — 1060 S-DRAPE SPCL INCISE 10/BX 4BX/CS: Brand: STERI-DRAPE™

## (undated) DEVICE — CANNULA 25GA SOFT TIP 1.0MM SIL TIP

## (undated) DEVICE — NEEDLE SYRINGE 18GA L1.5IN RED PLAS HUB S STL BLNT FILL W/O

## (undated) DEVICE — PROBE LASER 25GA DIR FOR VISION

## (undated) DEVICE — REVOLUTION DSP 25G ILM FORCEPS: Brand: ALCON GRIESHABER REVOLUTION

## (undated) DEVICE — LENS VIRECTOMY FLAT POST CHMBR DIR IMAGE CNTRCT PMMA STRL

## (undated) DEVICE — PICK SURG MIC 45 DEG 25 GAX32 MM SHRP LL HUB